# Patient Record
Sex: FEMALE | Race: WHITE | NOT HISPANIC OR LATINO | Employment: OTHER | URBAN - METROPOLITAN AREA
[De-identification: names, ages, dates, MRNs, and addresses within clinical notes are randomized per-mention and may not be internally consistent; named-entity substitution may affect disease eponyms.]

---

## 2017-04-24 ENCOUNTER — HOSPITAL ENCOUNTER (EMERGENCY)
Facility: CLINIC | Age: 57
Discharge: HOME | End: 2017-04-24
Attending: EMERGENCY MEDICINE | Admitting: EMERGENCY MEDICINE
Payer: COMMERCIAL

## 2017-04-24 ENCOUNTER — APPOINTMENT (OUTPATIENT)
Dept: GENERAL RADIOLOGY | Facility: CLINIC | Age: 57
End: 2017-04-24
Attending: EMERGENCY MEDICINE
Payer: COMMERCIAL

## 2017-04-24 VITALS
TEMPERATURE: 97.8 F | BODY MASS INDEX: 30.31 KG/M2 | HEART RATE: 60 BPM | WEIGHT: 200 LBS | RESPIRATION RATE: 18 BRPM | SYSTOLIC BLOOD PRESSURE: 148 MMHG | DIASTOLIC BLOOD PRESSURE: 92 MMHG | OXYGEN SATURATION: 98 % | HEIGHT: 68 IN

## 2017-04-24 DIAGNOSIS — R07.9 CHEST PAIN, UNSPECIFIED TYPE: ICD-10-CM

## 2017-04-24 LAB
ALBUMIN SERPL-MCNC: 4.1 G/DL (ref 3.4–5)
ALP SERPL-CCNC: 83 U/L (ref 40–150)
ALT SERPL W P-5'-P-CCNC: 36 U/L (ref 0–50)
ANION GAP SERPL CALCULATED.3IONS-SCNC: 7 MMOL/L (ref 3–14)
AST SERPL W P-5'-P-CCNC: 20 U/L (ref 0–45)
BASOPHILS # BLD AUTO: 0 10E9/L (ref 0–0.2)
BASOPHILS NFR BLD AUTO: 0.2 %
BILIRUB SERPL-MCNC: 0.3 MG/DL (ref 0.2–1.3)
BUN SERPL-MCNC: 15 MG/DL (ref 7–30)
CALCIUM SERPL-MCNC: 8.9 MG/DL (ref 8.5–10.1)
CHLORIDE SERPL-SCNC: 105 MMOL/L (ref 94–109)
CO2 SERPL-SCNC: 28 MMOL/L (ref 20–32)
CREAT SERPL-MCNC: 0.65 MG/DL (ref 0.52–1.04)
DIFFERENTIAL METHOD BLD: NORMAL
EOSINOPHIL # BLD AUTO: 0.1 10E9/L (ref 0–0.7)
EOSINOPHIL NFR BLD AUTO: 1.2 %
ERYTHROCYTE [DISTWIDTH] IN BLOOD BY AUTOMATED COUNT: 13.5 % (ref 10–15)
GFR SERPL CREATININE-BSD FRML MDRD: ABNORMAL ML/MIN/1.7M2
GLUCOSE SERPL-MCNC: 135 MG/DL (ref 70–99)
HCT VFR BLD AUTO: 41.6 % (ref 35–47)
HGB BLD-MCNC: 14.2 G/DL (ref 11.7–15.7)
IMM GRANULOCYTES # BLD: 0 10E9/L (ref 0–0.4)
IMM GRANULOCYTES NFR BLD: 0.4 %
INTERPRETATION ECG - MUSE: NORMAL
LYMPHOCYTES # BLD AUTO: 2.1 10E9/L (ref 0.8–5.3)
LYMPHOCYTES NFR BLD AUTO: 37.3 %
MCH RBC QN AUTO: 31.1 PG (ref 26.5–33)
MCHC RBC AUTO-ENTMCNC: 34.1 G/DL (ref 31.5–36.5)
MCV RBC AUTO: 91 FL (ref 78–100)
MONOCYTES # BLD AUTO: 0.3 10E9/L (ref 0–1.3)
MONOCYTES NFR BLD AUTO: 5.7 %
NEUTROPHILS # BLD AUTO: 3.1 10E9/L (ref 1.6–8.3)
NEUTROPHILS NFR BLD AUTO: 55.2 %
NRBC # BLD AUTO: 0 10*3/UL
NRBC BLD AUTO-RTO: 0 /100
PLATELET # BLD AUTO: 163 10E9/L (ref 150–450)
POTASSIUM SERPL-SCNC: 3.8 MMOL/L (ref 3.4–5.3)
PROT SERPL-MCNC: 7.6 G/DL (ref 6.8–8.8)
RBC # BLD AUTO: 4.57 10E12/L (ref 3.8–5.2)
SODIUM SERPL-SCNC: 140 MMOL/L (ref 133–144)
TROPONIN I SERPL-MCNC: NORMAL UG/L (ref 0–0.04)
TROPONIN I SERPL-MCNC: NORMAL UG/L (ref 0–0.04)
WBC # BLD AUTO: 5.6 10E9/L (ref 4–11)

## 2017-04-24 PROCEDURE — 80053 COMPREHEN METABOLIC PANEL: CPT | Performed by: EMERGENCY MEDICINE

## 2017-04-24 PROCEDURE — 93005 ELECTROCARDIOGRAM TRACING: CPT

## 2017-04-24 PROCEDURE — 71020 XR CHEST 2 VW: CPT

## 2017-04-24 PROCEDURE — 25000132 ZZH RX MED GY IP 250 OP 250 PS 637: Performed by: EMERGENCY MEDICINE

## 2017-04-24 PROCEDURE — 85025 COMPLETE CBC W/AUTO DIFF WBC: CPT | Performed by: EMERGENCY MEDICINE

## 2017-04-24 PROCEDURE — 99285 EMERGENCY DEPT VISIT HI MDM: CPT | Mod: 25

## 2017-04-24 PROCEDURE — 84484 ASSAY OF TROPONIN QUANT: CPT | Performed by: EMERGENCY MEDICINE

## 2017-04-24 RX ORDER — ASPIRIN 325 MG
325 TABLET ORAL ONCE
Status: COMPLETED | OUTPATIENT
Start: 2017-04-24 | End: 2017-04-24

## 2017-04-24 RX ADMIN — ASPIRIN 325 MG ORAL TABLET 325 MG: 325 PILL ORAL at 17:36

## 2017-04-24 ASSESSMENT — ENCOUNTER SYMPTOMS
ABDOMINAL PAIN: 0
SHORTNESS OF BREATH: 0
DIAPHORESIS: 0
NAUSEA: 0

## 2017-04-24 NOTE — ED AVS SNAPSHOT
Emergency Department    [address] [place] [address]    Phone:  [phone]    Fax:  [phone]                                       Ilene Gaviria   MRN: [id number]    Department:   Emergency Department   Date of Visit:  4/24/2017           After Visit Summary Signature Page     I have received my discharge instructions, and my questions have been answered. I have discussed any challenges I see with this plan with the nurse or doctor.    ..........................................................................................................................................  Patient/Patient Representative Signature      ..........................................................................................................................................  Patient Representative Print Name and Relationship to Patient    ..................................................               ................................................  Date                                            Time    ..........................................................................................................................................  Reviewed by Signature/Title    ...................................................              ..............................................  Date                                                            Time

## 2017-04-24 NOTE — ED PROVIDER NOTES
"  History     Chief Complaint:  Chest Pain    HPI   Ilene Gaviria is a 56 year old female with a history of untreated hypertension who presents with chest pain. 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 For the last month, she states that she has had brief episodes of dizziness and lightheadedness that worsen with position changes. She states that she has been having symptoms of intermittent chest pressure for years that sometimes will occur with activity. Has never been evaluated for this. Today, she states that she just felt different and \"aware of my heart beating\". While at work, she states that she had symptoms of indigestion with burping and nausea. In addition, she had increased feelings of lightheadedness and dizziness. After going home, she states that she laid down and became very diaphoretic and increased nausea. Because her  had a recent cardiac event and because of her family history, she was concerned and presented to the ED for evaluation. She denies current chest pressure, palpitations, chest pain, shortness of breath, leg swelling or pain, recent travel, or a history of PE/DVT or GERD. She also denies abdominal pain or diarrhea. Feeling better now. No headache, neck pain, numbness/tingling, or weakness.    Allergies:  NKDA    Medications:    The patient is currently on no regular medications.       Past Medical History:    Hypertension    Past Surgical History:     x3  Tubal ligation     Family History:  Mother: MI ( age 41)    Social History:  Relationship status:   The patient has never smoked.   The patient does not drink alcohol.        Review of Systems   Constitutional: Negative for diaphoresis.   Respiratory: Negative for shortness of breath.    Cardiovascular: Negative for chest pain and leg swelling.   Gastrointestinal: Negative for abdominal pain and nausea.   All other systems " "reviewed and are negative.      Physical Exam     Patient Vitals for the past 24 hrs:   BP Temp Temp src Pulse Heart Rate Resp SpO2 Height Weight   04/24/17 1929 (!) 148/92 - - 60 - 18 98 % - -   04/24/17 1805 (!) 150/94 - - - 57 18 96 % - -   04/24/17 1652 (!) 161/98 - - - 61 17 93 % - -   04/24/17 1554 (!) 177/101 97.8  F (36.6  C) Temporal 60 - 18 96 % 1.727 m (5' 8\") 90.7 kg (200 lb)       Physical Exam  General: Resting comfortably on the gurney  Eyes:  The pupils are equal and round    Conjunctivae and sclerae are normal  ENT:    Moist mucous membranes  Neck:  Normal range of motion  CV:  Regular rate and rhythm    Skin warm and well perfused     Radial pulses 2+ bilaterally  Resp:  Lungs are clear    Non-labored    No rales    No wheezing   GI:  Abdomen is soft, there is no rigidity    No distension    No rebound tenderness     No abdominal tenderness  MS:  Normal muscular tone    No leg swelling  Skin:  No rash or acute skin lesions noted  Neuro:   Awake, alert.      Speech is normal and fluent.    Face is symmetric.     Moves all extremities equally    Finger to nose intact  Psych:  Normal affect.  Appropriate interactions.    Emergency Department Course     ECG @ 1716  Indication: Chest pressure  Rate 59 bpm.   NC interval 204 ms.   QRS duration 100 ms.   QT/QTc 450/445 ms.   P-R-T axes 11.  Notes: Sinus bradycardia  Time read 1720      Imaging:  Radiographic findings were communicated with the patient who voiced understanding of the findings.    Chest XR, PA and LAT, per radiology:   IMPRESSION: Normal.      Laboratory:  CBC: WBC 5.6 (WNL) HGB 14.2 (WNL)  (WNL)   CMP: Cr 0.65 (WNL) Glucose 135 (H) Rest WNL  1839: Troponin I: <0.015 (WNL)    Interventions:  1736 : Aspirin, 325 mg, PO    ED Course:  The patient arrived in triage where her vitals were measured and recorded. The patient was then escorted back to the emergency department.  Nursing notes and past medical history reviewed.   I performed a " physical examination of the patient as documented above.  I explained the plan with the patient who consents to this.   An ECG was obtained.   Blood was drawn and sent to the laboratory for testing, see above results.   The patient received the above interventions.   The patient underwent the above imaging studies.   I personally reviewed the laboratory and imaging results with the Patient and answered all related questions prior to discharge.   Findings and plan explained to the Patient. Patient discharged home with instructions regarding supportive care, medications, and reasons to return. The importance of close follow-up was reviewed.     Impression & Plan      Medical Decision Making:  Ilene Gaviria is a 56 year old female who presents for evaluation of chest pain.  Vital signs wnl. Well appearing. Not having any symptoms at this time and feels quite a bit better than earlier today. Diff dx includes ACS, PE, aortic dissection, PTX, CVA/TIA, tumor, mass. EKG shows sinus bradycardia with no acute ischemic changes. Appears similar to prior EKG. Given aspirin. Most concerned about ACS given history. Does have strong family history of ACS with mother's history but patient has minimal risk factors. Has no chest pressure now. Labs obtained and troponin neg x2. Chest xray clear. No neuro deficits so suggest CVA. No shortness of breath, risk factors of PE, and unlikely given history, don't think workup needs to be done at this time for this. Unlikely aortic dissection given asymptomatic now, no severe HTN, no radiation to back and no neuro deficits. Discussed that do think stress test is indicated as next step and discussed options with her but she declines hospitalization for stress test. Strongly encouraged close follow-up and stress test. She has appt with PCP scheduled. I did order outpatient stress test for her. Also recommended daily aspirin. Reasons to return to ED were discussed with patient.        Diagnosis:    ICD-10-CM    1. Chest pain, unspecified type R07.9 Troponin I (now)     Follow-Up with Cardiologist     Exercise Stress Echocardiogram       Disposition:   Discharge to home with cardiology follow up.       I, Gayle Kinney, am serving as a scribe on 4/24/2017 at 5:05 PM to personally document services performed by Lexi Grey MD, based on my observations and the provider's statements to me.         Lexi Grey MD  04/24/17 2358

## 2017-04-24 NOTE — ED AVS SNAPSHOT
Emergency Department    [address] [facility] [address]    Phone:  [phone]    Fax:  [phone]                                       Ilene Gaviria   MRN: [id number]    Department:   Emergency Department   Date of Visit:  4/24/2017           Patient Information     Date Of Birth          1960        Your diagnoses for this visit were:     Chest pain, unspecified type        You were seen by Lexi Grey MD.      Follow-up Information     Schedule an appointment as soon as possible for a visit with Trevor Rizo MD.    Specialty:  Internal Medicine    Contact information:    [facility]  [address]  [facility] [id number] [phone]          Follow up with  Emergency Department.    Specialty:  EMERGENCY MEDICINE    Why:  If symptoms worsen    Contact information:    [address] [facility] [id number] [phone]        Schedule an appointment as soon as possible for a visit with [facility].    Contact information:    [address] [facility]  [address]  [facility] [id number] [phone]          Discharge Instructions       Start taking the aspirin daily  Get a stress test done - call heart center if you don't get a call to schedule this  Follow up with primary care provider after this     Discharge Instructions  Chest Pain    You have been seen today for chest pain or discomfort.  At this time, your doctor has found no signs that your chest pain is due to a serious or life-threatening condition, (or you have declined more testing and/or admission to the hospital). However, sometimes there is a serious problem that does not show up right away. Your evaluation today may not be complete and you may need further testing and evaluation.     You need to follow-up with your regular doctor within 3 days.    Return to the Emergency Department if:    Your chest pain changes, gets worse, starts to happen more  often, or comes with less activity.    You are short of breath.    You get very weak or tired.    You pass out or faint.    You have any new symptoms, like fever, cough, numb legs, or you cough up blood.    You have anything else that worries you.    Until you follow-up with your regular doctor please do the following:    Take one aspirin daily unless you have an allergy or are told not to by your doctor.    If a stress test appointment has been made, go to the appointment.    If you have questions, contact your regular doctor.    If your doctor today has told you to follow-up with your regular doctor, it is very important that you make an appointment with your clinic and go to the appointment.  If you do not follow-up with your primary doctor, it may result in missing an important development which could result in permanent injury or disability and/or lasting pain.  If there is any problem keeping your appointment, call your doctor or return to the Emergency Department.    If you were given a prescription for medicine here today, be sure to read all of the information (including the package insert) that comes with your prescription.  This will include important information about the medicine, its side effects, and any warnings that you need to know about.  The pharmacist who fills the prescription can provide more information and answer questions you may have about the medicine.  If you have questions or concerns that the pharmacist cannot address, please call or return to the Emergency Department.     Remember that you can always come back to the Emergency Department if you are not able to see your regular doctor in the amount of time listed above, if you get any new symptoms, or if there is anything that worries you.          Future Appointments        Provider Department Dept Phone Center    4/27/2017 1:30 PM Trevor Rizo MD [facility] [phone]       24 Hour Appointment Hotline        To make an appointment at any [facility], call [phone] ([phone]). If you don't have a family doctor or clinic, we will help you find one. [place] clinics are conveniently located to serve the needs of you and your family.          ED Discharge Orders     Exercise Stress Echocardiogram       The supervising Cardiologist may change the type of echocardiogram requested to answer a specific clinical question based on existing protocols in the Echocardiography laboratory.    Use of contrast is at the discretion of the supervising Cardiologist.            Follow-Up with Cardiologist                    Review of your medicines      Notice     You have not been prescribed any medications.            Procedures and tests performed during your visit     CBC with platelets differential    Chest XR,  PA & LAT    Comprehensive metabolic panel    EKG 12 lead    Troponin I    Troponin I (now)      Orders Needing Specimen Collection     None      Pending Results     No orders found from 4/22/2017 to 4/25/2017.            Pending Culture Results     No orders found from 4/22/2017 to 4/25/2017.            Test Results From Your Hospital Stay        4/24/2017  5:27 PM      Component Results     Component Value Ref Range & Units Status    WBC 5.6 4.0 - 11.0 10e9/L Final    RBC Count 4.57 3.8 - 5.2 10e12/L Final    Hemoglobin 14.2 11.7 - 15.7 g/dL Final    Hematocrit 41.6 35.0 - 47.0 % Final    MCV 91 78 - 100 fl Final    MCH 31.1 26.5 - 33.0 pg Final    MCHC 34.1 31.5 - 36.5 g/dL Final    RDW 13.5 10.0 - 15.0 % Final    Platelet Count 163 150 - 450 10e9/L Final    Diff Method Automated Method  Final    % Neutrophils 55.2 % Final    % Lymphocytes 37.3 % Final    % Monocytes 5.7 % Final    % Eosinophils 1.2 % Final    % Basophils 0.2 % Final    % Immature Granulocytes 0.4 % Final    Nucleated RBCs 0 0 /100 Final    Absolute Neutrophil 3.1 1.6 - 8.3 10e9/L Final    Absolute Lymphocytes 2.1 0.8 - 5.3 10e9/L Final     Absolute Monocytes 0.3 0.0 - 1.3 10e9/L Final    Absolute Eosinophils 0.1 0.0 - 0.7 10e9/L Final    Absolute Basophils 0.0 0.0 - 0.2 10e9/L Final    Abs Immature Granulocytes 0.0 0 - 0.4 10e9/L Final    Absolute Nucleated RBC 0.0  Final         4/24/2017  5:55 PM      Component Results     Component Value Ref Range & Units Status    Sodium 140 133 - 144 mmol/L Final    Potassium 3.8 3.4 - 5.3 mmol/L Final    Chloride 105 94 - 109 mmol/L Final    Carbon Dioxide 28 20 - 32 mmol/L Final    Anion Gap 7 3 - 14 mmol/L Final    Glucose 135 (H) 70 - 99 mg/dL Final    Urea Nitrogen 15 7 - 30 mg/dL Final    Creatinine 0.65 0.52 - 1.04 mg/dL Final    GFR Estimate >90  Non  GFR Calc   >60 mL/min/1.7m2 Final    GFR Estimate If Black >90   GFR Calc   >60 mL/min/1.7m2 Final    Calcium 8.9 8.5 - 10.1 mg/dL Final    Bilirubin Total 0.3 0.2 - 1.3 mg/dL Final    Albumin 4.1 3.4 - 5.0 g/dL Final    Protein Total 7.6 6.8 - 8.8 g/dL Final    Alkaline Phosphatase 83 40 - 150 U/L Final    ALT 36 0 - 50 U/L Final    AST 20 0 - 45 U/L Final         4/24/2017  5:56 PM      Component Results     Component Value Ref Range & Units Status    Troponin I ES  0.000 - 0.045 ug/L Final    <0.015  The 99th percentile for upper reference range is 0.045 ug/L.  Troponin values in   the range of 0.045 - 0.120 ug/L may be associated with risks of adverse   clinical events.           4/24/2017  6:15 PM      Narrative     XR CHEST 2 VW   4/24/2017 5:55 PM     HISTORY: Chest pain    COMPARISON: None.        Impression     IMPRESSION: Normal.    SHIVA PRIETO MD         4/24/2017  7:05 PM      Component Results     Component Value Ref Range & Units Status    Troponin I ES  0.000 - 0.045 ug/L Final    <0.015  The 99th percentile for upper reference range is 0.045 ug/L.  Troponin values in   the range of 0.045 - 0.120 ug/L may be associated with risks of adverse   clinical events.                  Clinical Quality Measure: Blood  Pressure Screening     Your blood pressure was checked while you were in the emergency department today. The last reading we obtained was  BP: (!) 150/94 . Please read the guidelines below about what these numbers mean and what you should do about them.  If your systolic blood pressure (the top number) is less than 120 and your diastolic blood pressure (the bottom number) is less than 80, then your blood pressure is normal. There is nothing more that you need to do about it.  If your systolic blood pressure (the top number) is 120-139 or your diastolic blood pressure (the bottom number) is 80-89, your blood pressure may be higher than it should be. You should have your blood pressure rechecked within a year by a primary care provider.  If your systolic blood pressure (the top number) is 140 or greater or your diastolic blood pressure (the bottom number) is 90 or greater, you may have high blood pressure. High blood pressure is treatable, but if left untreated over time it can put you at risk for heart attack, stroke, or kidney failure. You should have your blood pressure rechecked by a primary care provider within the next 4 weeks.  If your provider in the emergency department today gave you specific instructions to follow-up with your doctor or provider even sooner than that, you should follow that instruction and not wait for up to 4 weeks for your follow-up visit.        Thank you for choosing [facility]       Thank you for choosing [facility] for your care. Our goal is always to provide you with excellent care. Hearing back from our patients is one way we can continue to improve our services. Please take a few minutes to complete the written survey that you may receive in the mail after you visit with us. Thank you!        [employer] Information     [employer] lets you send messages to your doctor, view your test results, renew your prescriptions, schedule appointments and more. To sign up, go to [email or web address]  ". Click on \"Log in\" on the left side of the screen, which will take you to the Welcome page. Then click on \"Sign up Now\" on the right side of the page.     You will be asked to enter the access code listed below, as well as some personal information. Please follow the directions to create your username and password.     Your access code is: [email or web address]  Expires: 2017  7:24 PM     Your access code will  in 90 days. If you need help or a new code, please call your [facility] clinic or [phone].        Care EveryWhere ID     This is your Care EveryWhere ID. This could be used by other organizations to access your [facility] medical records  [id number]        After Visit Summary       This is your record. Keep this with you and show to your community pharmacist(s) and doctor(s) at your next visit.                  "

## 2017-04-24 NOTE — DISCHARGE INSTRUCTIONS
Start taking the aspirin daily  Get a stress test done - call heart center if you don't get a call to schedule this  Follow up with primary care provider after this     Discharge Instructions  Chest Pain    You have been seen today for chest pain or discomfort.  At this time, your doctor has found no signs that your chest pain is due to a serious or life-threatening condition, (or you have declined more testing and/or admission to the hospital). However, sometimes there is a serious problem that does not show up right away. Your evaluation today may not be complete and you may need further testing and evaluation.     You need to follow-up with your regular doctor within 3 days.    Return to the Emergency Department if:    Your chest pain changes, gets worse, starts to happen more often, or comes with less activity.    You are short of breath.    You get very weak or tired.    You pass out or faint.    You have any new symptoms, like fever, cough, numb legs, or you cough up blood.    You have anything else that worries you.    Until you follow-up with your regular doctor please do the following:    Take one aspirin daily unless you have an allergy or are told not to by your doctor.    If a stress test appointment has been made, go to the appointment.    If you have questions, contact your regular doctor.    If your doctor today has told you to follow-up with your regular doctor, it is very important that you make an appointment with your clinic and go to the appointment.  If you do not follow-up with your primary doctor, it may result in missing an important development which could result in permanent injury or disability and/or lasting pain.  If there is any problem keeping your appointment, call your doctor or return to the Emergency Department.    If you were given a prescription for medicine here today, be sure to read all of the information (including the package insert) that comes with your prescription.  This  will include important information about the medicine, its side effects, and any warnings that you need to know about.  The pharmacist who fills the prescription can provide more information and answer questions you may have about the medicine.  If you have questions or concerns that the pharmacist cannot address, please call or return to the Emergency Department.     Remember that you can always come back to the Emergency Department if you are not able to see your regular doctor in the amount of time listed above, if you get any new symptoms, or if there is anything that worries you.

## 2017-04-27 ENCOUNTER — OFFICE VISIT (OUTPATIENT)
Dept: FAMILY MEDICINE | Facility: CLINIC | Age: 57
End: 2017-04-27
Payer: COMMERCIAL

## 2017-04-27 VITALS
WEIGHT: 200 LBS | BODY MASS INDEX: 30.31 KG/M2 | DIASTOLIC BLOOD PRESSURE: 91 MMHG | OXYGEN SATURATION: 95 % | HEART RATE: 75 BPM | TEMPERATURE: 97.9 F | SYSTOLIC BLOOD PRESSURE: 125 MMHG | HEIGHT: 68 IN

## 2017-04-27 DIAGNOSIS — R03.0 ELEVATED BLOOD PRESSURE READING WITHOUT DIAGNOSIS OF HYPERTENSION: Primary | ICD-10-CM

## 2017-04-27 DIAGNOSIS — Z13.220 LIPID SCREENING: ICD-10-CM

## 2017-04-27 DIAGNOSIS — Z12.11 COLON CANCER SCREENING: ICD-10-CM

## 2017-04-27 DIAGNOSIS — E66.9 NON MORBID OBESITY, UNSPECIFIED OBESITY TYPE: ICD-10-CM

## 2017-04-27 PROCEDURE — 99203 OFFICE O/P NEW LOW 30 MIN: CPT | Performed by: INTERNAL MEDICINE

## 2017-04-27 RX ORDER — TOPIRAMATE 50 MG/1
50 TABLET, FILM COATED ORAL 2 TIMES DAILY
Qty: 60 TABLET | Refills: 1 | Status: SHIPPED | OUTPATIENT
Start: 2017-04-27 | End: 2018-01-07

## 2017-04-27 ASSESSMENT — ENCOUNTER SYMPTOMS
NUMBNESS: 0
NAUSEA: 0
LIGHT-HEADEDNESS: 0
HEADACHES: 0
ABDOMINAL PAIN: 0
DIZZINESS: 0
PALPITATIONS: 0
WEAKNESS: 0
FATIGUE: 0
SHORTNESS OF BREATH: 0
VOMITING: 0

## 2017-04-27 NOTE — PROGRESS NOTES
"  SUBJECTIVE:                                                    Ilene Gaviria is a 56 year old female who presents to clinic today for the following health issues:      ED/UC Followup:    Facility:   ED  Date of visit: 04/24/17  Reason for visit: chest pain  Current Status: patient is fine now     Patient denies having had any further episodes of chest pain since her discharge from the emergency room.  She is scheduled to have a stress test.     She states that this incident was a wake-up call for her and she is trying to eat more healthy and engage in regular exercise. She is interested in weight management.      History reviewed. No pertinent past medical history.  Pap smear, 2015, [facility], normal  Mammogram, 2015,  [facility], normal      Review of Systems   Constitutional: Negative for fatigue.   Eyes: Negative for visual disturbance.   Respiratory: Negative for shortness of breath.    Cardiovascular: Negative for chest pain, palpitations and leg swelling.   Gastrointestinal: Negative for abdominal pain, nausea and vomiting.   Neurological: Negative for dizziness, weakness, light-headedness, numbness and headaches.       BP (!) 125/91 (BP Location: Left arm, Cuff Size: Adult Large)  Pulse 75  Temp 97.9  F (36.6  C) (Tympanic)  Ht 5' 8\" (1.727 m)  Wt 200 lb (90.7 kg)  SpO2 95%  BMI 30.41 kg/m2      Physical Exam   Constitutional: She is oriented to person, place, and time. No distress.   Neck: No thyromegaly present.   Cardiovascular: Normal rate, regular rhythm and normal heart sounds.    Pulmonary/Chest: Effort normal and breath sounds normal. No respiratory distress.   Musculoskeletal: She exhibits no edema.   Neurological: She is alert and oriented to person, place, and time. Coordination normal.   Nursing note and vitals reviewed.          ICD-10-CM    1. Elevated blood pressure reading without diagnosis of hypertension R03.0 Blood Pressure Monitoring KIT   2. Non morbid obesity, " unspecified obesity type E66.9 topiramate (TOPAMAX) 50 MG tablet   3. BMI 30.0-30.9,adult Z68.30 topiramate (TOPAMAX) 50 MG tablet   4. Colon cancer screening Z12.11 Fecal colorectal cancer screen FIT   5. Lipid screening Z13.220 Lipid panel reflex to direct LDL         Patient Instructions   Proceed with your stress test.    Monitor your blood pressure twice a day (once you wake up and before bedtime).  Call doctor if:  -- your blood pressure for the top/upper number is greater than 140 or less than 90  -- your blood pressure for the bottom/lower number is greater than 90 or less than 60    Maintain the following meal plan:    Entree - 200 calories per serving  Shake - 150 calories per serving  Fruit or Vegetable - 100 calories per serving    Breakfast - Entree + Fruit or Vegetable  Snack - Shake   Lunch - Entree + Fruit or Vegetable  Snack - Shake   Dinner - Entree + Fruit or Vegetable  Snack - Shake       Call doctor if you develop any side effects from the Topamax.    Follow up in 1 month.      *30 minutes was spent with the patient, more than half of which was spent on counseling on controlling risk factors for heart disease and on weight management

## 2017-04-27 NOTE — PATIENT INSTRUCTIONS
Proceed with your stress test.    Monitor your blood pressure twice a day (once you wake up and before bedtime).  Call doctor if:  -- your blood pressure for the top/upper number is greater than 140 or less than 90  -- your blood pressure for the bottom/lower number is greater than 90 or less than 60    Maintain the following meal plan:    Entree - 200 calories per serving  Shake - 150 calories per serving  Fruit or Vegetable - 100 calories per serving    Breakfast - Entree + Fruit or Vegetable  Snack - Shake   Lunch - Entree + Fruit or Vegetable  Snack - Shake   Dinner - Entree + Fruit or Vegetable  Snack - Shake       Call doctor if you develop any side effects from the Topamax.    Follow up in 1 month.

## 2017-04-27 NOTE — NURSING NOTE
"Chief Complaint   Patient presents with     Establish Care       Initial BP (!) 125/91 (BP Location: Left arm, Cuff Size: Adult Large)  Pulse 75  Temp 97.9  F (36.6  C) (Tympanic)  Ht 5' 8\" (1.727 m)  Wt 200 lb (90.7 kg)  SpO2 95%  BMI 30.41 kg/m2 Estimated body mass index is 30.41 kg/(m^2) as calculated from the following:    Height as of this encounter: 5' 8\" (1.727 m).    Weight as of this encounter: 200 lb (90.7 kg).  Medication Reconciliation: complete   Kennedi Luis MA    "

## 2017-04-27 NOTE — MR AVS SNAPSHOT
After Visit Summary   4/27/2017    Ilene Gaviria    MRN: [id number]           Patient Information     Date Of Birth          1960        Visit Information        Provider Department      4/27/2017 1:30 PM Trevor Rizo MD [facility]        Today's Diagnoses     Colon cancer screening    -  1    Lipid screening        BMI 30.0-30.9,adult        Elevated blood pressure reading without diagnosis of hypertension          Care Instructions    Proceed with your stress test.    Monitor your blood pressure twice a day (once you wake up and before bedtime).  Call doctor if:  -- your blood pressure for the top/upper number is greater than 140 or less than 90  -- your blood pressure for the bottom/lower number is greater than 90 or less than 60    Maintain the following meal plan:    Entree - 200 calories per serving  Shake - 150 calories per serving  Fruit or Vegetable - 100 calories per serving    Breakfast - Entree + Fruit or Vegetable  Snack - Shake   Lunch - Entree + Fruit or Vegetable  Snack - Shake   Dinner - Entree + Fruit or Vegetable  Snack - Shake       Call doctor if you develop any side effects from the Topamax.    Follow up in 1 month.            Follow-ups after your visit        Your next 10 appointments already scheduled     May 02, 2017  3:00 PM CDT   Ech Stress Test with [id number]   [facility] CV Echocardiography ([facility] at [facility])    [address] [place] [id number] [phone]           1.  Please bring or wear a comfortable two-piece outfit and walking shoes. 2.  Stop eating 3 hours before the test. You may drink water or juice. 3.  Stop all caffeine 12 hours before the test. This includes coffee, tea, soda pop, chocolate and certain medicines (such as Anacin and Excederin). Also avoid decaf coffee and tea, as these contain small amounts of caffeine. 4.  No alcohol, smoking or use of  other tobacco products for 12 hours before the test. 5.  Refer to your provider instructions to see if you need to stop any medications (such as beta-blockers or nitrates) for this test. 6.  For patients with diabetes: -   If you take insulin, call your diabetes care team. Ask if you should take a   dose the morning of your test. -   If you take diabetes medicine by mouth, don't take it on the morning of your test. Bring it with you to take after the test.  (If you have questions, call your diabetes care team) 7.  When you arrive, please tell us if: -   You have diabetes. -   You have taken Viagra, Cialis or Levitra in the past 48 hours. 8.  For any questions that cannot be answered, please contact the ordering physician              Future tests that were ordered for you today     Open Future Orders        Priority Expected Expires Ordered    Lipid panel reflex to direct LDL Routine  4/27/2018 4/27/2017    Fecal colorectal cancer screen FIT Routine 5/18/2017 7/20/2017 4/27/2017            Who to contact     If you have questions or need follow up information about today's clinic visit or your schedule please contact [facility] directly at [phone].  Normal or non-critical lab and imaging results will be communicated to you by MyChart, letter or phone within 4 business days after the clinic has received the results. If you do not hear from us within 7 days, please contact the clinic through [employer] or phone. If you have a critical or abnormal lab result, we will notify you by phone as soon as possible.  Submit refill requests through [employer] or call your pharmacy and they will forward the refill request to us. Please allow 3 business days for your refill to be completed.          Additional Information About Your Visit        [employer] Information     [employer] lets you send messages to your doctor, view your test results, renew your prescriptions, schedule appointments and more. To sign up, go to  [email or web address] . Click on \"Log in\" on the left side of the screen, which will take you to the Welcome page. Then click on \"Sign up Now\" on the right side of the page.     You will be asked to enter the access code listed below, as well as some personal information. Please follow the directions to create your username and password.     Your access code is: [email or web address]  Expires: 2017  7:24 PM     Your access code will  in 90 days. If you need help or a new code, please call your [place] clinic or [phone].        Care EveryWhere ID     This is your Care EveryWhere ID. This could be used by other organizations to access your [facility] medical records  [id number]        Your Vitals Were     Pulse Temperature Height Pulse Oximetry BMI (Body Mass Index)       75 97.9  F (36.6  C) (Tympanic) 5' 8\" (1.727 m) 95% 30.41 kg/m2        Blood Pressure from Last 3 Encounters:   17 (!) 125/91   17 (!) 148/92    Weight from Last 3 Encounters:   17 200 lb (90.7 kg)   17 200 lb (90.7 kg)                 Today's Medication Changes          These changes are accurate as of: 17  2:00 PM.  If you have any questions, ask your nurse or doctor.               Start taking these medicines.        Dose/Directions    Blood Pressure Monitoring Kit   Used for:  Elevated blood pressure reading without diagnosis of hypertension   Started by:  Trevor Rizo MD        Use as directed   Quantity:  1 kit   Refills:  0       topiramate 50 MG tablet   Commonly known as:  TOPAMAX   Used for:  BMI 30.0-30.9,adult   Started by:  Trevor Rizo MD        Dose:  50 mg   Take 1 tablet (50 mg) by mouth 2 times daily Start with half a tablet twice a day for the first week   Quantity:  60 tablet   Refills:  1            Where to get your medicines      These medications were sent to [facility], [place] - [address], [address]  [address], [address], [facility] [address]     Phone:  [phone]     topiramate 50 MG tablet         Some of these will need a paper prescription and others can be bought over the counter.  Ask your nurse if you have questions.     Bring a paper prescription for each of these medications     Blood Pressure Monitoring Kit                Primary Care Provider Office Phone # Fax #    Trevor Tez Rizo -[phone]       [facility] [address]  [facility] [address]        Thank you!     Thank you for choosing [facility]  for your care. Our goal is always to provide you with excellent care. Hearing back from our patients is one way we can continue to improve our services. Please take a few minutes to complete the written survey that you may receive in the mail after your visit with us. Thank you!             Your Updated Medication List - Protect others around you: Learn how to safely use, store and throw away your medicines at www.disposemymeds.org.          This list is accurate as of: 4/27/17  2:00 PM.  Always use your most recent med list.                   Brand Name Dispense Instructions for use    Blood Pressure Monitoring Kit     1 kit    Use as directed       topiramate 50 MG tablet    TOPAMAX    60 tablet    Take 1 tablet (50 mg) by mouth 2 times daily Start with half a tablet twice a day for the first week

## 2017-05-02 ENCOUNTER — HOSPITAL ENCOUNTER (OUTPATIENT)
Dept: CARDIOLOGY | Facility: CLINIC | Age: 57
Discharge: HOME | End: 2017-05-02
Attending: EMERGENCY MEDICINE | Admitting: EMERGENCY MEDICINE
Payer: COMMERCIAL

## 2017-05-02 DIAGNOSIS — R07.9 CHEST PAIN, UNSPECIFIED TYPE: ICD-10-CM

## 2017-05-02 PROCEDURE — 93016 CV STRESS TEST SUPVJ ONLY: CPT | Performed by: INTERNAL MEDICINE

## 2017-05-02 PROCEDURE — 93350 STRESS TTE ONLY: CPT | Mod: 26 | Performed by: INTERNAL MEDICINE

## 2017-05-02 PROCEDURE — 93018 CV STRESS TEST I&R ONLY: CPT | Performed by: INTERNAL MEDICINE

## 2017-05-02 PROCEDURE — 93321 DOPPLER ECHO F-UP/LMTD STD: CPT | Mod: 26 | Performed by: INTERNAL MEDICINE

## 2017-05-02 PROCEDURE — 93325 DOPPLER ECHO COLOR FLOW MAPG: CPT | Mod: 26 | Performed by: INTERNAL MEDICINE

## 2017-05-02 PROCEDURE — 93325 DOPPLER ECHO COLOR FLOW MAPG: CPT | Mod: TC

## 2017-08-17 DIAGNOSIS — Z53.9 ERRONEOUS ENCOUNTER--DISREGARD: Primary | ICD-10-CM

## 2018-01-04 ENCOUNTER — TELEPHONE (OUTPATIENT)
Dept: FAMILY MEDICINE | Facility: CLINIC | Age: 58
End: 2018-01-04

## 2018-01-04 NOTE — TELEPHONE ENCOUNTER
1/4/2018    Call Regarding Preventive Health Screening Colonoscopy    Attempt 1    Message on voicemail    Comments:       Outreach   Roslyn Li

## 2018-01-07 ENCOUNTER — HOSPITAL ENCOUNTER (EMERGENCY)
Facility: CLINIC | Age: 58
Discharge: HOME | End: 2018-01-07
Attending: NURSE PRACTITIONER | Admitting: NURSE PRACTITIONER
Payer: COMMERCIAL

## 2018-01-07 VITALS
TEMPERATURE: 99.3 F | RESPIRATION RATE: 18 BRPM | DIASTOLIC BLOOD PRESSURE: 95 MMHG | HEIGHT: 68 IN | OXYGEN SATURATION: 97 % | SYSTOLIC BLOOD PRESSURE: 146 MMHG | WEIGHT: 200 LBS | HEART RATE: 97 BPM | BODY MASS INDEX: 30.31 KG/M2

## 2018-01-07 DIAGNOSIS — J06.9 UPPER RESPIRATORY TRACT INFECTION, UNSPECIFIED TYPE: ICD-10-CM

## 2018-01-07 PROCEDURE — 99282 EMERGENCY DEPT VISIT SF MDM: CPT

## 2018-01-07 ASSESSMENT — ENCOUNTER SYMPTOMS
NAUSEA: 0
CHILLS: 1
COUGH: 1
DIAPHORESIS: 1
SHORTNESS OF BREATH: 1
DIARRHEA: 0
FEVER: 0
WHEEZING: 0
VOMITING: 0

## 2018-01-07 NOTE — DISCHARGE INSTRUCTIONS

## 2018-01-07 NOTE — ED PROVIDER NOTES
"  History     Chief Complaint:  Cold Symptoms      HPI   Ilene Gaviria is a 57 year old female who presents to the emergency department for evaluation of cold symptoms. The patient states that in the past her colds tend to move into her lungs and gives her trouble breathing. Today the patient complains of a \"croupy cough that has been getting sharper and heavier\". She also notes that she has been having episodes of sweating and chills as well as some shortness of breath. She denies any fevers, vomiting, nausea or diarrhea. She wants to know if there is something that will make her feel better faster.    Allergies:  No known Drug Allergies      Medications:    Blood Pressure Monitoring KIT    Past Medical History:    History reviewed. No pertinent past medical history.    Past Surgical History:    GYN surgery    Family History:    History reviewed. No pertinent family history.     Social History:  Marital Status:   [2]  Social History   Substance Use Topics     Smoking status: Never Smoker     Smokeless tobacco: Never Used     Alcohol use No        Review of Systems   Constitutional: Positive for chills and diaphoresis. Negative for fever.   Respiratory: Positive for cough and shortness of breath. Negative for wheezing.    Gastrointestinal: Negative for diarrhea, nausea and vomiting.   All other systems reviewed and are negative.        Physical Exam     Patient Vitals for the past 24 hrs:   BP Temp Temp src Pulse Heart Rate Resp SpO2 Height Weight   01/07/18 1645 (!) 146/95 - - - - - - - -   01/07/18 1621 (!) 149/108 99.3  F (37.4  C) Oral 97 99 18 97 % 1.727 m (5' 8\") 90.7 kg (200 lb)           Physical Exam  General: Alert, No obvious discomfort, well kept  Eyes: PERRL, conjunctivae pink no scleral icterus or conjunctival injection  ENT:   Moist mucus membranes, posterior oropharynx clear without erythema or exudates, No lymphadenopathy, Normal voice  Resp:  Lungs clear to auscultation bilaterally, no " crackles/rubs/wheezes. Good air movement. Occasional coarse cough  CV:  Normal rate and rhythm, no murmurs/rubs/gallops  GI:  Abdomen soft and non-distended.  Normoactive BS.  No tenderness, guarding or rebound, No masses  Skin:  Warm, dry.  No rashes or petechiae  Musculoskeletal: No peripheral edema or calf tenderness, Normal gross ROM   Neuro: Alert and oriented to person/place/time, normal sensation  Psychiatric: Normal affect, cooperative, good eye contact      Emergency Department Course   Emergency Department Course:  Nursing notes and vitals reviewed.  I performed an exam of the patient as documented above.   I discussed the treatment plan with the patient. They expressed understanding of this plan and consented to discharge. They will be discharged home with instructions for care and follow up. In addition, the patient will return to the emergency department if their symptoms persist, worsen, if new symptoms arise or if there is any concern.  All questions were answered.     I personally reviewed the physical exam results with the patient and answered all related questions prior to discharge.  Impression & Plan      Medical Decision Making:  Ilene Gaviria is a 57 year old female presents for evaluation of upper respiratory symptoms. Patient is concerned for getting to work this week and wanted to be evaluated for her cold symptoms. Evaluation consisted of Physical exam. Non specific viral findings. Exam consistent with viral illness. No evidence of sepsis. No meningismus. Xray not indicated. Discharged with advice for symptomatic treatment including over the counter medication such as Tylenol and Ibuprofen.  Advised to follow up with primary care provider in 3-5 days if continued symptoms, immediately if worse. Patient will return to the ER/UR if they develop high fevers not controlled with medication, difficulty breathing, shortness of breath, or has other concerns.         Diagnosis:    ICD-10-CM     1. Upper respiratory tract infection, unspecified type J06.9      Disposition:   Discharged    Scribe Disclosure:  I, Lakhwinder Mcgregorabel, am serving as a scribe at 4:46 PM on 1/7/2018 to document services personally performed by Rj Mitchell APRN, based on my observations and the provider's statements to me.       EMERGENCY DEPARTMENT       Rj Mitchell APRN CNP  01/07/18 170

## 2018-01-07 NOTE — ED AVS SNAPSHOT
Emergency Department    [address] [place] [address]    Phone:  [phone]    Fax:  [phone]                                       Ilene Gaviria   MRN: [id number]    Department:   Emergency Department   Date of Visit:  1/7/2018           After Visit Summary Signature Page     I have received my discharge instructions, and my questions have been answered. I have discussed any challenges I see with this plan with the nurse or doctor.    ..........................................................................................................................................  Patient/Patient Representative Signature      ..........................................................................................................................................  Patient Representative Print Name and Relationship to Patient    ..................................................               ................................................  Date                                            Time    ..........................................................................................................................................  Reviewed by Signature/Title    ...................................................              ..............................................  Date                                                            Time

## 2018-01-07 NOTE — ED AVS SNAPSHOT
Emergency Department    [address] [facility] [address]    Phone:  [phone]    Fax:  [phone]                                       Ilene Gaviria   MRN: [id number]    Department:   Emergency Department   Date of Visit:  1/7/2018           Patient Information     Date Of Birth          1960        Your diagnoses for this visit were:     Upper respiratory tract infection, unspecified type        You were seen by Rj Mitchell, RENETTA CNP.      Follow-up Information     Follow up with Trevor Rizo MD In 1 week.    Specialty:  Internal Medicine    Why:  if continuned symptoms or sooner if worsening    Contact information:    [address] [address]  [phone]          Discharge Instructions         Viral Upper Respiratory Illness (Adult)  You have a viral upper respiratory illness (URI), which is another term for the common cold. This illness is contagious during the first few days. It is spread through the air by coughing and sneezing. It may also be spread by direct contact (touching the sick person and then touching your own eyes, nose, or mouth). Frequent handwashing will decrease risk of spread. Most viral illnesses go away within 7 to 10 days with rest and simple home remedies. Sometimes the illness may last for several weeks. Antibiotics will not kill a virus, and they are generally not prescribed for this condition.    Home care    If symptoms are severe, rest at home for the first 2 to 3 days. When you resume activity, don't let yourself get too tired.    Avoid being exposed to cigarette smoke (yours or others ).    You may use acetaminophen or ibuprofen to control pain and fever, unless another medicine was prescribed. (Note: If you have chronic liver or kidney disease, have ever had a stomach ulcer or gastrointestinal bleeding, or are taking blood-thinning medicines, talk with your healthcare provider before using these  medicines.) Aspirin should never be given to anyone under 18 years of age who is ill with a viral infection or fever. It may cause severe liver or brain damage.    Your appetite may be poor, so a light diet is fine. Avoid dehydration by drinking 6 to 8 glasses of fluids per day (water, soft drinks, juices, tea, or soup). Extra fluids will help loosen secretions in the nose and lungs.    Over-the-counter cold medicines will not shorten the length of time you re sick, but they may be helpful for the following symptoms: cough, sore throat, and nasal and sinus congestion. (Note: Do not use decongestants if you have high blood pressure.)  Follow-up care  Follow up with your healthcare provider, or as advised.  When to seek medical advice  Call your healthcare provider right away if any of these occur:    Cough with lots of colored sputum (mucus)    Severe headache; face, neck, or ear pain    Difficulty swallowing due to throat pain    Fever of 100.4 F (38 C)  Call 911, or get immediate medical care  Call emergency services right away if any of these occur:    Chest pain, shortness of breath, wheezing, or difficulty breathing    Coughing up blood    Inability to swallow due to throat pain  Date Last Reviewed: 9/13/2015 2000-2017 [employer]. [address] [place] [address], [place] [address]. All rights reserved. This information is not intended as a substitute for professional medical care. Always follow your healthcare professional's instructions.          24 Hour Appointment Hotline       To make an appointment at any [facility], call [phone] ([phone]). If you don't have a family doctor or clinic, we will help you find one. [facility] clinics are conveniently located to serve the needs of you and your family.             Review of your medicines      Our records show that you are taking the medicines listed below. If these are incorrect, please call your family doctor or clinic.        Dose /  Directions Last dose taken    Blood Pressure Monitoring Kit   Quantity:  1 kit        Use as directed   Refills:  0                Orders Needing Specimen Collection     None      Pending Results     No orders found from 1/5/2018 to 1/8/2018.            Pending Culture Results     No orders found from 1/5/2018 to 1/8/2018.            Pending Results Instructions     If you had any lab results that were not finalized at the time of your Discharge, you can call the ED Lab Result RN at [phone]. You will be contacted by this team for any positive Lab results or changes in treatment. The nurses are available 7 days a week from 10A to 6:30P.  You can leave a message 24 hours per day and they will return your call.        Test Results From Your Hospital Stay               Clinical Quality Measure: Blood Pressure Screening     Your blood pressure was checked while you were in the emergency department today. The last reading we obtained was  BP: (!) 149/108 (Simultaneous filing. User may not have seen previous data.) . Please read the guidelines below about what these numbers mean and what you should do about them.  If your systolic blood pressure (the top number) is less than 120 and your diastolic blood pressure (the bottom number) is less than 80, then your blood pressure is normal. There is nothing more that you need to do about it.  If your systolic blood pressure (the top number) is 120-139 or your diastolic blood pressure (the bottom number) is 80-89, your blood pressure may be higher than it should be. You should have your blood pressure rechecked within a year by a primary care provider.  If your systolic blood pressure (the top number) is 140 or greater or your diastolic blood pressure (the bottom number) is 90 or greater, you may have high blood pressure. High blood pressure is treatable, but if left untreated over time it can put you at risk for heart attack, stroke, or kidney failure. You should have your  "blood pressure rechecked by a primary care provider within the next 4 weeks.  If your provider in the emergency department today gave you specific instructions to follow-up with your doctor or provider even sooner than that, you should follow that instruction and not wait for up to 4 weeks for your follow-up visit.        Thank you for choosing [facility]       Thank you for choosing [facility] for your care. Our goal is always to provide you with excellent care. Hearing back from our patients is one way we can continue to improve our services. Please take a few minutes to complete the written survey that you may receive in the mail after you visit with us. Thank you!        [facility]ktghar[facility]akarouler Information     [employer] lets you send messages to your doctor, view your test results, renew your prescriptions, schedule appointments and more. To sign up, go to [email or web address] . Click on \"Log in\" on the left side of the screen, which will take you to the Welcome page. Then click on \"Sign up Now\" on the right side of the page.     You will be asked to enter the access code listed below, as well as some personal information. Please follow the directions to create your username and password.     Your access code is: [email or web address]  Expires: 2018  4:42 PM     Your access code will  in 90 days. If you need help or a new code, please call your [facility] clinic or [phone].        Care EveryWhere ID     This is your Care EveryWhere ID. This could be used by other organizations to access your [facility] medical records  [id number]        Equal Access to Services     SURESH HENAO : Hadii yesenia nicole hadasho Soomaali, waaxda luqadaha, qaybta kaalmada adeegyada, abiodun morales . So [facility] [phone].    ATENCIÓN: Si habla español, tiene a worley disposición servicios gratuitos de asistencia lingüística. Llame al [phone].    We comply with applicable federal civil rights laws and [place] laws. We do not " discriminate on the basis of race, color, national origin, age, disability, sex, sexual orientation, or gender identity.            After Visit Summary       This is your record. Keep this with you and show to your community pharmacist(s) and doctor(s) at your next visit.

## 2018-01-11 NOTE — TELEPHONE ENCOUNTER
1/11/2018    Call Regarding Preventive Health Screening Colonoscopy and Mammogram    Attempt 2    Message on voicemail     Comments:           Outreach   AT

## 2018-02-02 NOTE — TELEPHONE ENCOUNTER
2/2/2018    Call Regarding Preventive Health Screening Colonoscopy and Mammogram    Attempt 3    Message on voicemail    Comments:       Outreach   Roslyn Li

## 2018-04-12 ENCOUNTER — TRANSFERRED RECORDS (OUTPATIENT)
Dept: HEALTH INFORMATION MANAGEMENT | Facility: CLINIC | Age: 58
End: 2018-04-12

## 2018-04-18 ENCOUNTER — TRANSFERRED RECORDS (OUTPATIENT)
Dept: HEALTH INFORMATION MANAGEMENT | Facility: CLINIC | Age: 58
End: 2018-04-18

## 2018-06-05 RX ORDER — LIDOCAINE 40 MG/G
CREAM TOPICAL
Status: CANCELLED | OUTPATIENT
Start: 2018-06-05

## 2018-06-05 RX ORDER — ONDANSETRON 2 MG/ML
4 INJECTION INTRAMUSCULAR; INTRAVENOUS
Status: CANCELLED | OUTPATIENT
Start: 2018-06-05

## 2018-06-13 ENCOUNTER — TRANSFERRED RECORDS (OUTPATIENT)
Dept: HEALTH INFORMATION MANAGEMENT | Facility: CLINIC | Age: 58
End: 2018-06-13

## 2018-07-09 ENCOUNTER — SURGERY (OUTPATIENT)
Age: 58
End: 2018-07-09

## 2018-07-09 ENCOUNTER — HOSPITAL ENCOUNTER (OUTPATIENT)
Facility: CLINIC | Age: 58
Discharge: HOME | End: 2018-07-09
Attending: COLON & RECTAL SURGERY | Admitting: COLON & RECTAL SURGERY
Payer: COMMERCIAL

## 2018-07-09 VITALS
HEIGHT: 68 IN | SYSTOLIC BLOOD PRESSURE: 126 MMHG | BODY MASS INDEX: 29.7 KG/M2 | DIASTOLIC BLOOD PRESSURE: 95 MMHG | OXYGEN SATURATION: 91 % | RESPIRATION RATE: 24 BRPM | WEIGHT: 196 LBS

## 2018-07-09 LAB — FLEXIBLE SIGMOIDOSCOPY: NORMAL

## 2018-07-09 PROCEDURE — 25000128 H RX IP 250 OP 636: Performed by: COLON & RECTAL SURGERY

## 2018-07-09 PROCEDURE — G0500 MOD SEDAT ENDO SERVICE >5YRS: HCPCS | Performed by: COLON & RECTAL SURGERY

## 2018-07-09 PROCEDURE — 45330 DIAGNOSTIC SIGMOIDOSCOPY: CPT | Performed by: COLON & RECTAL SURGERY

## 2018-07-09 RX ORDER — FENTANYL CITRATE 50 UG/ML
INJECTION, SOLUTION INTRAMUSCULAR; INTRAVENOUS PRN
Status: DISCONTINUED | OUTPATIENT
Start: 2018-07-09 | End: 2018-07-09 | Stop reason: HOSPADM

## 2018-07-09 RX ADMIN — FENTANYL CITRATE 100 MCG: 50 INJECTION, SOLUTION INTRAMUSCULAR; INTRAVENOUS at 10:17

## 2018-07-09 RX ADMIN — MIDAZOLAM 1 MG: 1 INJECTION INTRAMUSCULAR; INTRAVENOUS at 10:17

## 2018-07-09 NOTE — H&P
"Pre-Endoscopy History and Physical     Ilene Gaviria MRN# [id number]   YOB: 1960 Age: 57 year old     Date of Procedure: 7/9/2018  Primary care provider: No Ref-Primary, Physician  Type of Endoscopy: flex sig  Reason for Procedure: pre op exam  Type of Anesthesia Anticipated: Moderate Sedation    HPI:    Ilene is a 57 year old female who will be undergoing the above procedure.      A history and physical has been performed. The patient's medications and allergies have been reviewed. The risks and benefits of the procedure including the risk of bleeding, perforation, and missed lesions as well as the sedation options and risks were discussed with the patient.  All questions were answered and informed consent was obtained.      No Known Allergies     Current Facility-Administered Medications   Medication     fentaNYL (PF) (SUBLIMAZE) injection     midazolam (VERSED) injection       Prescriptions Prior to Admission   Medication Sig Dispense Refill Last Dose     Blood Pressure Monitoring KIT Use as directed 1 kit 0        Patient Active Problem List   Diagnosis     BMI 30.0-30.9,adult        Past Medical History:   Diagnosis Date     Cancer (H)     rectal cancer        Past Surgical History:   Procedure Laterality Date     GYN SURGERY      c section X3, tubal        Social History   Substance Use Topics     Smoking status: Never Smoker     Smokeless tobacco: Never Used     Alcohol use Yes      Comment: rare       History reviewed. No pertinent family history.      PHYSICAL EXAM:   BP (!) 166/94  Resp 16  Ht 1.727 m (5' 8\")  Wt 88.9 kg (196 lb)  SpO2 97%  BMI 29.8 kg/m2 Estimated body mass index is 29.8 kg/(m^2) as calculated from the following:    Height as of this encounter: 1.727 m (5' 8\").    Weight as of this encounter: 88.9 kg (196 lb).   Mental status - alert and oriented  RESP: lungs clear  CV: RRR  AIRWAY EXAM: Mallampatti Class II (visualization of the soft palate, fauces, and " abril)    IMPRESSION   ASA Class 2 - Mild systemic disease      Signed Electronically by: Jonny Finney  July 9, 2018    Colorectal Surgery  [phone] (office)  [phone] (pager)  [email or web address]

## 2018-07-26 ENCOUNTER — OFFICE VISIT (OUTPATIENT)
Dept: FAMILY MEDICINE | Facility: CLINIC | Age: 58
End: 2018-07-26
Payer: COMMERCIAL

## 2018-07-26 VITALS
BODY MASS INDEX: 30.46 KG/M2 | TEMPERATURE: 98 F | HEART RATE: 67 BPM | HEIGHT: 68 IN | DIASTOLIC BLOOD PRESSURE: 89 MMHG | SYSTOLIC BLOOD PRESSURE: 128 MMHG | OXYGEN SATURATION: 97 % | WEIGHT: 201 LBS

## 2018-07-26 DIAGNOSIS — C20 RECTAL CANCER (H): ICD-10-CM

## 2018-07-26 DIAGNOSIS — Z01.818 PREOP GENERAL PHYSICAL EXAM: Primary | ICD-10-CM

## 2018-07-26 LAB
CREAT SERPL-MCNC: 0.6 MG/DL (ref 0.52–1.04)
GFR SERPL CREATININE-BSD FRML MDRD: >90 ML/MIN/1.7M2
HGB BLD-MCNC: 13 G/DL (ref 11.7–15.7)
POTASSIUM SERPL-SCNC: 4.2 MMOL/L (ref 3.4–5.3)

## 2018-07-26 PROCEDURE — 82565 ASSAY OF CREATININE: CPT | Performed by: NURSE PRACTITIONER

## 2018-07-26 PROCEDURE — 85018 HEMOGLOBIN: CPT | Performed by: NURSE PRACTITIONER

## 2018-07-26 PROCEDURE — 84132 ASSAY OF SERUM POTASSIUM: CPT | Performed by: NURSE PRACTITIONER

## 2018-07-26 PROCEDURE — 99214 OFFICE O/P EST MOD 30 MIN: CPT | Performed by: NURSE PRACTITIONER

## 2018-07-26 PROCEDURE — 93000 ELECTROCARDIOGRAM COMPLETE: CPT | Performed by: NURSE PRACTITIONER

## 2018-07-26 PROCEDURE — 36415 COLL VENOUS BLD VENIPUNCTURE: CPT | Performed by: NURSE PRACTITIONER

## 2018-07-26 NOTE — MR AVS SNAPSHOT
After Visit Summary   7/26/2018    Ilene Gaviria    MRN: [id number]           Patient Information     Date Of Birth          1960        Visit Information        Provider Department      7/26/2018 8:30 AM Oles[facility] Sands APR[facility]        Today's Diagnoses     Preop general physical exam    -  1    Rectal cancer (H)          Care Instructions      Before Your Surgery      Call your surgeon if there is any change in your health. This includes signs of a cold or flu (such as a sore throat, runny nose, cough, rash or fever).    Do not smoke, drink alcohol or take over the counter medicine (unless your surgeon or primary care doctor tells you to) for the 24 hours before and after surgery.    If you take prescribed drugs: Follow your doctor s orders about which medicines to take and which to stop until after surgery.    Eating and drinking prior to surgery: follow the instructions from your surgeon    Take a shower or bath the night before surgery. Use the soap your surgeon gave you to gently clean your skin. If you do not have soap from your surgeon, use your regular soap. Do not shave or scrub the surgery site.  Wear clean pajamas and have clean sheets on your bed.           Follow-ups after your visit        Your next 10 appointments already scheduled     Aug 02, 2018 10:00 AM CDT   Consult HOD with  Ostomy Wound Room 743, RN   [facility] Wound Ostomy ([facility])    [address]  [facility] [address] [phone]            Aug 07, 2018   Procedure with Jonny Finney MD   [facility] PeriOP Services (--)    [address]  [facility] [address] [phone]              Who to contact     If you have questions or need follow up information about today's clinic visit or your schedule please contact [facility] directly at [phone].  Normal or non-critical lab and imaging results will be  "communicated to you by MyChart, letter or phone within 4 business days after the clinic has received the results. If you do not hear from us within 7 days, please contact the clinic through MyChart or phone. If you have a critical or abnormal lab result, we will notify you by phone as soon as possible.  Submit refill requests through [employer] or call your pharmacy and they will forward the refill request to us. Please allow 3 business days for your refill to be completed.          Additional Information About Your Visit        Care EveryWhere ID     This is your Care EveryWhere ID. This could be used by other organizations to access your [facility] medical records  [id number]        Your Vitals Were     Pulse Temperature Height Pulse Oximetry BMI (Body Mass Index)       67 98  F (36.7  C) (Tympanic) 5' 8\" (1.727 m) 97% 30.56 kg/m2        Blood Pressure from Last 3 Encounters:   07/26/18 128/89   07/09/18 (!) 126/95   01/07/18 (!) 146/95    Weight from Last 3 Encounters:   07/26/18 201 lb (91.2 kg)   07/09/18 196 lb (88.9 kg)   01/07/18 200 lb (90.7 kg)              We Performed the Following     Creatinine     EKG 12-lead complete w/read - Clinics     Hemoglobin     Potassium        Primary Care Provider Office Phone # Fax #    Radha Dick [facility] [phone] [address]  [facility] [address]        Equal Access to Services     [facility]ASH[facility] : Hadii aad ku hadasho Soomaali, waaxda luqadaha, qaybta kaalmada adeegyada, abiodun morales . So [facility] [phone].    ATENCIÓN: Si habla español, tiene a worley disposición servicios gratuitos de asistencia lingüística. Llame al [phone].    We comply with applicable federal civil rights laws and [place] laws. We do not discriminate on the basis of race, color, national origin, age, disability, sex, sexual orientation, or gender identity.            Thank you!     Thank you for choosing [facility]  for your " care. Our goal is always to provide you with excellent care. Hearing back from our patients is one way we can continue to improve our services. Please take a few minutes to complete the written survey that you may receive in the mail after your visit with us. Thank you!             Your Updated Medication List - Protect others around you: Learn how to safely use, store and throw away your medicines at www.disposemymeds.org.      Notice  As of 7/26/2018  6:04 PM    You have not been prescribed any medications.

## 2018-07-26 NOTE — LETTER
[place] [address] [facility]  [address]  [place]  [address]  Tel: [phone]    July 26, 2018    Ilene Gaviria  [address] [place] [address]   [place] [address]        Dear Tom Solis  Your preop labs look great! Best of luck on your surgery!    If you have any further questions or problems, please contact our office.      Sincerely,    Olesya Sands NP/ Beatriz Aguero CMA  Results for orders placed or performed in visit on 07/26/18   Creatinine   Result Value Ref Range    Creatinine 0.60 0.52 - 1.04 mg/dL    GFR Estimate >90 >60 mL/min/1.7m2    GFR Estimate If Black >90 >60 mL/min/1.7m2   Hemoglobin   Result Value Ref Range    Hemoglobin 13.0 11.7 - 15.7 g/dL   Potassium   Result Value Ref Range    Potassium 4.2 3.4 - 5.3 mmol/L               Enclosure: Lab Results

## 2018-07-26 NOTE — PROGRESS NOTES
[address] [address]  [phone]  Dept: [phone]    PRE-OP EVALUATION:  Today's date: 2018    Ilene Gaviria (: 1960) presents for pre-operative evaluation assessment as requested by Dr. Jonny Finney.  She requires evaluation and anesthesia risk assessment prior to undergoing surgery/procedure for treatment of DAVINCI XI COLECTOMY .    Proposed Surgery/ Procedure:DAVINCI XI COLECTOMY   Date of Surgery/ Procedure: 2018  Time of Surgery/ Procedure: 7:30AM  Hospital/Surgical Facility:  OR  Primary Physician: No Ref-Primary, Physician  Type of Anesthesia Anticipated: General    Patient has a Health Care Directive or Living Will:  NO    1. NO - Do you have a history of heart attack, stroke, stent, bypass or surgery on an artery in the head, neck, heart or legs?  2. NO - Do you ever have any pain or discomfort in your chest?  3. NO - Do you have a history of  Heart Failure?  4. NO - Are you troubled by shortness of breath when: walking on the level, up a slight hill or at night?  5. NO - Do you currently have a cold, bronchitis or other respiratory infection?  6. NO - Do you have a cough, shortness of breath or wheezing?  7. NO - Do you sometimes get pains in the calves of your legs when you walk?  8. NO - Do you or anyone in your family have previous history of blood clots?  9. NO - Do you or does anyone in your family have a serious bleeding problem such as prolonged bleeding following surgeries or cuts?  10. NO - Have you ever had problems with anemia or been told to take iron pills?  11. YES - HAVE YOU HAD ANY ABNORMAL BLOOD LOSS SUCH AS BLACK, TARRY OR BLOODY STOOLS, OR ABNORMAL VAGINAL BLEEDING? Current rectal cancer, small amt of rectal bleeding  12. NO - Have you ever had a blood transfusion?  13. NO - Have you or any of your relatives ever had problems with anesthesia?  14. NO - Do you have sleep apnea, excessive snoring or daytime  "drowsiness?  15. NO - Do you have any prosthetic heart valves?  16. NO - Do you have prosthetic joints?  17. NO - Is there any chance that you may be pregnant?      HPI:     HPI related to upcoming procedure: Daignosed 4/2018 with rectal cancer   Is going to [facility].  Has been feeling great lately with no complaints. She is active and there are no concerns     See problem list for active medical problems.  Problems all longstanding and stable, except as noted/documented.  See ROS for pertinent symptoms related to these conditions.                                                                                                                                                              MEDICAL HISTORY:     Patient Active Problem List    Diagnosis Date Noted     BMI 30.0-30.9,adult 04/27/2017     Priority: Medium      Past Medical History:   Diagnosis Date     Cancer (H)     rectal cancer     Past Surgical History:   Procedure Laterality Date     GYN SURGERY      c section X3, tubal      SIGMOIDOSCOPY FLEXIBLE N/A 7/9/2018    Procedure: SIGMOIDOSCOPY FLEXIBLE;  FLEXIBILE SIGMOIDOSCOPY;  Surgeon: Jonny Finney MD;  Location: [facility]     Current Outpatient Prescriptions   Medication Sig Dispense Refill     Blood Pressure Monitoring KIT Use as directed 1 kit 0     OTC products: None, except as noted above    No Known Allergies   Latex Allergy: NO    Social History   Substance Use Topics     Smoking status: Never Smoker     Smokeless tobacco: Never Used     Alcohol use Yes      Comment: rare     History   Drug Use No       REVIEW OF SYSTEMS:   Constitutional, neuro, ENT, endocrine, pulmonary, cardiac, gastrointestinal, genitourinary, musculoskeletal, integument and psychiatric systems are negative, except as otherwise noted.    EXAM:   /89 (BP Location: Right arm, Cuff Size: Adult Regular)  Pulse 67  Temp 98  F (36.7  C) (Tympanic)  Ht 5' 8\" (1.727 m)  Wt 201 lb (91.2 kg)  SpO2 97%  BMI 30.56 kg/m2    " GENERAL APPEARANCE: healthy, alert and no distress     EYES: EOMI, PERRL     HENT: ear canals and TM's normal and nose and mouth without ulcers or lesions     NECK: no adenopathy, no asymmetry, masses, or scars and thyroid normal to palpation     RESP: lungs clear to auscultation - no rales, rhonchi or wheezes     CV: regular rates and rhythm, normal S1 S2, no S3 or S4 and no murmur, click or rub     MS: extremities normal- no gross deformities noted, no evidence of inflammation in joints, FROM in all extremities.     SKIN: no suspicious lesions or rashes     NEURO: Normal strength and tone, sensory exam grossly normal, mentation intact and speech normal     PSYCH: mentation appears normal. and affect normal/bright     LYMPHATICS: No cervical adenopathy    DIAGNOSTICS:     EKG: appears normal, NSR, normal axis, normal intervals, no acute ST/T changes c/w ischemia, no LVH by voltage criteria  Labs Resulted Today:   Results for orders placed or performed in visit on 07/26/18   Creatinine   Result Value Ref Range    Creatinine 0.60 0.52 - 1.04 mg/dL    GFR Estimate >90 >60 mL/min/1.7m2    GFR Estimate If Black >90 >60 mL/min/1.7m2   Hemoglobin   Result Value Ref Range    Hemoglobin 13.0 11.7 - 15.7 g/dL   Potassium   Result Value Ref Range    Potassium 4.2 3.4 - 5.3 mmol/L         Recent Labs   Lab Test  04/24/17   1659   HGB  14.2   PLT  163   NA  140   POTASSIUM  3.8   CR  0.65        IMPRESSION:   Reason for surgery/procedure: rectal cancer  Diagnosis/reason for consult: medical preop    The proposed surgical procedure is considered HIGH risk.    REVISED CARDIAC RISK INDEX  The patient has the following serious cardiovascular risks for perioperative complications such as (MI, PE, VFib and 3  AV Block):  No serious cardiac risks  INTERPRETATION: 0 risks: Class I (very low risk - 0.4% complication rate)    The patient has the following additional risks for perioperative complications:  No identified additional  risks      ICD-10-CM    1. Preop general physical exam Z01.818 EKG 12-lead complete w/read - Clinics     Creatinine     Hemoglobin     Potassium   2. Rectal cancer (H) C20        RECOMMENDATIONS:     --Consult hospital rounder / IM to assist post-op medical management    --Patient is on no chronic medications      APPROVAL GIVEN to proceed with proposed procedure, without further diagnostic evaluation       Signed Electronically by: RENETTA Burdick CNP    Copy of this evaluation report is provided to requesting physician.    [facility] Preop Guidelines    Revised Cardiac Risk Index

## 2018-08-02 ENCOUNTER — HOSPITAL ENCOUNTER (OUTPATIENT)
Dept: WOUND CARE | Facility: CLINIC | Age: 58
End: 2018-08-02
Attending: COLON & RECTAL SURGERY
Payer: COMMERCIAL

## 2018-08-02 PROCEDURE — 40000903 ZZH STATISTIC WOC PT EDUCATION, 31-45 MIN

## 2018-08-02 NOTE — PROGRESS NOTES
"[facility] Nurse Ostomy Marking and Education         Data:   History:    Pt with new hx of rectal CA.  Pt scheduled for APR with permanent colostomy. Pt is . Good support system. Pt very motivated to have surgery and move forward with life. 3 x c-sections in past    Pt referred by Dr. Castorena  Who is present for marking and education: Patient only  Type of ostomy surgery:  APR with permanent colostomy  Abdomen:   Rounded and soft.  Deep crease @ level of umbilicus when pt sits. LLQ bulge also rolls into deep crease when pt sits           Intervention:     Patient's chart evaluated.      Assessments done today:  Belt line, previous abdominal surgeries and scars, abdominal muscles and pt observed lying, sitting and standing.     Education: Reviewed upcoming surgery, stoma construction, post-op expectations, general Colostomy cares/concerns diet and hydration, bathing, odor, output,  activity, appliances and emptying.  Pt is interested in closed pouching system post-op    All patient questions answered: Yes    Patient marked with \"x\" using surgical marker and scratch method then allowed to dry 3 minutes and sealed with 3M No Sting Skin Prep. Pt marked apex of LLQ abd bulge that allows for adequate visualization and surrounding skin for pouching system.   Site is below betline of patients jeans         Assessment:      Pt is ready to proceed with OR on 8-7-18         Plan:     Plan:   ? Colostomy booklet and sample pouches given to patient for review.              Pt has [facility] contact information for any ?/concerns prior to OR        Will plan to follow patient post operatively.          "

## 2018-08-03 NOTE — H&P (VIEW-ONLY)
[address] [address]  [phone]  Dept: [phone]    PRE-OP EVALUATION:  Today's date: 2018    Ilene Gaviria (: 1960) presents for pre-operative evaluation assessment as requested by Dr. Jonny Finney.  She requires evaluation and anesthesia risk assessment prior to undergoing surgery/procedure for treatment of DAVINCI XI COLECTOMY .    Proposed Surgery/ Procedure:DAVINCI XI COLECTOMY   Date of Surgery/ Procedure: 2018  Time of Surgery/ Procedure: 7:30AM  Hospital/Surgical Facility:  OR  Primary Physician: No Ref-Primary, Physician  Type of Anesthesia Anticipated: General    Patient has a Health Care Directive or Living Will:  NO    1. NO - Do you have a history of heart attack, stroke, stent, bypass or surgery on an artery in the head, neck, heart or legs?  2. NO - Do you ever have any pain or discomfort in your chest?  3. NO - Do you have a history of  Heart Failure?  4. NO - Are you troubled by shortness of breath when: walking on the level, up a slight hill or at night?  5. NO - Do you currently have a cold, bronchitis or other respiratory infection?  6. NO - Do you have a cough, shortness of breath or wheezing?  7. NO - Do you sometimes get pains in the calves of your legs when you walk?  8. NO - Do you or anyone in your family have previous history of blood clots?  9. NO - Do you or does anyone in your family have a serious bleeding problem such as prolonged bleeding following surgeries or cuts?  10. NO - Have you ever had problems with anemia or been told to take iron pills?  11. YES - HAVE YOU HAD ANY ABNORMAL BLOOD LOSS SUCH AS BLACK, TARRY OR BLOODY STOOLS, OR ABNORMAL VAGINAL BLEEDING? Current rectal cancer, small amt of rectal bleeding  12. NO - Have you ever had a blood transfusion?  13. NO - Have you or any of your relatives ever had problems with anesthesia?  14. NO - Do you have sleep apnea, excessive snoring or daytime  "drowsiness?  15. NO - Do you have any prosthetic heart valves?  16. NO - Do you have prosthetic joints?  17. NO - Is there any chance that you may be pregnant?      HPI:     HPI related to upcoming procedure: Daignosed 4/2018 with rectal cancer   Is going to [facility].  Has been feeling great lately with no complaints. She is active and there are no concerns     See problem list for active medical problems.  Problems all longstanding and stable, except as noted/documented.  See ROS for pertinent symptoms related to these conditions.                                                                                                                                                              MEDICAL HISTORY:     Patient Active Problem List    Diagnosis Date Noted     BMI 30.0-30.9,adult 04/27/2017     Priority: Medium      Past Medical History:   Diagnosis Date     Cancer (H)     rectal cancer     Past Surgical History:   Procedure Laterality Date     GYN SURGERY      c section X3, tubal      SIGMOIDOSCOPY FLEXIBLE N/A 7/9/2018    Procedure: SIGMOIDOSCOPY FLEXIBLE;  FLEXIBILE SIGMOIDOSCOPY;  Surgeon: Jonny Finney MD;  Location: [facility]     Current Outpatient Prescriptions   Medication Sig Dispense Refill     Blood Pressure Monitoring KIT Use as directed 1 kit 0     OTC products: None, except as noted above    No Known Allergies   Latex Allergy: NO    Social History   Substance Use Topics     Smoking status: Never Smoker     Smokeless tobacco: Never Used     Alcohol use Yes      Comment: rare     History   Drug Use No       REVIEW OF SYSTEMS:   Constitutional, neuro, ENT, endocrine, pulmonary, cardiac, gastrointestinal, genitourinary, musculoskeletal, integument and psychiatric systems are negative, except as otherwise noted.    EXAM:   /89 (BP Location: Right arm, Cuff Size: Adult Regular)  Pulse 67  Temp 98  F (36.7  C) (Tympanic)  Ht 5' 8\" (1.727 m)  Wt 201 lb (91.2 kg)  SpO2 97%  BMI 30.56 kg/m2    " GENERAL APPEARANCE: healthy, alert and no distress     EYES: EOMI, PERRL     HENT: ear canals and TM's normal and nose and mouth without ulcers or lesions     NECK: no adenopathy, no asymmetry, masses, or scars and thyroid normal to palpation     RESP: lungs clear to auscultation - no rales, rhonchi or wheezes     CV: regular rates and rhythm, normal S1 S2, no S3 or S4 and no murmur, click or rub     MS: extremities normal- no gross deformities noted, no evidence of inflammation in joints, FROM in all extremities.     SKIN: no suspicious lesions or rashes     NEURO: Normal strength and tone, sensory exam grossly normal, mentation intact and speech normal     PSYCH: mentation appears normal. and affect normal/bright     LYMPHATICS: No cervical adenopathy    DIAGNOSTICS:     EKG: appears normal, NSR, normal axis, normal intervals, no acute ST/T changes c/w ischemia, no LVH by voltage criteria  Labs Resulted Today:   Results for orders placed or performed in visit on 07/26/18   Creatinine   Result Value Ref Range    Creatinine 0.60 0.52 - 1.04 mg/dL    GFR Estimate >90 >60 mL/min/1.7m2    GFR Estimate If Black >90 >60 mL/min/1.7m2   Hemoglobin   Result Value Ref Range    Hemoglobin 13.0 11.7 - 15.7 g/dL   Potassium   Result Value Ref Range    Potassium 4.2 3.4 - 5.3 mmol/L         Recent Labs   Lab Test  04/24/17   1659   HGB  14.2   PLT  163   NA  140   POTASSIUM  3.8   CR  0.65        IMPRESSION:   Reason for surgery/procedure: rectal cancer  Diagnosis/reason for consult: medical preop    The proposed surgical procedure is considered HIGH risk.    REVISED CARDIAC RISK INDEX  The patient has the following serious cardiovascular risks for perioperative complications such as (MI, PE, VFib and 3  AV Block):  No serious cardiac risks  INTERPRETATION: 0 risks: Class I (very low risk - 0.4% complication rate)    The patient has the following additional risks for perioperative complications:  No identified additional  risks      ICD-10-CM    1. Preop general physical exam Z01.818 EKG 12-lead complete w/read - Clinics     Creatinine     Hemoglobin     Potassium   2. Rectal cancer (H) C20        RECOMMENDATIONS:     --Consult hospital rounder / IM to assist post-op medical management    --Patient is on no chronic medications      APPROVAL GIVEN to proceed with proposed procedure, without further diagnostic evaluation       Signed Electronically by: RENETTA Burdick CNP    Copy of this evaluation report is provided to requesting physician.    [facility] Preop Guidelines    Revised Cardiac Risk Index

## 2018-08-07 ENCOUNTER — HOSPITAL ENCOUNTER (INPATIENT)
Facility: CLINIC | Age: 58
LOS: 4 days | Discharge: HOME | DRG: 331 | End: 2018-08-11
Attending: COLON & RECTAL SURGERY | Admitting: COLON & RECTAL SURGERY
Payer: COMMERCIAL

## 2018-08-07 ENCOUNTER — ANESTHESIA (OUTPATIENT)
Dept: SURGERY | Facility: CLINIC | Age: 58
DRG: 331 | End: 2018-08-07
Payer: COMMERCIAL

## 2018-08-07 ENCOUNTER — ANESTHESIA EVENT (OUTPATIENT)
Dept: SURGERY | Facility: CLINIC | Age: 58
DRG: 331 | End: 2018-08-07
Payer: COMMERCIAL

## 2018-08-07 DIAGNOSIS — C20 RECTAL CANCER (H): Primary | ICD-10-CM

## 2018-08-07 LAB
ABO + RH BLD: NORMAL
ABO + RH BLD: NORMAL
BLD GP AB SCN SERPL QL: NORMAL
BLOOD BANK CMNT PATIENT-IMP: NORMAL
CREAT SERPL-MCNC: 0.56 MG/DL (ref 0.52–1.04)
GFR SERPL CREATININE-BSD FRML MDRD: >90 ML/MIN/1.7M2
PLATELET # BLD AUTO: 116 10E9/L (ref 150–450)
SPECIMEN EXP DATE BLD: NORMAL

## 2018-08-07 PROCEDURE — 0D1N4Z4 BYPASS SIGMOID COLON TO CUTANEOUS, PERCUTANEOUS ENDOSCOPIC APPROACH: ICD-10-PCS | Performed by: COLON & RECTAL SURGERY

## 2018-08-07 PROCEDURE — 0DTP4ZZ RESECTION OF RECTUM, PERCUTANEOUS ENDOSCOPIC APPROACH: ICD-10-PCS | Performed by: COLON & RECTAL SURGERY

## 2018-08-07 PROCEDURE — 25000132 ZZH RX MED GY IP 250 OP 250 PS 637: Performed by: COLON & RECTAL SURGERY

## 2018-08-07 PROCEDURE — 8E0W4CZ ROBOTIC ASSISTED PROCEDURE OF TRUNK REGION, PERCUTANEOUS ENDOSCOPIC APPROACH: ICD-10-PCS | Performed by: COLON & RECTAL SURGERY

## 2018-08-07 PROCEDURE — 40000169 ZZH STATISTIC PRE-PROCEDURE ASSESSMENT I: Performed by: COLON & RECTAL SURGERY

## 2018-08-07 PROCEDURE — 86850 RBC ANTIBODY SCREEN: CPT | Performed by: COLON & RECTAL SURGERY

## 2018-08-07 PROCEDURE — 25800025 ZZH RX 258: Performed by: COLON & RECTAL SURGERY

## 2018-08-07 PROCEDURE — 88309 TISSUE EXAM BY PATHOLOGIST: CPT | Mod: 26 | Performed by: COLON & RECTAL SURGERY

## 2018-08-07 PROCEDURE — 88309 TISSUE EXAM BY PATHOLOGIST: CPT | Performed by: COLON & RECTAL SURGERY

## 2018-08-07 PROCEDURE — 37000009 ZZH ANESTHESIA TECHNICAL FEE, EACH ADDTL 15 MIN: Performed by: COLON & RECTAL SURGERY

## 2018-08-07 PROCEDURE — 25000566 ZZH SEVOFLURANE, EA 15 MIN: Performed by: COLON & RECTAL SURGERY

## 2018-08-07 PROCEDURE — 25000125 ZZHC RX 250: Performed by: COLON & RECTAL SURGERY

## 2018-08-07 PROCEDURE — 25000128 H RX IP 250 OP 636: Performed by: ANESTHESIOLOGY

## 2018-08-07 PROCEDURE — 12000007 ZZH R&B INTERMEDIATE

## 2018-08-07 PROCEDURE — 25000128 H RX IP 250 OP 636: Performed by: NURSE ANESTHETIST, CERTIFIED REGISTERED

## 2018-08-07 PROCEDURE — 25000125 ZZHC RX 250: Performed by: NURSE ANESTHETIST, CERTIFIED REGISTERED

## 2018-08-07 PROCEDURE — 27210995 ZZH RX 272: Performed by: COLON & RECTAL SURGERY

## 2018-08-07 PROCEDURE — 25000125 ZZHC RX 250: Performed by: ANESTHESIOLOGY

## 2018-08-07 PROCEDURE — 0DBN4ZZ EXCISION OF SIGMOID COLON, PERCUTANEOUS ENDOSCOPIC APPROACH: ICD-10-PCS | Performed by: COLON & RECTAL SURGERY

## 2018-08-07 PROCEDURE — 0JX90ZC TRANSFER BUTTOCK SUBCUTANEOUS TISSUE AND FASCIA WITH SKIN, SUBCUTANEOUS TISSUE AND FASCIA, OPEN APPROACH: ICD-10-PCS | Performed by: PLASTIC SURGERY

## 2018-08-07 PROCEDURE — 25000128 H RX IP 250 OP 636: Performed by: COLON & RECTAL SURGERY

## 2018-08-07 PROCEDURE — 25000132 ZZH RX MED GY IP 250 OP 250 PS 637: Performed by: PLASTIC SURGERY

## 2018-08-07 PROCEDURE — 36000087 ZZH SURGERY LEVEL 8 EA 15 ADDTL MIN: Performed by: COLON & RECTAL SURGERY

## 2018-08-07 PROCEDURE — 0DTQ4ZZ RESECTION OF ANUS, PERCUTANEOUS ENDOSCOPIC APPROACH: ICD-10-PCS | Performed by: COLON & RECTAL SURGERY

## 2018-08-07 PROCEDURE — 82565 ASSAY OF CREATININE: CPT | Performed by: COLON & RECTAL SURGERY

## 2018-08-07 PROCEDURE — 36000085 ZZH SURGERY LEVEL 8 1ST 30 MIN: Performed by: COLON & RECTAL SURGERY

## 2018-08-07 PROCEDURE — 86901 BLOOD TYPING SEROLOGIC RH(D): CPT | Performed by: COLON & RECTAL SURGERY

## 2018-08-07 PROCEDURE — 85049 AUTOMATED PLATELET COUNT: CPT | Performed by: COLON & RECTAL SURGERY

## 2018-08-07 PROCEDURE — 71000012 ZZH RECOVERY PHASE 1 LEVEL 1 FIRST HR: Performed by: COLON & RECTAL SURGERY

## 2018-08-07 PROCEDURE — 36415 COLL VENOUS BLD VENIPUNCTURE: CPT | Performed by: COLON & RECTAL SURGERY

## 2018-08-07 PROCEDURE — 25000128 H RX IP 250 OP 636: Performed by: PLASTIC SURGERY

## 2018-08-07 PROCEDURE — 37000008 ZZH ANESTHESIA TECHNICAL FEE, 1ST 30 MIN: Performed by: COLON & RECTAL SURGERY

## 2018-08-07 PROCEDURE — 27210794 ZZH OR GENERAL SUPPLY STERILE: Performed by: COLON & RECTAL SURGERY

## 2018-08-07 PROCEDURE — 86900 BLOOD TYPING SEROLOGIC ABO: CPT | Performed by: COLON & RECTAL SURGERY

## 2018-08-07 RX ORDER — FENTANYL CITRATE 50 UG/ML
25-50 INJECTION, SOLUTION INTRAMUSCULAR; INTRAVENOUS
Status: DISCONTINUED | OUTPATIENT
Start: 2018-08-07 | End: 2018-08-07 | Stop reason: HOSPADM

## 2018-08-07 RX ORDER — DIPHENHYDRAMINE HYDROCHLORIDE 50 MG/ML
25 INJECTION INTRAMUSCULAR; INTRAVENOUS EVERY 6 HOURS PRN
Status: DISCONTINUED | OUTPATIENT
Start: 2018-08-07 | End: 2018-08-11 | Stop reason: HOSPADM

## 2018-08-07 RX ORDER — NEOSTIGMINE METHYLSULFATE 1 MG/ML
VIAL (ML) INJECTION PRN
Status: DISCONTINUED | OUTPATIENT
Start: 2018-08-07 | End: 2018-08-07

## 2018-08-07 RX ORDER — MAGNESIUM HYDROXIDE 1200 MG/15ML
LIQUID ORAL PRN
Status: DISCONTINUED | OUTPATIENT
Start: 2018-08-07 | End: 2018-08-07 | Stop reason: HOSPADM

## 2018-08-07 RX ORDER — FENTANYL CITRATE 50 UG/ML
INJECTION, SOLUTION INTRAMUSCULAR; INTRAVENOUS PRN
Status: DISCONTINUED | OUTPATIENT
Start: 2018-08-07 | End: 2018-08-07

## 2018-08-07 RX ORDER — PROPOFOL 10 MG/ML
INJECTION, EMULSION INTRAVENOUS PRN
Status: DISCONTINUED | OUTPATIENT
Start: 2018-08-07 | End: 2018-08-07

## 2018-08-07 RX ORDER — SODIUM CHLORIDE, SODIUM LACTATE, POTASSIUM CHLORIDE, CALCIUM CHLORIDE 600; 310; 30; 20 MG/100ML; MG/100ML; MG/100ML; MG/100ML
INJECTION, SOLUTION INTRAVENOUS CONTINUOUS
Status: DISCONTINUED | OUTPATIENT
Start: 2018-08-07 | End: 2018-08-07 | Stop reason: HOSPADM

## 2018-08-07 RX ORDER — EPHEDRINE SULFATE 50 MG/ML
INJECTION, SOLUTION INTRAMUSCULAR; INTRAVENOUS; SUBCUTANEOUS PRN
Status: DISCONTINUED | OUTPATIENT
Start: 2018-08-07 | End: 2018-08-07

## 2018-08-07 RX ORDER — GLYCOPYRROLATE 0.2 MG/ML
INJECTION, SOLUTION INTRAMUSCULAR; INTRAVENOUS PRN
Status: DISCONTINUED | OUTPATIENT
Start: 2018-08-07 | End: 2018-08-07

## 2018-08-07 RX ORDER — ONDANSETRON 2 MG/ML
4 INJECTION INTRAMUSCULAR; INTRAVENOUS EVERY 6 HOURS PRN
Status: DISCONTINUED | OUTPATIENT
Start: 2018-08-07 | End: 2018-08-11 | Stop reason: HOSPADM

## 2018-08-07 RX ORDER — CELECOXIB 200 MG/1
200 CAPSULE ORAL ONCE
Status: COMPLETED | OUTPATIENT
Start: 2018-08-07 | End: 2018-08-07

## 2018-08-07 RX ORDER — LIDOCAINE HYDROCHLORIDE 20 MG/ML
INJECTION, SOLUTION INFILTRATION; PERINEURAL PRN
Status: DISCONTINUED | OUTPATIENT
Start: 2018-08-07 | End: 2018-08-07

## 2018-08-07 RX ORDER — ONDANSETRON 2 MG/ML
4 INJECTION INTRAMUSCULAR; INTRAVENOUS EVERY 30 MIN PRN
Status: DISCONTINUED | OUTPATIENT
Start: 2018-08-07 | End: 2018-08-07 | Stop reason: HOSPADM

## 2018-08-07 RX ORDER — ACETAMINOPHEN 325 MG/1
975 TABLET ORAL EVERY 8 HOURS
Status: COMPLETED | OUTPATIENT
Start: 2018-08-07 | End: 2018-08-10

## 2018-08-07 RX ORDER — CEFAZOLIN SODIUM 1 G/3ML
1 INJECTION, POWDER, FOR SOLUTION INTRAMUSCULAR; INTRAVENOUS SEE ADMIN INSTRUCTIONS
Status: DISCONTINUED | OUTPATIENT
Start: 2018-08-07 | End: 2018-08-07 | Stop reason: HOSPADM

## 2018-08-07 RX ORDER — ACETAMINOPHEN 325 MG/1
975 TABLET ORAL ONCE
Status: COMPLETED | OUTPATIENT
Start: 2018-08-07 | End: 2018-08-07

## 2018-08-07 RX ORDER — CIPROFLOXACIN 2 MG/ML
400 INJECTION, SOLUTION INTRAVENOUS EVERY 12 HOURS
Status: COMPLETED | OUTPATIENT
Start: 2018-08-07 | End: 2018-08-08

## 2018-08-07 RX ORDER — CIPROFLOXACIN 2 MG/ML
400 INJECTION, SOLUTION INTRAVENOUS
Status: COMPLETED | OUTPATIENT
Start: 2018-08-07 | End: 2018-08-07

## 2018-08-07 RX ORDER — CIPROFLOXACIN 2 MG/ML
400 INJECTION, SOLUTION INTRAVENOUS SEE ADMIN INSTRUCTIONS
Status: DISCONTINUED | OUTPATIENT
Start: 2018-08-07 | End: 2018-08-07 | Stop reason: HOSPADM

## 2018-08-07 RX ORDER — ONDANSETRON 4 MG/1
4 TABLET, ORALLY DISINTEGRATING ORAL EVERY 30 MIN PRN
Status: DISCONTINUED | OUTPATIENT
Start: 2018-08-07 | End: 2018-08-07 | Stop reason: HOSPADM

## 2018-08-07 RX ORDER — LIDOCAINE 40 MG/G
CREAM TOPICAL
Status: DISCONTINUED | OUTPATIENT
Start: 2018-08-07 | End: 2018-08-11 | Stop reason: HOSPADM

## 2018-08-07 RX ORDER — HYDROMORPHONE HYDROCHLORIDE 1 MG/ML
.3-.5 INJECTION, SOLUTION INTRAMUSCULAR; INTRAVENOUS; SUBCUTANEOUS EVERY 5 MIN PRN
Status: DISCONTINUED | OUTPATIENT
Start: 2018-08-07 | End: 2018-08-07 | Stop reason: HOSPADM

## 2018-08-07 RX ORDER — VECURONIUM BROMIDE 1 MG/ML
INJECTION, POWDER, LYOPHILIZED, FOR SOLUTION INTRAVENOUS PRN
Status: DISCONTINUED | OUTPATIENT
Start: 2018-08-07 | End: 2018-08-07

## 2018-08-07 RX ORDER — HEPARIN SODIUM 5000 [USP'U]/.5ML
5000 INJECTION, SOLUTION INTRAVENOUS; SUBCUTANEOUS
Status: COMPLETED | OUTPATIENT
Start: 2018-08-07 | End: 2018-08-07

## 2018-08-07 RX ORDER — GABAPENTIN 600 MG/1
600 TABLET ORAL ONCE
Status: COMPLETED | OUTPATIENT
Start: 2018-08-07 | End: 2018-08-07

## 2018-08-07 RX ORDER — NALOXONE HYDROCHLORIDE 0.4 MG/ML
.1-.4 INJECTION, SOLUTION INTRAMUSCULAR; INTRAVENOUS; SUBCUTANEOUS
Status: DISCONTINUED | OUTPATIENT
Start: 2018-08-07 | End: 2018-08-11 | Stop reason: HOSPADM

## 2018-08-07 RX ORDER — CEFAZOLIN SODIUM 2 G/100ML
2 INJECTION, SOLUTION INTRAVENOUS
Status: COMPLETED | OUTPATIENT
Start: 2018-08-07 | End: 2018-08-07

## 2018-08-07 RX ORDER — DEXAMETHASONE SODIUM PHOSPHATE 4 MG/ML
INJECTION, SOLUTION INTRA-ARTICULAR; INTRALESIONAL; INTRAMUSCULAR; INTRAVENOUS; SOFT TISSUE PRN
Status: DISCONTINUED | OUTPATIENT
Start: 2018-08-07 | End: 2018-08-07

## 2018-08-07 RX ORDER — SODIUM CHLORIDE, SODIUM LACTATE, POTASSIUM CHLORIDE, CALCIUM CHLORIDE 600; 310; 30; 20 MG/100ML; MG/100ML; MG/100ML; MG/100ML
INJECTION, SOLUTION INTRAVENOUS CONTINUOUS
Status: DISCONTINUED | OUTPATIENT
Start: 2018-08-07 | End: 2018-08-09

## 2018-08-07 RX ORDER — ONDANSETRON 2 MG/ML
INJECTION INTRAMUSCULAR; INTRAVENOUS PRN
Status: DISCONTINUED | OUTPATIENT
Start: 2018-08-07 | End: 2018-08-07

## 2018-08-07 RX ORDER — NALOXONE HYDROCHLORIDE 0.4 MG/ML
.1-.4 INJECTION, SOLUTION INTRAMUSCULAR; INTRAVENOUS; SUBCUTANEOUS
Status: DISCONTINUED | OUTPATIENT
Start: 2018-08-07 | End: 2018-08-07

## 2018-08-07 RX ORDER — KETOROLAC TROMETHAMINE 30 MG/ML
INJECTION, SOLUTION INTRAMUSCULAR; INTRAVENOUS PRN
Status: DISCONTINUED | OUTPATIENT
Start: 2018-08-07 | End: 2018-08-07

## 2018-08-07 RX ORDER — OXYCODONE HCL 10 MG/1
10 TABLET, FILM COATED, EXTENDED RELEASE ORAL ONCE
Status: COMPLETED | OUTPATIENT
Start: 2018-08-07 | End: 2018-08-07

## 2018-08-07 RX ORDER — ACETAMINOPHEN 500 MG
1000 TABLET ORAL ONCE
Status: DISCONTINUED | OUTPATIENT
Start: 2018-08-07 | End: 2018-08-07

## 2018-08-07 RX ORDER — NEOMYCIN SULFATE 500 MG/1
1000 TABLET ORAL 3 TIMES DAILY
COMMUNITY
Start: 2018-08-06 | End: 2018-08-10

## 2018-08-07 RX ORDER — MEPERIDINE HYDROCHLORIDE 25 MG/ML
12.5 INJECTION INTRAMUSCULAR; INTRAVENOUS; SUBCUTANEOUS EVERY 5 MIN PRN
Status: DISCONTINUED | OUTPATIENT
Start: 2018-08-07 | End: 2018-08-07 | Stop reason: HOSPADM

## 2018-08-07 RX ADMIN — GLYCOPYRROLATE 0.6 MG: 0.2 INJECTION, SOLUTION INTRAMUSCULAR; INTRAVENOUS at 12:33

## 2018-08-07 RX ADMIN — VECURONIUM BROMIDE 2 MG: 1 INJECTION, POWDER, LYOPHILIZED, FOR SOLUTION INTRAVENOUS at 10:50

## 2018-08-07 RX ADMIN — OXYCODONE HYDROCHLORIDE 10 MG: 10 TABLET, FILM COATED, EXTENDED RELEASE ORAL at 06:48

## 2018-08-07 RX ADMIN — METRONIDAZOLE 500 MG: 500 INJECTION, SOLUTION INTRAVENOUS at 17:26

## 2018-08-07 RX ADMIN — PROPOFOL 200 MG: 10 INJECTION, EMULSION INTRAVENOUS at 07:39

## 2018-08-07 RX ADMIN — GABAPENTIN 600 MG: 600 TABLET, FILM COATED ORAL at 06:44

## 2018-08-07 RX ADMIN — CIPROFLOXACIN 400 MG: 2 INJECTION INTRAVENOUS at 07:36

## 2018-08-07 RX ADMIN — DEXAMETHASONE SODIUM PHOSPHATE 4 MG: 4 INJECTION, SOLUTION INTRA-ARTICULAR; INTRALESIONAL; INTRAMUSCULAR; INTRAVENOUS; SOFT TISSUE at 12:28

## 2018-08-07 RX ADMIN — ROCURONIUM BROMIDE 50 MG: 10 INJECTION INTRAVENOUS at 07:39

## 2018-08-07 RX ADMIN — SODIUM CHLORIDE, POTASSIUM CHLORIDE, SODIUM LACTATE AND CALCIUM CHLORIDE: 600; 310; 30; 20 INJECTION, SOLUTION INTRAVENOUS at 12:36

## 2018-08-07 RX ADMIN — Medication 5 MG: at 10:04

## 2018-08-07 RX ADMIN — CELECOXIB 200 MG: 200 CAPSULE ORAL at 06:40

## 2018-08-07 RX ADMIN — VECURONIUM BROMIDE 2 MG: 1 INJECTION, POWDER, LYOPHILIZED, FOR SOLUTION INTRAVENOUS at 10:15

## 2018-08-07 RX ADMIN — METRONIDAZOLE 500 MG: 500 INJECTION, SOLUTION INTRAVENOUS at 08:27

## 2018-08-07 RX ADMIN — VECURONIUM BROMIDE 2 MG: 1 INJECTION, POWDER, LYOPHILIZED, FOR SOLUTION INTRAVENOUS at 07:58

## 2018-08-07 RX ADMIN — HEPARIN SODIUM 5000 UNITS: 10000 INJECTION, SOLUTION INTRAVENOUS; SUBCUTANEOUS at 07:36

## 2018-08-07 RX ADMIN — ACETAMINOPHEN 975 MG: 325 TABLET, FILM COATED ORAL at 16:28

## 2018-08-07 RX ADMIN — HYDROMORPHONE HYDROCHLORIDE 0.5 MG: 1 INJECTION, SOLUTION INTRAMUSCULAR; INTRAVENOUS; SUBCUTANEOUS at 12:23

## 2018-08-07 RX ADMIN — ACETAMINOPHEN 975 MG: 325 TABLET, FILM COATED ORAL at 06:31

## 2018-08-07 RX ADMIN — FENTANYL CITRATE 50 MCG: 50 INJECTION, SOLUTION INTRAMUSCULAR; INTRAVENOUS at 10:32

## 2018-08-07 RX ADMIN — Medication 10 MG: at 11:22

## 2018-08-07 RX ADMIN — CEFAZOLIN SODIUM 1 G: 2 INJECTION, SOLUTION INTRAVENOUS at 12:24

## 2018-08-07 RX ADMIN — ONDANSETRON 4 MG: 2 INJECTION INTRAMUSCULAR; INTRAVENOUS at 12:28

## 2018-08-07 RX ADMIN — CIPROFLOXACIN 400 MG: 2 INJECTION, SOLUTION INTRAVENOUS at 19:36

## 2018-08-07 RX ADMIN — VECURONIUM BROMIDE 3 MG: 1 INJECTION, POWDER, LYOPHILIZED, FOR SOLUTION INTRAVENOUS at 11:20

## 2018-08-07 RX ADMIN — DEXMEDETOMIDINE HYDROCHLORIDE 0.5 MCG/KG/HR: 100 INJECTION, SOLUTION INTRAVENOUS at 07:45

## 2018-08-07 RX ADMIN — FENTANYL CITRATE 100 MCG: 50 INJECTION, SOLUTION INTRAMUSCULAR; INTRAVENOUS at 07:39

## 2018-08-07 RX ADMIN — Medication 10 MG: at 10:55

## 2018-08-07 RX ADMIN — LIDOCAINE HYDROCHLORIDE 1 ML: 10 INJECTION, SOLUTION EPIDURAL; INFILTRATION; INTRACAUDAL; PERINEURAL at 06:30

## 2018-08-07 RX ADMIN — VECURONIUM BROMIDE 2 MG: 1 INJECTION, POWDER, LYOPHILIZED, FOR SOLUTION INTRAVENOUS at 09:11

## 2018-08-07 RX ADMIN — CEFAZOLIN SODIUM 2 G: 2 INJECTION, SOLUTION INTRAVENOUS at 10:24

## 2018-08-07 RX ADMIN — HYDROMORPHONE HYDROCHLORIDE: 10 INJECTION, SOLUTION INTRAMUSCULAR; INTRAVENOUS; SUBCUTANEOUS at 13:36

## 2018-08-07 RX ADMIN — MIDAZOLAM 2 MG: 1 INJECTION INTRAMUSCULAR; INTRAVENOUS at 07:36

## 2018-08-07 RX ADMIN — LIDOCAINE HYDROCHLORIDE 100 MG: 20 INJECTION, SOLUTION INFILTRATION; PERINEURAL at 07:39

## 2018-08-07 RX ADMIN — KETOROLAC TROMETHAMINE 30 MG: 30 INJECTION, SOLUTION INTRAMUSCULAR at 12:28

## 2018-08-07 RX ADMIN — SODIUM CHLORIDE, POTASSIUM CHLORIDE, SODIUM LACTATE AND CALCIUM CHLORIDE: 600; 310; 30; 20 INJECTION, SOLUTION INTRAVENOUS at 10:27

## 2018-08-07 RX ADMIN — ONDANSETRON 4 MG: 2 INJECTION INTRAMUSCULAR; INTRAVENOUS at 10:53

## 2018-08-07 RX ADMIN — Medication 5 MG: at 09:16

## 2018-08-07 RX ADMIN — GLYCOPYRROLATE 0.2 MG: 0.2 INJECTION, SOLUTION INTRAMUSCULAR; INTRAVENOUS at 10:15

## 2018-08-07 RX ADMIN — DEXAMETHASONE SODIUM PHOSPHATE 4 MG: 4 INJECTION, SOLUTION INTRA-ARTICULAR; INTRALESIONAL; INTRAMUSCULAR; INTRAVENOUS; SOFT TISSUE at 07:58

## 2018-08-07 RX ADMIN — NEOSTIGMINE METHYLSULFATE 4 MG: 1 INJECTION, SOLUTION INTRAVENOUS at 12:33

## 2018-08-07 RX ADMIN — SODIUM CHLORIDE, POTASSIUM CHLORIDE, SODIUM LACTATE AND CALCIUM CHLORIDE: 600; 310; 30; 20 INJECTION, SOLUTION INTRAVENOUS at 06:30

## 2018-08-07 RX ADMIN — Medication 5 MG: at 09:52

## 2018-08-07 RX ADMIN — VECURONIUM BROMIDE 2 MG: 1 INJECTION, POWDER, LYOPHILIZED, FOR SOLUTION INTRAVENOUS at 08:33

## 2018-08-07 RX ADMIN — FENTANYL CITRATE 50 MCG: 50 INJECTION, SOLUTION INTRAMUSCULAR; INTRAVENOUS at 08:05

## 2018-08-07 ASSESSMENT — ACTIVITIES OF DAILY LIVING (ADL)
ADLS_ACUITY_SCORE: 11
ADLS_ACUITY_SCORE: 10

## 2018-08-07 NOTE — ANESTHESIA POSTPROCEDURE EVALUATION
Patient: Ilene Gaviria    Procedure(s):  DAVINCI ABDOMINAL PERINEAL RESECTION WITH PERMANENT COLOSTOMY ( YAÑEZ ) LEFT GLUTEAL FLAP TO RECTAL DEFECT ( JERE )  - Wound Class: II-Clean Contaminated   - Wound Class: II-Clean Contaminated    Diagnosis:RECTAL CANCER  Diagnosis Additional Information: No value filed.    Anesthesia Type:  General, ETT    Note:  Anesthesia Post Evaluation    Patient location during evaluation: PACU  Patient participation: Able to fully participate in evaluation  Level of consciousness: awake  Pain management: adequate  Airway patency: patent  Cardiovascular status: acceptable  Respiratory status: acceptable  Hydration status: acceptable  PONV: none             Last vitals:  Vitals:    08/07/18 1310 08/07/18 1320 08/07/18 1330   BP: 126/89 118/85 120/71   Pulse:      Resp: 12 16 15   Temp: 35.9  C (96.6  F) 35.9  C (96.6  F) 36.2  C (97.2  F)   SpO2: 98% 98% 91%         Electronically Signed By: Devante Owen MD  August 7, 2018  1:38 PM

## 2018-08-07 NOTE — PROGRESS NOTES
Admission medication history interview status for the 8/7/2018  admission is complete. See EPIC admission navigator for prior to admission medications     Medication history source reliability:Good    Medication history interview source(s):Patient    Medication history resources (including written lists, pill bottles, clinic record):pill bottles    [facility]    Additional medication history information not noted on PTA med list :None    Time spent in this activity: 20 minutes    Prior to Admission medications    Medication Sig Last Dose Taking? Auth Provider   Ascorbic Acid (VITAMIN C PO) Take 500 mg by mouth daily 7/8/2018 at Unknown Yes Reported, Patient   Ondansetron (ZOFRAN ODT PO) Take 4 mg by mouth every 4 hours as needed for nausea  Yes Reported, Patient   OVER-THE-COUNTER Take 1 Dropperette by mouth 2 times daily Hemp oil CDB (does NOT contain THC) 7/8/2018 at Unknown Yes Reported, Patient

## 2018-08-07 NOTE — OP NOTE
Procedure Date: 08/07/2018      PREOPERATIVE DIAGNOSIS:  Distal rectal cancer.      POSTOPERATIVE DIAGNOSIS:  Distal rectal cancer.      PROCEDURES PERFORMED:   1.  Robotic-assisted abdominoperineal resection.   2.  Creation of permanent end colostomy.   3.  Left gluteal flap to repair perineal wound.      STAFF SURGEON:  Jonny Finney MD      CO-SURGEON:  Conchita Ramos MD      SECOND CO-SURGEON:  Luis Cornelius MD      ANESTHESIA:  General.      ESTIMATED BLOOD LOSS:  40 mL      SPECIMENS:  Sigmoid, rectum and anus.      FINDINGS:  There was no evidence of metastatic disease.  There was some fullness to the posterior anal canal.  We created the colostomy in the left lower quadrant and left a transabdominal drain through the left lateral robotic trocar site.      INDICATIONS:  Ilene is a pleasant 58-year-old female who presented with a fairly early stage rectal cancer.  She was staged with an MRI as a T2 N0 M0.  She has a very low rectal cancer involving the internal sphincter.  We had extensive discussions with the patient preoperatively in consultation with her Radiation Oncology as well as Medical Oncology colleagues, as well as informal discussions with the surgeons at [facility].  She received neoadjuvant chemoradiotherapy.  She presents today for an elective abdominoperineal resection after approximately 8 weeks of rest after the completion of chemoradiotherapy.  The risks, benefits and alternatives of surgery, including the risk of bleeding, infection, recurrence of cancer, breakdown of the perineal wound, development of a parastomal hernia, need for further surgery, urinary tract infection, DVT, complications of the anesthesia and any more serious complications were discussed.  In preoperative evaluation, the tumor was felt to be much too low to allow for a coloanal anastomosis and that she would likely be left with significantly poor bowel function even if an anastomosis could be created.   She wishes to proceed with surgery and understands this would include construction of a permanent end colostomy.      DESCRIPTION OF PROCEDURE:  After full bowel prep and after obtaining informed consent, the patient was brought to the operating room.  She was laid supine on the operating table.  General anesthesia was induced.  She was intubated without difficulty.  Haque catheter was placed under sterile conditions.  An orogastric tube was placed.  Her arms were tucked at her side.  She was secured to the table with tape across the chest and use of a Pigazzi pad.  She was placed in low modified lithotomy position.  All pressure points were inspected and padded appropriately. The abdomen was prepped and draped in the usual sterile fashion.  A timeout was undertaken, which identified the patient and correct procedure.      We made a stab incision in the left upper quadrant.  Veress needle was then used to establish pneumoperitoneum.  With pneumoperitoneum established, we made a small supraumbilical incision and an 8 mm robotic trocar was placed here.  We surveyed the abdomen with the robotic camera.  The cj for the colostomy was noted in the left lower quadrant.  We placed 2 additional 8 mm trocars on the patient's left side in an orientation transversely across the abdomen and a 12 mm trocar in the right lower quadrant.  We placed an 8 mm AirSeal trocar in the right upper quadrant.  We placed the patient in steep Trendelenburg position and again surveyed the abdomen.  There was no evidence of metastatic disease.  The liver was identified and found to be normal.  There were some adhesions of the sigmoid colon to the left pelvic sidewall.  We then docked the robot over the patient's left side.  In arm #1, we had a small grasping retractor.  In arm #2, we had a tip-up grasper.  In arm #3 was a camera and in arm #4 was a monopolar scissors which was exchanged out for the vessel sealing device, as well as the robotic  stapler when needed.  Through this instrumentation, we performed a medial to lateral mobilization of the sigmoid colon, descending colon and upper rectum.  The superior hemorrhoidal artery and inferior mesenteric artery were identified and placed on stretch, incising the peritoneum to the right lateral position of the vessel.  This allowed us to elevate the mesentery and to identify the left ureter, elevating the sigmoid mesocolon off the retroperitoneum out to the lateral abdominal sidewall.  This allowed us to encircle the inferior mesenteric artery and the vein with the monopolar scissors and then these vessels were taken with the robotic vessel sealing device.  This allowed us to incise some of the additional bare area of the mesentery, elevating the descending mesocolon off the retroperitoneum, up over Gerota fascia and the kidney and out laterally to the abdominal sidewall.        We turned our attention laterally and used the monopolar scissors to incise the peritoneum and Toldt fascia laterally to connect our lateral dissection to our medial dissection.  We then turned our attention to the pelvis.  We used the grasping retractor to retract the colon cephalad and incised the attachments between the mesocolon and the presacral fascia.  This was taken down distally below Waldeyer's fascia and then incising the peritoneum bilaterally along the rectum coming through the median stalks.  The fascia was then incised anteriorly as we retracted the uterus and the ovaries anteriorly.  I should note the uterus and the ovaries bilaterally looked normal.  The fascia was then incised anteriorly, gaining into the nice areolar plane between the rectum and the posterior wall of the vagina.  We then continued our dissection, both laterally as well as anteriorly, down to the level of the pelvic floor.  The levators were identified bilaterally.  We came down to the level of the anal canal and confirmed that we had dissected  posteriorly distal to the level of the coccyx, with confirmation of a perineal palpation by Dr. Cornelius.        Once this was accomplished, we turned our attention back to the sigmoid colon and identified our proximal vascular pedicle.  A point for proximal transection was chosen, allowing for an adequate radial lymphadenectomy based on the ligation of the inferior mesenteric artery.  The intervening mesentery was ligated and transected with the vessel sealing device up to our point for proximal transection, with the bowel nicely isolated here.  The bowel was transected with a single firing of a green load robotic stapler.  We then introduced a 19-[place] drain through arm #1 after undocking this arm and it was brought out through the port site.  The drain was secured to the specimen with a 3-0 Vicryl suture.        The robot was then fully undocked and I came to the bedside.  I excised a disk of skin at the proposed ostomy site and dissection was taken down with electrocautery.  The fascia was incised in a craniocaudal fashion.  The muscle fibers of the rectus were spread.  The posterior fascia was then incised in a craniocaudal fashion.  Pneumoperitoneum was evacuated and the proximal bowel, which had been grasped previously with a laparoscopic grasper, was nicely delivered through a trephine and grasped with a [place] clamp externally.  With the bowel brought through the trephine easily, pneumoperitoneum was reestablished with the AirSeal device.  We inspected the bowel, inspected the cut edge of the mesentery and found everything to be hemostatic and that the bowel was in proper orientation without twisting.  Maintaining pneumoperitoneum and utilizing the robotic camera as a laparoscope, we closed the right lower quadrant trocar site as well as the AirSeal trocar site with a Pineda-Trina device and 0 Vicryl suture.  With the pneumoperitoneum evacuated, skin and subcutaneous fat was irrigated and all sites  then closed with 4-0 Monocryl in a subcuticular fashion.  The drain was secured with 2-0 nylon.        We then turned our attention to the ostomy.  The trephine was palpated and found to be slightly enlarged.  I was concerned about a parastomal hernia.  The fascia was partially reapproximated cephalad to the stoma with interrupted sutures of 0 Vicryl on a UR-6.  At the completion of this, the fashion was felt to be snug, but not tight.  Palpation of the stoma also after maturing the stoma was found that it was patent, but not tight.  Some of the pericolonic fat was excised by excising the epiploica and then the colostomy was matured in a Peggy fashion with interrupted 3-0 Vicryl sutures after excising the transverse staple line.  A stoma appliance was applied.  A drain sponge was applied to the drain and the drain was connected to bulb suction.        The drapes were taken down.  She was then placed in supine position.  She was then placed in the prone jackknife position on a new operating table.  The procto roll was placed across the pelvis and the chest and legs were padded with pillows, as well as chest rolls.  All pressure points were inspected and padded appropriately.  The buttocks were spread with tape.  The perineum was prepped and draped in the usual sterile fashion.  We repeated our timeout.  The coccyx was identified by palpation.  Skin markings were made around the hair bearing area just outside the external sphincter circumferentially around the anus.  I did palpate the fullness in the anus again and confirmed location.  Electrocautery was then used to make a curvilinear incision bilaterally around the anus to fully excise the anus.  Dissection was taken down with electrocautery bilaterally as well as posteriorly, coming through the anococcygeal ligament and entering into the pelvis.  We were able to palpate the levators bilaterally and incise these bilaterally, carrying our dissection anteriorly.   This allowed us to jonathon the specimen outside the patient.  The drain was identified and the suture was cut, leaving the drain within the pelvis.  We then took some of the remaining fibers and attachments of the rectum and anus attached to the vagina.  We then carried with our anterior dissection very carefully to avoid injury to the vagina, as well as injury to the bowel.  With the specimen fully excised, it was sent off for routine examination and marked as sigmoid colon, rectum and anus.  The mesorectal excision appeared to be complete.      The pelvis was then irrigated out and hemostasis was attended to.  The drain was left within the pelvis.  The levators were reapproximated with interrupted #1 Vicryl sutures and the wound was irrigated out.  At this point, the drapes were taken down and the procto roll was removed and the patient was kept in a more neutral position with pillows per Dr. Ramos's instruction.  She then came in to the procedure and reprepped and draped the patient for the left gluteal flap.  At the end of the procedure, I came back into the room as the patient was turned in the supine position and being extubated.  I did change out the ostomy appliance, as well as the drain dressing.  The patient was then awakened, extubated and transferred to the PACU in stable condition.  Lap, sponge and needle counts were correct at the end of the case x 2.         CHANTEL YAÑEZ MD             D: 2018   T: 2018   MT: SHAUNA      Name:     JAMES CARABALLO   MRN:      [id number]        Account:        [id number]   :      1960           Procedure Date: 2018      Document: [id number]       cc: Patrizia Talley MD

## 2018-08-07 NOTE — ANESTHESIA PREPROCEDURE EVALUATION
Anesthesia Evaluation     .             ROS/MED HX    ENT/Pulmonary:      (-) sleep apnea   Neurologic:  - neg neurologic ROS     Cardiovascular:  - neg cardiovascular ROS       METS/Exercise Tolerance:     Hematologic:  - neg hematologic  ROS       Musculoskeletal:  - neg musculoskeletal ROS       GI/Hepatic:        (-) GERD   Renal/Genitourinary:  - ROS Renal section negative       Endo:         Psychiatric:  - neg psychiatric ROS       Infectious Disease:         Malignancy:   (+) Malignancy History of Other  Other CA Rectal CA Active status post         Other:    - neg other ROS                 Physical Exam  Normal systems: cardiovascular, pulmonary and dental    Airway   Mallampati: II  TM distance: >3 FB  Neck ROM: full    Dental     Cardiovascular       Pulmonary                     Anesthesia Plan      History & Physical Review  History and physical reviewed and following examination; no interval change.    ASA Status:  3 .    NPO Status:  > 8 hours    Plan for General and ETT with Intravenous induction. Maintenance will be Balanced.    PONV prophylaxis:  Ondansetron (or other 5HT-3) and Dexamethasone or Solumedrol  precedex gtt      Postoperative Care  Postoperative pain management:  IV analgesics.      Consents  Anesthetic plan, risks, benefits and alternatives discussed with:  Patient..                          .

## 2018-08-07 NOTE — IP AVS SNAPSHOT
[facility]    [address] [place] [address] [place] [address]    Phone:  [phone]                                       After Visit Summary   8/7/2018    Ilene Gaviria    MRN: [id number]           After Visit Summary Signature Page     I have received my discharge instructions, and my questions have been answered. I have discussed any challenges I see with this plan with the nurse or doctor.    ..........................................................................................................................................  Patient/Patient Representative Signature      ..........................................................................................................................................  Patient Representative Print Name and Relationship to Patient    ..................................................               ................................................  Date                                            Time    ..........................................................................................................................................  Reviewed by Signature/Title    ...................................................              ..............................................  Date                                                            Time

## 2018-08-07 NOTE — ANESTHESIA CARE TRANSFER NOTE
Patient: Ilene Gaviria    Procedure(s):  DAVINCI ABDOMINAL PERINEAL RESECTION WITH PERMANENT COLOSTOMY ( YAÑEZ ) LEFT GLUTEAL FLAP TO RECTAL DEFECT ( JERE )  - Wound Class: II-Clean Contaminated   - Wound Class: II-Clean Contaminated    Diagnosis: RECTAL CANCER  Diagnosis Additional Information: No value filed.    Anesthesia Type:   General, ETT     Note:  Airway :Face Mask  Patient transferred to:PACU  Comments: A&O x 3.  Denies pain, N&V.  Report to RN.      Vitals: (Last set prior to Anesthesia Care Transfer)    CRNA VITALS  8/7/2018 1243 - 8/7/2018 1320      8/7/2018             NIBP: 132/88    Pulse: 75    NIBP Mean: 109    SpO2: 98 %    Resp Rate (observed): 9    Resp Rate (set): 13                Electronically Signed By: Tricia Epstein  August 7, 2018  1:20 PM

## 2018-08-07 NOTE — BRIEF OP NOTE
[facility] Brief Operative Note    Pre-operative diagnosis: RECTAL CANCER   Post-operative diagnosis Rectal cancer   Procedure: Procedure(s):  DAVINCI ABDOMINAL PERINEAL RESECTION WITH PERMANENT COLOSTOMY ( PARI ) LEFT GLUTEAL FLAP TO RECTAL DEFECT ( JERE )  - Wound Class: II-Clean Contaminated   - Wound Class: II-Clean Contaminated   Surgeon(s): Surgeon(s) and Role:  Panel 1:     * Jonny Finney MD - Primary     * Luis Cornelius MD - Assisting    Panel 2:     * Conchita Ramos MD - Primary   Estimated blood loss: 40 mL    Specimens:   ID Type Source Tests Collected by Time Destination   A : SIGMOID, RECTUM, ANUS Tissue Large Intestine, Rectum SURGICAL PATHOLOGY EXAM Jonny Finney MD 8/7/2018 11:04 AM       Findings: Excellent response to chemo radiation.  Perineal wound closed by Dr. Ramos.  JONAH wound in pelvis and in buttock.  End colostomy in LLQ.         IVF: 300  UOP: 200  Condition on discharge from OR: Satisfactory    Jonny Finney MD   [facility]   [phone].        ADDENDUM:    PATIENT DATA  Indicate Y or N:  Home O2 No  Hemodialysis  No  Transplant patient  No  Cirrhosis  No  Steroids in last 30 days  No  Immunomodulators in last 30 days  No  Anticoagulation at time of surgery  No   List medication n    Prior abdominal surgery  No  Pelvic irradiation  Yes    Albumin within 30 days if known 4.1   Hgb within 30 days if known 13   Hemoglobin   Date Value Ref Range Status   07/26/2018 13.0 11.7 - 15.7 g/dL Final   ]  Cr within 30 days if known 0.6   Creatinine   Date Value Ref Range Status   07/26/2018 0.60 0.52 - 1.04 mg/dL Final   ]  Body mass index is 30.71 kg/(m^2).      OR DATA  Emergent  No   <24 hours  No   <1 week  No  Bowel Prep Yes  Antibiotics  Yes  DVT prophylaxis    Heparin  Yes   SCD  y   None  No  Drain  Yes  ASA (1,2,3,4) 2  OR time (min) 330  Stents  No  Transfuse >/= 2U  No  Anastomosis   Stapled  No   Handsewn  No  Leak Test     Positive  No   Negative  No   Not done  Yes    FOR CANCER ALSO COMPLETE:  Preoperative treatment (Y or N)   Chemo  Yes   Radiation Yes   Diversion  No  If rectal cancer   Distance from anal verge (cm) 1   Location    Anterior n      Posterior y      Lateral y      Circumferential n    Preoperative Stage   CT  Yes   MRI Yes   US  No   Clinical  yes   Unknown  No   T stage (1,2,3,4) 2   N stage (0,1,2) 0   M stage (0,1) 0  CEA 0.9  Metastatic disease at time of operation  No       Route (Have a good day)

## 2018-08-07 NOTE — BRIEF OP NOTE
[facility] Brief Operative Note    Pre-operative diagnosis: RECTAL CANCER   Post-operative diagnosis rectal wound     Procedure: Procedure(s):  DAVINCI ABDOMINAL PERINEAL RESECTION WITH PERMANENT COLOSTOMY ( PARI ) LEFT GLUTEAL FLAP TO RECTAL DEFECT ( JERE )  - Wound Class: II-Clean Contaminated   - Wound Class: II-Clean Contaminated   Surgeon(s): Surgeon(s) and Role:  Panel 1:     * Jonny Finney MD - Primary     * Luis Cornelius MD - Assisting    Panel 2:     * Conchita Ramos MD - Primary   Estimated blood loss: 40 mL    Specimens:   ID Type Source Tests Collected by Time Destination   A : SIGMOID, RECTUM, ANUS Tissue Large Intestine, Rectum SURGICAL PATHOLOGY EXAM Jonny Finney MD 8/7/2018 11:04 AM       Findings: See op note.

## 2018-08-07 NOTE — PLAN OF CARE
Problem: Patient Care Overview  Goal: Individualization & Mutuality  1435 Pt received per cart from PACU. Drains , miguel patent. Denies pain. Few ice chips given. Vy Wagner RN

## 2018-08-08 LAB
ANION GAP SERPL CALCULATED.3IONS-SCNC: 7 MMOL/L (ref 3–14)
BUN SERPL-MCNC: 15 MG/DL (ref 7–30)
CALCIUM SERPL-MCNC: 8.2 MG/DL (ref 8.5–10.1)
CHLORIDE SERPL-SCNC: 108 MMOL/L (ref 94–109)
CO2 SERPL-SCNC: 26 MMOL/L (ref 20–32)
COPATH REPORT: NORMAL
CREAT SERPL-MCNC: 0.6 MG/DL (ref 0.52–1.04)
ERYTHROCYTE [DISTWIDTH] IN BLOOD BY AUTOMATED COUNT: 13.5 % (ref 10–15)
GFR SERPL CREATININE-BSD FRML MDRD: >90 ML/MIN/1.7M2
GLUCOSE SERPL-MCNC: 103 MG/DL (ref 70–99)
HCT VFR BLD AUTO: 32.9 % (ref 35–47)
HGB BLD-MCNC: 10.8 G/DL (ref 11.7–15.7)
MAGNESIUM SERPL-MCNC: 1.8 MG/DL (ref 1.6–2.3)
MCH RBC QN AUTO: 31.9 PG (ref 26.5–33)
MCHC RBC AUTO-ENTMCNC: 32.8 G/DL (ref 31.5–36.5)
MCV RBC AUTO: 97 FL (ref 78–100)
PHOSPHATE SERPL-MCNC: 3.2 MG/DL (ref 2.5–4.5)
PLATELET # BLD AUTO: 120 10E9/L (ref 150–450)
POTASSIUM SERPL-SCNC: 4.1 MMOL/L (ref 3.4–5.3)
RBC # BLD AUTO: 3.39 10E12/L (ref 3.8–5.2)
SODIUM SERPL-SCNC: 141 MMOL/L (ref 133–144)
WBC # BLD AUTO: 8.2 10E9/L (ref 4–11)

## 2018-08-08 PROCEDURE — 80048 BASIC METABOLIC PNL TOTAL CA: CPT | Performed by: COLON & RECTAL SURGERY

## 2018-08-08 PROCEDURE — 83735 ASSAY OF MAGNESIUM: CPT | Performed by: COLON & RECTAL SURGERY

## 2018-08-08 PROCEDURE — 25000128 H RX IP 250 OP 636: Performed by: COLON & RECTAL SURGERY

## 2018-08-08 PROCEDURE — 25000128 H RX IP 250 OP 636: Performed by: PHYSICIAN ASSISTANT

## 2018-08-08 PROCEDURE — 36415 COLL VENOUS BLD VENIPUNCTURE: CPT | Performed by: COLON & RECTAL SURGERY

## 2018-08-08 PROCEDURE — 25000125 ZZHC RX 250: Performed by: COLON & RECTAL SURGERY

## 2018-08-08 PROCEDURE — 25000132 ZZH RX MED GY IP 250 OP 250 PS 637: Performed by: COLON & RECTAL SURGERY

## 2018-08-08 PROCEDURE — 12000007 ZZH R&B INTERMEDIATE

## 2018-08-08 PROCEDURE — 84100 ASSAY OF PHOSPHORUS: CPT | Performed by: COLON & RECTAL SURGERY

## 2018-08-08 PROCEDURE — 85027 COMPLETE CBC AUTOMATED: CPT | Performed by: COLON & RECTAL SURGERY

## 2018-08-08 RX ORDER — KETOROLAC TROMETHAMINE 30 MG/ML
30 INJECTION, SOLUTION INTRAMUSCULAR; INTRAVENOUS EVERY 6 HOURS PRN
Status: DISCONTINUED | OUTPATIENT
Start: 2018-08-08 | End: 2018-08-11 | Stop reason: HOSPADM

## 2018-08-08 RX ADMIN — SODIUM CHLORIDE, POTASSIUM CHLORIDE, SODIUM LACTATE AND CALCIUM CHLORIDE: 600; 310; 30; 20 INJECTION, SOLUTION INTRAVENOUS at 00:53

## 2018-08-08 RX ADMIN — METRONIDAZOLE 500 MG: 500 INJECTION, SOLUTION INTRAVENOUS at 09:18

## 2018-08-08 RX ADMIN — ENOXAPARIN SODIUM 40 MG: 40 INJECTION SUBCUTANEOUS at 07:30

## 2018-08-08 RX ADMIN — ACETAMINOPHEN 975 MG: 325 TABLET, FILM COATED ORAL at 00:41

## 2018-08-08 RX ADMIN — ACETAMINOPHEN 975 MG: 325 TABLET, FILM COATED ORAL at 16:00

## 2018-08-08 RX ADMIN — METRONIDAZOLE 500 MG: 500 INJECTION, SOLUTION INTRAVENOUS at 00:41

## 2018-08-08 RX ADMIN — SODIUM CHLORIDE, POTASSIUM CHLORIDE, SODIUM LACTATE AND CALCIUM CHLORIDE: 600; 310; 30; 20 INJECTION, SOLUTION INTRAVENOUS at 17:50

## 2018-08-08 RX ADMIN — CIPROFLOXACIN 400 MG: 2 INJECTION, SOLUTION INTRAVENOUS at 07:30

## 2018-08-08 RX ADMIN — KETOROLAC TROMETHAMINE 30 MG: 30 INJECTION, SOLUTION INTRAMUSCULAR at 12:17

## 2018-08-08 RX ADMIN — ACETAMINOPHEN 975 MG: 325 TABLET, FILM COATED ORAL at 23:35

## 2018-08-08 RX ADMIN — ACETAMINOPHEN 975 MG: 325 TABLET, FILM COATED ORAL at 08:12

## 2018-08-08 ASSESSMENT — ACTIVITIES OF DAILY LIVING (ADL)
ADLS_ACUITY_SCORE: 11

## 2018-08-08 NOTE — PROGRESS NOTES
COLON [facility] RECTAL [facility]GERY  PROGRESS NOTE    August 8, 2018  Post-op Day # 1    SUBJECTIVE:  The patient is doing well. Feeling sore. Using PCA for pain control.     OBJECTIVE:  Temp:  [96.6  F (35.9  C)-98.6  F (37  C)] 98.6  F (37  C)  Heart Rate:  [57-75] 67  Resp:  [12-31] 16  BP: (103-130)/(60-97) 115/69  SpO2:  [91 %-98 %] 96 %    Intake/Output Summary (Last 24 hours) at 08/08/18 0806  Last data filed at 08/08/18 0600   Gross per 24 hour   Intake             3681 ml   Output             2445 ml   Net             1236 ml       GENERAL:  Awake, alert, no acute distress,   HEAD - Nomocephalic atraumatic  SCLERA anicteric  EXTREMITIES warm and well perfused  ABDOMEN:  Soft, appropriately tender, non-distended, colostomy is pink and viable.   INCISION:  C/d/i, JONAH serosanguinous drainage and around the tube    LABS:  Lab Results   Component Value Date    WBC 8.2 08/08/2018     Lab Results   Component Value Date    HGB 10.8 08/08/2018     Lab Results   Component Value Date    HCT 32.9 08/08/2018     Lab Results   Component Value Date     08/08/2018     Last Basic Metabolic Panel:  Lab Results   Component Value Date     04/24/2017      Lab Results   Component Value Date    POTASSIUM 4.2 07/26/2018     Lab Results   Component Value Date    CHLORIDE 105 04/24/2017     Lab Results   Component Value Date    ARTHUR 8.9 04/24/2017     Lab Results   Component Value Date    CO2 28 04/24/2017     Lab Results   Component Value Date    BUN 15 04/24/2017     Lab Results   Component Value Date    CR 0.56 08/07/2018     Lab Results   Component Value Date     04/24/2017       ASSESSMENT/PLAN:POD#1 robotic APR and end colostomy  1. Full liquids  2. Haque to be removed tomorrow  3. Continue JONAH drains.   4. Lovenox for prophylaxis and teaching for home  5. Continue PCA and PO pain meds  6. Decrease IVF      Traci Andrade PA-C  [facility]  [phone]    I performed a history and physical  examination of the patient and discussed their management with the physician assistant. I reviewed the physician assistants note and agree with the documented findings and plan of care.       Jonny Finney MD  Colorectal Surgery  [phone] (office)  [phone] (pager)  [email or web address]

## 2018-08-08 NOTE — PROGRESS NOTES
"Plastic Surgery    Patient doing OK. Reports abdomen more uncomfortable than buttock area.    PE: Temp: 98.9  F (37.2  C) Temp src: Oral BP: 102/54   Heart Rate: 65 Resp: 16 SpO2: 94 % O2 Device: Nasal cannula Oxygen Delivery: 1 LPM    Flap in place. Mild ecchymosis distally but good cap refill.    A/P:  Limit upright sitting to 10\"/60\". Ambulate as tolerated. Lying as tolerated. Management per CRS.    Conchita Ramos MD  Plastic Surgery  [facility]  [phone] (office)        "

## 2018-08-08 NOTE — PLAN OF CARE
Problem: Patient Care Overview  Goal: Plan of Care/Patient Progress Review  Patient alert and oriented x4. VSS on 1L of O2 via NC. Pain controlled with PCA, scheduled Tylenol and PRN Toradol. JPs patent, new dressing applied. Rectal incision CDI. BS+, passing gas. Stoma pink and protruding, no output. Haque patent . Up w/1. Full liquid diet, tolerating well. CMS intact.

## 2018-08-08 NOTE — PLAN OF CARE
Problem: Patient Care Overview  Goal: Plan of Care/Patient Progress Review  Outcome: Improving  Alert and oriented x4. Vital signs stable on 2L nasal cannula. Tolerated jello and broth. Dangled and stood at bedside. Assist of 1. Bowel sounds hypoactive, denies flatus. Haque patent with adequate output. 2 JONAH drains patent with serosang output. Lap sites open to air with no drainage. Incision dressings clean, dry and intact. Ostomy intact with no output. Pain managed with PCA.

## 2018-08-08 NOTE — PLAN OF CARE
Problem: Patient Care Overview  Goal: Plan of Care/Patient Progress Review  Outcome: No Change  A/O x4. AVSS. 1L of O2 nasal cannula. CMS intact. Pain controlled with PCA, 0.2 dilaudid and schedule tylenol. L buttock dressing CDI, JONAH patent stripped per order, sanguinous output. LLQ dressing changed 1x, leaking at site, JONAH w/no output. Rectal incision intact, small amount of blood on panties. BS audible and active, no flatus. Ostomy w/no output. Stoma pink and protruding. Haque patent w/adequate output. Up w/1. Full liquid diet, tolerating well.

## 2018-08-08 NOTE — PHARMACY
Medication coverage check for Lovenox. Patient's copay is reasonable at $12 for a one-month supply.  Leola Perdue CphT  [facility] Discharge Pharmacy Liaison  Liaison Cell: [phone]

## 2018-08-09 LAB — GLUCOSE BLDC GLUCOMTR-MCNC: 92 MG/DL (ref 70–99)

## 2018-08-09 PROCEDURE — 25000128 H RX IP 250 OP 636: Performed by: COLON & RECTAL SURGERY

## 2018-08-09 PROCEDURE — 00000146 ZZHCL STATISTIC GLUCOSE BY METER IP

## 2018-08-09 PROCEDURE — 25000128 H RX IP 250 OP 636: Performed by: PHYSICIAN ASSISTANT

## 2018-08-09 PROCEDURE — 12000007 ZZH R&B INTERMEDIATE

## 2018-08-09 PROCEDURE — G0463 HOSPITAL OUTPT CLINIC VISIT: HCPCS

## 2018-08-09 PROCEDURE — 25000132 ZZH RX MED GY IP 250 OP 250 PS 637: Performed by: PHYSICIAN ASSISTANT

## 2018-08-09 PROCEDURE — 25000132 ZZH RX MED GY IP 250 OP 250 PS 637: Performed by: COLON & RECTAL SURGERY

## 2018-08-09 PROCEDURE — 40000901 ZZH STATISTIC WOC PT EDUCATION, 0-15 MIN

## 2018-08-09 RX ORDER — HYDROMORPHONE HYDROCHLORIDE 1 MG/ML
.3-.5 INJECTION, SOLUTION INTRAMUSCULAR; INTRAVENOUS; SUBCUTANEOUS
Status: DISCONTINUED | OUTPATIENT
Start: 2018-08-09 | End: 2018-08-11 | Stop reason: HOSPADM

## 2018-08-09 RX ORDER — OXYCODONE HYDROCHLORIDE 5 MG/1
5 TABLET ORAL EVERY 4 HOURS PRN
Status: DISCONTINUED | OUTPATIENT
Start: 2018-08-09 | End: 2018-08-11 | Stop reason: HOSPADM

## 2018-08-09 RX ADMIN — KETOROLAC TROMETHAMINE 30 MG: 30 INJECTION, SOLUTION INTRAMUSCULAR at 21:39

## 2018-08-09 RX ADMIN — ENOXAPARIN SODIUM 40 MG: 40 INJECTION SUBCUTANEOUS at 06:33

## 2018-08-09 RX ADMIN — KETOROLAC TROMETHAMINE 30 MG: 30 INJECTION, SOLUTION INTRAMUSCULAR at 15:35

## 2018-08-09 RX ADMIN — ACETAMINOPHEN 975 MG: 325 TABLET, FILM COATED ORAL at 16:40

## 2018-08-09 RX ADMIN — SODIUM CHLORIDE, POTASSIUM CHLORIDE, SODIUM LACTATE AND CALCIUM CHLORIDE: 600; 310; 30; 20 INJECTION, SOLUTION INTRAVENOUS at 10:03

## 2018-08-09 RX ADMIN — OXYCODONE HYDROCHLORIDE 5 MG: 5 TABLET ORAL at 13:46

## 2018-08-09 RX ADMIN — ACETAMINOPHEN 975 MG: 325 TABLET, FILM COATED ORAL at 08:43

## 2018-08-09 ASSESSMENT — ACTIVITIES OF DAILY LIVING (ADL)
ADLS_ACUITY_SCORE: 11

## 2018-08-09 NOTE — PLAN OF CARE
Problem: Patient Care Overview  Goal: Plan of Care/Patient Progress Review  Outcome: Improving  Patient alert and oriented x4. VSS on RA. JPs patent, stripped per order, new dressing applies. Rectal incision dress changed. BS+, passing gas, small amount soft/brown stool. Stoma pink and protruding. PT bladder scanned and straight cath @ 1530 645 ml output. CMS intact. Advanced to regular diet. Ambulated in [address] twice, tolerated well.

## 2018-08-09 NOTE — PLAN OF CARE
Problem: Patient Care Overview  Goal: Plan of Care/Patient Progress Review  Outcome: Improving  A/O x4. AVSS. 1L of O2 nasal cannula. CMS intact. Pain controlled with PCA and scheduled tylenol. Laps sites x4, CDI. JONAH x2, moderate serosanguinous output, stripped per order. LLQ dressing scant serous drainage. L lateral dressing and bottom dressing CDI. Stoma pink and protruding. Ostomy no output this shift. BS audible and active, passing flatus, no BM this shift. Haque to be removed this morning, due to void. Up w/assist x1. Tolerating full liquid diet. Will continue to monitor.

## 2018-08-09 NOTE — PROGRESS NOTES
COLON & RECTAL SURGERY  PROGRESS NOTE    August 8, 2018  Post-op Day # 2    SUBJECTIVE:  The patient is doing very well, no events overnight. Ambulated once, tolerating diet without nausea, using PCA minimally. Haque removed this AM, + flatus, no significant stoma output.     OBJECTIVE:  Temp:  [98.4  F (36.9  C)-98.9  F (37.2  C)] 98.4  F (36.9  C)  Pulse:  [57-64] 57  Heart Rate:  [59-70] 59  Resp:  [16] 16  BP: (102-135)/(54-78) 135/67  SpO2:  [93 %-95 %] 95 %    Intake/Output Summary (Last 24 hours) at 08/08/18 0806  Last data filed at 08/08/18 0600   Gross per 24 hour   Intake             3681 ml   Output             2445 ml   Net             1236 ml       GENERAL:  Awake, alert, no acute distress,   HEAD - Nomocephalic atraumatic  SCLERA anicteric  EXTREMITIES warm and well perfused  ABDOMEN/Perineum:  Soft, appropriately and minimally tender, non-distended, colostomy is pink and viable, + gas in bag.   INCISION:  C/d/i, JONAH serosanguinous drainage and around the tube    LABS:  Lab Results   Component Value Date    WBC 8.2 08/08/2018     Lab Results   Component Value Date    HGB 10.8 08/08/2018     Lab Results   Component Value Date    HCT 32.9 08/08/2018     Lab Results   Component Value Date     08/08/2018     Last Basic Metabolic Panel:  Lab Results   Component Value Date     04/24/2017      Lab Results   Component Value Date    POTASSIUM 4.2 07/26/2018     Lab Results   Component Value Date    CHLORIDE 105 04/24/2017     Lab Results   Component Value Date    ARTHUR 8.9 04/24/2017     Lab Results   Component Value Date    CO2 28 04/24/2017     Lab Results   Component Value Date    BUN 15 04/24/2017     Lab Results   Component Value Date    CR 0.56 08/07/2018     Lab Results   Component Value Date     04/24/2017       ASSESSMENT/PLAN:POD#2 robotic APR and end colostomy  1. Will discuss diet advancement with Dr. Finney  2. Haque out, due to void in 6 hours  3. Continue flap JONAH per PRS, will  discuss timing for removal of abd JONAH with Dr. Finney   4. Lovenox for prophylaxis and teaching for home  5. Will discuss DC PCA today and use of multimodal oral analgesics  6. Will discuss saline lock IVF  7. WOCN to see today for teaching  8. Pt encouraged to increase ambulation  9. F/U pending path    Lonny Obregon MD  Pager: [phone]  [facility]  [phone]      CRS Staff  Seen and examined independently.  Reviewed path with her.  Awaiting to urinate.  Will remove abdominal drain tomorrow.    Jonny Finney MD  Colorectal Surgery  [phone] (office)  [phone] (pager)  [email or web address]

## 2018-08-10 LAB — PLATELET # BLD AUTO: 124 10E9/L (ref 150–450)

## 2018-08-10 PROCEDURE — 25000132 ZZH RX MED GY IP 250 OP 250 PS 637: Performed by: PHYSICIAN ASSISTANT

## 2018-08-10 PROCEDURE — 85049 AUTOMATED PLATELET COUNT: CPT | Performed by: COLON & RECTAL SURGERY

## 2018-08-10 PROCEDURE — 40000902 ZZH STATISTIC WOC PT EDUCATION, 16-30 MIN

## 2018-08-10 PROCEDURE — 12000007 ZZH R&B INTERMEDIATE

## 2018-08-10 PROCEDURE — 36415 COLL VENOUS BLD VENIPUNCTURE: CPT | Performed by: COLON & RECTAL SURGERY

## 2018-08-10 PROCEDURE — 25000132 ZZH RX MED GY IP 250 OP 250 PS 637: Performed by: COLON & RECTAL SURGERY

## 2018-08-10 PROCEDURE — G0463 HOSPITAL OUTPT CLINIC VISIT: HCPCS

## 2018-08-10 PROCEDURE — 25000128 H RX IP 250 OP 636: Performed by: COLON & RECTAL SURGERY

## 2018-08-10 RX ORDER — OXYCODONE HYDROCHLORIDE 5 MG/1
5 TABLET ORAL EVERY 4 HOURS PRN
Qty: 30 TABLET | Refills: 0 | Status: SHIPPED | OUTPATIENT
Start: 2018-08-10 | End: 2018-12-14

## 2018-08-10 RX ORDER — MINERAL OIL/HYDROPHIL PETROLAT
OINTMENT (GRAM) TOPICAL DAILY
Status: DISCONTINUED | OUTPATIENT
Start: 2018-08-10 | End: 2018-08-11 | Stop reason: HOSPADM

## 2018-08-10 RX ADMIN — ACETAMINOPHEN 975 MG: 325 TABLET, FILM COATED ORAL at 08:02

## 2018-08-10 RX ADMIN — OXYCODONE HYDROCHLORIDE 5 MG: 5 TABLET ORAL at 12:49

## 2018-08-10 RX ADMIN — KETOROLAC TROMETHAMINE 30 MG: 30 INJECTION, SOLUTION INTRAMUSCULAR at 15:48

## 2018-08-10 RX ADMIN — ENOXAPARIN SODIUM 40 MG: 40 INJECTION SUBCUTANEOUS at 06:08

## 2018-08-10 RX ADMIN — OXYCODONE HYDROCHLORIDE 5 MG: 5 TABLET ORAL at 06:08

## 2018-08-10 RX ADMIN — ACETAMINOPHEN 975 MG: 325 TABLET, FILM COATED ORAL at 00:48

## 2018-08-10 RX ADMIN — KETOROLAC TROMETHAMINE 30 MG: 30 INJECTION, SOLUTION INTRAMUSCULAR at 08:08

## 2018-08-10 RX ADMIN — OXYCODONE HYDROCHLORIDE 5 MG: 5 TABLET ORAL at 20:16

## 2018-08-10 ASSESSMENT — ACTIVITIES OF DAILY LIVING (ADL)
ADLS_ACUITY_SCORE: 11

## 2018-08-10 NOTE — CONSULTS
Care Transition Initial Assessment - RN        Met with: Patient and Family.  DATA   Active Problems:    Rectal cancer (H)       Cognitive Status: awake, alert and oriented.        Contact information and PCP information verified: Yes  Lives With: spouse  Insurance concerns: No Insurance issues identified  ASSESSMENT  Patient currently receives the following services:  none       Identified issues/concerns regarding health management: new ostomy cares  Patient lives with spouse and family  PLAN  Financial costs for the patient include per insurance .  Patient given options and choices for discharge yes, would like [facility] . Referral sent to [place] via standard process  Patient/family is agreeable to the plan?  Yes:   Patient anticipates discharging to home with [facility] .        Patient anticipates needs for home equipment: Yes, new ostomy supplies  Plan/Disposition: Home     Care  (CTS) will continue to follow as needed.

## 2018-08-10 NOTE — PROGRESS NOTES
"[facility] Nurse Inpatient Ostomy Assessment      Assessment of ostomy and needs for:  New Permanent colostomy      Data:   History:        Type of ostomy:  Permanent colostomy  Stoma assessment:   ? Stoma 1 3/4\" rounded, red moist, protrudes slightly, lumen empties @ 6 o'clock above skin level  ? A bridge in place?No  Mucocutaneous Junction (MCJ):  Intact with sutures  Peristomal skin:  Intact no erythema.  Divot @ 3 o'clock and crease from 4-7 o'clock  Abdominal assessment: distended but softer today  ? N/G still in place?:  No  Output:  Small amt brown mushy output. Passing flatus        I/O last 3 completed shifts:  In: -   Out: 1500 [Urine:1275; Drains:225]    Current pouching system:  [employer]den NI flat cut to fit with drainable pouch with lock n roll closure. Small amt leakage @ 3 o'clock  Pain:  5/10 buttocks pain   ? Is pt still on a PCA? No see EMR    Diet:       Active Diet Order      Regular Diet Adult     Labs:   Recent Labs   Lab Test  08/08/18   0732   04/24/17   1659   ALBUMIN   --    --   4.1   HGB  10.8*   < >  14.2   WBC  8.2   --   5.6    < > = values in this interval not displayed.             Intervention:     Patient's chart evaluated.      Assessments done today:  Stoma, pouching system and readiness to learn  Education: Met with  and patient for teaching                    Pt changed pouch with verbal cues and minimal hands on assist                     Pt is emptying drainable pouch independently. Pt applied closed appliance today                     Review bathing, odor, diet and activity.                      Reviewed all AVS ostomy instructions and discharge supplies    All patient / family questions answered:  Yes      Prepared for discharge by: AVS completed and reviewed with pt/.  Discharge supplies reviewed with patient    Pt registered for ostomy supply samples? Yes         Assessment:       Stoma: Viable and functioning    Prosthetic refitting:  " Gracy NI flat cut to fit with closed pouch.  .     Learning needs/ comprehension: HHC for reinforcement of appliance change and monitoring buttocks incision. HHC to monitor need for convexity         Plan:     Plan:   ? Current pouching system: Gracy New Image 2-pc 57mm flat with drainable/closed pouches.    Education:  ? Education completed:  Stoma construction, post-op expectations. Pt changed pouch with verbal cues and minimal hands on assist. Emptying drainable pouch independently  ? Educational needs: HHC for reinforcement of appliance change and monitoring buttocks incision. HHC to monitor need for convexity   ? Continue to prepare for discharge: AVS completed and reviewed. Pt has supplies @ bedside for discharge  o Supply company: TBD  o Do WOC Nurses recommend home care? Yes    WOC will return daily M-F

## 2018-08-10 NOTE — PROGRESS NOTES
"[facility] Nurse Inpatient Ostomy Assessment      Assessment of ostomy and needs for:  New Permanent colostomy      Data:   History:        Type of ostomy:  Permanent colostomy  Stoma assessment:   ? Stoma 1 3/4\" rounded, red moist, protrudes slightly, lumen empties @ 6 o'clock above skin level  ? A bridge in place?No  Mucocutaneous Junction (MCJ):  Intact with sutures  Peristomal skin:  Intact no erythema.  Divot @ 3 o'clock and crease from 4-7 o'clock  Abdominal assessment: distended and firm  ? N/G still in place?:  No  Output:  Small amt brown fecal mushy output that appears to be residual stool.  No flatus noted   I/O last 3 completed shifts:  In: 1238 [P.O.:540; I.V.:698]  Out: 1190 [Urine:925; Drains:265]    Current pouching system:  Gracy Premier flat cut to fit post-op pouch intact without evidence of leakage   Pain:  5/10 buttocks pain   ? Is pt still on a PCA? No see EMR    Diet:       Active Diet Order      Regular Diet Adult     Labs:   Recent Labs   Lab Test  08/08/18   0732   04/24/17   1659   ALBUMIN   --    --   4.1   HGB  10.8*   < >  14.2   WBC  8.2   --   5.6    < > = values in this interval not displayed.           Intervention:     Patient's chart evaluated.      Assessments done today:  Stoma, pouching system and readiness to learn    Education: Review surgery, stoma construction, post-op expectations, C Role                           Demo appliance change for pt and                            Review emptying drainable pouch with lock n roll                            Review bathing, odor and diet    All patient / family questions answered:  Yes      Prepared for discharge by: No discharge prep initiated today    Pt registered for ostomy supply samples? Yes         Assessment:       Stoma: Viable and await return of GI function    Prosthetic refitting:  Gracy NI flat cut to fit with drainable lock n roll pouch.  Monitor need for convexity. Pt wants closed " pouches @ discharge    Learning needs/ comprehension:  Continued review of appliance changed and colostomy management. Pt and  engaged         Plan:     Plan:   ? Current pouching system: Gracy New Image 2-pc 57mm flat with drainable/closed pouches. Monitor need for convexity secondary to divot/crease    Education:  ? Education completed:  Stoma construction, post-op expectations and demo appliance change  ? Educational needs: Continued review of colostomy management and appliance change  ? Continue to prepare for discharge: Not discharge instructions initiated today  o Supply company: TBD  o Do WOC Nurses recommend home care? TBD    WOC will return daily M-F

## 2018-08-10 NOTE — PROGRESS NOTES
Plastic Surgery    Patient doing very well. Good pain control on oxycodone and toradol.    PE: Temp: 97.9  F (36.6  C) Temp src: Oral BP: (!) 131/91   Heart Rate: 59 Resp: 16 SpO2: 97 % O2 Device: None (Room air)      Gluteal flap healing well. JONAH drain in place and functioning.    A/P:  OK to discharge from my standpoint. F/u with me next week. Drain will stay in at discharge. May shower.    Conchita Ramos MD  Plastic Surgery  [facility]  [phone] (office)

## 2018-08-10 NOTE — PROGRESS NOTES
"New Colostomy:        1.Change appliance 2x/wk and prn   2. Empty or change pouch when 1/3 full of stool/gas  3. Will not harm appliance to get wet during bathing.        Blow dry (on cool setting) cloth tape and backing after bath or shower  4. Iniitally Eat 6 small meals/day. No dietary restrictions related to colostomy  5. Drink plenty of fluids  6. Always carry and emergency appliance system  7. For best results use a odor eliminator (Febreeze) in your bathroom prior to emptying/changing the appliance  8. No heavy lifting, no sit-ups but walk.  9. OK to take steps.       Supplies:  [employer] New image 2pc: 20 pouch changes/month  1. Barrier: flextend, cut to fit with tape                                       #16210  5/bx = -4bx/month  2. Pouch: Opaque lock n roll                                                    #96553 10/bx = 2bx/month OR  3. Pouch: Closed opaque no  filter, 9\"                                       #21004 30/bx =  2bx/month   4. Pouch: Closed opaque filter 7\"                                        #27295 60/bx = 1bx/month  4. Optional if leakage:  Adapt ring                                             #7805   10/bx = 2bx/month         Procedure for appliance change:  1. Draw and cut opening in barrier (If cut to fit)  following pattern,   2. Remove plastic backing  3. Optional if leakage:  Open ring. Stretch to approx size of opening in pouch.   4.  Optional: Place full ring around opening on sticky side of pouch. Press into place  5. Fold back edge of tape to create a \"tab\" This assists with paper removal in Step #11  6 Set barrier aside  7. Gently remove pouching system from around stoma. Discard.  8. Clean skin around stoma with disposable wipe or moist gauze. Discard.  9. Pat the skin dry  10. Center stoma in pouch opening. Press barrier to skin  11. Pull on \"tabs\" to remove paper that covers the tape. Press tape to skin  12. Close lock n roll closure on drainable pouch.  If using closed " appliance can change up to 2x/day      Call for any ?/concerns:  Carline TIWARI (ostomy nurses) @ [facility]  (C) [phone]  (W) [phone]

## 2018-08-10 NOTE — PROGRESS NOTES
COLON & RECTAL SURGERY  PROGRESS NOTE    August 10, 2018  Post-op Day # 3    SUBJECTIVE:  The patient has been having output from her colostomy and removing it herself. She tolerated diet advancement. Pain is controlled but having more incisional pain then past 2 days.     OBJECTIVE:  Temp:  [97.9  F (36.6  C)-98.7  F (37.1  C)] 97.9  F (36.6  C)  Heart Rate:  [58-68] 59  Resp:  [16] 16  BP: (115-141)/(68-91) 131/91  SpO2:  [93 %-97 %] 97 %    Intake/Output Summary (Last 24 hours) at 08/10/18 0848  Last data filed at 08/10/18 0613   Gross per 24 hour   Intake              300 ml   Output             1200 ml   Net             -900 ml       GENERAL:  Awake, alert, no acute distress,   HEAD - Nomocephalic atraumatic  SCLERA anicteric  EXTREMITIES warm and well perfused  ABDOMEN:  Soft, appropriately tender, non-distended, colostomy is pink and viable. Gas and mushy brown stool in bag.   INCISION:  C/d/i, JONAH drain with serous fluid. Abdominal drain removed.     LABS:  Lab Results   Component Value Date    WBC 8.2 08/08/2018     Lab Results   Component Value Date    HGB 10.8 08/08/2018     Lab Results   Component Value Date    HCT 32.9 08/08/2018     Lab Results   Component Value Date     08/08/2018     Last Basic Metabolic Panel:  Lab Results   Component Value Date     08/08/2018      Lab Results   Component Value Date    POTASSIUM 4.1 08/08/2018     Lab Results   Component Value Date    CHLORIDE 108 08/08/2018     Lab Results   Component Value Date    ARTHUR 8.2 08/08/2018     Lab Results   Component Value Date    CO2 26 08/08/2018     Lab Results   Component Value Date    BUN 15 08/08/2018     Lab Results   Component Value Date    CR 0.60 08/08/2018     Lab Results   Component Value Date     08/08/2018       ASSESSMENT/PLAN:POD#3 robotic APR with end colostomy and left gluteal flap. The patient is voiding and having gas and stool from her colostomy  1. Regular diet  2. PO and IV PRN pain meds  3. WOC  for education  4. Lovenox for prophylaxis and teaching for going home  5. OOB, ambulate.   6. Activity restrictions and gluteal JONAH drain per Dr. Ramos  7. Dispo: Anticipate going home in the next 1-2 days with lovenox and [facility]    Traci Andrade PA-C  [facility]  [phone]    CRS Staff  I performed a history and physical examination of the patient and discussed their management with the physician assistant. I reviewed the physician assistants note and agree with the documented findings and plan of care.         Doing great.  Plan to discharge home tomorrow.  Jonny Finney MD  Colorectal Surgery  [phone] (office)  [phone] (pager)  [email or web address]

## 2018-08-10 NOTE — PLAN OF CARE
Problem: Patient Care Overview  Goal: Plan of Care/Patient Progress Review  Outcome: Improving  A/O x4. AVSS on RA. CMS intact. Pain controlled with scheduled tylenol, Toradol, and oxycodone. Lap sites x4 CDI. Rectal dressing CDI. JONAH x2 patent w/serosanguious output. BS audible and active, passing flatus. Ostomy output x1, pt involved in ostomy cares. Stoma pink and protruding. Up w/SBA to BR, voiding adequately, PVR 51. Regular diet, poor appetite. Will continue to monitor.

## 2018-08-11 VITALS
HEIGHT: 68 IN | RESPIRATION RATE: 16 BRPM | HEART RATE: 58 BPM | SYSTOLIC BLOOD PRESSURE: 131 MMHG | TEMPERATURE: 97.9 F | DIASTOLIC BLOOD PRESSURE: 86 MMHG | BODY MASS INDEX: 30.62 KG/M2 | OXYGEN SATURATION: 91 % | WEIGHT: 202 LBS

## 2018-08-11 PROCEDURE — 25000132 ZZH RX MED GY IP 250 OP 250 PS 637: Performed by: PHYSICIAN ASSISTANT

## 2018-08-11 PROCEDURE — 25000128 H RX IP 250 OP 636: Performed by: COLON & RECTAL SURGERY

## 2018-08-11 RX ADMIN — OXYCODONE HYDROCHLORIDE 5 MG: 5 TABLET ORAL at 05:46

## 2018-08-11 RX ADMIN — ENOXAPARIN SODIUM 40 MG: 40 INJECTION SUBCUTANEOUS at 07:57

## 2018-08-11 RX ADMIN — KETOROLAC TROMETHAMINE 30 MG: 30 INJECTION, SOLUTION INTRAMUSCULAR at 07:59

## 2018-08-11 ASSESSMENT — ACTIVITIES OF DAILY LIVING (ADL)
ADLS_ACUITY_SCORE: 11

## 2018-08-11 NOTE — PLAN OF CARE
Problem: Patient Care Overview  Goal: Plan of Care/Patient Progress Review  Outcome: Improving  A/Ox4. AVSS. Lap sites DAISY. Rectal inc DAISY, no drainage. JONAH stripped, 30 mL serosanguinous output. +BS, +flatus. Pt caring for colostomy independently. Voiding adequately. Independent. Plan to discharge tomorrow. Pain managed with prn toradol & oxycodone 1x. Tolerating regular diet.

## 2018-08-11 NOTE — PROGRESS NOTES
Plastic Surgery    Pt doing well, anxious to go home.      Flap viable, suture line intact.    Drain output not excessive.    OK for discharge from plastic surgery standpoint, f/u w/ Dr. Ramos next week on Thursday for drain removal.

## 2018-08-11 NOTE — PLAN OF CARE
Problem: Patient Care Overview  Goal: Plan of Care/Patient Progress Review  Outcome: Adequate for Discharge Date Met: 08/11/18  Pt. A & O x 4. VSS.  Pain managed with PO Oxy. Pt. Independent in room.  Ambulated in [place] with stand by assist.  Pt. Independent with colostomy care.  BS active and passing flatus.  Lap sites DAISY; Rectal site DAISY.  Pt. And  was educated on discharge orders as well as lovenox shot administration.  Pt. Discharged to home with .  Pt. Sent home with Oxy and lovenox; JONAH drain low back/buttocks still in place.  Pt. Managing stripping and emptying.

## 2018-08-11 NOTE — PLAN OF CARE
Problem: Patient Care Overview  Goal: Plan of Care/Patient Progress Review  Outcome: Improving  VSS on RA. Incisions WNL, JONAH with serosanguinous output. pt resting comfortably this shift, minimal pain, well managed with oxycodone.

## 2018-08-11 NOTE — PROGRESS NOTES
COLON & RECTAL SURGERY PROGRESS NOTE: Colon & Rectal Surgery Staff MD    SUBJECTIVE: Feels great, wants to go home. No N/V. Good pain control, +ambulation. Comfortable with ostomy cares. Plastics has seen today and was happy with wound.    VITALS:  B/P: 131/79, T: 98.5, P: 58, R: 16  Patient Vitals for the past 24 hrs:   BP Temp Temp src Pulse Resp SpO2   08/11/18 0630 - - - - 16 -   08/11/18 0546 - - - - 16 -   08/11/18 0419 - - - - 18 -   08/11/18 0212 - - - - 16 -   08/11/18 0034 131/79 98.5  F (36.9  C) Oral - 16 95 %   08/10/18 2101 - - - - 16 -   08/10/18 2017 123/73 98.2  F (36.8  C) - 58 16 94 %   08/10/18 2016 - - - - 16 -   08/10/18 1552 130/82 98.2  F (36.8  C) Oral - 16 94 %   08/10/18 1334 - - - - 16 -   08/10/18 1123 130/88 98.3  F (36.8  C) - - 16 98 %       Intake/Output Summary (Last 24 hours) at 08/11/18 0812  Last data filed at 08/11/18 0543   Gross per 24 hour   Intake              435 ml   Output             1045 ml   Net             -610 ml     JONAH 15/115 ml    EXAM:  General Appearance:  NAD, looks good.  Abdomen: soft, NT, wounds CDI, ostomy pink     RECENT LABS:  Recent Labs   Lab Test  08/10/18   0844  08/08/18   0732   07/26/18   0919  04/24/17   1659   WBC   --   8.2   --    --   5.6   RBC   --   3.39*   --    --   4.57   HGB   --   10.8*   --   13.0  14.2   HCT   --   32.9*   --    --   41.6   MCV   --   97   --    --   91   MCH   --   31.9   --    --   31.1   MCHC   --   32.8   --    --   34.1   PLT  124*  120*   < >   --   163    < > = values in this interval not displayed.     Recent Labs   Lab Test  08/08/18   0732  07/26/18   0919  04/24/17   1659   POTASSIUM  4.1  4.2  3.8   CHLORIDE  108   --   105   BUN  15   --   15     No lab results found.    ASSESSMENT AND PLAN:  POD# 4 s/p robotic APR with end colostomy and gluteal flap by Dr Ramos of Plastic surgery.  Discharge to home today  Lovenox for 30 days postoperatively at home.  Activity and JONAH restrictions per Dr Richard POLLOCK  MD ARMAND EricksonRS ....................  8/11/2018   8:12 AM  Pager: [phone]    Colon and Rectal Surgery Staff Physician at [facility] covering patients on call for [facility] ([facility]) physicians. The [facility] physicians providing regular coverage at this facility will assume the patient's care upon completion of my call coverage period.

## 2018-08-11 NOTE — DISCHARGE INSTRUCTIONS
Discharge Instructions following Abdominal Surgery  [facility]      Bowel Function  After removal of a portion of the intestines, it is normal for bowel movements to be erratic.  They are often looser and more frequent.  This condition generally resolves within a few weeks or it can be controlled with diet or medication.  Diet  In general, you may return to a well-balanced diet upon discharge.  Your doctor will let you know when to add extra fiber to your diet.  Be sure to drink plenty of fluids.  Incision  You should expect some discomfort in the area of your incision, particularly as you increase your activity.  If you notice an area of increasing redness or new drainage, please call your doctor.  You may shower as desired but refrain from tub baths or swimming until the incisions are fully healed.  Activity  Gradually increase your activity each day.  There are generally no restrictions on walking, climbing stairs, or riding in a car.  You can usually drive a car 7-10 days after your leave the hospital.  Do not drive while taking narcotic pain medications.  Ask your doctor regarding resumption of physical sexual activity; in most cases, you can resume sex after a few weeks.  Your physician will advise you about when to return to work.  It is preferable to have somebody stay with you at home until you are fully healed and able to resume a normal level of activity   Medications  You will be discharged with a prescription pain medication and any medications you were taking prior to surgery.  Do not drive while taking narcotic pain medications.  If you need additional medications or a refill, you must call your doctor during normal business hours.  It is the policy of [facility] to not refill pain medication after hours or on weekends because your chart is not available.  Follow-up  Typically, you will be asked to schedule a follow-up office visit 1  "to 4 weeks after surgery.  If you have any questions related to follow-up, medications, or your condition, please call the office.      Call your surgeon if you have these signs or symptoms:  ([phone])    Problem with the incision, including increasing pain, swelling, redness, or drainage    Increasing abdominal pain    Uncontrolled nausea or vomiting    Fever or chills    Constipation  (no bowel movement for 3 days)    Diarrhea (more than 3 watery stools within 24 hours)    Bleeding from the rectum, wound, or stoma    Any questions or concerns  You are being discharged with home care services through [facility]. [facility] will contact you to schedule initial visit. If you have any questions prior to this call, please contact the 24 hour patient line at [phone].    New Colostomy:   - HHC to reinforce appliance change and colostomy management  -HHC to monitor need for a convex barrier  - HHC to monitor buttocks incisions       1.Change appliance 2x/wk and prn   2. Empty or change pouch when 1/3 full of stool/gas  3. Will not harm appliance to get wet during bathing.        Blow dry (on cool setting) cloth tape and backing after bath or shower  4. Iniitally Eat 6 small meals/day. No dietary restrictions related to colostomy  5. Drink plenty of fluids  6. Always carry and emergency appliance system  7. For best results use a odor eliminator (Febreeze) in your bathroom prior to emptying/changing the appliance  8. No heavy lifting, no sit-ups but walk.  9. OK to take steps.       Supplies:  Victoria Plumb New image 2pc: 20 pouch changes/month  1. Barrier: flextend, cut to fit with tape                                       #94008  5/bx = -4bx/month  2. Pouch: Opaque lock n roll                                                    #08187 10/bx = 2bx/month OR  3. Pouch: Closed opaque no  filter, 9\"                                       #60444 30/bx =  2bx/month   4. Pouch: Closed opaque filter 7\"      " "                                  #03454 60/bx = 1bx/month  4. Optional if leakage:  Adapt ring                                             #7805   10/bx = 2bx/month          Procedure for appliance change:  1. Draw and cut opening in barrier (If cut to fit)  following pattern,   2. Remove plastic backing  3. Optional if leakage:  Open ring. Stretch to approx size of opening in pouch.   4.  Optional: Place full ring around opening on sticky side of pouch. Press into place  5. Fold back edge of tape to create a \"tab\" This assists with paper removal in Step #11  6 Set barrier aside  7. Gently remove pouching system from around stoma. Discard.  8. Clean skin around stoma with disposable wipe or moist gauze. Discard.  9. Pat the skin dry  10. Center stoma in pouch opening. Press barrier to skin  11. Pull on \"tabs\" to remove paper that covers the tape. Press tape to skin  12. Close lock n roll closure on drainable pouch.  If using closed appliance can change up to 2x/day      Call for any ?/concerns:  Carline TIWARI (ostomy nurses) @ [facility]  (C) [phone]  (W) [phone]                                   "

## 2018-08-13 ENCOUNTER — TELEPHONE (OUTPATIENT)
Dept: FAMILY MEDICINE | Facility: CLINIC | Age: 58
End: 2018-08-13

## 2018-08-13 NOTE — DISCHARGE SUMMARY
[facility] Discharge Summary      Ilene Gaviria MRN# [id number]   Age: 58 year old YOB: 1960     Date of Admission:  8/7/2018  Date of Discharge::  8/11/2018 11:11 AM  Admitting Physician:  Jonny Finney MD  Discharge Physician:  Jonny Finney MD     PCP:  Olesya Sands    Disposition: Patient discharged from [facility] to home in stable condition.        Primary Diagnosis:   Distal rectal cancer            Discharge Medications:   Discharge Medication List as of 8/11/2018  9:37 AM      START taking these medications    Details   enoxaparin (LOVENOX) 40 MG/0.4ML injection Inject 0.4 mLs (40 mg) Subcutaneous every 24 hours, Disp-26 Syringe, R-0, E-Prescribe      oxyCODONE IR (ROXICODONE) 5 MG tablet Take 1 tablet (5 mg) by mouth every 4 hours as needed for moderate to severe pain, Disp-30 tablet, R-0, Local Print         CONTINUE these medications which have NOT CHANGED    Details   Ascorbic Acid (VITAMIN C PO) Take 500 mg by mouth daily, Historical      Ondansetron (ZOFRAN ODT PO) Take 4 mg by mouth every 4 hours as needed for nausea, Historical      OVER-THE-COUNTER Take 1 Dropperette by mouth 2 times daily Hemp oil CDB (does NOT contain THC), Historical         STOP taking these medications       MetroNIDAZOLE (FLAGYL PO) Comments:   Reason for Stopping:         neomycin (MYCIFRADIN) 500 MG tablet Comments:   Reason for Stopping:                      Follow Up, Special Instructions:   Discharge diet: Low residue   Discharge activity: No heavy lifting, pushing, pulling for 6 week(s)   Discharge follow-up: Follow up with Dr. Finney in 4 weeks   Wound care: Daily dressing changes  Keep wound clean and dry  May get incision wet in shower but do not soak or scrub              Procedures:   Procedure(s): Robotic assisted abdominoperineal resection, creation of permanent end colostomy, left gluteal flap to repair perineal wound       No other procedures performed  during this admission            Consultations:   , SONIDO, plastic surgery          Brief Hospital Summary:   Patient is a 58 year old woman who underwent robotic assisted abdominoperineal resection with creation of permanent end colostomy on 8/7/18 by Dr. Finney with left gluteal flap by Dr. Ramos.   There were no immediate complications during this procedure.    Please refer to the full operative summary for details.  The patient's hospital course was unremarkable.  Pain was controlled on oral pain regimen.  She was tolerating a low fiber diet.  Bowel function had returned prior to discharge.  She recovered as anticipated and experienced no post-operative complications. She was discharged with a gluteal drain in place with plans for follow up with Dr. Ramos for outpatient removal of the drain.        Attestation:  I have reviewed today's vital signs, notes, medications, labs and imaging.    Sameera Cisneros PA-C  [facility]          ADDENDUM:  Length of stay: 4 days  Indicate Y or N for the following:  UTI  No  C diff  No  PNA  No  SSI No  DVT No  PE  No  CVA No  MI No  Enterocutaneous fistula  No  Peripheral nerve injury  No  Abscess (not adjacent to anastomosis)  No  Leak No    Treated with:   Antibiotics N/A   Drain  N/A   Reoperation  N/A  Death within 30 days No  Reintubation  No  Reoperation  No   Procedure N/A    FOR CANCER CASES:  T stage: 0 (post-treatment, no evidence of residual tumor)  N stage: 0   Total number of nodes: 13   Total positive: 0  M stage: 0  R:   TME grade, if known (1,2,3):   MSI (pos, neg):

## 2018-08-13 NOTE — TELEPHONE ENCOUNTER
Reason for Call:  Home Health Care    MARTA  with [facility] [employer] called regarding (reason for call): [phone]    Orders are needed for this patient. SKILLED NURSING      Skilled Nursin VISITS A WEEK FOR 1 WEEK AND 1 VISIT A WEEK FOR THREE WEEKS AND 3 PRN VISITS FOR COLOSTOMY    Pt Provider: FRANCINE    Phone Number Homecare Nurse can be reached at: [phone]    Can we leave a detailed message on this number? YES      Best Time: ANYTIME    Call taken on 2018 at 11:55 AM by Charlette Rodas

## 2018-08-13 NOTE — TELEPHONE ENCOUNTER
Rectal Cancer, Rectal Cancer (H) 08/11/2018 6 mo ED/IP 08/11/2018 6 mo ED/IP 0/1  [phone] (home) [phone] (work)

## 2018-08-14 NOTE — TELEPHONE ENCOUNTER
"ED / Discharge Outreach Protocol    Patient Contact    Attempt # 1    Was call answered?  Yes.  \"May I please speak with <patient name>\"  Is patient available?   Yes    ED/Discharge Protocol    \"Hi, my name is Charmaine Rouse, a registered nurse, and I am calling on behalf of [facility] office at [place].  I am calling to follow up and see how things are going for you after your recent visit.\"    How are you doing now that you are home?\" Discomfort - sleeping on back. Taking Oxycodone but otherwise doing really well. Not having much pain.     Is patient experiencing symptoms that may require a hospital visit?  No    Discharge Instructions    \"Let's review your discharge instructions.  What is/are the follow-up recommendations?  Pt. Response: Thursday for drain removal and \"we'll go from there\"     \"Were you instructed to make a follow-up appointment?\"  Pt. Response: No.       \"When you see the provider, I would recommend that you bring your discharge instructions with you.    Medications    \"How many new medications are you on since your hospitalization/ED visit?\"    0-1  \"How many of your current medicines changed (dose, timing, name, etc.) while you were in the hospital/ED visit?\"   0-1  \"Do you have questions about your medications?\"   No  \"Were you newly diagnosed with heart failure, COPD, diabetes or did you have a heart attack?\"   No  For patients on insulin: \"Did you start on insulin in the hospital or did you have your insulin dose changed?\"   No    Medication reconciliation completed? Yes    Was MTM referral placed (*Make sure to put transitions as reason for referral)?   No    Call Summary    \"Do you have any questions or concerns about your condition or care plan at the moment?\"    No  Triage nurse advice given: N/A    Patient was in ER 6 times in the past year (assess appropriateness of ER visits.)      \"If you have questions or things don't continue to improve, we encourage you contact us through " "the main clinic number,  ([phone])   Even if the clinic is not open, triage nurses are available 24/7 to help you.     We would like you to know that our clinic has extended hours (provide information).  We also have urgent care (provide details on closest location and hours/contact info)\"      \"Thank you for your time and take care!\"    Charmaine PALMER RN      About your hospital stay            You were admitted on:  August 7, 2018 You last received care in the:  [place] [address] [facility]      You were discharged on:  August 11, 2018                 Reason for your hospital stay         Surgery.               Reason for your hospital stay         The patient was in the hospital to have surgery for rectal cancer.              Follow-up Appointments           Follow-up and recommended labs and tests          Follow up in the office in 3-4 weeks with Dr. Finney or at your already scheduled post op visit. Call [phone] to schedule your appointment. Call the office at [phone] if you develop fever, uncontrolled pain, bleeding, nausea, vomiting, or constipation.      No heavy lifting or straining over 15-20 lbs for 6 weeks. No Driving while taking narcotic pain medications                           Discharge Medications:          Discharge Medication List as of 8/11/2018  9:37 AM             START taking these medications     Details   enoxaparin (LOVENOX) 40 MG/0.4ML injection Inject 0.4 mLs (40 mg) Subcutaneous every 24 hours, Disp-26 Syringe, R-0, E-Prescribe       oxyCODONE IR (ROXICODONE) 5 MG tablet Take 1 tablet (5 mg) by mouth every 4 hours as needed for moderate to severe pain, Disp-30 tablet, R-0, Local Print                 CONTINUE these medications which have NOT CHANGED     Details   Ascorbic Acid (VITAMIN C PO) Take 500 mg by mouth daily, Historical       Ondansetron (ZOFRAN ODT PO) Take 4 mg by mouth every 4 hours as needed for nausea, Historical       OVER-THE-COUNTER Take 1 " Dropperette by mouth 2 times daily Hemp oil CDB (does NOT contain THC), Historical                STOP taking these medications         MetroNIDAZOLE (FLAGYL PO) Comments:   Reason for Stopping:            neomycin (MYCIFRADIN) 500 MG tablet Comments:   Reason for Stopping:                           Follow Up, Special Instructions:    Discharge diet: Low residue   Discharge activity: No heavy lifting, pushing, pulling for 6 week(s)   Discharge follow-up: Follow up with Dr. Finney in 4 weeks   Wound care: Daily dressing changes  Keep wound clean and dry  May get incision wet in shower but do not soak or scrub                      Procedures:    Procedure(s): Robotic assisted abdominoperineal resection, creation of permanent end colostomy, left gluteal flap to repair perineal wound       No other procedures performed during this admission              Consultations:   , SONIDO, plastic surgery               Brief Hospital Summary:    Patient is a 58 year old woman who underwent robotic assisted abdominoperineal resection with creation of permanent end colostomy on 8/7/18 by Dr. Finney with left gluteal flap by Dr. Ramos.   There were no immediate complications during this procedure.    Please refer to the full operative summary for details.  The patient's hospital course was unremarkable.  Pain was controlled on oral pain regimen.  She was tolerating a low fiber diet.  Bowel function had returned prior to discharge.  She recovered as anticipated and experienced no post-operative complications. She was discharged with a gluteal drain in place with plans for follow up with Dr. Ramos for outpatient removal of the drain.

## 2018-08-16 NOTE — TELEPHONE ENCOUNTER
Called [facility]na to provide verbal orders for skilled nursing. No answer- left detailed VM.    Amadeo DEL CID RN

## 2018-08-17 NOTE — OP NOTE
PLASTIC SURGERY OPERATIVE NOTE  Patient Name:  Ilene Gaviria     Date of Service:  8/7/2018     PREOPERATIVE DIAGNOSES:   1.  RECTAL CANCER     POSTOPERATIVE DIAGNOSES:   1.  RECTAL CANCER       SURGEON:  Conchita Ramos MD   OR STAFF:  Circulator: Maria Elena Ortiz RN  Relief Circulator: Fabiana Domingo RN  Relief Scrub: Nellie Gaviria  Scrub Person: Sara Mondragon     ANESTHESIA:  General     ESTIMATED BLOOD LOSS:  40 mL    SPECIMEN(S):    ID Type Source Tests Collected by Time Destination   A : SIGMOID, RECTUM, ANUS Tissue Large Intestine, Rectum SURGICAL PATHOLOGY EXAM Jonny Finney MD 8/7/2018 11:04 AM         PROCEDURES:   1.  Left gluteal flap to perineum      DESCRIPTION OF PROCEDURE:  Patient was marked for incisions and taken to the operating room with Dr. Finney.  He completed his portion of the surgery.  The patient was then repositioned for a gluteal flap to the perineal area.  The buttock area was prepped and draped in a sterile manner.  An incision was made along the gluteal crease extending below the gluteus and then laterally.  A skin, subcutaneous tissue, and fascia flap was then elevated.  It was rotated medially to fill the rectal defect.  A portion of this flap was de-epithelialized medially.  This was tacked into the rectal defect using interrupted 2-0 PDS suture.  A 15 Uzbek JONAH drain was placed and secured with 3-0 nylon suture.  Excess skin was then removed from the superior aspect of the gluteal crease to allow for primary closure.  The incision was reapproximated interrupted 2-0 PDS in the superficial fascia followed by interrupted 3-0 Monocryl in the subcutaneous tissue.  Below the buttock and laterally, the incision was closed with running 4-0 Monocryl suture.  The gluteal crease was reapproximated interrupted 4-0 nylon suture.  Xeroform and a light dressing were applied.    IMPLANTS:  * No implants in log *    Conchita Ramos MD  Plastic Surgery  [facility]  [phone] (office)

## 2018-08-17 NOTE — OP NOTE
Procedure Date: 08/07/2018      PREOPERATIVE DIAGNOSIS:  Rectal cancer.      POSTOPERATIVE DIAGNOSIS:  Rectal cancer.      PROCEDURE:  Robotic-assisted abdominal perineal resection with creation of permanent end colostomy as well as left gluteal flap perineal closure.      SURGEON:  Jonny Finney MD      COSURGEON:  Luis Cornelius MD and Conchita Ramos MD       ANESTHESIA:  GETA.      ESTIMATED BLOOD LOSS:  40 mL.      Please see Dr. Finney's notes for full indications and details of procedure.        PROCEDURE DETAILS:  The patient was brought to the operating room, placed on the table in the modified lithotomy position.  After adequate induction of general anesthesia, the patient was prepped and draped in standard surgical fashion.  Timeout was then performed to confirm the patient and procedure as well as necessary equipment and personnel, all was found to be in order.  I began by establishing pneumoperitoneum using a Veress needle in left upper quadrant.  Once pneumoperitoneum was achieved, a supraumbilical incision was made and an 8 mm robotic trocar was placed.  Entering the robotic camera into the abdomen, there appeared to be no damage to the abdominal viscera from either the Veress or the port entry.  The versus was then removed.  We then placed the remainder of our ports, all the robotic ports were placed in a transverse orientation in a straight line.  A 12 mm was placed in the right side of the abdomen, two 8 mm in the left and placed an additional 8 mm AirSeal in the right upper quadrant.  The patient was then placed into steep Trendelenburg.        We then docked the robot and Dr. Finney then went to the patient consult.  A medial lateral dissection was then performed to the inferior mesenteric artery and this was taken using the LigaSure energy device.  The ureter was identified and made sure to be kept well out of the way prior to taking the vessel.  Total mesorectal excision dissection was then  performed of the rectum all the way down to the pelvic floor, started the posterior avascular plane carrying that around laterally and eventually anteriorly until we were all the way at the bottom.  Once we felt we had enough of that dissection done that would be able to get the remainder done through a perineal approach, we then turned our attention towards the proximal to make sure that we would have enough length to bring up an end colostomy.  We had mobilized the proximal sigmoid distal descending colon enough that we would be able to get the bowel through the abdominal wall.  We then cleaned off all the mesentery all the way to where it could be dissected and then transected the bowel using a green load robotic stapler single load.        We then introduced a 19-Bangladeshi Tapan drain after undocking one of the arms and we brought that drain through the port site and secured that to the specimen using a 3-0 Vicryl suture.  We did put a grasping forceps on what would eventually become the colostomy.  We then prepared to bring up that colostomy.  We removed a disk of skin from the site marked for the ostomy and then incised the fascia with a cruciate incision.  The rectus muscle was spread bluntly and then the posterior peritoneum was entered sharply using electrocautery.  We were then able to hand ourselves that specimen from the inside that we put the locked grasper on and the Blayne from the outside and were able to pull the entire specimen through the abdominal wall.  Once we had that pulled through the abdominal wall, we then were able to put the camera back into the abdomen.  We checked the orientation and this appeared to be adequate.  There was no twisting of the conduit.  We then closed the right lower quadrant 12 mm port site as well as AirSeal port site, all using an 0 Vicryl stitch and the Pineda-Trina needle.  All the skin incisions were then closed with 4-0 Monocryl in subcuticular fashion as well  as Dermabond.        We then matured the colostomy in the usual fashion placing brooking stitches at the 12, 3, 6 and 9 o'clock positions which everted the colostomy nicely and then completed the mucocutaneous junction using interrupted 3-0 Vicryl.  All the drapes were then taken down.  Patient was then flipped back onto a brand new OR table in the prone jackknife position.  The patient was then reprepped and redraped in standard surgical fashion.  We then circumferentially came around the anus coming down through the subcutaneous fat using electrocautery all the way down to the levator muscles.  We then came through the pelvic floor posteriorly just from the coccyx.  Once we were back into our abdominal dissection which was quite easy as that dissection was fairly extensive, we then circumferentially came around the left and right lateral portions of the levators.  We were then able to jonathon the specimen out of the abdomen and we removed the remainder of the specimen off of the vagina and the anterior structures.  At the end of the procedure, hemostasis appeared to be excellent.  We disconnected the drain from the specimen and placed that back into the abdomen.  At this point, Dr. Ramos from Plastic Surgery came in to perform a flap closure of the perineal wound.         QUEENIE PRIETO MD             D: 2018   T: 2018   MT: TACHO      Name:     JAMES CARABALLO   MRN:      [id number]        Account:        [id number]   :      1960           Procedure Date: 2018      Document: [id number]       cc: Queenie Prieto MD

## 2018-09-06 ENCOUNTER — HOSPITAL ENCOUNTER (OUTPATIENT)
Dept: WOUND CARE | Facility: CLINIC | Age: 58
Discharge: HOME | End: 2018-09-06
Attending: NURSE PRACTITIONER | Admitting: NURSE PRACTITIONER
Payer: COMMERCIAL

## 2018-09-06 PROCEDURE — G0463 HOSPITAL OUTPT CLINIC VISIT: HCPCS

## 2018-09-06 PROCEDURE — 40000901 ZZH STATISTIC WOC PT EDUCATION, 0-15 MIN

## 2018-09-06 NOTE — PROGRESS NOTES
"[facility] Nurse Outpatient Ostomy Assessment      Assessment of ostomy and needs for:  New Permanent colostomy      Data:   History:    History of Low rectal CA followed by APR with  permanent colostomy by Dr Finney.  Pt had appt with oncologist today: no positive nodes and no chemo/radiation follow-up. Pt reports #20 weight loss. Still reporting lack of stamina but slowly improving.  Gracy NI flat barrier with ring -> continual leakage @ home. Pt trialed convex barrier with success. Currently using [place] NI 57mm convex cut to fit and closed pouch, changing barrier every 4-5 days.   Reports mushy to forming fecal output. Diet slowly improving.   supportive and with patient today. Pt denies issues with ordering supplies    Type of ostomy:  Permanent colostomy  Stoma assessment:   ? Stoma 1 1/2\" rounded, red moist, protrudes slightly, lumen empties @ 6 o'clock above skin level  Mucocutaneous Junction (MCJ):  Intact with softening sutures. 4 x sutures removed today  Peristomal skin:  Intact no erythema.  Divot @ 3 o'clock and crease from 4-7 o'clock causing stoma to sit in small bowl  Abdominal assessment: soft jelly-like abd wall.  Output:  Formed brown fecal output today           Current pouching system:  Gracy NI cut to fit convex 57mm with closed pouch.  No leakage under  Convex barrier              Intervention:     Patient's chart evaluated.      Assessments done today:  Stoma, pouching system  Education: Met with  and patient                      Discussed use of ring if needed in future                     Trial gracy pre-cut 1 3/8\" convex barrier with small amt of trimming              All patient / family questions answered:  Yes  Prosthetic refitting: Trial of 1 3/8\" convex barrier. No ring             Assessment:       Stoma: Viable and functioning    Prosthetic refitting:  Switch to  Gracy NI convex   1 3/8\" barrier with tape and closed pouch to " "fill in uneven abd contours            Plan:     Plan:   ? Current pouching system: Gracy New Image 2-pc 57mm convex 1 3/8\" (#[id number])  pre-cut  with closed pouches.  o Supply company: [employer]                     F/u prn for any questions/concerns          "

## 2018-12-06 ENCOUNTER — TRANSFERRED RECORDS (OUTPATIENT)
Dept: HEALTH INFORMATION MANAGEMENT | Facility: CLINIC | Age: 58
End: 2018-12-06

## 2018-12-14 ENCOUNTER — OFFICE VISIT (OUTPATIENT)
Dept: FAMILY MEDICINE | Facility: CLINIC | Age: 58
End: 2018-12-14
Payer: COMMERCIAL

## 2018-12-14 VITALS
WEIGHT: 205 LBS | HEART RATE: 67 BPM | BODY MASS INDEX: 31.07 KG/M2 | OXYGEN SATURATION: 95 % | HEIGHT: 68 IN | DIASTOLIC BLOOD PRESSURE: 88 MMHG | SYSTOLIC BLOOD PRESSURE: 127 MMHG | TEMPERATURE: 97.2 F

## 2018-12-14 DIAGNOSIS — Z93.3 COLOSTOMY IN PLACE (H): ICD-10-CM

## 2018-12-14 DIAGNOSIS — R32 URINARY INCONTINENCE, UNSPECIFIED TYPE: ICD-10-CM

## 2018-12-14 DIAGNOSIS — Z01.818 PREOP GENERAL PHYSICAL EXAM: Primary | ICD-10-CM

## 2018-12-14 PROCEDURE — 99214 OFFICE O/P EST MOD 30 MIN: CPT | Performed by: NURSE PRACTITIONER

## 2018-12-14 ASSESSMENT — MIFFLIN-ST. JEOR: SCORE: 1563.37

## 2018-12-14 NOTE — PROGRESS NOTES
[address] [address]  [phone]  Dept: [phone]    PRE-OP EVALUATION:  Today's date: 2018    Ilene Gaviria (: 1960) presents for pre-operative evaluation assessment as requested by Dontae Armstrong.  He requires evaluation and anesthesia risk assessment prior to undergoing surgery/procedure for treatment of bladder prolapse .    Proposed Surgery/ Procedure: Advantage Fit Sling  Date of Surgery/ Procedure: 2018  Time of Surgery/ Procedure: 9:30 AM  Hospital/Surgical Facility: [facility]  Fax number for surgical facility: [phone]  Primary Physician: Olesya Sands  Type of Anesthesia Anticipated: to be determined    Patient has a Health Care Directive or Living Will:  NO    1. NO - Do you have a history of heart attack, stroke, stent, bypass or surgery on an artery in the head, neck, heart or legs?  2. NO - Do you ever have any pain or discomfort in your chest?  3. NO - Do you have a history of  Heart Failure?  4. NO - Are you troubled by shortness of breath when: walking on the level, up a slight hill or at night?  5. NO - Do you currently have a cold, bronchitis or other respiratory infection?  6. NO - Do you have a cough, shortness of breath or wheezing?  7. NO - Do you sometimes get pains in the calves of your legs when you walk?  8. NO - Do you or anyone in your family have previous history of blood clots?  9. NO - Do you or does anyone in your family have a serious bleeding problem such as prolonged bleeding following surgeries or cuts?  10. NO - Have you ever had problems with anemia or been told to take iron pills?  11. NO - Have you had any abnormal blood loss such as black, tarry or bloody stools, or abnormal vaginal bleeding?  12. NO - Have you ever had a blood transfusion?  13. NO - Have you or any of your relatives ever had problems with anesthesia?  14. NO - Do you have sleep apnea, excessive  snoring or daytime drowsiness?  15. NO - Do you have any prosthetic heart valves?  16. NO - Do you have prosthetic joints?  17. NO - Is there any chance that you may be pregnant?      HPI:     HPI related to upcoming procedure: Going for bladder sling. Recently had colectomy d/t rectal cancer and has since developed more incontinence.  Has an ostomy and has no concerns about this.   Has been feeling well         See problem list for active medical problems.  Problems all longstanding and stable, except as noted/documented.  See ROS for pertinent symptoms related to these conditions.                                                                                                                                                          .    MEDICAL HISTORY:     Patient Active Problem List    Diagnosis Date Noted     Rectal cancer (H) 08/07/2018     Priority: Medium     BMI 30.0-30.9,adult 04/27/2017     Priority: Medium      Past Medical History:   Diagnosis Date     Cancer (H)     rectal cancer     Past Surgical History:   Procedure Laterality Date     DAVINCI COLECTOMY N/A 8/7/2018    Procedure: DAVINCI XI COLECTOMY;  DAVINCI ABDOMINAL PERINEAL RESECTION WITH PERMANENT COLOSTOMY ( PARI ) LEFT GLUTEAL FLAP TO RECTAL DEFECT ( JERE ) ;  Surgeon: Jonny Finney MD;  Location:  OR     GRAFT FLAP GLUTEUS TO PERINEUM N/A 8/7/2018    Procedure: GRAFT FLAP GLUTEUS TO PERINEUM;;  Surgeon: Conchita Ramos MD;  Location:  OR     GYN SURGERY      c section X3, tubal      SIGMOIDOSCOPY FLEXIBLE N/A 7/9/2018    Procedure: SIGMOIDOSCOPY FLEXIBLE;  FLEXIBILE SIGMOIDOSCOPY;  Surgeon: Jonny Finney MD;  Location:  GI     Current Outpatient Medications   Medication Sig Dispense Refill     Ascorbic Acid (VITAMIN C PO) Take 500 mg by mouth daily       enoxaparin (LOVENOX) 40 MG/0.4ML injection Inject 0.4 mLs (40 mg) Subcutaneous every 24 hours 26 Syringe 0     Ondansetron (ZOFRAN ODT PO) Take 4 mg by mouth every 4  "hours as needed for nausea       OVER-THE-COUNTER Take 1 Dropperette by mouth 2 times daily Hemp oil CDB (does NOT contain THC)       oxyCODONE IR (ROXICODONE) 5 MG tablet Take 1 tablet (5 mg) by mouth every 4 hours as needed for moderate to severe pain 30 tablet 0     OTC products: None, except as noted above    No Known Allergies   Latex Allergy: NO    Social History     Tobacco Use     Smoking status: Never Smoker     Smokeless tobacco: Never Used   Substance Use Topics     Alcohol use: Yes     Comment: rare     History   Drug Use No       REVIEW OF SYSTEMS:   Constitutional, neuro, ENT, endocrine, pulmonary, cardiac, gastrointestinal, genitourinary, musculoskeletal, integument and psychiatric systems are negative, except as otherwise noted.    EXAM:   /88 (BP Location: Left arm, Patient Position: Sitting, Cuff Size: Adult Regular)   Pulse 67   Temp 97.2  F (36.2  C) (Oral)   Ht 1.727 m (5' 8\")   Wt 93 kg (205 lb)   SpO2 95%   BMI 31.17 kg/m      GENERAL APPEARANCE: healthy, alert and no distress     EYES: EOMI, PERRL     HENT:  mouth without ulcers or lesions     NECK: no adenopathy, no asymmetry, masses, or scars and thyroid normal to palpation     RESP: lungs clear to auscultation - no rales, rhonchi or wheezes     CV: regular rates and rhythm, normal S1 S2, no S3 or S4 and no murmur, click or rub     MS: extremities normal- no gross deformities noted, no evidence of inflammation in joints, FROM in all extremities.     SKIN: no suspicious lesions or rashes     NEURO: Normal strength and tone, sensory exam grossly normal, mentation intact and speech normal     PSYCH: mentation appears normal. and affect normal/bright     LYMPHATICS: No cervical adenopathy    DIAGNOSTICS:     EKG: Not indicated due to non-vascular surgery and last ekg on 7/26/18 (within 30 days for CAD history or last year for cardiac risk factors)  Labs Resulted Today:   See separate sheet for labs      Recent Labs   Lab Test " 08/10/18  0844 08/08/18  0732 08/07/18  1640 07/26/18  0919 04/24/17  1659     IMPRESSION:   Reason for surgery/procedure: bladder sling  Diagnosis/reason for consult: medical preop    The proposed surgical procedure is considered INTERMEDIATE risk.    REVISED CARDIAC RISK INDEX  The patient has the following serious cardiovascular risks for perioperative complications such as (MI, PE, VFib and 3  AV Block):  No serious cardiac risks  INTERPRETATION: 0 risks: Class I (very low risk - 0.4% complication rate)    The patient has the following additional risks for perioperative complications:  No identified additional risks      ICD-10-CM    1. Preop general physical exam Z01.818    2. Urinary incontinence, unspecified type R32    3. Colostomy in place (H) Z93.3        RECOMMENDATIONS:         --Patient is to take all scheduled medications on the day of surgery EXCEPT for modifications listed below.  --Patient is on no chronic medications      APPROVAL GIVEN to proceed with proposed procedure, without further diagnostic evaluation       Signed Electronically by: RENETTA Burdick CNP    Copy of this evaluation report is provided to requesting physician.    [facility] Preop Guidelines    Revised Cardiac Risk Index

## 2019-03-12 ENCOUNTER — HOSPITAL ENCOUNTER (OUTPATIENT)
Dept: CT IMAGING | Facility: CLINIC | Age: 59
Discharge: HOME | End: 2019-03-12
Attending: INTERNAL MEDICINE | Admitting: INTERNAL MEDICINE
Payer: COMMERCIAL

## 2019-03-12 DIAGNOSIS — C20 RECTAL CANCER (H): ICD-10-CM

## 2019-03-12 PROCEDURE — 25000125 ZZHC RX 250: Performed by: INTERNAL MEDICINE

## 2019-03-12 PROCEDURE — 71260 CT THORAX DX C+: CPT

## 2019-03-12 PROCEDURE — 25000128 H RX IP 250 OP 636: Performed by: INTERNAL MEDICINE

## 2019-03-12 PROCEDURE — 74177 CT ABD & PELVIS W/CONTRAST: CPT

## 2019-03-12 RX ORDER — IOPAMIDOL 755 MG/ML
103 INJECTION, SOLUTION INTRAVASCULAR ONCE
Status: COMPLETED | OUTPATIENT
Start: 2019-03-12 | End: 2019-03-12

## 2019-03-12 RX ADMIN — SODIUM CHLORIDE 71 ML: 9 INJECTION, SOLUTION INTRAVENOUS at 07:52

## 2019-03-12 RX ADMIN — IOPAMIDOL 103 ML: 755 INJECTION, SOLUTION INTRAVENOUS at 07:52

## 2019-04-01 ENCOUNTER — TRANSFERRED RECORDS (OUTPATIENT)
Dept: HEALTH INFORMATION MANAGEMENT | Facility: CLINIC | Age: 59
End: 2019-04-01

## 2019-04-01 ENCOUNTER — MEDICAL CORRESPONDENCE (OUTPATIENT)
Dept: HEALTH INFORMATION MANAGEMENT | Facility: CLINIC | Age: 59
End: 2019-04-01

## 2019-07-15 ENCOUNTER — HOSPITAL ENCOUNTER (OUTPATIENT)
Facility: CLINIC | Age: 59
Discharge: HOME | End: 2019-07-15
Attending: COLON & RECTAL SURGERY | Admitting: COLON & RECTAL SURGERY
Payer: COMMERCIAL

## 2019-07-15 VITALS
BODY MASS INDEX: 30.31 KG/M2 | HEIGHT: 68 IN | HEART RATE: 56 BPM | SYSTOLIC BLOOD PRESSURE: 132 MMHG | RESPIRATION RATE: 12 BRPM | WEIGHT: 200 LBS | DIASTOLIC BLOOD PRESSURE: 95 MMHG | OXYGEN SATURATION: 91 %

## 2019-07-15 LAB — COLONOSCOPY: NORMAL

## 2019-07-15 PROCEDURE — 25000125 ZZHC RX 250: Performed by: COLON & RECTAL SURGERY

## 2019-07-15 PROCEDURE — 88305 TISSUE EXAM BY PATHOLOGIST: CPT | Performed by: COLON & RECTAL SURGERY

## 2019-07-15 PROCEDURE — 88305 TISSUE EXAM BY PATHOLOGIST: CPT | Mod: 26 | Performed by: COLON & RECTAL SURGERY

## 2019-07-15 PROCEDURE — G0500 MOD SEDAT ENDO SERVICE >5YRS: HCPCS | Performed by: COLON & RECTAL SURGERY

## 2019-07-15 PROCEDURE — 44394 COLONOSCOPY W/SNARE: CPT | Mod: PT | Performed by: COLON & RECTAL SURGERY

## 2019-07-15 PROCEDURE — 25000128 H RX IP 250 OP 636: Performed by: COLON & RECTAL SURGERY

## 2019-07-15 PROCEDURE — 45385 COLONOSCOPY W/LESION REMOVAL: CPT | Mod: PT | Performed by: COLON & RECTAL SURGERY

## 2019-07-15 RX ORDER — LIDOCAINE 40 MG/G
CREAM TOPICAL
Status: DISCONTINUED | OUTPATIENT
Start: 2019-07-15 | End: 2019-07-15 | Stop reason: HOSPADM

## 2019-07-15 RX ORDER — PROCHLORPERAZINE MALEATE 10 MG
10 TABLET ORAL EVERY 6 HOURS PRN
Status: DISCONTINUED | OUTPATIENT
Start: 2019-07-15 | End: 2019-07-15 | Stop reason: HOSPADM

## 2019-07-15 RX ORDER — DIPHENHYDRAMINE HCL 25 MG
25 CAPSULE ORAL EVERY 4 HOURS PRN
Status: DISCONTINUED | OUTPATIENT
Start: 2019-07-15 | End: 2019-07-15 | Stop reason: HOSPADM

## 2019-07-15 RX ORDER — DIPHENHYDRAMINE HYDROCHLORIDE 50 MG/ML
25 INJECTION INTRAMUSCULAR; INTRAVENOUS EVERY 4 HOURS PRN
Status: DISCONTINUED | OUTPATIENT
Start: 2019-07-15 | End: 2019-07-15 | Stop reason: HOSPADM

## 2019-07-15 RX ORDER — FLUMAZENIL 0.1 MG/ML
0.2 INJECTION, SOLUTION INTRAVENOUS
Status: DISCONTINUED | OUTPATIENT
Start: 2019-07-15 | End: 2019-07-15 | Stop reason: HOSPADM

## 2019-07-15 RX ORDER — NALOXONE HYDROCHLORIDE 0.4 MG/ML
.1-.4 INJECTION, SOLUTION INTRAMUSCULAR; INTRAVENOUS; SUBCUTANEOUS
Status: DISCONTINUED | OUTPATIENT
Start: 2019-07-15 | End: 2019-07-15 | Stop reason: HOSPADM

## 2019-07-15 RX ORDER — FENTANYL CITRATE 50 UG/ML
INJECTION, SOLUTION INTRAMUSCULAR; INTRAVENOUS PRN
Status: DISCONTINUED | OUTPATIENT
Start: 2019-07-15 | End: 2019-07-15 | Stop reason: HOSPADM

## 2019-07-15 RX ORDER — ONDANSETRON 4 MG/1
4 TABLET, ORALLY DISINTEGRATING ORAL EVERY 6 HOURS PRN
Status: DISCONTINUED | OUTPATIENT
Start: 2019-07-15 | End: 2019-07-15 | Stop reason: HOSPADM

## 2019-07-15 RX ORDER — ONDANSETRON 2 MG/ML
4 INJECTION INTRAMUSCULAR; INTRAVENOUS
Status: DISCONTINUED | OUTPATIENT
Start: 2019-07-15 | End: 2019-07-15 | Stop reason: HOSPADM

## 2019-07-15 RX ORDER — ONDANSETRON 2 MG/ML
4 INJECTION INTRAMUSCULAR; INTRAVENOUS EVERY 6 HOURS PRN
Status: DISCONTINUED | OUTPATIENT
Start: 2019-07-15 | End: 2019-07-15 | Stop reason: HOSPADM

## 2019-07-15 ASSESSMENT — MIFFLIN-ST. JEOR: SCORE: 1540.69

## 2019-07-15 NOTE — H&P
Pre-Endoscopy History and Physical     Ilene Gaviria MRN# [id number]   YOB: 1960 Age: 58 year old     Date of Procedure: 7/15/2019  Primary care provider: No Ref-Primary, Physician  Type of Endoscopy: colonoscopy  Reason for Procedure: surveillance, rectal cancer  Type of Anesthesia Anticipated: Moderate Sedation    HPI:    Ilene is a 58 year old female who will be undergoing the above procedure.      A history and physical has been performed. The patient's medications and allergies have been reviewed. The risks and benefits of the procedure including the risk of bleeding, perforation, and missed lesions as well as the sedation options and risks were discussed with the patient.  All questions were answered and informed consent was obtained.      Allergies   Allergen Reactions     No Known Allergies         No current facility-administered medications for this encounter.        No medications prior to admission.       Patient Active Problem List   Diagnosis     BMI 30.0-30.9,adult     Rectal cancer (H)     Colostomy in place (H)        Past Medical History:   Diagnosis Date     Cancer (H)     rectal cancer        Past Surgical History:   Procedure Laterality Date     DAVINCI COLECTOMY N/A 8/7/2018    Procedure: DAVINCI XI COLECTOMY;  DAVINCI ABDOMINAL PERINEAL RESECTION WITH PERMANENT COLOSTOMY ( PARI ) LEFT GLUTEAL FLAP TO RECTAL DEFECT ( JERE ) ;  Surgeon: Jonny Finney MD;  Location:  OR     GENITOURINARY SURGERY      bladder sling 12/18     GRAFT FLAP GLUTEUS TO PERINEUM N/A 8/7/2018    Procedure: GRAFT FLAP GLUTEUS TO PERINEUM;;  Surgeon: Conchita Ramos MD;  Location:  OR     GYN SURGERY      c section X3, tubal      SIGMOIDOSCOPY FLEXIBLE N/A 7/9/2018    Procedure: SIGMOIDOSCOPY FLEXIBLE;  FLEXIBILE SIGMOIDOSCOPY;  Surgeon: Jonny Finney MD;  Location:  GI       Social History     Tobacco Use     Smoking status: Never Smoker     Smokeless tobacco: Never Used  "  Substance Use Topics     Alcohol use: Yes     Comment: rare       History reviewed. No pertinent family history.      PHYSICAL EXAM:   BP (!) 165/97   Pulse 65   Ht 1.727 m (5' 8\")   Wt 90.7 kg (200 lb)   SpO2 97%   BMI 30.41 kg/m   Estimated body mass index is 30.41 kg/m  as calculated from the following:    Height as of this encounter: 1.727 m (5' 8\").    Weight as of this encounter: 90.7 kg (200 lb).   Mental status - alert and oriented  RESP: lungs clear  CV: RRR  AIRWAY EXAM: Mallampatti Class II (visualization of the soft palate, fauces, and uvula)    IMPRESSION   ASA Class 2 - Mild systemic disease      Signed Electronically by: Jonny Finney  July 15, 2019    Colorectal Surgery  [phone] (office)  [phone] (pager)  [email or web address]          "

## 2019-07-16 LAB — COPATH REPORT: NORMAL

## 2019-12-11 ENCOUNTER — HOSPITAL ENCOUNTER (OUTPATIENT)
Dept: MAMMOGRAPHY | Facility: CLINIC | Age: 59
Discharge: HOME | End: 2019-12-11
Attending: INTERNAL MEDICINE | Admitting: INTERNAL MEDICINE
Payer: COMMERCIAL

## 2019-12-11 DIAGNOSIS — Z12.31 VISIT FOR SCREENING MAMMOGRAM: ICD-10-CM

## 2019-12-11 PROCEDURE — 77067 SCR MAMMO BI INCL CAD: CPT

## 2019-12-12 ENCOUNTER — MEDICAL CORRESPONDENCE (OUTPATIENT)
Dept: HEALTH INFORMATION MANAGEMENT | Facility: CLINIC | Age: 59
End: 2019-12-12

## 2019-12-17 ENCOUNTER — OFFICE VISIT (OUTPATIENT)
Dept: FAMILY MEDICINE | Facility: CLINIC | Age: 59
End: 2019-12-17
Payer: COMMERCIAL

## 2019-12-17 VITALS
OXYGEN SATURATION: 100 % | HEART RATE: 65 BPM | TEMPERATURE: 97.9 F | DIASTOLIC BLOOD PRESSURE: 87 MMHG | SYSTOLIC BLOOD PRESSURE: 127 MMHG

## 2019-12-17 DIAGNOSIS — G89.29 CHRONIC RIGHT SHOULDER PAIN: ICD-10-CM

## 2019-12-17 DIAGNOSIS — M25.511 CHRONIC RIGHT SHOULDER PAIN: ICD-10-CM

## 2019-12-17 DIAGNOSIS — Z13.220 LIPID SCREENING: ICD-10-CM

## 2019-12-17 DIAGNOSIS — Z01.818 PREOP GENERAL PHYSICAL EXAM: Primary | ICD-10-CM

## 2019-12-17 DIAGNOSIS — Z93.3 COLOSTOMY IN PLACE (H): ICD-10-CM

## 2019-12-17 LAB
ANION GAP SERPL CALCULATED.3IONS-SCNC: 6 MMOL/L (ref 3–14)
BUN SERPL-MCNC: 18 MG/DL (ref 7–30)
CALCIUM SERPL-MCNC: 9.1 MG/DL (ref 8.5–10.1)
CHLORIDE SERPL-SCNC: 107 MMOL/L (ref 94–109)
CHOLEST SERPL-MCNC: 206 MG/DL
CO2 SERPL-SCNC: 28 MMOL/L (ref 20–32)
CREAT SERPL-MCNC: 0.6 MG/DL (ref 0.52–1.04)
GFR SERPL CREATININE-BSD FRML MDRD: >90 ML/MIN/{1.73_M2}
GLUCOSE SERPL-MCNC: 100 MG/DL (ref 70–99)
HDLC SERPL-MCNC: 39 MG/DL
LDLC SERPL CALC-MCNC: 97 MG/DL
NONHDLC SERPL-MCNC: 167 MG/DL
POTASSIUM SERPL-SCNC: 4.3 MMOL/L (ref 3.4–5.3)
SODIUM SERPL-SCNC: 141 MMOL/L (ref 133–144)
TRIGL SERPL-MCNC: 348 MG/DL

## 2019-12-17 PROCEDURE — 99214 OFFICE O/P EST MOD 30 MIN: CPT | Performed by: INTERNAL MEDICINE

## 2019-12-17 PROCEDURE — 80061 LIPID PANEL: CPT | Performed by: INTERNAL MEDICINE

## 2019-12-17 PROCEDURE — 80048 BASIC METABOLIC PNL TOTAL CA: CPT | Performed by: INTERNAL MEDICINE

## 2019-12-17 ASSESSMENT — MIFFLIN-ST. JEOR: SCORE: 1617.34

## 2019-12-17 NOTE — PROGRESS NOTES
[address] [address]  [phone]  Dept: [phone]    PRE-OP EVALUATION:  Today's date: 2019    Ilene Gaviira (: 1960) presents for pre-operative evaluation assessment as requested by Dr. Luis English.  She requires evaluation and anesthesia risk assessment prior to undergoing surgery/procedure for treatment of right shoulder pain.    Proposed Surgery/ Procedure: laparoscopic right shoulder   Date of Surgery/ Procedure: 2019  Time of Surgery/ Procedure: 9:30 am   Hospital/Surgical Facility: [facility] [place]  Fax number for surgical facility: [phone]  Primary Physician: Trevor Rizo  Type of Anesthesia Anticipated: General    Patient has a Health Care Directive or Living Will:  NO    1. NO - Do you have a history of heart attack, stroke, stent, bypass or surgery on an artery in the head, neck, heart or legs?  2. NO - Do you ever have any pain or discomfort in your chest?  3. NO - Do you have a history of  Heart Failure?  4. NO - Are you troubled by shortness of breath when: walking on the level, up a slight hill or at night?  5. NO - Do you currently have a cold, bronchitis or other respiratory infection?  6. NO - Do you have a cough, shortness of breath or wheezing?  7. NO - Do you sometimes get pains in the calves of your legs when you walk?  8. NO - Do you or anyone in your family have previous history of blood clots?  9. NO - Do you or does anyone in your family have a serious bleeding problem such as prolonged bleeding following surgeries or cuts?  10. NO - Have you ever had problems with anemia or been told to take iron pills?  11. NO - Have you had any abnormal blood loss such as black, tarry or bloody stools, or abnormal vaginal bleeding?  12. NO - Have you ever had a blood transfusion?  13. NO - Have you or any of your relatives ever had problems with anesthesia?  14. NO - Do you have sleep apnea,  excessive snoring or daytime drowsiness?  15. NO - Do you have any prosthetic heart valves?  16. NO - Do you have prosthetic joints?  17. NO - Is there any chance that you may be pregnant?      HPI:     HPI related to upcoming procedure:     Sometime in August of this year, the patient reportedly had an accidental fall and hurt her right shoulder.  Since then, her shoulder pain has not improved.  Pain is aggravated by certain movements such as reaching backwards and raising her arm above shoulder level.  Denies radiation of pain down the right arm.  No numbness/tingling or weakness of the right upper extremity.  Patient has consulted with orthopedics and has had steroid injections into the right shoulder without satisfactory relief.  Patient was then advised arthroscopic surgery.      MEDICAL HISTORY:     Patient Active Problem List    Diagnosis Date Noted     Colostomy in place (H) 12/14/2018     Priority: Medium     Rectal cancer (H) 08/07/2018     Priority: Medium     BMI 30.0-30.9,adult 04/27/2017     Priority: Medium      Past Medical History:   Diagnosis Date     Cancer (H)     rectal cancer     Past Surgical History:   Procedure Laterality Date     DAVINCI COLECTOMY N/A 8/7/2018    Procedure: DAVINCI XI COLECTOMY;  DAVINCI ABDOMINAL PERINEAL RESECTION WITH PERMANENT COLOSTOMY ( PARI ) LEFT GLUTEAL FLAP TO RECTAL DEFECT ( JERE ) ;  Surgeon: Jonny Finney MD;  Location:  OR     GENITOURINARY SURGERY      bladder sling 12/18     GRAFT FLAP GLUTEUS TO PERINEUM N/A 8/7/2018    Procedure: GRAFT FLAP GLUTEUS TO PERINEUM;;  Surgeon: Conchita Ramos MD;  Location:  OR     GYN SURGERY      c section X3, tubal      SIGMOIDOSCOPY FLEXIBLE N/A 7/9/2018    Procedure: SIGMOIDOSCOPY FLEXIBLE;  FLEXIBILE SIGMOIDOSCOPY;  Surgeon: Jonny Finney MD;  Location:  GI     No current outpatient medications on file.     OTC products: no recent use of OTC ASA, NSAIDS or Steroids    Allergies   Allergen Reactions  "    No Known Allergies       Latex Allergy: NO    Social History     Tobacco Use     Smoking status: Never Smoker     Smokeless tobacco: Never Used   Substance Use Topics     Alcohol use: Yes     Comment: rare     History   Drug Use No       REVIEW OF SYSTEMS:   C: NEGATIVE for fever, chills, change in weight  E/M: NEGATIVE for ear, mouth and throat problems  R: NEGATIVE for significant cough or SOB  CV: NEGATIVE for chest pain, palpitations or peripheral edema  GI: NEGATIVE for nausea, abdominal pain, heartburn, or change in bowel habits  : NEGATIVE for frequency, dysuria, or hematuria  N: NEGATIVE for weakness, dizziness or paresthesias  H: NEGATIVE for bleeding problems      EXAM:   /87 (BP Location: Right arm, Cuff Size: Adult Large)   Pulse 65   Temp 97.9  F (36.6  C) (Oral)   Ht (P) 1.727 m (5' 8\")   Wt (P) 98.9 kg (218 lb)   SpO2 100%   Breastfeeding No   BMI (P) 33.15 kg/m        GENERAL APPEARANCE: healthy, alert and no distress    NECK: no adenopathy, no asymmetry, masses, or scars and thyroid normal to palpation    RESP: lungs clear to auscultation - no rales, rhonchi or wheezes    CV: regular rates and rhythm, normal S1 S2, no S3 or S4 and no murmur, click or rub    ABDOMEN:  soft, nontender, no HSM or masses and bowel sounds normal    NEURO: Normal strength and tone, sensory exam grossly normal, mentation intact and speech normal    PSYCH: mentation appears normal. and affect normal/bright    LYMPHATICS: No cervical or supraclavicular nodes      DIAGNOSTICS:     Labs Drawn and in Process:   Unresulted Labs Ordered in the Past 30 Days of this Admission     Date and Time Order Name Status Description    12/17/2019 0953 LIPID REFLEX TO DIRECT LDL PANEL In process     12/17/2019 0940 LIPID REFLEX TO DIRECT LDL PANEL In process     12/17/2019 0939 BASIC METABOLIC PANEL In process           Recent Labs   Lab Test 08/10/18  0844 08/08/18  0732 08/07/18  1640 07/26/18  0919 04/24/17  1659   HGB  " --  10.8*  --  13.0 14.2   * 120* 116*  --  163   NA  --  141  --   --  140   POTASSIUM  --  4.1  --  4.2 3.8   CR  --  0.60 0.56 0.60 0.65        IMPRESSION:   Diagnosis/reason for consult: right shoulder pain    The proposed surgical procedure is considered LOW risk.    REVISED CARDIAC RISK INDEX  The patient has the following serious cardiovascular risks for perioperative complications such as (MI, PE, VFib and 3  AV Block):  No serious cardiac risks  INTERPRETATION: 0 risks: Class I (very low risk - 0.4% complication rate)    The patient has the following additional risks for perioperative complications:  No identified additional risks      ICD-10-CM    1. Preop general physical exam Z01.818 Basic metabolic panel   2. Chronic right shoulder pain M25.511     G89.29    3. Colostomy in place (H) Z93.3        RECOMMENDATIONS:       APPROVAL GIVEN to proceed with proposed procedure, without further diagnostic evaluation       Signed Electronically by: Trevor Rizo MD    Copy of this evaluation report is provided to requesting physician.    [facility] Preop Guidelines    Revised Cardiac Risk Index

## 2019-12-17 NOTE — PROGRESS NOTES
"   SUBJECTIVE:   CC: Ilene Gaviria is an 59 year old woman who presents for preventive health visit.     HPI  {Add if <65 person on Medicare  - Required Questions (Optional):709641}  {Outside tests to abstract? :425389}    {additional problems to add (Optional):667590}    Today's PHQ-2 Score:   PHQ-2 ( 1999 Pfizer) 12/14/2018   Q1: Little interest or pleasure in doing things 0   Q2: Feeling down, depressed or hopeless 0   PHQ-2 Score 0       Abuse: Current or Past(Physical, Sexual or Emotional)- { :453148}  Do you feel safe in your environment? { :466250}    Have you ever done Advance Care Planning? (For example, a Health Directive, POLST, or a discussion with a medical provider or your loved ones about your wishes): { :198236}    Social History     Tobacco Use     Smoking status: Never Smoker     Smokeless tobacco: Never Used   Substance Use Topics     Alcohol use: Yes     Comment: rare     {Rooming Staff- Complete this question if Prescreen response is not shown below for today's visit. If you drink alcohol do you typically have >3 drinks per day or >7 drinks per week? (Optional):410880}    No flowsheet data found.{add AUDIT responses (Optional) (A score of 7 for adult men is an indication of hazardous drinking; a score of 8 or more is an indication of an alcohol use disorder.  A score of 7 or more for adult women is an indication of hazardous drinking or an alchohol use disorder):900223}    Reviewed orders with patient.  Reviewed health maintenance and updated orders accordingly - { :332735::\"Yes\"}  {Chronicprobdata (optional):928762}    {Mammo Decision Support (Optional):604505}    Pertinent mammograms are reviewed under the imaging tab.  History of abnormal Pap smear: { :172876}     Reviewed and updated as needed this visit by clinical staff         Reviewed and updated as needed this visit by Provider        {HISTORY OPTIONS (Optional):983991}    Review of Systems  {FEMALE ROS (Optional):320262}   " "  OBJECTIVE:   There were no vitals taken for this visit.  Physical Exam  {Exam Choices (Optional):298852}    {Diagnostic Test Results (Optional):517946::\"Diagnostic Test Results:\",\"Labs reviewed in Epic\"}    ASSESSMENT/PLAN:   {Diag Picklist:657124}    COUNSELING:  {FEMALE COUNSELING MESSAGES:195891::\"Reviewed preventive health counseling, as reflected in patient instructions\"}    Estimated body mass index is 30.41 kg/m  as calculated from the following:    Height as of 7/15/19: 1.727 m (5' 8\").    Weight as of 7/15/19: 90.7 kg (200 lb).    {Weight Management Plan (ACO) Complete if BMI is abnormal-  Ages 18-64  BMI >24.9.  Age 65+ with BMI <23 or >30 (Optional):267651}     reports that she has never smoked. She has never used smokeless tobacco.  {Tobacco Cessation -- Complete if patient is a smoker (Optional):282415}    Counseling Resources:  ATP IV Guidelines  Pooled Cohorts Equation Calculator  Breast Cancer Risk Calculator  FRAX Risk Assessment  ICSI Preventive Guidelines  Dietary Guidelines for Americans, 2010  hybris's MyPlate  ASA Prophylaxis  Lung CA Screening    Trevor Rizo MD  [facility]  "

## 2019-12-17 NOTE — PATIENT INSTRUCTIONS
Follow up in 6 months for your full physical exam (please come in fasting).        Before Your Surgery      Call your surgeon if there is any change in your health. This includes signs of a cold or flu (such as a sore throat, runny nose, cough, rash or fever).    Do not smoke, drink alcohol or take over the counter medicine (unless your surgeon or primary care doctor tells you to) for the 24 hours before and after surgery.    If you take prescribed drugs: Follow your doctor s orders about which medicines to take and which to stop until after surgery.    Eating and drinking prior to surgery: follow the instructions from your surgeon    Take a shower or bath the night before surgery. Use the soap your surgeon gave you to gently clean your skin. If you do not have soap from your surgeon, use your regular soap. Do not shave or scrub the surgery site.  Wear clean pajamas and have clean sheets on your bed.

## 2019-12-17 NOTE — LETTER
[place] [address] [facility]  [address]  [place]  [address]  Tel: [phone]    January 9, 2020    Ilene SMITH Khadra  [address] [place] [address]        Dear MsRanda Khadra,    Good day to you Ilene.    Your cholesterol levels are mildly elevated.  This would improve with stricter low-fat diet and regular exercise.    Dr. Sallie Oviedo      Enclosure: Lab Results  Results for orders placed or performed in visit on 12/17/19   Basic metabolic panel     Status: Abnormal   Result Value Ref Range    Sodium 141 133 - 144 mmol/L    Potassium 4.3 3.4 - 5.3 mmol/L    Chloride 107 94 - 109 mmol/L    Carbon Dioxide 28 20 - 32 mmol/L    Anion Gap 6 3 - 14 mmol/L    Glucose 100 (H) 70 - 99 mg/dL    Urea Nitrogen 18 7 - 30 mg/dL    Creatinine 0.60 0.52 - 1.04 mg/dL    GFR Estimate >90 >60 mL/min/[1.73_m2]    GFR Estimate If Black >90 >60 mL/min/[1.73_m2]    Calcium 9.1 8.5 - 10.1 mg/dL   Lipid panel reflex to direct LDL     Status: Abnormal   Result Value Ref Range    Cholesterol 206 (H) <200 mg/dL    Triglycerides 348 (H) <150 mg/dL    HDL Cholesterol 39 (L) >49 mg/dL    LDL Cholesterol Calculated 97 <100 mg/dL    Non HDL Cholesterol 167 (H) <130 mg/dL

## 2020-04-07 ENCOUNTER — HOSPITAL ENCOUNTER (OUTPATIENT)
Dept: CT IMAGING | Facility: CLINIC | Age: 60
Discharge: HOME | End: 2020-04-07
Attending: INTERNAL MEDICINE | Admitting: INTERNAL MEDICINE
Payer: COMMERCIAL

## 2020-04-07 DIAGNOSIS — C20 MALIGNANT NEOPLASM OF RECTUM (H): ICD-10-CM

## 2020-04-07 PROCEDURE — 25000128 H RX IP 250 OP 636: Performed by: INTERNAL MEDICINE

## 2020-04-07 PROCEDURE — 71260 CT THORAX DX C+: CPT

## 2020-04-07 PROCEDURE — 25000125 ZZHC RX 250: Performed by: INTERNAL MEDICINE

## 2020-04-07 RX ORDER — IOPAMIDOL 755 MG/ML
107 INJECTION, SOLUTION INTRAVASCULAR ONCE
Status: COMPLETED | OUTPATIENT
Start: 2020-04-07 | End: 2020-04-07

## 2020-04-07 RX ADMIN — SODIUM CHLORIDE 71 ML: 9 INJECTION, SOLUTION INTRAVENOUS at 08:19

## 2020-04-07 RX ADMIN — IOPAMIDOL 107 ML: 755 INJECTION, SOLUTION INTRAVENOUS at 08:19

## 2020-04-09 ENCOUNTER — TRANSFERRED RECORDS (OUTPATIENT)
Dept: HEALTH INFORMATION MANAGEMENT | Facility: CLINIC | Age: 60
End: 2020-04-09

## 2020-06-08 DIAGNOSIS — Z11.59 ENCOUNTER FOR SCREENING FOR OTHER VIRAL DISEASES: Primary | ICD-10-CM

## 2020-06-19 DIAGNOSIS — Z20.822 SUSPECTED COVID-19 VIRUS INFECTION: Primary | ICD-10-CM

## 2020-06-19 LAB
SARS-COV-2 RNA SPEC QL NAA+PROBE: NOT DETECTED
SPECIMEN SOURCE: NORMAL

## 2020-06-19 PROCEDURE — 99000 SPECIMEN HANDLING OFFICE-LAB: CPT | Performed by: COLON & RECTAL SURGERY

## 2020-06-19 PROCEDURE — U0003 INFECTIOUS AGENT DETECTION BY NUCLEIC ACID (DNA OR RNA); SEVERE ACUTE RESPIRATORY SYNDROME CORONAVIRUS 2 (SARS-COV-2) (CORONAVIRUS DISEASE [COVID-19]), AMPLIFIED PROBE TECHNIQUE, MAKING USE OF HIGH THROUGHPUT TECHNOLOGIES AS DESCRIBED BY CMS-2020-01-R: HCPCS | Mod: 90 | Performed by: COLON & RECTAL SURGERY

## 2020-06-19 PROCEDURE — 99207 ZZC NO BILLABLE SERVICE THIS VISIT: CPT

## 2020-06-19 NOTE — LETTER
June 20, 2020        Ilene Gaviria  [address] [place] [id number]    This letter provides a written record that you were tested for COVID-19 on 6/19/20.       Your result was negative. This means that we didn t find the virus that causes COVID-19 in your sample. A test may show negative when you do actually have the virus. This can happen when the virus is in the early stages of infection, before you feel illness symptoms.    If you have symptoms   Stay home and away from others (self-isolate) until you meet ALL of the guidelines below:    You ve had no fever--and no medicine that reduces fever--for 3 full days (72 hours). And      Your other symptoms have gotten better. For example, your cough or breathing has improved. And     At least 10 days have passed since your symptoms started.    During this time:    Stay home. Don t go to work, school or anywhere else.     Stay in your own room, including for meals. Use your own bathroom if you can.    Stay away from others in your home. No hugging, kissing or shaking hands. No visitors.    Clean  high touch  surfaces often (doorknobs, counters, handles, etc.). Use a household cleaning spray or wipes. You can find a full list on the EPA website at www.epa.gov/pesticide-registration/list-n-disinfectants-use-against-sars-cov-2.    Cover your mouth and nose with a mask, tissue or washcloth to avoid spreading germs.    Wash your hands and face often with soap and water.    Going back to work  Check with your employer for any guidelines to follow for going back to work.    Employers: This document serves as formal notice that your employee tested negative for COVID-19, as of the testing date shown above.

## 2020-06-25 ENCOUNTER — HOSPITAL ENCOUNTER (OUTPATIENT)
Facility: CLINIC | Age: 60
Discharge: HOME | End: 2020-06-25
Attending: COLON & RECTAL SURGERY | Admitting: COLON & RECTAL SURGERY
Payer: COMMERCIAL

## 2020-06-25 VITALS
BODY MASS INDEX: 31.07 KG/M2 | WEIGHT: 205 LBS | OXYGEN SATURATION: 96 % | HEIGHT: 68 IN | DIASTOLIC BLOOD PRESSURE: 101 MMHG | TEMPERATURE: 98 F | RESPIRATION RATE: 21 BRPM | HEART RATE: 74 BPM | SYSTOLIC BLOOD PRESSURE: 154 MMHG

## 2020-06-25 LAB — COLONOSCOPY: NORMAL

## 2020-06-25 PROCEDURE — 44389 COLONOSCOPY WITH BIOPSY: CPT | Performed by: COLON & RECTAL SURGERY

## 2020-06-25 PROCEDURE — 88305 TISSUE EXAM BY PATHOLOGIST: CPT | Performed by: COLON & RECTAL SURGERY

## 2020-06-25 PROCEDURE — 88305 TISSUE EXAM BY PATHOLOGIST: CPT | Mod: 26 | Performed by: COLON & RECTAL SURGERY

## 2020-06-25 RX ORDER — FLUMAZENIL 0.1 MG/ML
0.2 INJECTION, SOLUTION INTRAVENOUS
Status: DISCONTINUED | OUTPATIENT
Start: 2020-06-25 | End: 2020-06-25 | Stop reason: HOSPADM

## 2020-06-25 RX ORDER — DIPHENHYDRAMINE HCL 25 MG
25 CAPSULE ORAL EVERY 4 HOURS PRN
Status: DISCONTINUED | OUTPATIENT
Start: 2020-06-25 | End: 2020-06-25 | Stop reason: HOSPADM

## 2020-06-25 RX ORDER — DIPHENHYDRAMINE HYDROCHLORIDE 50 MG/ML
25 INJECTION INTRAMUSCULAR; INTRAVENOUS EVERY 4 HOURS PRN
Status: DISCONTINUED | OUTPATIENT
Start: 2020-06-25 | End: 2020-06-25 | Stop reason: HOSPADM

## 2020-06-25 RX ORDER — NALOXONE HYDROCHLORIDE 0.4 MG/ML
.1-.4 INJECTION, SOLUTION INTRAMUSCULAR; INTRAVENOUS; SUBCUTANEOUS
Status: DISCONTINUED | OUTPATIENT
Start: 2020-06-25 | End: 2020-06-25 | Stop reason: HOSPADM

## 2020-06-25 RX ORDER — ONDANSETRON 2 MG/ML
4 INJECTION INTRAMUSCULAR; INTRAVENOUS EVERY 6 HOURS PRN
Status: DISCONTINUED | OUTPATIENT
Start: 2020-06-25 | End: 2020-06-25 | Stop reason: HOSPADM

## 2020-06-25 RX ORDER — ONDANSETRON 4 MG/1
4 TABLET, ORALLY DISINTEGRATING ORAL EVERY 6 HOURS PRN
Status: DISCONTINUED | OUTPATIENT
Start: 2020-06-25 | End: 2020-06-25 | Stop reason: HOSPADM

## 2020-06-25 RX ORDER — PROCHLORPERAZINE MALEATE 10 MG
10 TABLET ORAL EVERY 6 HOURS PRN
Status: DISCONTINUED | OUTPATIENT
Start: 2020-06-25 | End: 2020-06-25 | Stop reason: HOSPADM

## 2020-06-25 ASSESSMENT — MIFFLIN-ST. JEOR: SCORE: 1558.37

## 2020-06-26 LAB — COPATH REPORT: NORMAL

## 2020-08-24 ENCOUNTER — TELEPHONE (OUTPATIENT)
Dept: FAMILY MEDICINE | Facility: CLINIC | Age: 60
End: 2020-08-24

## 2020-08-24 NOTE — TELEPHONE ENCOUNTER
Panel Management Review      Composite cancer screening  Chart review shows that this patient is due/due soon for the following Pap Smear  Summary:    Patient is due/failing the following:   PAP    Action needed:   Contact us with information or schedule an exam.    Type of outreach:    Sent letter.    Questions for provider review:    None                                                                                                                                    Henrietta Oneal MA on 8/24/2020 at 11:37 AM

## 2020-11-13 ENCOUNTER — TRANSFERRED RECORDS (OUTPATIENT)
Dept: HEALTH INFORMATION MANAGEMENT | Facility: CLINIC | Age: 60
End: 2020-11-13

## 2020-12-14 ENCOUNTER — HOSPITAL ENCOUNTER (OUTPATIENT)
Dept: MAMMOGRAPHY | Facility: CLINIC | Age: 60
Discharge: HOME | End: 2020-12-14
Attending: INTERNAL MEDICINE | Admitting: INTERNAL MEDICINE
Payer: COMMERCIAL

## 2020-12-14 DIAGNOSIS — Z12.31 VISIT FOR SCREENING MAMMOGRAM: ICD-10-CM

## 2020-12-14 PROCEDURE — 77067 SCR MAMMO BI INCL CAD: CPT

## 2020-12-25 ENCOUNTER — NURSE TRIAGE (OUTPATIENT)
Dept: NURSING | Facility: CLINIC | Age: 60
End: 2020-12-25

## 2020-12-25 NOTE — TELEPHONE ENCOUNTER
COVID19 positive 12/23 and done through [facility].   Breathing fine, not sob. Pulse oxygen 95-88% 93% now  Coughing at times.   NO wheezing.   Heaviness sometimes in the chest  Some crackling after a long breath, .   Temp is 100 and has a headache. Fever started 3 days ago. Takes tylenol for headache and this helps. Chin to chest is fine.   Advised a virtul urgent care visit for today. Patient declined stating that she will monitor her symptoms for now and call back if they worsen.    Nickie Dmaon RN on 12/25/2020 at 2:38 PM    Reason for Disposition    Fever present > 3 days (72 hours)    Additional Information    Negative: SEVERE difficulty breathing (e.g., struggling for each breath, speaks in single words)    Negative: Difficult to awaken or acting confused (e.g., disoriented, slurred speech)    Negative: Bluish (or gray) lips or face now    Negative: Shock suspected (e.g., cold/pale/clammy skin, too weak to stand, low BP, rapid pulse)    Negative: Sounds like a life-threatening emergency to the triager    Negative: [1] COVID-19 exposure AND [2] no symptoms    Negative: [1] Lives with someone known to have influenza (flu test positive) AND [2] flu-like symptoms (e.g., cough, runny nose, sore throat, SOB; with or without fever)    Negative: [1] Adult with possible COVID-19 symptoms AND [2] triager concerned about severity of symptoms or other causes    Negative: Immunization reaction suspected (e.g., fever, headache, muscle aches occurring during days 1-3 days after immunization)    Negative: COVID-19 and breastfeeding, questions about    Negative: SEVERE or constant chest pain or pressure (Exception: mild central chest pain, present only when coughing)    Negative: MODERATE difficulty breathing (e.g., speaks in phrases, SOB even at rest, pulse 100-120)    Negative: [1] Headache AND [2] stiff neck (can't touch chin to chest)    Negative: MILD difficulty breathing (e.g., minimal/no SOB at rest, SOB with  walking, pulse <100)    Negative: Chest pain or pressure    Negative: Patient sounds very sick or weak to the triager    Negative: Fever > 103 F (39.4 C)    Negative: [1] Fever > 101 F (38.3 C) AND [2] age > 60    Negative: [1] Fever > 100.0 F (37.8 C) AND [2] bedridden (e.g., nursing home patient, CVA, chronic illness, recovering from surgery)    Negative: [1] HIGH RISK patient (e.g., age > 64 years, diabetes, heart or lung disease, weak immune system) AND [2] new or worsening symptoms    Negative: [1] HIGH RISK patient AND [2] influenza is widespread in the community AND [3] ONE OR MORE respiratory symptoms: cough, sore throat, runny or stuffy nose    Negative: [1] HIGH RISK patient AND [2] influenza exposure within the last 7 days AND [3] ONE OR MORE respiratory symptoms: cough, sore throat, runny or stuffy nose    Protocols used: CORONAVIRUS (COVID-19) DIAGNOSED OR [id number] 12.1

## 2021-05-10 ENCOUNTER — HOSPITAL ENCOUNTER (OUTPATIENT)
Dept: CT IMAGING | Facility: CLINIC | Age: 61
Discharge: HOME | End: 2021-05-10
Attending: INTERNAL MEDICINE | Admitting: INTERNAL MEDICINE
Payer: COMMERCIAL

## 2021-05-10 DIAGNOSIS — C20 PRIMARY MALIGNANT NEOPLASM OF RECTUM (H): ICD-10-CM

## 2021-05-10 LAB
CREAT BLD-MCNC: 0.7 MG/DL (ref 0.52–1.04)
GFR SERPL CREATININE-BSD FRML MDRD: 86 ML/MIN/{1.73_M2}

## 2021-05-10 PROCEDURE — 71260 CT THORAX DX C+: CPT

## 2021-05-10 PROCEDURE — 250N000009 HC RX 250: Performed by: INTERNAL MEDICINE

## 2021-05-10 PROCEDURE — 82565 ASSAY OF CREATININE: CPT

## 2021-05-10 PROCEDURE — 250N000011 HC RX IP 250 OP 636: Performed by: INTERNAL MEDICINE

## 2021-05-10 RX ORDER — IOPAMIDOL 755 MG/ML
101 INJECTION, SOLUTION INTRAVASCULAR ONCE
Status: COMPLETED | OUTPATIENT
Start: 2021-05-10 | End: 2021-05-10

## 2021-05-10 RX ADMIN — SODIUM CHLORIDE 69 ML: 9 INJECTION, SOLUTION INTRAVENOUS at 09:35

## 2021-05-10 RX ADMIN — IOPAMIDOL 101 ML: 755 INJECTION, SOLUTION INTRAVENOUS at 09:35

## 2021-05-13 ENCOUNTER — TRANSFERRED RECORDS (OUTPATIENT)
Dept: HEALTH INFORMATION MANAGEMENT | Facility: CLINIC | Age: 61
End: 2021-05-13

## 2021-11-12 ENCOUNTER — TRANSFERRED RECORDS (OUTPATIENT)
Dept: HEALTH INFORMATION MANAGEMENT | Facility: CLINIC | Age: 61
End: 2021-11-12
Payer: COMMERCIAL

## 2021-12-21 ENCOUNTER — HOSPITAL ENCOUNTER (OUTPATIENT)
Dept: MAMMOGRAPHY | Facility: CLINIC | Age: 61
Discharge: HOME | End: 2021-12-21
Attending: INTERNAL MEDICINE | Admitting: INTERNAL MEDICINE
Payer: COMMERCIAL

## 2021-12-21 DIAGNOSIS — Z12.31 VISIT FOR SCREENING MAMMOGRAM: ICD-10-CM

## 2021-12-21 PROCEDURE — 77063 BREAST TOMOSYNTHESIS BI: CPT

## 2022-03-09 ENCOUNTER — HOSPITAL ENCOUNTER (EMERGENCY)
Facility: CLINIC | Age: 62
Discharge: HOME | End: 2022-03-09
Attending: EMERGENCY MEDICINE | Admitting: EMERGENCY MEDICINE
Payer: COMMERCIAL

## 2022-03-09 ENCOUNTER — APPOINTMENT (OUTPATIENT)
Dept: GENERAL RADIOLOGY | Facility: CLINIC | Age: 62
End: 2022-03-09
Attending: EMERGENCY MEDICINE
Payer: COMMERCIAL

## 2022-03-09 ENCOUNTER — APPOINTMENT (OUTPATIENT)
Dept: CT IMAGING | Facility: CLINIC | Age: 62
End: 2022-03-09
Attending: EMERGENCY MEDICINE
Payer: COMMERCIAL

## 2022-03-09 VITALS
RESPIRATION RATE: 24 BRPM | HEIGHT: 68 IN | DIASTOLIC BLOOD PRESSURE: 100 MMHG | WEIGHT: 205 LBS | SYSTOLIC BLOOD PRESSURE: 160 MMHG | TEMPERATURE: 97.7 F | OXYGEN SATURATION: 94 % | BODY MASS INDEX: 31.07 KG/M2 | HEART RATE: 68 BPM

## 2022-03-09 DIAGNOSIS — R07.81 PLEURITIC CHEST PAIN: ICD-10-CM

## 2022-03-09 DIAGNOSIS — I10 HYPERTENSION, UNSPECIFIED TYPE: ICD-10-CM

## 2022-03-09 LAB
ALBUMIN SERPL-MCNC: 3.9 G/DL (ref 3.4–5)
ALP SERPL-CCNC: 76 U/L (ref 40–150)
ALT SERPL W P-5'-P-CCNC: 73 U/L (ref 0–50)
ANION GAP SERPL CALCULATED.3IONS-SCNC: 5 MMOL/L (ref 3–14)
AST SERPL W P-5'-P-CCNC: 42 U/L (ref 0–45)
BASOPHILS # BLD AUTO: 0 10E3/UL (ref 0–0.2)
BASOPHILS NFR BLD AUTO: 1 %
BILIRUB SERPL-MCNC: 0.4 MG/DL (ref 0.2–1.3)
BUN SERPL-MCNC: 15 MG/DL (ref 7–30)
CALCIUM SERPL-MCNC: 9.4 MG/DL (ref 8.5–10.1)
CHLORIDE BLD-SCNC: 106 MMOL/L (ref 94–109)
CO2 SERPL-SCNC: 28 MMOL/L (ref 20–32)
CREAT SERPL-MCNC: 0.64 MG/DL (ref 0.52–1.04)
D DIMER PPP FEU-MCNC: 0.52 UG/ML FEU (ref 0–0.5)
EOSINOPHIL # BLD AUTO: 0.1 10E3/UL (ref 0–0.7)
EOSINOPHIL NFR BLD AUTO: 2 %
ERYTHROCYTE [DISTWIDTH] IN BLOOD BY AUTOMATED COUNT: 12.7 % (ref 10–15)
GFR SERPL CREATININE-BSD FRML MDRD: >90 ML/MIN/1.73M2
GLUCOSE BLD-MCNC: 150 MG/DL (ref 70–99)
HCT VFR BLD AUTO: 44.6 % (ref 35–47)
HGB BLD-MCNC: 14.9 G/DL (ref 11.7–15.7)
IMM GRANULOCYTES # BLD: 0 10E3/UL
IMM GRANULOCYTES NFR BLD: 1 %
LYMPHOCYTES # BLD AUTO: 1.2 10E3/UL (ref 0.8–5.3)
LYMPHOCYTES NFR BLD AUTO: 25 %
MCH RBC QN AUTO: 31.2 PG (ref 26.5–33)
MCHC RBC AUTO-ENTMCNC: 33.4 G/DL (ref 31.5–36.5)
MCV RBC AUTO: 93 FL (ref 78–100)
MONOCYTES # BLD AUTO: 0.3 10E3/UL (ref 0–1.3)
MONOCYTES NFR BLD AUTO: 6 %
NEUTROPHILS # BLD AUTO: 3.2 10E3/UL (ref 1.6–8.3)
NEUTROPHILS NFR BLD AUTO: 65 %
NRBC # BLD AUTO: 0 10E3/UL
NRBC BLD AUTO-RTO: 0 /100
PLATELET # BLD AUTO: 184 10E3/UL (ref 150–450)
POTASSIUM BLD-SCNC: 4 MMOL/L (ref 3.4–5.3)
PROT SERPL-MCNC: 7.3 G/DL (ref 6.8–8.8)
RBC # BLD AUTO: 4.78 10E6/UL (ref 3.8–5.2)
SODIUM SERPL-SCNC: 139 MMOL/L (ref 133–144)
TROPONIN I SERPL HS-MCNC: 11 NG/L
WBC # BLD AUTO: 4.8 10E3/UL (ref 4–11)

## 2022-03-09 PROCEDURE — 71275 CT ANGIOGRAPHY CHEST: CPT

## 2022-03-09 PROCEDURE — 84484 ASSAY OF TROPONIN QUANT: CPT | Performed by: EMERGENCY MEDICINE

## 2022-03-09 PROCEDURE — 85379 FIBRIN DEGRADATION QUANT: CPT | Performed by: EMERGENCY MEDICINE

## 2022-03-09 PROCEDURE — 93005 ELECTROCARDIOGRAM TRACING: CPT

## 2022-03-09 PROCEDURE — 71046 X-RAY EXAM CHEST 2 VIEWS: CPT

## 2022-03-09 PROCEDURE — 99285 EMERGENCY DEPT VISIT HI MDM: CPT | Mod: 25

## 2022-03-09 PROCEDURE — 80053 COMPREHEN METABOLIC PANEL: CPT | Performed by: EMERGENCY MEDICINE

## 2022-03-09 PROCEDURE — 250N000009 HC RX 250: Performed by: EMERGENCY MEDICINE

## 2022-03-09 PROCEDURE — 36415 COLL VENOUS BLD VENIPUNCTURE: CPT | Performed by: EMERGENCY MEDICINE

## 2022-03-09 PROCEDURE — 85025 COMPLETE CBC W/AUTO DIFF WBC: CPT | Performed by: EMERGENCY MEDICINE

## 2022-03-09 PROCEDURE — 250N000011 HC RX IP 250 OP 636: Performed by: EMERGENCY MEDICINE

## 2022-03-09 PROCEDURE — 82040 ASSAY OF SERUM ALBUMIN: CPT | Performed by: EMERGENCY MEDICINE

## 2022-03-09 RX ORDER — HYDROCODONE BITARTRATE AND ACETAMINOPHEN 5; 325 MG/1; MG/1
1-2 TABLET ORAL EVERY 4 HOURS PRN
Qty: 8 TABLET | Refills: 0 | Status: SHIPPED | OUTPATIENT
Start: 2022-03-09 | End: 2022-06-01

## 2022-03-09 RX ORDER — IOPAMIDOL 755 MG/ML
74 INJECTION, SOLUTION INTRAVASCULAR ONCE
Status: COMPLETED | OUTPATIENT
Start: 2022-03-09 | End: 2022-03-09

## 2022-03-09 RX ORDER — CYCLOBENZAPRINE HCL 10 MG
10 TABLET ORAL 3 TIMES DAILY PRN
Qty: 20 TABLET | Refills: 0 | Status: SHIPPED | OUTPATIENT
Start: 2022-03-09 | End: 2022-03-16

## 2022-03-09 RX ADMIN — SODIUM CHLORIDE 97 ML: 900 INJECTION INTRAVENOUS at 10:55

## 2022-03-09 RX ADMIN — IOPAMIDOL 74 ML: 755 INJECTION, SOLUTION INTRAVENOUS at 10:54

## 2022-03-09 ASSESSMENT — ENCOUNTER SYMPTOMS
SHORTNESS OF BREATH: 1
COUGH: 0
FEVER: 0

## 2022-03-09 NOTE — ED PROVIDER NOTES
"  History   Chief Complaint:  Chest Pain       The history is provided by the patient.      Ilene Gaviria is a 61 year old female who presents with anterior right-sided chest pain accompanied by shortness of breath and pain while taking a deep breath which started about 6 days ago. She correlates this pain to a sneezing episode that happened a day earlier. She notes she apparently has had a rib out before. She is not on blood pressure medications and notes her blood pressure is normally not high. No immediate pain after the sneezing episode. No fall or injury to the area. No recent travel. She does not smoke. Denies fever, cough, leg swelling and leg pain.  She also apparently saw her chiropractor who did an adjustment, however that has not helped her.      Review of Systems   Constitutional: Negative for fever.   Respiratory: Positive for shortness of breath. Negative for cough.    Cardiovascular: Positive for chest pain. Negative for leg swelling.   All other systems reviewed and are negative.      Allergies:  The patient has no known allergies.     Medications:  The patient is not currently taking any prescribed medications.    Past Medical History:     Rectal cancer  Colostomy in place      Past Surgical History:    Davinci colectomy  Bladder sling  Graft flap gluteus to perineum  Caesarean section x3  Sigmoidoscopy flexible   Tonsillectomy  [place] teeth extraction    Family History:    Father - cancer  Mother - heart disease    Social History:  The patient presents to the ED alone.    Physical Exam     Patient Vitals for the past 24 hrs:   BP Temp Temp src Pulse Resp SpO2 Height Weight   03/09/22 1152 (!) 160/100 -- -- 68 -- -- -- --   03/09/22 1040 (!) 167/103 -- -- 67 -- 94 % -- --   03/09/22 1000 (!) 114/99 -- -- 81 24 94 % -- --   03/09/22 0819 (!) 194/103 97.7  F (36.5  C) Oral 84 22 100 % 1.727 m (5' 8\") 93 kg (205 lb)       Physical Exam  Nursing note and vitals reviewed.  Constitutional:  Oriented " to person, place, and time. Cooperative.   HENT:   Nose:    Nose normal.   Mouth/Throat:   Face mask in place.  Eyes:    Conjunctivae normal and EOM are normal.      Pupils are equal, round, and reactive to light.   Neck:    Trachea normal.   Cardiovascular:  Normal rate, regular rhythm, normal heart sounds and normal pulses. No murmur heard.  Pulmonary/Chest:  Effort normal and breath sounds normal.   Abdominal:   Soft. Normal appearance and bowel sounds are normal.      There is no tenderness.      There is no rebound and no CVA tenderness.   Musculoskeletal:  Reproducible tenderness to palpation to the right upper chest wall region.  Extremities atraumatic x 4.   Lymphadenopathy:  No cervical adenopathy.   Neurological:   Alert and oriented to person, place, and time. Normal strength.      No cranial nerve deficit or sensory deficit. GCS eye subscore is 4. GCS verbal subscore is 5. GCS motor subscore is 6.   Skin:    Skin is intact. No rash noted.   Psychiatric:   Normal mood and affect.    Emergency Department Course   ECG  ECG obtained at 0959, ECG read at 1002  Normal sinus rhythm. Minimal voltage criteria for LVH, may be normal variant. Nonspecific T wave abnormality. Abnormal ECG.   No significant change as compared to prior, dated 04/24/17.  Rate 72 bpm. CA interval 170 ms. QRS duration 92 ms. QT/QTc 392/429 ms. P-R-T axes 57 5 42.     Imaging:  CT Chest Pulmonary Embolism w Contrast   Preliminary Result   IMPRESSION: No pulmonary embolism demonstrated.      XR Chest 2 Views   Final Result   IMPRESSION:  There are no acute infiltrates. The cardiac silhouette is   not enlarged. Pulmonary vasculature is unremarkable.       EN RAY MD            SYSTEM ID:  [id number]        Report per radiology    Laboratory:  Labs Ordered and Resulted from Time of ED Arrival to Time of ED Departure   D DIMER QUANTITATIVE - Abnormal       Result Value    D-Dimer Quantitative 0.52 (*)    COMPREHENSIVE METABOLIC PANEL -  Abnormal    Sodium 139      Potassium 4.0      Chloride 106      Carbon Dioxide (CO2) 28      Anion Gap 5      Urea Nitrogen 15      Creatinine 0.64      Calcium 9.4      Glucose 150 (*)     Alkaline Phosphatase 76      AST 42      ALT 73 (*)     Protein Total 7.3      Albumin 3.9      Bilirubin Total 0.4      GFR Estimate >90     TROPONIN I - Normal    Troponin I High Sensitivity 11     CBC WITH PLATELETS AND DIFFERENTIAL    WBC Count 4.8      RBC Count 4.78      Hemoglobin 14.9      Hematocrit 44.6      MCV 93      MCH 31.2      MCHC 33.4      RDW 12.7      Platelet Count 184      % Neutrophils 65      % Lymphocytes 25      % Monocytes 6      % Eosinophils 2      % Basophils 1      % Immature Granulocytes 1      NRBCs per 100 WBC 0      Absolute Neutrophils 3.2      Absolute Lymphocytes 1.2      Absolute Monocytes 0.3      Absolute Eosinophils 0.1      Absolute Basophils 0.0      Absolute Immature Granulocytes 0.0      Absolute NRBCs 0.0            Emergency Department Course:    Reviewed:  I reviewed nursing notes, vitals, past medical history and Care Everywhere    Assessments:  0839 I obtained history and examined the patient as noted above.   1156 I rechecked the patient and explained findings.     Disposition:  The patient was discharged to home.     Impression & Plan     CMS Diagnoses: None      Medical Decision Making:  This is a 61-year-old female came in for further evaluation of pleuritic right-sided chest pain.  She has had this now for about 6 days. She has a normal exam here, other than her blood pressure which is elevated and her pain is somewhat reproducible.  She had a chest x-ray obtained prior to my seeing her, which was unremarkable.  I felt it was reasonable to rule out other more serious causes such as cardiac ischemia and pulmonary embolism, as well as a pneumothorax.  Therefore proceeded with the above work-up including an EKG, blood work, and then a CT scan of her chest when her D-dimer  came back elevated.  Her CT scan is unremarkable as well, which is reassuring.  I suspect her pain is musculoskeletal in origin, and I reassured her that this does not appear to be from a dislocated rib.  I agreed to provide her a prescription for Flexeril as well as a small prescription for Norco.  I recommended close outpatient follow-up though regardless, especially given her elevated blood pressure here.  She knows to return here with any concerns or worsening symptoms.    Diagnosis:    ICD-10-CM    1. Pleuritic chest pain  R07.81    2. Hypertension, unspecified type  I10        Discharge Medications:  New Prescriptions    CYCLOBENZAPRINE (FLEXERIL) 10 MG TABLET    Take 1 tablet (10 mg) by mouth 3 times daily as needed for muscle spasms    HYDROCODONE-ACETAMINOPHEN (NORCO) 5-325 MG TABLET    Take 1-2 tablets by mouth every 4 hours as needed for pain       Scribe Disclosure:  I, Chuyita Carreon, am serving as a scribe at 8:33 AM on 3/9/2022 to document services personally performed by Jackson Hernandez MD based on my observations and the provider's statements to me.            Jackson Hernandez MD  03/09/22 [id number]

## 2022-03-09 NOTE — ED TRIAGE NOTES
Pt reports last week having a sneezing episode and then developing severe pain anterior right chest,m went to chiropractor to diagnosed with a rib out, had an adjustment and pain has continued to get worse, went to [facility] this morning and referred to ED, no imaging completed

## 2022-05-06 NOTE — TELEPHONE ENCOUNTER
Action 5.6.22  sv   Action Taken Records request sent to Endocrinology [facility]     Action 5.31.22 sv    Action Taken Called and spoke with Endocrinology [facility], a 2nd request was sent, per staff records should be sent over and be received by tomorrow morning     Action 6.1.22 sv    Action Taken No records have been received from Endocrinology [facility], 3rd request sent,               RECORDS RECEIVED FROM: internal    DATE RECEIVED: 6.1.22    NOTES (FOR ALL VISITS) STATUS DETAILS   OFFICE NOTES from referring provider internal  Self referred   OFFICE NOTES from other specialist     ED NOTES     OPERATIVE REPORT  (thyroid, pituitary, adrenal, parathyroid)     MEDICATION LIST internal     IMAGING      DEXASCAN     MRI (BRAIN)     XR (Chest) internal  3.9.22    CT (HEAD/NECK/CHEST/ABDOMEN) internal  3.9.22, 12.21.21, 5.10.21   NUCLEAR      ULTRASOUND (HEAD/NECK)     LABS     DIABETES: HBGA1C, CREATININE, FASTING LIPIDS, MICROALBUMIN URINE, POTASSIUM, TSH, T4    THYROID: TSH, T4, CBC, THYRODLONULIN, TOTAL T3, FREE T4, CALCITONIN, CEA internal

## 2022-05-23 ENCOUNTER — ANCILLARY PROCEDURE (OUTPATIENT)
Dept: CT IMAGING | Facility: CLINIC | Age: 62
End: 2022-05-23
Attending: INTERNAL MEDICINE
Payer: COMMERCIAL

## 2022-05-23 DIAGNOSIS — C20 MALIGNANT NEOPLASM OF RECTUM (H): ICD-10-CM

## 2022-05-23 LAB
CREAT BLD-MCNC: 0.6 MG/DL (ref 0.5–1)
GFR SERPL CREATININE-BSD FRML MDRD: >60 ML/MIN/1.73M2

## 2022-05-23 PROCEDURE — 82565 ASSAY OF CREATININE: CPT

## 2022-05-23 PROCEDURE — 250N000011 HC RX IP 250 OP 636: Performed by: INTERNAL MEDICINE

## 2022-05-23 PROCEDURE — 74177 CT ABD & PELVIS W/CONTRAST: CPT

## 2022-05-23 RX ORDER — IOPAMIDOL 755 MG/ML
100 INJECTION, SOLUTION INTRAVASCULAR ONCE
Status: COMPLETED | OUTPATIENT
Start: 2022-05-23 | End: 2022-05-23

## 2022-05-23 RX ADMIN — IOPAMIDOL 100 ML: 755 INJECTION, SOLUTION INTRAVENOUS at 08:57

## 2022-05-31 ENCOUNTER — TRANSFERRED RECORDS (OUTPATIENT)
Dept: HEALTH INFORMATION MANAGEMENT | Facility: CLINIC | Age: 62
End: 2022-05-31
Payer: COMMERCIAL

## 2022-06-01 ENCOUNTER — PRE VISIT (OUTPATIENT)
Dept: ENDOCRINOLOGY | Facility: CLINIC | Age: 62
End: 2022-06-01

## 2022-06-01 ENCOUNTER — OFFICE VISIT (OUTPATIENT)
Dept: ENDOCRINOLOGY | Facility: CLINIC | Age: 62
End: 2022-06-01
Payer: COMMERCIAL

## 2022-06-01 VITALS
HEIGHT: 68 IN | BODY MASS INDEX: 31.17 KG/M2 | WEIGHT: 205.7 LBS | DIASTOLIC BLOOD PRESSURE: 105 MMHG | HEART RATE: 85 BPM | SYSTOLIC BLOOD PRESSURE: 155 MMHG

## 2022-06-01 DIAGNOSIS — E11.40 TYPE 2 DIABETES MELLITUS WITH DIABETIC NEUROPATHY, WITHOUT LONG-TERM CURRENT USE OF INSULIN (H): Primary | ICD-10-CM

## 2022-06-01 LAB — HBA1C MFR BLD: 6.1 % (ref 4.3–?)

## 2022-06-01 PROCEDURE — 83036 HEMOGLOBIN GLYCOSYLATED A1C: CPT | Performed by: STUDENT IN AN ORGANIZED HEALTH CARE EDUCATION/TRAINING PROGRAM

## 2022-06-01 PROCEDURE — 99205 OFFICE O/P NEW HI 60 MIN: CPT | Performed by: STUDENT IN AN ORGANIZED HEALTH CARE EDUCATION/TRAINING PROGRAM

## 2022-06-01 RX ORDER — SEMAGLUTIDE 1.34 MG/ML
INJECTION, SOLUTION SUBCUTANEOUS
Qty: 3 MG | Refills: 3 | Status: SHIPPED | OUTPATIENT
Start: 2022-06-01 | End: 2023-03-13

## 2022-06-01 RX ORDER — SIMVASTATIN 20 MG
1 TABLET ORAL
COMMUNITY
End: 2023-03-13

## 2022-06-01 RX ORDER — LISINOPRIL 20 MG/1
20 TABLET ORAL DAILY
Qty: 30 TABLET | Refills: 3 | Status: SHIPPED | OUTPATIENT
Start: 2022-06-01 | End: 2022-11-09

## 2022-06-01 ASSESSMENT — PAIN SCALES - GENERAL: PAINLEVEL: NO PAIN (0)

## 2022-06-01 NOTE — NURSING NOTE
"Chief Complaint   Patient presents with     Diabetes     Vital signs:      BP: (!) 155/105 Pulse: 85           Height: 172.7 cm (5' 8\") Weight: 93.3 kg (205 lb 11.2 oz)  Estimated body mass index is 31.28 kg/m  as calculated from the following:    Height as of this encounter: 1.727 m (5' 8\").    Weight as of this encounter: 93.3 kg (205 lb 11.2 oz).        "

## 2022-06-01 NOTE — LETTER
6/1/2022       RE: Ilene Gaviria  [address] [facility] [address]     Dear Colleague,    Thank you for referring your patient, Ilene Gaviria, to the [facility] ENDOCRINOLOG[facility] [place] [facility]. Please see a copy of my visit note below.    Endocrinology Clinic Visit 6/1/2022    NAME:  Ilene Gaviria  PCP:  Trevor Rizo  MRN:  [id number]  Reason for Consult: DM2  Requesting Provider:  Referred Self    Chief Complaint     Chief Complaint   Patient presents with     Diabetes       History of Present Illness     Ilene Gaviria is a 61 year old female who is seen in clinic for DM2    Her past medical history is significant for rectal cancer status post surgery and chemotherapy currently in remission.  Never been on steroids before.  The patient was diagnosed with type 2 diabetes . 6 month ago new diagnosis of DM2. A1C was checked no records, she said it was in the range of 8.  She was started on metformin, currently at 2 g daily.  She is tolerating it very well.  Her A1c today was down to 6.1.  Today she was concerned about her weight affecting her overall health and diabetes .  Her weight has always been always between 180-210 since having her kids. It is been stable but not able to loose weight despite dieting. She saw nutritionist in the past, does not think it was very helpful.    Previous A1C in records reviewed. Today A1C is 6.1 significantly improved compared to previous.    Weight and records as below  Wt Readings from Last 4 Encounters:   06/01/22 93.3 kg (205 lb 11.2 oz)   03/09/22 93 kg (205 lb)   06/25/20 93 kg (205 lb)   12/17/19 (P) 98.9 kg (218 lb)         Diabetes Care/Complications:   Eyes: no eye  exam  Kidneys: no urine microalb, normal Cr  Nerves: tingling and numbness bottom at first but now at the ankle.   Smoking: no  Blood Pressure: elevated today and multiplr previously elevated  readings.  Lipids: on simvastatin 20 mg daily  Macrovascular: no   Hypoglycemia: no     Previous DM related Hospitalization: no    Current treatment strategy:   Metformin 2 g daily     Blood Glucose Monitoring:   No SMBG    Diet: 2 meals and a snack  Breakfast: cereal or eggs.   Lunch: big meal: a lot of salads and veggies. Rarely protein. Sometime asian food.  Snack at night: fruits.  Drinks: no sugary drinks. Occasional alcohol.  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 She lives with her .    Problem List     Patient Active Problem List   Diagnosis     BMI 30.0-30.9,adult     Rectal cancer (H)     Colostomy in place (H)        Medications     Current Outpatient Medications   Medication     metFORMIN (GLUCOPHAGE) 500 MG tablet     simvastatin (ZOCOR) 20 MG tablet     No current facility-administered medications for this visit.        Allergies     Allergies   Allergen Reactions     No Known Allergies        Medical / Surgical History     Past Medical History:   Diagnosis Date     Cancer (H)     rectal cancer     Past Surgical History:   Procedure Laterality Date     DAVINCI COLECTOMY N/A 8/7/2018    Procedure: DAVINCI XI COLECTOMY;  DAVINCI ABDOMINAL PERINEAL RESECTION WITH PERMANENT COLOSTOMY ( PARI ) LEFT GLUTEAL FLAP TO RECTAL DEFECT ( JERE ) ;  Surgeon: Jonny Finney MD;  Location:  OR     GENITOURINARY SURGERY      bladder sling 12/18     GRAFT FLAP GLUTEUS TO PERINEUM N/A 8/7/2018    Procedure: GRAFT FLAP GLUTEUS TO PERINEUM;;  Surgeon: Conchita Ramos MD;  Location:  OR     GYN SURGERY      c section X3, tubal      SIGMOIDOSCOPY FLEXIBLE N/A 7/9/2018    Procedure: SIGMOIDOSCOPY FLEXIBLE;  FLEXIBILE SIGMOIDOSCOPY;  Surgeon: Jonny Finney MD;  Location:  GI       Social History     Social History     Socioeconomic History     Marital status:      Spouse name: Not on file     Number  "of children: Not on file     Years of education: Not on file     Highest education level: Not on file   Occupational History     Not on file   Tobacco Use     Smoking status: Never Smoker     Smokeless tobacco: Never Used   Substance and Sexual Activity     Alcohol use: Yes     Comment: rare     Drug use: No     Sexual activity: Not Currently     Partners: Male   Other Topics Concern     Parent/sibling w/ CABG, MI or angioplasty before 65F 55M? Not Asked   Social History Narrative     Not on file     Social Determinants of Health     Financial Resource Strain: Not on file   Food Insecurity: Not on file   Transportation Needs: Not on file   Physical Activity: Not on file   Stress: Not on file   Social Connections: Not on file   Intimate Partner Violence: Not on file   Housing Stability: Not on file       Family History   Noncontributory    ROS     12 ROS completed, pertinent positive and negative in HPI    Physical Exam   BP (!) 155/105 (BP Location: Left arm, Patient Position: Sitting, Cuff Size: Adult Regular)   Pulse 85   Ht 1.727 m (5' 8\")   Wt 93.3 kg (205 lb 11.2 oz)   BMI 31.28 kg/m       General: Comfortable, no obvious distress, normal body habitus  Eyes: Sclera anicteric, moist conjunctiva  HENT: Atraumatic, oropharynx clear, moist mucous membranes with no mucosal ulcerations  Neck: Trachea midline, supple.   CV: normal rate.   Resp:  good effort, no evidence of loud wheezing  Abdomen:  obese, non distended.   Skin: No rashes, lesions, or subcutaneous nodules on exposed skin.   Psych: Alert and oriented x 3. Appropriate affect, good insight  Extremities: No peripheral edema  Musculoskeletal: Appropriate muscle bulk and strength  Neuro: Moves all four extremities. No focal deficits on limited exam. Gait normal.     Diabetic Foot Screen:  Any complaints of increased pain or numbness ? No  Is there a foot ulcer now or a history of foot ulcer? No  Does the foot have an abnormal shape? No  Are the nails " thick, too long or ingrown? No  Are there any redness or open areas? No         Sensation Testing done at all points on the diagram with monofilament     Right Foot: Sensation Normal at all points  Left Foot: Sensation Normal at all points     Risk Category: 0- No loss of protective sensation  Performed by Christal Monteiro MD                Labs/Imaging     Pertinent Labs were reviewed and updated in EPIC and discussed briefly.  Radiology Results were  reviewed and updated in EPIC and discussed briefly.    Summary of recent findings:   No results found for: A1C    No results found for: TSH, T4    Creatinine   Date Value Ref Range Status   03/09/2022 0.64 0.52 - 1.04 mg/dL Final   12/17/2019 0.60 0.52 - 1.04 mg/dL Final     Creatinine POCT   Date Value Ref Range Status   05/23/2022 0.6 0.5 - 1.0 mg/dL Final       Recent Labs   Lab Test 12/17/19  0940   CHOL 206*   HDL 39*   LDL 97   TRIG 348*       No results found for: [id number], [id number], [id number]    I personally reviewed the patient's outside records from [facility] EMR and faxed records. Summary of pertinent findings in [facility].    Impression / Plan     1. Diabetes Mellitus: Type 2  2. Obesity class II    Her A1c significantly improved on metformin, down to 6.1 today.  Her concern today were related to her weight and diet.  She is interested in weight loss medication.  We reviewed the available FDA approved weight loss medications.  Given her underlying diabetes, she is interested in trying a GLP-1 agonist.  She has no prior history of pancreatitis, no family history or personal history of thyroid cancer.  I reviewed with her GLP-1 agonist side effect.         Plan:   Start Ozempic at 0.25 mg weekly for 4 weeks then go up to 0.5 mg weekly for 4 weeks, if tolerated go up to 1 mg weekly  Continue metformin 2 g daily     2. Diabetes Complications: She is due for an eye exam, ophthalmology referral placed.  Neuropathy could be related to previous chemotherapy.     3.  Blood Pressure Management: Blood pressure is uncontrolled. Currently is  not on pharmacotherapy for this.  Hypertension is a new diagnosis for her, I reviewed lisinopril side effects with her.  Plan to start lisinopril 20 mg daily     4.Lipid Management: Per the new ACC/RALPH/NHLBI guidelines, statins are recommended for individuals with diabetes aged 40-75 with LDL  without ASCVD, and for any individual with ASCVD. Currently the patient is on a statin.      5. Smoking Status: Patient Pt is smoke free..     Review of the result(s) of each unique test - A1c and diabetes related labs.          Test and/or medications prescribed today:  Orders Placed This Encounter   Procedures     Hemoglobin A1c POCT         Follow up: 2-3 month      Christal Monteiro MD  Endocrinology, Diabetes and Metabolism  [facility]    Review of the result(s) of each unique test - A1c and diabetes related labs  65 minutes spent on the date of the encounter doing chart review, history and exam, documentation and further activities per the note

## 2022-06-01 NOTE — PROGRESS NOTES
Endocrinology Clinic Visit 6/1/2022    NAME:  Ilene Gaviria  PCP:  Trevor Rizo  MRN:  [id number]  Reason for Consult: DM2  Requesting Provider:  Referred Self    Chief Complaint     Chief Complaint   Patient presents with     Diabetes       History of Present Illness     Ilene Gaviria is a 61 year old female who is seen in clinic for DM2    Her past medical history is significant for rectal cancer status post surgery and chemotherapy currently in remission.  Never been on steroids before.  The patient was diagnosed with type 2 diabetes . 6 month ago new diagnosis of DM2. A1C was checked no records, she said it was in the range of 8.  She was started on metformin, currently at 2 g daily.  She is tolerating it very well.  Her A1c today was down to 6.1.  Today she was concerned about her weight affecting her overall health and diabetes .  Her weight has always been always between 180-210 since having her kids. It is been stable but not able to loose weight despite dieting. She saw nutritionist in the past, does not think it was very helpful.    Previous A1C in records reviewed. Today A1C is 6.1 significantly improved compared to previous.    Weight and records as below  Wt Readings from Last 4 Encounters:   06/01/22 93.3 kg (205 lb 11.2 oz)   03/09/22 93 kg (205 lb)   06/25/20 93 kg (205 lb)   12/17/19 (P) 98.9 kg (218 lb)         Diabetes Care/Complications:   Eyes: no eye  exam  Kidneys: no urine microalb, normal Cr  Nerves: tingling and numbness bottom at first but now at the ankle.   Smoking: no  Blood Pressure: elevated today and multiplr previously elevated readings.  Lipids: on simvastatin 20 mg daily  Macrovascular: no   Hypoglycemia: no     Previous DM related Hospitalization: no    Current treatment strategy:   Metformin 2 g daily     Blood Glucose Monitoring:   No SMBG    Diet: 2 meals and a snack  Breakfast: cereal or eggs.   Lunch: big meal: a lot of salads and veggies. Rarely  protein. Sometime asian food.  Snack at night: fruits.  Drinks: no sugary drinks. Occasional alcohol.  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 She lives with her .    Problem List     Patient Active Problem List   Diagnosis     BMI 30.0-30.9,adult     Rectal cancer (H)     Colostomy in place (H)        Medications     Current Outpatient Medications   Medication     metFORMIN (GLUCOPHAGE) 500 MG tablet     simvastatin (ZOCOR) 20 MG tablet     No current facility-administered medications for this visit.        Allergies     Allergies   Allergen Reactions     No Known Allergies        Medical / Surgical History     Past Medical History:   Diagnosis Date     Cancer (H)     rectal cancer     Past Surgical History:   Procedure Laterality Date     DAVINCI COLECTOMY N/A 8/7/2018    Procedure: DAVINCI XI COLECTOMY;  DAVINCI ABDOMINAL PERINEAL RESECTION WITH PERMANENT COLOSTOMY ( PARI ) LEFT GLUTEAL FLAP TO RECTAL DEFECT ( JERE ) ;  Surgeon: Jonny Finney MD;  Location:  OR     GENITOURINARY SURGERY      bladder sling 12/18     GRAFT FLAP GLUTEUS TO PERINEUM N/A 8/7/2018    Procedure: GRAFT FLAP GLUTEUS TO PERINEUM;;  Surgeon: Conchita Ramos MD;  Location:  OR     GYN SURGERY      c section X3, tubal      SIGMOIDOSCOPY FLEXIBLE N/A 7/9/2018    Procedure: SIGMOIDOSCOPY FLEXIBLE;  FLEXIBILE SIGMOIDOSCOPY;  Surgeon: Jonny Finney MD;  Location:  GI       Social History     Social History     Socioeconomic History     Marital status:      Spouse name: Not on file     Number of children: Not on file     Years of education: Not on file     Highest education level: Not on file   Occupational History     Not on file   Tobacco Use     Smoking status: Never Smoker     Smokeless tobacco: Never Used   Substance and Sexual Activity     Alcohol use: Yes     Comment: rare     Drug use: No     Sexual activity: Not  "Currently     Partners: Male   Other Topics Concern     Parent/sibling w/ CABG, MI or angioplasty before 65F 55M? Not Asked   Social History Narrative     Not on file     Social Determinants of Health     Financial Resource Strain: Not on file   Food Insecurity: Not on file   Transportation Needs: Not on file   Physical Activity: Not on file   Stress: Not on file   Social Connections: Not on file   Intimate Partner Violence: Not on file   Housing Stability: Not on file       Family History   Noncontributory    ROS     12 ROS completed, pertinent positive and negative in HPI    Physical Exam   BP (!) 155/105 (BP Location: Left arm, Patient Position: Sitting, Cuff Size: Adult Regular)   Pulse 85   Ht 1.727 m (5' 8\")   Wt 93.3 kg (205 lb 11.2 oz)   BMI 31.28 kg/m       General: Comfortable, no obvious distress, normal body habitus  Eyes: Sclera anicteric, moist conjunctiva  HENT: Atraumatic, oropharynx clear, moist mucous membranes with no mucosal ulcerations  Neck: Trachea midline, supple.   CV: normal rate.   Resp:  good effort, no evidence of loud wheezing  Abdomen:  obese, non distended.   Skin: No rashes, lesions, or subcutaneous nodules on exposed skin.   Psych: Alert and oriented x 3. Appropriate affect, good insight  Extremities: No peripheral edema  Musculoskeletal: Appropriate muscle bulk and strength  Neuro: Moves all four extremities. No focal deficits on limited exam. Gait normal.     Diabetic Foot Screen:  Any complaints of increased pain or numbness ? No  Is there a foot ulcer now or a history of foot ulcer? No  Does the foot have an abnormal shape? No  Are the nails thick, too long or ingrown? No  Are there any redness or open areas? No         Sensation Testing done at all points on the diagram with monofilament     Right Foot: Sensation Normal at all points  Left Foot: Sensation Normal at all points     Risk Category: 0- No loss of protective sensation  Performed by Christal Monteiro, " MD                Labs/Imaging     Pertinent Labs were reviewed and updated in EPIC and discussed briefly.  Radiology Results were  reviewed and updated in EPIC and discussed briefly.    Summary of recent findings:   No results found for: A1C    No results found for: TSH, T4    Creatinine   Date Value Ref Range Status   03/09/2022 0.64 0.52 - 1.04 mg/dL Final   12/17/2019 0.60 0.52 - 1.04 mg/dL Final     Creatinine POCT   Date Value Ref Range Status   05/23/2022 0.6 0.5 - 1.0 mg/dL Final       Recent Labs   Lab Test 12/17/19  0940   CHOL 206*   HDL 39*   LDL 97   TRIG 348*       No results found for: [id number], [id number], [id number]    I personally reviewed the patient's outside records from [facility] EMR and faxed records. Summary of pertinent findings in [facility].    Impression / Plan     1. Diabetes Mellitus: Type 2  2. Obesity class II    Her A1c significantly improved on metformin, down to 6.1 today.  Her concern today were related to her weight and diet.  She is interested in weight loss medication.  We reviewed the available FDA approved weight loss medications.  Given her underlying diabetes, she is interested in trying a GLP-1 agonist.  She has no prior history of pancreatitis, no family history or personal history of thyroid cancer.  I reviewed with her GLP-1 agonist side effect.         Plan:   Start Ozempic at 0.25 mg weekly for 4 weeks then go up to 0.5 mg weekly for 4 weeks, if tolerated go up to 1 mg weekly  Continue metformin 2 g daily     2. Diabetes Complications: She is due for an eye exam, ophthalmology referral placed.  Neuropathy could be related to previous chemotherapy.     3. Blood Pressure Management: Blood pressure is uncontrolled. Currently is  not on pharmacotherapy for this.  Hypertension is a new diagnosis for her, I reviewed lisinopril side effects with her.  Plan to start lisinopril 20 mg daily     4.Lipid Management: Per the new ACC/RALPH/NHLBI guidelines, statins are recommended for  individuals with diabetes aged 40-75 with LDL  without ASCVD, and for any individual with ASCVD. Currently the patient is on a statin.      5. Smoking Status: Patient Pt is smoke free..     Review of the result(s) of each unique test - A1c and diabetes related labs.          Test and/or medications prescribed today:  Orders Placed This Encounter   Procedures     Hemoglobin A1c POCT         Follow up: 2-3 month      Christal Monteiro MD  Endocrinology, Diabetes and Metabolism  [facility]    Review of the result(s) of each unique test - A1c and diabetes related labs  65 minutes spent on the date of the encounter doing chart review, history and exam, documentation and further activities per the note

## 2022-06-01 NOTE — PATIENT INSTRUCTIONS
It was nice meeting you.    Start lisinopril 20 mg daily for High blood pressure.    I sent script for ozempic start as below:  Week 1 through week 4 : 0.25 mg once weekly.  Week 5 through week 8: 0.5 mg once weekly.  Week 9 t: 1 mg once weekly.

## 2022-06-02 PROCEDURE — 99283 EMERGENCY DEPT VISIT LOW MDM: CPT

## 2022-06-03 ENCOUNTER — HOSPITAL ENCOUNTER (EMERGENCY)
Facility: CLINIC | Age: 62
Discharge: HOME | End: 2022-06-03
Attending: EMERGENCY MEDICINE | Admitting: EMERGENCY MEDICINE
Payer: COMMERCIAL

## 2022-06-03 VITALS
HEIGHT: 68 IN | HEART RATE: 70 BPM | DIASTOLIC BLOOD PRESSURE: 101 MMHG | WEIGHT: 205 LBS | OXYGEN SATURATION: 97 % | RESPIRATION RATE: 14 BRPM | TEMPERATURE: 97.8 F | SYSTOLIC BLOOD PRESSURE: 174 MMHG | BODY MASS INDEX: 31.07 KG/M2

## 2022-06-03 DIAGNOSIS — N30.00 ACUTE CYSTITIS WITHOUT HEMATURIA: ICD-10-CM

## 2022-06-03 LAB
ALBUMIN UR-MCNC: 100 MG/DL
APPEARANCE UR: ABNORMAL
BILIRUB UR QL STRIP: NEGATIVE
COLOR UR AUTO: ABNORMAL
GLUCOSE UR STRIP-MCNC: NEGATIVE MG/DL
HGB UR QL STRIP: ABNORMAL
KETONES UR STRIP-MCNC: NEGATIVE MG/DL
LEUKOCYTE ESTERASE UR QL STRIP: ABNORMAL
MUCOUS THREADS #/AREA URNS LPF: PRESENT /LPF
NITRATE UR QL: NEGATIVE
PH UR STRIP: 5.5 [PH] (ref 5–7)
RBC URINE: 6 /HPF
SP GR UR STRIP: 1.01 (ref 1–1.03)
SQUAMOUS EPITHELIAL: 1 /HPF
UROBILINOGEN UR STRIP-MCNC: NORMAL MG/DL
WBC CLUMPS #/AREA URNS HPF: PRESENT /HPF
WBC URINE: >182 /HPF

## 2022-06-03 PROCEDURE — 250N000013 HC RX MED GY IP 250 OP 250 PS 637: Performed by: EMERGENCY MEDICINE

## 2022-06-03 PROCEDURE — 81001 URINALYSIS AUTO W/SCOPE: CPT | Performed by: EMERGENCY MEDICINE

## 2022-06-03 PROCEDURE — 87086 URINE CULTURE/COLONY COUNT: CPT | Performed by: EMERGENCY MEDICINE

## 2022-06-03 RX ORDER — CEPHALEXIN 500 MG/1
500 CAPSULE ORAL ONCE
Status: COMPLETED | OUTPATIENT
Start: 2022-06-03 | End: 2022-06-03

## 2022-06-03 RX ORDER — CEPHALEXIN 500 MG/1
500 CAPSULE ORAL 2 TIMES DAILY
Qty: 14 CAPSULE | Refills: 0 | Status: SHIPPED | OUTPATIENT
Start: 2022-06-03 | End: 2022-06-10

## 2022-06-03 RX ADMIN — CEPHALEXIN 500 MG: 500 CAPSULE ORAL at 02:06

## 2022-06-03 NOTE — ED NOTES
Bed: ED20  Expected date:   Expected time:   Means of arrival:   Comments:  Triage -poss kidney stone

## 2022-06-03 NOTE — ED PROVIDER NOTES
History   Chief Complaint:  Flank Pain and Dysuria       HPI   Ilene Gaviria is a 61 year old female with history of cancer and radiation who presents with dysuria.  Patient is having dysuria, frequency and she feels that she has another urinary tract infection.  She has been treated with 3 different courses of antibiotics in the month of May.  She does not know the names of the medications but the first 2 antibiotics were for 5 days and the same one which may have been nitrofurantoin. The 3rd antibiotic was for 3 days.  She reports that she does get better with the antibiotics but then symptoms return a few days later.  The third time this happened, a culture was done which was negative.  She reports that she usually gets urinary tract infections 4-6 times per year and cultures are not usually done.  Denies fever, vomiting and abdominal pain.  She did have a CT scan of her abdomen on May 23 which was after her third course of antibiotics.  The symptoms that she is having now have been present for about a week and they were feeling improved but then worsened last night.    CT chestabdpelvis:  1.  No metastatic disease in the chest, abdomen and pelvis.  2.  Inflammatory changes surrounding the urinary bladder, likely secondary to cystitis, given the history of current urinary tract infection. Correlate with urinalysis.  3.  Hepatic steatosis.  4.  Stable left lower quadrant end colostomy with a large parastomal hernia containing nonobstructed loops of small bowel.  5.  Stable 1 cm left adrenal nodule, likely benign given the stability.    Review of Systems  + Urinary frequency, dysuria  Denies fever, vomiting, abdominal pain  10 point review of systems was obtained and negative other than mentioned above.    Allergies:  No Known Allergies    Medications:  lisinopril (ZESTRIL) 20 MG tablet  metFORMIN (GLUCOPHAGE) 500 MG tablet  semaglutide (OZEMPIC, 0.25 OR 0.5 MG/DOSE,) 2 MG/1.5ML SOPN pen  simvastatin (ZOCOR)  "20 MG tablet      Past Medical History:     Past Medical History:   Diagnosis Date     Cancer (H)      Past Surgical History:    Past Surgical History:   Procedure Laterality Date     DAVINCI COLECTOMY N/A 8/7/2018    Procedure: DAVINCI XI COLECTOMY;  DAVINCI ABDOMINAL PERINEAL RESECTION WITH PERMANENT COLOSTOMY ( PARI ) LEFT GLUTEAL FLAP TO RECTAL DEFECT ( JERE ) ;  Surgeon: Jonny Finney MD;  Location:  OR     GENITOURINARY SURGERY      bladder sling 12/18     GRAFT FLAP GLUTEUS TO PERINEUM N/A 8/7/2018    Procedure: GRAFT FLAP GLUTEUS TO PERINEUM;;  Surgeon: Conchita Ramos MD;  Location:  OR     GYN SURGERY      c section X3, tubal      SIGMOIDOSCOPY FLEXIBLE N/A 7/9/2018    Procedure: SIGMOIDOSCOPY FLEXIBLE;  FLEXIBILE SIGMOIDOSCOPY;  Surgeon: Jonny Finney MD;  Location:  GI      Social History:  In ED alone    Physical Exam     Patient Vitals for the past 24 hrs:   BP Temp Temp src Pulse Resp SpO2 Height Weight   06/03/22 0001 (!) 174/101 (!) 96.7  F (35.9  C) Temporal 70 14 97 % 1.727 m (5' 8\") 93 kg (205 lb)       Physical Exam  General: Sitting up in bed  Eyes:  The pupils are equal and round    Conjunctivae and sclerae are normal  ENT:    Wearing a mask  Neck:  Normal range of motion  CV:  Regular rate     Skin warm and well perfused   Resp:  Non labored breathing on room air    No tachypnea    No cough heard  GI:  Abdomen is soft, there is no rigidity    No distension    No rebound tenderness     No abdominal tenderness    No CVA tenderness  MS:  Normal muscular tone  Skin:  No rash or acute skin lesions noted  Neuro:   Awake, alert.      Speech is normal and fluent.    Face is symmetric.     Moves all extremities equally  Psych: Normal affect.  Appropriate interactions.    Emergency Department Course     Laboratory:  Labs Ordered and Resulted from Time of ED Arrival to Time of ED Departure   ROUTINE UA WITH MICROSCOPIC REFLEX TO CULTURE - Abnormal       Result Value    Color " Urine Straw      Appearance Urine Cloudy (*)     Glucose Urine Negative      Bilirubin Urine Negative      Ketones Urine Negative      Specific Gravity Urine 1.015      Blood Urine Small (*)     pH Urine 5.5      Protein Albumin Urine 100  (*)     Urobilinogen Urine Normal      Nitrite Urine Negative      Leukocyte Esterase Urine Large (*)     WBC Clumps Urine Present (*)     Mucus Urine Present (*)     RBC Urine 6 (*)     WBC Urine >182 (*)     Squamous Epithelials Urine 1     URINE CULTURE        Emergency Department Course:    Reviewed:  I reviewed nursing notes, vitals and past medical history    Assessments:   I obtained history and examined the patient as noted above.     Disposition:  The patient was discharged to home.     Impression & Plan     Medical Decision Making:  Ilene Gaviria is a 61-year-old female who presented to the emergency department with urinary symptoms.  She is think she has another urinary tract infection.  Her urine analysis is consistent with urinary tract infection though I do wonder if she may have radiation cystitis or other process that is causing the frequent UTIs.  She does not routinely get a urine culture done when she has the symptoms though the last time the culture was negative.  Her provider mentioned the possibility of a kidney stone but we reviewed the CT that she had done on May 23 that showed no renal masses, hydronephrosis or calculi.  There was thickening of her bladder.  This was after her third course of antibiotics.  Given that she just recently had a CT, she does not want additional CT done today.  Given her urinalysis result today, will start her on antibiotics while the culture is in process. I do not know exactly which antibiotics she was given over last month but keflex did not sound familiar to patient so will do this one. No evidence of pyelonephritis and no abdominal tenderness to suggest other process such as diverticulitis, etc.  I did discuss the  possibility of a process such as radiation cystitis given that the last urine culture was negative while having symptoms. She denies vaginal discharge. I recommended follow-up with urology and discussed that in future urine cultures may be beneficial especially if todays urine culture is negative.     Diagnosis:    ICD-10-CM    1. Acute cystitis without hematuria  N30.00        Discharge Medications:  Discharge Medication List as of 6/3/2022  2:03 AM      START taking these medications    Details   cephALEXin (KEFLEX) 500 MG capsule Take 1 capsule (500 mg) by mouth 2 times daily for 7 days, Disp-14 capsule, R-0, E-Prescribe           MD Matilda King, Lexi Trent MD  06/03/22 0819

## 2022-06-04 LAB — BACTERIA UR CULT: NORMAL

## 2022-06-08 ENCOUNTER — TELEPHONE (OUTPATIENT)
Dept: OBGYN | Facility: CLINIC | Age: 62
End: 2022-06-08
Payer: COMMERCIAL

## 2022-06-08 ENCOUNTER — VIRTUAL VISIT (OUTPATIENT)
Dept: UROLOGY | Facility: CLINIC | Age: 62
End: 2022-06-08
Attending: OBSTETRICS & GYNECOLOGY
Payer: COMMERCIAL

## 2022-06-08 DIAGNOSIS — N39.41 URGE INCONTINENCE: Primary | ICD-10-CM

## 2022-06-08 PROCEDURE — 99203 OFFICE O/P NEW LOW 30 MIN: CPT | Mod: 95 | Performed by: OBSTETRICS & GYNECOLOGY

## 2022-06-08 RX ORDER — TOLTERODINE 2 MG/1
2 CAPSULE, EXTENDED RELEASE ORAL DAILY
Qty: 30 CAPSULE | Refills: 3 | Status: SHIPPED | OUTPATIENT
Start: 2022-06-08 | End: 2023-05-11

## 2022-06-08 NOTE — TELEPHONE ENCOUNTER
Patient called with increase pain and urgency with urination. Patient reports recent ED visit and recurrent UTI's.    Patient scheduled today with Dr. Romero at 3:30pm for a telephone visit

## 2022-06-08 NOTE — LETTER
6/8/2022       RE: Ilene Gaviria  [address] [facility] [address]     Dear Colleague,    Thank you for referring your patient, Ilene Gaviria, to the [facility] [place] at [facility]. Please see a copy of my visit note below.    Video-Visit Details    Type of service:  Telephone Visit    Video Start Time (time phone started): 3:30    June 8, 2022    Referring Provider: No referring provider defined for this encounter.    Primary Care Provider: Trevor Rizo    CC: urinary urgency    HPI:  Ilene Gaviria is a 61 year old female who presents for evaluation of her pelvic floor symptoms.  She has an appointment with me in 2 weeks but wanted to discuss her symptoms. She has urinary urgency and frequency. Her UAs and urine cultures have been negative.    She has a h/o rectal cancer s/p stoma and radiation.    Past Medical History:   Diagnosis Date     Cancer (H)     rectal cancer       Past Surgical History:   Procedure Laterality Date     DAVINCI COLECTOMY N/A 8/7/2018    Procedure: DAVINCI XI COLECTOMY;  DAVINCI ABDOMINAL PERINEAL RESECTION WITH PERMANENT COLOSTOMY ( PARI ) LEFT GLUTEAL FLAP TO RECTAL DEFECT ( JERE ) ;  Surgeon: Jonny Finney MD;  Location:  OR     GENITOURINARY SURGERY      bladder sling 12/18     GRAFT FLAP GLUTEUS TO PERINEUM N/A 8/7/2018    Procedure: GRAFT FLAP GLUTEUS TO PERINEUM;;  Surgeon: Conchita Ramos MD;  Location:  OR     GYN SURGERY      c section X3, tubal      SIGMOIDOSCOPY FLEXIBLE N/A 7/9/2018    Procedure: SIGMOIDOSCOPY FLEXIBLE;  FLEXIBILE SIGMOIDOSCOPY;  Surgeon: Jonny Finney MD;  Location:  GI       Social History     Socioeconomic History     Marital status:      Spouse name: Not on file     Number of children: Not on file     Years of education: Not on file     Highest education level: Not on file   Occupational History     Not on file    Tobacco Use     Smoking status: Never Smoker     Smokeless tobacco: Never Used   Substance and Sexual Activity     Alcohol use: Yes     Comment: rare     Drug use: No     Sexual activity: Not Currently     Partners: Male   Other Topics Concern     Parent/sibling w/ CABG, MI or angioplasty before 65F 55M? Not Asked   Social History Narrative     Not on file     Social Determinants of Health     Financial Resource Strain: Not on file   Food Insecurity: Not on file   Transportation Needs: Not on file   Physical Activity: Not on file   Stress: Not on file   Social Connections: Not on file   Intimate Partner Violence: Not on file   Housing Stability: Not on file       No family history on file.    ROS    Allergies   Allergen Reactions     No Known Allergies        Current Outpatient Medications   Medication     tolterodine ER (DETROL LA) 2 MG 24 hr capsule     cephALEXin (KEFLEX) 500 MG capsule     lisinopril (ZESTRIL) 20 MG tablet     metFORMIN (GLUCOPHAGE) 500 MG tablet     semaglutide (OZEMPIC, 0.25 OR 0.5 MG/DOSE,) 2 MG/1.5ML SOPN pen     simvastatin (ZOCOR) 20 MG tablet     No current facility-administered medications for this visit.       There were no vitals taken for this visit. No LMP recorded. Patient is postmenopausal. There is no height or weight on file to calculate BMI.  Ms. Gaviria is alert, comfortable in no acute distress, non-labored breathing.     A/P: Ilene Gaviria is a 61 year old F with urinary urgency    Refer for PFPT. Start detrol 2mg. F/U in 2-3 months    I spent a total of 30 minutes with  Ms. Gaviria  on the date of the encounter in chart review, face to face patient visit, review of tests, documentation and/or discussion with other providers about the issues documented above.    Med Romero MD  Professor, OB/GYN  Urogynecologist  CC  Patient Care Team:  Trevor Rizo MD as PCP - General (Internal Medicine)  Trevor Rizo MD as Assigned  PCP  Brittany Harris OD (Optometry)  Christal Monteiro MD as Referring Physician (Endocrinology, Diabetes, and Metabolism)                  Video End Time (time phone stopped): 4:00    Originating Location (pt. Location): Home    Distant Location (provider location):  [facility]     Mode of Communication:  phone    Physician has received verbal consent for a Video Visit from the patient? Yes      Med Romero MD

## 2022-06-08 NOTE — PROGRESS NOTES
Video-Visit Details    Type of service:  Telephone Visit    Video Start Time (time phone started): 3:30    June 8, 2022    Referring Provider: No referring provider defined for this encounter.    Primary Care Provider: Trevor Rizo    CC: urinary urgency    HPI:  Ilene Gaviria is a 61 year old female who presents for evaluation of her pelvic floor symptoms.  She has an appointment with me in 2 weeks but wanted to discuss her symptoms. She has urinary urgency and frequency. Her UAs and urine cultures have been negative.    She has a h/o rectal cancer s/p stoma and radiation.    Past Medical History:   Diagnosis Date     Cancer (H)     rectal cancer       Past Surgical History:   Procedure Laterality Date     DAVINCI COLECTOMY N/A 8/7/2018    Procedure: DAVINCI XI COLECTOMY;  DAVINCI ABDOMINAL PERINEAL RESECTION WITH PERMANENT COLOSTOMY ( PARI ) LEFT GLUTEAL FLAP TO RECTAL DEFECT ( JERE ) ;  Surgeon: Jonny Finney MD;  Location:  OR     GENITOURINARY SURGERY      bladder sling 12/18     GRAFT FLAP GLUTEUS TO PERINEUM N/A 8/7/2018    Procedure: GRAFT FLAP GLUTEUS TO PERINEUM;;  Surgeon: Conchita Ramos MD;  Location:  OR     GYN SURGERY      c section X3, tubal      SIGMOIDOSCOPY FLEXIBLE N/A 7/9/2018    Procedure: SIGMOIDOSCOPY FLEXIBLE;  FLEXIBILE SIGMOIDOSCOPY;  Surgeon: Jonny Finney MD;  Location:  GI       Social History     Socioeconomic History     Marital status:      Spouse name: Not on file     Number of children: Not on file     Years of education: Not on file     Highest education level: Not on file   Occupational History     Not on file   Tobacco Use     Smoking status: Never Smoker     Smokeless tobacco: Never Used   Substance and Sexual Activity     Alcohol use: Yes     Comment: rare     Drug use: No     Sexual activity: Not Currently     Partners: Male   Other Topics Concern     Parent/sibling w/ CABG, MI or angioplasty before 65F 55M? Not Asked    Social History Narrative     Not on file     Social Determinants of Health     Financial Resource Strain: Not on file   Food Insecurity: Not on file   Transportation Needs: Not on file   Physical Activity: Not on file   Stress: Not on file   Social Connections: Not on file   Intimate Partner Violence: Not on file   Housing Stability: Not on file       No family history on file.    ROS    Allergies   Allergen Reactions     No Known Allergies        Current Outpatient Medications   Medication     tolterodine ER (DETROL LA) 2 MG 24 hr capsule     cephALEXin (KEFLEX) 500 MG capsule     lisinopril (ZESTRIL) 20 MG tablet     metFORMIN (GLUCOPHAGE) 500 MG tablet     semaglutide (OZEMPIC, 0.25 OR 0.5 MG/DOSE,) 2 MG/1.5ML SOPN pen     simvastatin (ZOCOR) 20 MG tablet     No current facility-administered medications for this visit.       There were no vitals taken for this visit. No LMP recorded. Patient is postmenopausal. There is no height or weight on file to calculate BMI.  Ms. Gaviria is alert, comfortable in no acute distress, non-labored breathing.     A/P: Ilene Gaviria is a 61 year old F with urinary urgency    Refer for PFPT. Start detrol 2mg. F/U in 2-3 months    I spent a total of 30 minutes with  Ms. Gaviria  on the date of the encounter in chart review, face to face patient visit, review of tests, documentation and/or discussion with other providers about the issues documented above.    Med Romero MD  Professor, OB/GYN  Urogynecologist  CC  Patient Care Team:  Trevor Rizo MD as PCP - General (Internal Medicine)  Trevor Rizo MD as Assigned PCP  Brittany Harris OD (Optometry)  Christal Monteiro MD as Referring Physician (Endocrinology, Diabetes, and Metabolism)                  Video End Time (time phone stopped): 4:00    Originating Location (pt. Location): Home    Distant Location (provider location):  [facility]      Mode of Communication:  phone    Physician has received verbal consent for a Video Visit from the patient? Yes      Med Romero MD

## 2022-06-14 ENCOUNTER — TELEPHONE (OUTPATIENT)
Dept: UROLOGY | Facility: CLINIC | Age: 62
End: 2022-06-14
Payer: COMMERCIAL

## 2022-06-14 NOTE — TELEPHONE ENCOUNTER
[facility] Call [facility]    Phone Message    May a detailed message be left on voicemail: yes     Reason for Call: Medication Question or concern regarding medication   Prescription Clarification  Name of Medication: tolterodine ER (DETROL LA) 2 MG 24 hr capsule   Prescribing Provider: Dr. Romero   What on the order needs clarification? Pt called and stated that the above medication was working for awhile and as of yesterday just doesn't work anymore, pt has a big event this weekend and would like to know what she can do so she is not using the bathroom so often, please call pt to further discuss, thanks!          Action Taken: Message routed to:  Other: Uro    Travel Screening: Not Applicable

## 2022-06-14 NOTE — TELEPHONE ENCOUNTER
Called and spoke with patient. Patient states this dose worked for 3 days and then now is not working well for her. Patient reports urinating 10 times during the day and 20 times overnight. Patient denies UTI symptoms, flank pain, fever, or any other concerns. Patient is wondering if there is a higher dose or a different medication she can try.     Will route MD for review.

## 2022-06-16 ENCOUNTER — NURSE TRIAGE (OUTPATIENT)
Dept: NURSING | Facility: CLINIC | Age: 62
End: 2022-06-16
Payer: COMMERCIAL

## 2022-06-16 NOTE — TELEPHONE ENCOUNTER
[facility] Call [facility]    Phone Message    May a detailed message be left on voicemail: yes     Reason for Call: Per patient, she is frustrated the nobody has called her back for 2 days now.  She is still having the same issues and feels its getting worse.  Please reach out to patient.    Action Taken: Message routed to:  [facility]    Travel Screening: Not Applicable

## 2022-06-16 NOTE — TELEPHONE ENCOUNTER
Patient is calling and states that she has a sudden onset to urinate all the time.  Patient had a virtual visit and was started on Detrol LA.  Patient feels it is not working.  Medication was increased to 4 mg and is still not working.   Her Urologist is on vacation.  Patient is not able to get in to urology as it is booked for two weeks.  Patient was told to go to ER and patient went to ER and they advised her to go to urology.  Patient is calling with questions today.    COVID 19 Nurse Triage Plan/Patient Instructions    Please be aware that novel coronavirus (COVID-19) may be circulating in the community. If you develop symptoms such as fever, cough, or SOB or if you have concerns about the presence of another infection including coronavirus (COVID-19), please contact your health care provider or visit [email or web address].     Disposition/Instructions    Home care recommended. Follow home care protocol based instructions.    Thank you for taking steps to prevent the spread of this virus.  o Limit your contact with others.  o Wear a simple mask to cover your cough.  o Wash your hands well and often.    Resources    [facility]: About COVID-19: [email or web address]    CDC: What to Do If You're Sick: www.cdc.gov/coronavirus/2019-ncov/about/steps-when-sick.html    CDC: Ending Home Isolation: www.cdc.gov/coronavirus/2019-ncov/hcp/disposition-in-home-patients.html     CDC: Caring for Someone: www.cdc.gov/coronavirus/2019-ncov/if-you-are-sick/care-for-someone.html     [facility]: Interim Guidance for Hospital Discharge to Home: www.health.[facility].[place]/diseases/coronavirus/hcp/hospdischarge.pdf    [facility] clinical trials (COVID-19 research studies): [email or web address]/um-clinical-trials     Below are the COVID-19 hotlines at the [place] Department of Health ([facility]). Interpreters are available.   o For health questions: Call 454-598-0211 or 1-108.501.5319 (7 a.m. to 7 p.m.)  o For  questions about schools and childcare: Call 610-411-7124 or 1-710.777.1161 (7 a.m. to 7 p.m.)

## 2022-06-16 NOTE — TELEPHONE ENCOUNTER
Following message received from Dr. Romero    She can increase her detrol to 4mg every day     Has she gone to physical therapy yet?     Writer called and notified patient of medication dose increase. Patient expressed frustration with the situation. Patient has an appointment with Dr. Romero and PT scheduled.

## 2022-06-20 ENCOUNTER — HOSPITAL ENCOUNTER (EMERGENCY)
Facility: CLINIC | Age: 62
Discharge: HOME | End: 2022-06-20
Attending: EMERGENCY MEDICINE | Admitting: EMERGENCY MEDICINE
Payer: COMMERCIAL

## 2022-06-20 VITALS
OXYGEN SATURATION: 97 % | WEIGHT: 205 LBS | DIASTOLIC BLOOD PRESSURE: 87 MMHG | HEIGHT: 68 IN | SYSTOLIC BLOOD PRESSURE: 127 MMHG | HEART RATE: 75 BPM | BODY MASS INDEX: 31.07 KG/M2 | TEMPERATURE: 98.7 F | RESPIRATION RATE: 16 BRPM

## 2022-06-20 DIAGNOSIS — N30.01 ACUTE CYSTITIS WITH HEMATURIA: ICD-10-CM

## 2022-06-20 LAB
ALBUMIN UR-MCNC: 300 MG/DL
APPEARANCE UR: ABNORMAL
BACTERIA #/AREA URNS HPF: ABNORMAL /HPF
BILIRUB UR QL STRIP: NEGATIVE
COLOR UR AUTO: ABNORMAL
GLUCOSE UR STRIP-MCNC: NEGATIVE MG/DL
HGB UR QL STRIP: ABNORMAL
KETONES UR STRIP-MCNC: NEGATIVE MG/DL
LEUKOCYTE ESTERASE UR QL STRIP: ABNORMAL
MUCOUS THREADS #/AREA URNS LPF: PRESENT /LPF
NITRATE UR QL: NEGATIVE
PH UR STRIP: 6 [PH] (ref 5–7)
RBC URINE: 34 /HPF
SP GR UR STRIP: 1.02 (ref 1–1.03)
SQUAMOUS EPITHELIAL: 1 /HPF
UROBILINOGEN UR STRIP-MCNC: NORMAL MG/DL
WBC CLUMPS #/AREA URNS HPF: PRESENT /HPF
WBC URINE: >182 /HPF

## 2022-06-20 PROCEDURE — 87086 URINE CULTURE/COLONY COUNT: CPT | Performed by: EMERGENCY MEDICINE

## 2022-06-20 PROCEDURE — 81001 URINALYSIS AUTO W/SCOPE: CPT | Performed by: EMERGENCY MEDICINE

## 2022-06-20 PROCEDURE — 99284 EMERGENCY DEPT VISIT MOD MDM: CPT

## 2022-06-20 RX ORDER — CELECOXIB 200 MG/1
200 CAPSULE ORAL DAILY PRN
Qty: 15 CAPSULE | Refills: 0 | Status: SHIPPED | OUTPATIENT
Start: 2022-06-20 | End: 2022-09-21

## 2022-06-20 RX ORDER — PHENAZOPYRIDINE HYDROCHLORIDE 200 MG/1
200 TABLET, FILM COATED ORAL 3 TIMES DAILY
Qty: 9 TABLET | Refills: 0 | Status: SHIPPED | OUTPATIENT
Start: 2022-06-20 | End: 2022-06-23

## 2022-06-20 ASSESSMENT — ENCOUNTER SYMPTOMS
FEVER: 0
DYSURIA: 1
CHILLS: 0
BACK PAIN: 1

## 2022-06-20 NOTE — ED PROVIDER NOTES
"  History     Chief Complaint:  Bladder Problems      HPI   Ilene Gaviria is a 61 year old female who presents with bladder problems. The patient had a bladder sling placed in 2018. She also has had a full colostomy. She has had urgency and a burning sensation while urinating for the past 1.5 months. She has been on three full courses of antibiotics so far. She is not sleeping very well lately. She gets about 1.5 hours of sleep before going to the bathroom almost every 15 minutes. She has cloudy urine as well. She denies fever or chills. She has low back pain as well. She has an appointment with Urology on Friday, and has a appointment with Dr. Song OB-GYN, who placed the bladder sling.    Review of Systems   Constitutional: Negative for chills and fever.   Genitourinary: Positive for dysuria and urgency.        Abnormal urine color   Musculoskeletal: Positive for back pain.   All other systems reviewed and are negative.    Allergies:  No Known Allergies    Medications:    lisinopril   metformin   ozempic  simvastatin  tolterodine ER     Past Medical History:    Rectal cancer  Colostomy in place  Primary osteoarthritis  Complex tear of medial meniscus of right knee as current injury  Obesity    Past Surgical History:    Bladder sling  davinci colectomy  Graft flap gluteus to perineum    Tubal ligation  Sigmoidoscopy flexible    Social History:  Presents with SO    Physical Exam     Patient Vitals for the past 24 hrs:   BP Temp Temp src Pulse Resp SpO2 Height Weight   22 0546 (!) 132/95 -- -- 75 -- 97 % -- --   22 0412 (!) 110/92 (!) 96.3  F (35.7  C) Temporal 97 16 97 % 1.727 m (5' 8\") 93 kg (205 lb)       Physical Exam  General: alert, lying comfortably on gurney  HENT: mucous membranes moist  CV: regular rate, regular rhythm  Resp: normal effort, clear throughout, no crackles or wheezing  GI: abdomen soft, mild suprapubic tenderness  MSK: no bony tenderness  Skin: appropriately warm and " dry  Extremities: no edema, calves non-tender  Neuro: alert, clear speech, oriented  Psych: normal mood and affect      Emergency Department Course     Laboratory:  Labs Ordered and Resulted from Time of ED Arrival to Time of ED Departure   ROUTINE UA WITH MICROSCOPIC REFLEX TO CULTURE - Abnormal       Result Value    Color Urine Orange (*)     Appearance Urine Cloudy (*)     Glucose Urine Negative      Bilirubin Urine Negative      Ketones Urine Negative      Specific Gravity Urine 1.023      Blood Urine Moderate (*)     pH Urine 6.0      Protein Albumin Urine 300  (*)     Urobilinogen Urine Normal      Nitrite Urine Negative      Leukocyte Esterase Urine Large (*)     Bacteria Urine Moderate (*)     WBC Clumps Urine Present (*)     Mucus Urine Present (*)     RBC Urine 34 (*)     WBC Urine >182 (*)     Squamous Epithelials Urine 1     URINE CULTURE     Emergency Department Course:    Reviewed:  I reviewed nursing notes, vitals, past medical history, Care Everywhere and [facility]    Assessments:  0635 I obtained history and examined the patient as noted above.   0737 I rechecked the patient and explained findings.       Consults:   0658 I spoke with Dr. Reese of the OB-GYN service to discuss the patient's findings, presentation, and plan of care.    Disposition:  The patient was discharged to home.     Impression & Plan      Medical Decision Making:  Ilene Gaviria is a 61 year old female with history of rectal cancer, colostomy, who presents with urgency and dysuria.  Symptoms have been unremitting, and have affected her quality of life as she is unable to sleep throughout the night.  She has follow-up scheduled with Dr. Song, but felt that the symptoms were too severe to await follow-up.  Vitals are normal.  She has a benign abdominal exam.  She is already completed multiple courses of antibiotics for cystitis, but urine cultures have consistently returned negative.  At this point, I do not recommend additional  antibiotics.  I doubt kidney stone, pyelonephritis, or neurologic cause.  I suspect a interstitial cystitis, versus malfunction of the bladder sling.  We discussed medications to help alleviate symptoms, including Pyridium and celecoxib.  We will feed discussed the patient with Dr. Reese to try to accelerate follow-up.  Patient will call the clinic this morning, and I have notified the care manager to help arrange rapid follow up.        Diagnosis:    ICD-10-CM    1. Acute cystitis with hematuria  N30.01        Discharge Medications:  New Prescriptions    CELECOXIB (CELEBREX) 200 MG CAPSULE    Take 1 capsule (200 mg) by mouth daily as needed for moderate pain    PHENAZOPYRIDINE (PYRIDIUM) 200 MG TABLET    Take 1 tablet (200 mg) by mouth 3 times daily for 3 days     Scribe Disclosure:  Macario CARDENAS, am serving as a scribe at 6:33 AM on 6/20/2022 to document services personally performed by Zarina Neville MD based on my observations and the provider's statements to me.      Zarina Neville MD  06/25/22 2037

## 2022-06-20 NOTE — ED NOTES
I was asked to follow up with this patient to see if she can be seen sooner than this Wednesday by her OB/GYN Dr. Song.  I contacted this patient and she was able to get an appt tomorrow and is satisfied with that.  I will update the ER Provider.

## 2022-06-20 NOTE — DISCHARGE INSTRUCTIONS
Your urine test today show signs of infection, but no bacteria.  Based on your symptoms and risk factors, I suspect there is another cause of bladder inflammation, such as interstitial cystitis.  There are medications available to treat this problem, but this is a diagnosis that needs to be made by specialist, such as a gynecologist or urologist.  I have discussed her case with Dr. Cleveland, who is the on-call obstetrician for [facility].  He recommends follow-up today, and recommends calling the clinic for an appointment as soon as it open, at 8 AM.  I have also notified her care manager, who will assist with obtaining follow-up.

## 2022-06-20 NOTE — ED TRIAGE NOTES
Patient states has a bladder sling.  Thinks it may have collapsed.  Was on antibiotics.   Pressure, burning, stabbing.  Urine frequency.  Has a full stoma.       Triage Assessment     Row Name 06/20/22 0415       Triage Assessment (Adult)    Airway WDL WDL       Respiratory WDL    Respiratory WDL WDL       Skin Circulation/Temperature WDL    Skin Circulation/Temperature WDL WDL       Cardiac WDL    Cardiac WDL WDL       Peripheral/Neurovascular WDL    Peripheral Neurovascular WDL WDL       Cognitive/Neuro/Behavioral WDL    Cognitive/Neuro/Behavioral WDL WDL

## 2022-06-21 ENCOUNTER — PATIENT OUTREACH (OUTPATIENT)
Dept: CARE COORDINATION | Facility: CLINIC | Age: 62
End: 2022-06-21
Payer: COMMERCIAL

## 2022-06-21 DIAGNOSIS — Z71.89 OTHER SPECIFIED COUNSELING: ICD-10-CM

## 2022-06-21 LAB — BACTERIA UR CULT: NORMAL

## 2022-06-21 NOTE — PROGRESS NOTES
Clinic Care Coordination Contact    Background: Care Coordination referral placed from [facility] discharge report for reason of patient meeting criteria for a TCM outreach call by [facility] team.    Assessment: Upon chart review, [facility] Team member will cancel/close the referral for TCM outreach due to reason below:    Patient has a follow up appointment with an appropriate provider today for hospital discharge  see CM note of 6/20/22)    Plan: Care Coordination referral for TCM outreach canceled.    Rama Mccarthy MA  [facility], [facility]

## 2022-06-26 ENCOUNTER — APPOINTMENT (OUTPATIENT)
Dept: CT IMAGING | Facility: CLINIC | Age: 62
End: 2022-06-26
Attending: EMERGENCY MEDICINE
Payer: COMMERCIAL

## 2022-06-26 ENCOUNTER — HOSPITAL ENCOUNTER (EMERGENCY)
Facility: CLINIC | Age: 62
Discharge: HOME | End: 2022-06-26
Attending: EMERGENCY MEDICINE | Admitting: EMERGENCY MEDICINE
Payer: COMMERCIAL

## 2022-06-26 VITALS
OXYGEN SATURATION: 96 % | HEIGHT: 68 IN | RESPIRATION RATE: 18 BRPM | WEIGHT: 200 LBS | HEART RATE: 80 BPM | DIASTOLIC BLOOD PRESSURE: 82 MMHG | SYSTOLIC BLOOD PRESSURE: 128 MMHG | TEMPERATURE: 96.7 F | BODY MASS INDEX: 30.31 KG/M2

## 2022-06-26 DIAGNOSIS — K43.5 PARASTOMAL HERNIA WITHOUT OBSTRUCTION OR GANGRENE: ICD-10-CM

## 2022-06-26 DIAGNOSIS — N39.0 URINARY TRACT INFECTION IN FEMALE: ICD-10-CM

## 2022-06-26 LAB
ALBUMIN SERPL-MCNC: 3.5 G/DL (ref 3.4–5)
ALP SERPL-CCNC: 87 U/L (ref 40–150)
ALT SERPL W P-5'-P-CCNC: 63 U/L (ref 0–50)
ANION GAP SERPL CALCULATED.3IONS-SCNC: 4 MMOL/L (ref 3–14)
AST SERPL W P-5'-P-CCNC: 36 U/L (ref 0–45)
BASOPHILS # BLD AUTO: 0 10E3/UL (ref 0–0.2)
BASOPHILS NFR BLD AUTO: 0 %
BILIRUB SERPL-MCNC: 0.3 MG/DL (ref 0.2–1.3)
BUN SERPL-MCNC: 15 MG/DL (ref 7–30)
CALCIUM SERPL-MCNC: 9.3 MG/DL (ref 8.5–10.1)
CHLORIDE BLD-SCNC: 103 MMOL/L (ref 94–109)
CO2 SERPL-SCNC: 28 MMOL/L (ref 20–32)
CREAT SERPL-MCNC: 0.79 MG/DL (ref 0.52–1.04)
EOSINOPHIL # BLD AUTO: 0.1 10E3/UL (ref 0–0.7)
EOSINOPHIL NFR BLD AUTO: 1 %
ERYTHROCYTE [DISTWIDTH] IN BLOOD BY AUTOMATED COUNT: 12.6 % (ref 10–15)
GFR SERPL CREATININE-BSD FRML MDRD: 85 ML/MIN/1.73M2
GLUCOSE BLD-MCNC: 155 MG/DL (ref 70–99)
HCT VFR BLD AUTO: 43.9 % (ref 35–47)
HGB BLD-MCNC: 14.4 G/DL (ref 11.7–15.7)
IMM GRANULOCYTES # BLD: 0 10E3/UL
IMM GRANULOCYTES NFR BLD: 1 %
LACTATE SERPL-SCNC: 1.7 MMOL/L (ref 0.7–2)
LYMPHOCYTES # BLD AUTO: 1.3 10E3/UL (ref 0.8–5.3)
LYMPHOCYTES NFR BLD AUTO: 21 %
MCH RBC QN AUTO: 30.8 PG (ref 26.5–33)
MCHC RBC AUTO-ENTMCNC: 32.8 G/DL (ref 31.5–36.5)
MCV RBC AUTO: 94 FL (ref 78–100)
MONOCYTES # BLD AUTO: 0.5 10E3/UL (ref 0–1.3)
MONOCYTES NFR BLD AUTO: 8 %
NEUTROPHILS # BLD AUTO: 4.4 10E3/UL (ref 1.6–8.3)
NEUTROPHILS NFR BLD AUTO: 69 %
NRBC # BLD AUTO: 0 10E3/UL
NRBC BLD AUTO-RTO: 0 /100
PLATELET # BLD AUTO: 195 10E3/UL (ref 150–450)
POTASSIUM BLD-SCNC: 4.2 MMOL/L (ref 3.4–5.3)
PROT SERPL-MCNC: 6.9 G/DL (ref 6.8–8.8)
RBC # BLD AUTO: 4.67 10E6/UL (ref 3.8–5.2)
SODIUM SERPL-SCNC: 135 MMOL/L (ref 133–144)
WBC # BLD AUTO: 6.3 10E3/UL (ref 4–11)

## 2022-06-26 PROCEDURE — 74176 CT ABD & PELVIS W/O CONTRAST: CPT

## 2022-06-26 PROCEDURE — 83605 ASSAY OF LACTIC ACID: CPT | Performed by: EMERGENCY MEDICINE

## 2022-06-26 PROCEDURE — 96360 HYDRATION IV INFUSION INIT: CPT | Mod: 59

## 2022-06-26 PROCEDURE — 85025 COMPLETE CBC W/AUTO DIFF WBC: CPT | Performed by: EMERGENCY MEDICINE

## 2022-06-26 PROCEDURE — 258N000003 HC RX IP 258 OP 636: Performed by: EMERGENCY MEDICINE

## 2022-06-26 PROCEDURE — 80053 COMPREHEN METABOLIC PANEL: CPT | Performed by: EMERGENCY MEDICINE

## 2022-06-26 PROCEDURE — 36415 COLL VENOUS BLD VENIPUNCTURE: CPT | Performed by: EMERGENCY MEDICINE

## 2022-06-26 PROCEDURE — 99285 EMERGENCY DEPT VISIT HI MDM: CPT | Mod: 25

## 2022-06-26 RX ORDER — IOPAMIDOL 755 MG/ML
85 INJECTION, SOLUTION INTRAVASCULAR ONCE
Status: DISCONTINUED | OUTPATIENT
Start: 2022-06-26 | End: 2022-06-26 | Stop reason: CLARIF

## 2022-06-26 RX ADMIN — SODIUM CHLORIDE 1000 ML: 9 INJECTION, SOLUTION INTRAVENOUS at 16:20

## 2022-06-26 ASSESSMENT — ENCOUNTER SYMPTOMS
FEVER: 0
COUGH: 0
CONSTIPATION: 1
SORE THROAT: 0
DYSURIA: 1
SHORTNESS OF BREATH: 0
FREQUENCY: 1

## 2022-06-26 NOTE — DISCHARGE INSTRUCTIONS
Start your Cipro to treat the UTI.  Follow-up with your urologist by phone or virtually this week for recheck.

## 2022-06-26 NOTE — ED TRIAGE NOTES
"Has colostomy (has had since 2018) but has not had output for 3-4 days. On narcotics for UTI. Is also on antibiotics for UTI per urologist so that is being addressed. Noticed \"bulging\" on side and near back by ostomy. Tried massaging and heating pads. Has not taken any stool softeners. Did notice flatus in ostomy bag on way here.     Triage Assessment     Row Name 06/26/22 1528       Triage Assessment (Adult)    Airway WDL WDL       Respiratory WDL    Respiratory WDL WDL       Skin Circulation/Temperature WDL    Skin Circulation/Temperature WDL WDL       Cardiac WDL    Cardiac WDL WDL       Peripheral/Neurovascular WDL    Peripheral Neurovascular WDL WDL       Cognitive/Neuro/Behavioral WDL    Cognitive/Neuro/Behavioral WDL WDL              "

## 2022-06-26 NOTE — ED PROVIDER NOTES
History   Chief Complaint:  Constipation (Has not had any output of colostomy for 3-4 days)       The history is provided by the patient.      Ilene Gaviria is a 61 year old female with history of of bladder sling, rectal cancer, and colostomy who presents with constipation. She notes that for the last 60 hours she has not had any output in her colostomy bag and this started after she began taking Hydrocodone on Thursday. She has however been able to burp and pass gas. She does not currently have any pain with this. In addition, she notes that she currently has a UTI that is thought to be radiation cystitis for which she was on Bactrim and was switched to Ciprofloxacin today after seeing her urologist on Friday. For around the last month, she has been having dysuria, urgency, and urinary frequency because of this. She denies having any recent fevers, sore throat, chest pain, or shortness of breath.    Review of Systems   Constitutional: Negative for fever.   HENT: Negative for sore throat.    Respiratory: Negative for cough and shortness of breath.    Cardiovascular: Negative for chest pain.   Gastrointestinal: Positive for constipation.   Genitourinary: Positive for dysuria, frequency and urgency.   All other systems reviewed and are negative.      Allergies:  The patient has no known allergies.     Medications:  Celebrex  Zestril  Metformin  Semaglutide  Zocor  Tolterodine    Past Medical History:     Rectal cancer  Colostomy in place   Osteoarthritis of right knee  Complex tear of medial meniscus   Obesity  Premature menopause    Past Surgical History:    DaVinci colectomy  Bladder sling surgery  Graft flap gluteus to perineum   section x3  Tubal ligation  Sigmoidoscopy flexible   Tonsillectomy  [place] teeth extraction    Family History:    Metastatics liver cancer  Heart disease    Social History:  Patient came from home.  Patient is accompanied in the ED with her .    Physical Exam  "    Patient Vitals for the past 24 hrs:   BP Temp Temp src Pulse Resp SpO2 Height Weight   06/26/22 1910 128/82 -- -- 80 18 96 % -- --   06/26/22 1526 135/88 (!) 96.7  F (35.9  C) Temporal 85 22 93 % 1.727 m (5' 8\") 90.7 kg (200 lb)       Physical Exam  Physical Exam   Constitutional:  Patient is oriented to person, place, and time. They appear well-developed and well-nourished.    HENT:   Mouth/Throat:   Oropharynx is clear and moist.   Eyes:    Conjunctivae normal and EOM are normal. Pupils are equal, round, and reactive to light.   Neck:    Normal range of motion.   Cardiovascular: Normal rate, regular rhythm and normal heart sounds.  Exam reveals no gallop and no friction rub.  No murmur heard.  Pulmonary/Chest:  Effort normal and breath sounds normal. Patient has no wheezes. Patient has no rales.   Abdominal:   Ostomy bag in place with no output. Abdomen is soft with no focal tenderness. Slightly hypoactive bowel sounds. Patient exhibits no mass. There is no rebound and no guarding.   Musculoskeletal:  Normal range of motion. Patient exhibits no edema.   Neurological:   Patient is alert and oriented to person, place, and time. Patient has normal strength. No cranial nerve deficit or sensory deficit. GCS 15  Skin:   Skin is warm and dry. No rash noted. No erythema.   Psychiatric:   Patient has a normal mood and affect. Patient's behavior is normal. Judgment and thought content normal.     Emergency Department Course     Imaging:  CT Abdomen Pelvis w/o Contrast   Final Result   IMPRESSION:    1.  Left lower quadrant colostomy with small bowel containing parastomal hernia. No obstruction, colitis, diverticulitis, or appendicitis.   2.  Fatty liver.   3.  Bladder wall thickening with trace left hydroureter, correlate for cystitis and ascending urinary tract infection.   4.  Status post hysterectomy.           Report per radiology    Laboratory:  Labs Ordered and Resulted from Time of ED Arrival to Time of ED " Departure   COMPREHENSIVE METABOLIC PANEL - Abnormal       Result Value    Sodium 135      Potassium 4.2      Chloride 103      Carbon Dioxide (CO2) 28      Anion Gap 4      Urea Nitrogen 15      Creatinine 0.79      Calcium 9.3      Glucose 155 (*)     Alkaline Phosphatase 87      AST 36      ALT 63 (*)     Protein Total 6.9      Albumin 3.5      Bilirubin Total 0.3      GFR Estimate 85     LACTIC ACID WHOLE BLOOD - Normal    Lactic Acid 1.7     CBC WITH PLATELETS AND DIFFERENTIAL    WBC Count 6.3      RBC Count 4.67      Hemoglobin 14.4      Hematocrit 43.9      MCV 94      MCH 30.8      MCHC 32.8      RDW 12.6      Platelet Count 195      % Neutrophils 69      % Lymphocytes 21      % Monocytes 8      % Eosinophils 1      % Basophils 0      % Immature Granulocytes 1      NRBCs per 100 WBC 0      Absolute Neutrophils 4.4      Absolute Lymphocytes 1.3      Absolute Monocytes 0.5      Absolute Eosinophils 0.1      Absolute Basophils 0.0      Absolute Immature Granulocytes 0.0      Absolute NRBCs 0.0     ROUTINE UA WITH MICROSCOPIC REFLEX TO CULTURE   URINE CULTURE        Emergency Department Course:       Reviewed:  I reviewed nursing notes, vitals, past medical history and Care Everywhere    Assessments:  1606 I obtained history and examined the patient as noted above.   1820 I rechecked the patient and explained findings.     Interventions:  1620 NS 1000 mL IV    Disposition:  The patient was discharged to home.     Impression & Plan     CMS Diagnoses: None    Medical Decision Making:  Ilene Gaviria is a 61 year old female presenting for abdominal pain and concerns for no output out of her ostomy bag over the last couple of days.  She is on antibiotics for a UTI and was seen by urology on Friday and urine cultures came back today and so she was switched over to Cipro.  The patient was a bit hesitant to change anything regarding her UTI treatment.  Declined any type of catheter or urine specimen collection.  She  would like to follow-up with her urology regarding these symptoms.  I did do blood work as well as imaging.  She also opted for no contrast because she did not want any contrast in her renal system.  CT was therefore performed which shows a parastomal hernia which she states she has had before.  But no obstruction.  I suspect at this time that she likely has constipation due to taking hydrocodone.  She is taking this for the UTI symptoms.  I did recommend that she transition to Tylenol and ibuprofen.  If she does continue to use hydrocodone she should use a stool softener or laxative.  At this time with her work-up she is safe for discharge.  Again she will follow-up with your urologist either virtually or in the office this week to further manage her UTI.    Diagnosis:    ICD-10-CM    1. Parastomal hernia without obstruction or gangrene  K43.5    2. Urinary tract infection in female  N39.0        Discharge Medications:  Discharge Medication List as of 6/26/2022  7:01 PM          Scribe Disclosure:  I, Erwin Horvath, am serving as a scribe at 4:22 PM on 6/26/2022 to document services personally performed by Candy Morales MD based on my observations and the provider's statements to me.        Candy Morales MD  06/26/22 1959

## 2022-07-01 ENCOUNTER — HOSPITAL ENCOUNTER (EMERGENCY)
Facility: CLINIC | Age: 62
Discharge: HOME | End: 2022-07-01
Attending: EMERGENCY MEDICINE | Admitting: EMERGENCY MEDICINE
Payer: COMMERCIAL

## 2022-07-01 VITALS
BODY MASS INDEX: 30.31 KG/M2 | HEIGHT: 68 IN | HEART RATE: 65 BPM | DIASTOLIC BLOOD PRESSURE: 92 MMHG | SYSTOLIC BLOOD PRESSURE: 144 MMHG | RESPIRATION RATE: 14 BRPM | OXYGEN SATURATION: 96 % | WEIGHT: 200 LBS | TEMPERATURE: 97.7 F

## 2022-07-01 DIAGNOSIS — N30.40 CYSTITIS, RADIATION: ICD-10-CM

## 2022-07-01 DIAGNOSIS — K59.00 CONSTIPATION, UNSPECIFIED CONSTIPATION TYPE: ICD-10-CM

## 2022-07-01 LAB
ALBUMIN SERPL-MCNC: 3.4 G/DL (ref 3.4–5)
ALP SERPL-CCNC: 94 U/L (ref 40–150)
ALT SERPL W P-5'-P-CCNC: 49 U/L (ref 0–50)
ANION GAP SERPL CALCULATED.3IONS-SCNC: 8 MMOL/L (ref 3–14)
AST SERPL W P-5'-P-CCNC: 19 U/L (ref 0–45)
BASOPHILS # BLD AUTO: 0 10E3/UL (ref 0–0.2)
BASOPHILS NFR BLD AUTO: 0 %
BILIRUB SERPL-MCNC: 0.8 MG/DL (ref 0.2–1.3)
BUN SERPL-MCNC: 31 MG/DL (ref 7–30)
CALCIUM SERPL-MCNC: 9.2 MG/DL (ref 8.5–10.1)
CHLORIDE BLD-SCNC: 106 MMOL/L (ref 94–109)
CO2 SERPL-SCNC: 25 MMOL/L (ref 20–32)
CREAT SERPL-MCNC: 1.41 MG/DL (ref 0.52–1.04)
EOSINOPHIL # BLD AUTO: 0 10E3/UL (ref 0–0.7)
EOSINOPHIL NFR BLD AUTO: 0 %
ERYTHROCYTE [DISTWIDTH] IN BLOOD BY AUTOMATED COUNT: 12.6 % (ref 10–15)
GFR SERPL CREATININE-BSD FRML MDRD: 42 ML/MIN/1.73M2
GLUCOSE BLD-MCNC: 204 MG/DL (ref 70–99)
HCT VFR BLD AUTO: 38.8 % (ref 35–47)
HGB BLD-MCNC: 12.9 G/DL (ref 11.7–15.7)
HOLD SPECIMEN: NORMAL
IMM GRANULOCYTES # BLD: 0.1 10E3/UL
IMM GRANULOCYTES NFR BLD: 1 %
LYMPHOCYTES # BLD AUTO: 0.9 10E3/UL (ref 0.8–5.3)
LYMPHOCYTES NFR BLD AUTO: 10 %
MCH RBC QN AUTO: 30.9 PG (ref 26.5–33)
MCHC RBC AUTO-ENTMCNC: 33.2 G/DL (ref 31.5–36.5)
MCV RBC AUTO: 93 FL (ref 78–100)
MONOCYTES # BLD AUTO: 0.4 10E3/UL (ref 0–1.3)
MONOCYTES NFR BLD AUTO: 4 %
NEUTROPHILS # BLD AUTO: 7.6 10E3/UL (ref 1.6–8.3)
NEUTROPHILS NFR BLD AUTO: 85 %
NRBC # BLD AUTO: 0 10E3/UL
NRBC BLD AUTO-RTO: 0 /100
PLATELET # BLD AUTO: 202 10E3/UL (ref 150–450)
POTASSIUM BLD-SCNC: 5 MMOL/L (ref 3.4–5.3)
PROT SERPL-MCNC: 6.8 G/DL (ref 6.8–8.8)
RBC # BLD AUTO: 4.17 10E6/UL (ref 3.8–5.2)
SODIUM SERPL-SCNC: 139 MMOL/L (ref 133–144)
WBC # BLD AUTO: 9 10E3/UL (ref 4–11)

## 2022-07-01 PROCEDURE — 96374 THER/PROPH/DIAG INJ IV PUSH: CPT

## 2022-07-01 PROCEDURE — 99284 EMERGENCY DEPT VISIT MOD MDM: CPT | Mod: 25

## 2022-07-01 PROCEDURE — 258N000003 HC RX IP 258 OP 636: Performed by: EMERGENCY MEDICINE

## 2022-07-01 PROCEDURE — 250N000013 HC RX MED GY IP 250 OP 250 PS 637: Performed by: EMERGENCY MEDICINE

## 2022-07-01 PROCEDURE — 250N000011 HC RX IP 250 OP 636: Performed by: EMERGENCY MEDICINE

## 2022-07-01 PROCEDURE — 36415 COLL VENOUS BLD VENIPUNCTURE: CPT | Performed by: EMERGENCY MEDICINE

## 2022-07-01 PROCEDURE — 85004 AUTOMATED DIFF WBC COUNT: CPT | Performed by: EMERGENCY MEDICINE

## 2022-07-01 PROCEDURE — 96375 TX/PRO/DX INJ NEW DRUG ADDON: CPT

## 2022-07-01 PROCEDURE — 87086 URINE CULTURE/COLONY COUNT: CPT | Performed by: EMERGENCY MEDICINE

## 2022-07-01 PROCEDURE — 80053 COMPREHEN METABOLIC PANEL: CPT | Performed by: EMERGENCY MEDICINE

## 2022-07-01 PROCEDURE — 96361 HYDRATE IV INFUSION ADD-ON: CPT

## 2022-07-01 RX ORDER — SODIUM CHLORIDE, SODIUM LACTATE, POTASSIUM CHLORIDE, CALCIUM CHLORIDE 600; 310; 30; 20 MG/100ML; MG/100ML; MG/100ML; MG/100ML
125 INJECTION, SOLUTION INTRAVENOUS CONTINUOUS
Status: DISCONTINUED | OUTPATIENT
Start: 2022-07-01 | End: 2022-07-01 | Stop reason: HOSPADM

## 2022-07-01 RX ORDER — POLYETHYLENE GLYCOL 3350 17 G/17G
1 POWDER, FOR SOLUTION ORAL 2 TIMES DAILY
Qty: 1020 G | Refills: 0 | Status: SHIPPED | OUTPATIENT
Start: 2022-07-01 | End: 2022-07-31

## 2022-07-01 RX ORDER — PHENAZOPYRIDINE HYDROCHLORIDE 100 MG/1
200 TABLET, FILM COATED ORAL ONCE
Status: COMPLETED | OUTPATIENT
Start: 2022-07-01 | End: 2022-07-01

## 2022-07-01 RX ORDER — HYDROMORPHONE HYDROCHLORIDE 1 MG/ML
0.5 INJECTION, SOLUTION INTRAMUSCULAR; INTRAVENOUS; SUBCUTANEOUS
Status: DISCONTINUED | OUTPATIENT
Start: 2022-07-01 | End: 2022-07-01 | Stop reason: HOSPADM

## 2022-07-01 RX ORDER — HYOSCYAMINE SULFATE 0.38 MG/1
0.38 TABLET, EXTENDED RELEASE ORAL EVERY 12 HOURS PRN
Qty: 30 TABLET | Refills: 0 | Status: SHIPPED | OUTPATIENT
Start: 2022-07-01 | End: 2023-05-11

## 2022-07-01 RX ORDER — KETOROLAC TROMETHAMINE 15 MG/ML
15 INJECTION, SOLUTION INTRAMUSCULAR; INTRAVENOUS ONCE
Status: COMPLETED | OUTPATIENT
Start: 2022-07-01 | End: 2022-07-01

## 2022-07-01 RX ORDER — SENNOSIDES 8.6 MG
1 TABLET ORAL DAILY PRN
Qty: 30 TABLET | Refills: 0 | Status: SHIPPED | OUTPATIENT
Start: 2022-07-01 | End: 2023-03-13

## 2022-07-01 RX ORDER — CIPROFLOXACIN 500 MG/1
500 TABLET, FILM COATED ORAL 2 TIMES DAILY
COMMUNITY
Start: 2022-06-26 | End: 2022-07-03

## 2022-07-01 RX ADMIN — PHENAZOPYRIDINE HYDROCHLORIDE 200 MG: 100 TABLET ORAL at 05:27

## 2022-07-01 RX ADMIN — HYDROMORPHONE HYDROCHLORIDE 0.5 MG: 1 INJECTION, SOLUTION INTRAMUSCULAR; INTRAVENOUS; SUBCUTANEOUS at 06:31

## 2022-07-01 RX ADMIN — SODIUM CHLORIDE, POTASSIUM CHLORIDE, SODIUM LACTATE AND CALCIUM CHLORIDE 1000 ML: 600; 310; 30; 20 INJECTION, SOLUTION INTRAVENOUS at 04:54

## 2022-07-01 RX ADMIN — KETOROLAC TROMETHAMINE 15 MG: 15 INJECTION, SOLUTION INTRAMUSCULAR; INTRAVENOUS at 04:07

## 2022-07-01 RX ADMIN — GLYCERIN 1 SUPPOSITORY: 2 SUPPOSITORY RECTAL at 05:25

## 2022-07-01 ASSESSMENT — ENCOUNTER SYMPTOMS
FEVER: 0
ABDOMINAL PAIN: 1
NAUSEA: 1
CONSTIPATION: 1

## 2022-07-01 NOTE — ED TRIAGE NOTES
Patient presents via EMS with complaints of abdominal pain and known UTI. Patient with hx of colorectal cancer, in remission. Patient states she was diagnosed with a UTI about a month ago, and was told to follow up with a urologist. Patient was able to see the urologist after a delay, and has been on 2 antibiotics for this. Has been dealing with UTI symptoms for the past month, states she is having a hard time controlling her urine.   Patient seen in ED earlier this week for abdominal pain and constipation, found to have parastomal hernia. Has ostomy bag from previous colorectal surgery. Patient states she hasn't taken any narcotic pain medication for the past 1.5 weeks because she knew it was constipating her.   Patient taking tylenol at home for her pain. States tonight around 0100 she woke up with severe abdominal pain and was not able to wait until her 0900 apt with urology today. Feeling R sided abdominal pain (which she attributes to her ostomy issues) and suprapubic pain (which she attributes to her UTI).

## 2022-07-01 NOTE — ED PROVIDER NOTES
History   Chief Complaint:  Abdominal Pain       HPI   Ilene Gaviria is a 61 year old female with history of colerectal cancer with stoma and radiation who presents with abdominal pain and known UTI. Patient has a history of colorectal cancer and radiation, in remission. Patient states she was diagnosed with a UTI about a month ago, and was told to follow up with a urologist. Patient was able to see the urologist after a delay, and has been on 2 antibiotics for this. She is not currently on antibiotics (ciprofloxacin). Urologist was also concerned for a radiation cystitis. She has been dealing with UTI symptoms for the past month. She has not been able to hold her urine and is using a pad.     Patient was seen in ED earlier this week for abdominal pain and constipation, and was found to have parastomal hernia. Patient states she hasn't taken any narcotic pain medication for the past 1.5 weeks because she knew it was constipating her. Patient is taking tylenol at home for her pain. Tonight around 0100 she woke up with severe right-sided abdominal pain and was not able to wait until her 0900 appointment with urology today. She attributes the abdominal pain as a side effect of ostomy issues. She had a bowel movement today which was rock hard. She has had a mild nausea. She deneis a fever.    CT ABDOMEN PELVIS W/O CONTRAST on 6/26/22  IMPRESSION:   1.  Left lower quadrant colostomy with small bowel containing parastomal hernia. No obstruction, colitis, diverticulitis, or appendicitis.  2.  Fatty liver.  3.  Bladder wall thickening with trace left hydroureter, correlate for cystitis and ascending urinary tract infection.  4.  Status post hysterectomy.    Review of Systems   Constitutional: Negative for fever.   Gastrointestinal: Positive for abdominal pain, constipation and nausea.   All other systems reviewed and are negative.    Allergies:  No Known Allergies    Medications:  celecoxib (CELEBREX) 200 MG capsule  "not taking current  lisinopril (ZESTRIL) 20 MG tablet  metFORMIN (GLUCOPHAGE) 500 MG tablet  semaglutide (OZEMPIC, 0.25 OR 0.5 MG/DOSE,) 2 MG/1.5ML SOPN pen  simvastatin (ZOCOR) 20 MG tablet  tolterodine ER (DETROL LA) 2 MG 24 hr capsule not taking currently    Past Medical History:     Rectal cancer  Colostomy in place   Osteoarthritis of right knee  Complex tear of medial meniscus   Obesity  Premature menopause    Past Surgical History:    DaVinci colectomy  Bladder sling surgery  Graft flap gluteus to perineum   section x3  Tubal ligation  Sigmoidoscopy flexible   Tonsillectomy  [place] teeth extraction     Family History:    Metastatics liver cancer  Heart disease'    Social History:  Presents alone.    Physical Exam     Patient Vitals for the past 24 hrs:   BP Temp Temp src Pulse Resp SpO2 Height Weight   22 0347 (!) 166/105 97.7  F (36.5  C) Oral 73 18 99 % 1.727 m (5' 8\") 90.7 kg (200 lb)       Physical Exam    General: Alert, interactive in mild to moderate distress  Head:  Scalp is atraumatic  Eyes:  The pupils are equal, round, and reactive to light    EOM's intact    No scleral icterus  ENT:      Nose:  The external nose is normal  Ears:  External ears are normal  Mouth/Throat: The oropharynx is normal    Mucus membranes are moist   Neck:  Normal range of motion.      There is no rigidity.    Trachea is in the midline     CV:  Regular rate and rhythm    No murmur  Resp:  Breath sounds are clear bilaterally    Non-labored, no retractions or accessory muscle use  GI:  Diffuse abdominal tenderness.  Ostomy in the left lower quadrant with a healthy-appearing stoma, parastomal hernia with no signs of incarceration positive bowel.   MS:  Normal strength in all 4 extremities  Skin:  Warm and dry, No rash or lesions noted.  Neuro: Strength 5/5 x4.    GCS: 15  Psych:  Awake. Alert.  Normal affect.      Appropriate interactions.    Emergency Department Course   Laboratory:  Labs Ordered and Resulted " from Time of ED Arrival to Time of ED Departure   COMPREHENSIVE METABOLIC PANEL - Abnormal       Result Value    Sodium 139      Potassium 5.0      Chloride 106      Carbon Dioxide (CO2) 25      Anion Gap 8      Urea Nitrogen 31 (*)     Creatinine 1.41 (*)     Calcium 9.2      Glucose 204 (*)     Alkaline Phosphatase 94      AST 19      ALT 49      Protein Total 6.8      Albumin 3.4      Bilirubin Total 0.8      GFR Estimate 42 (*)    CBC WITH PLATELETS AND DIFFERENTIAL    WBC Count 9.0      RBC Count 4.17      Hemoglobin 12.9      Hematocrit 38.8      MCV 93      MCH 30.9      MCHC 33.2      RDW 12.6      Platelet Count 202      % Neutrophils 85      % Lymphocytes 10      % Monocytes 4      % Eosinophils 0      % Basophils 0      % Immature Granulocytes 1      NRBCs per 100 WBC 0      Absolute Neutrophils 7.6      Absolute Lymphocytes 0.9      Absolute Monocytes 0.4      Absolute Eosinophils 0.0      Absolute Basophils 0.0      Absolute Immature Granulocytes 0.1      Absolute NRBCs 0.0     ROUTINE UA WITH MICROSCOPIC REFLEX TO CULTURE   URINE CULTURE      Emergency Department Course:     Reviewed:  I reviewed nursing notes, vitals, past medical history and Care Everywhere    Assessments:  0437 I obtained history and examined the patient as noted above.   0550 I rechecked the patient and explained findings.     Interventions:  0407 Toradol 15 mg IV  0454 Bolus 1L IV    Disposition:  The patient was discharged to home.     Impression & Plan     Medical Decision Making:  Following presentation history and physical examination were performed, previous records reviewed.  Patient is currently on antibiotics and a bladder spasm medication given her ongoing discomfort.  Laboratory work-up is reassuring she was only able to provide a small amount of urine therefore culture is pending urinalysis cannot be obtained at this time.  She is feeling much improved after the medications above and is requesting discharge so she can  follow-up with her urologist at 9 AM.  Given the recent CT scan and no signs of bowel obstruction or intra-abdominal catastrophe on examination I do not believe she needs repeat imaging today.  I will discharge her with the medications below for constipation and for spasms.  She will return if new symptoms develop.    Diagnosis:    ICD-10-CM    1. Cystitis, radiation  N30.40    2. Constipation, unspecified constipation type  K59.00        Discharge Medications:  New Prescriptions    GLYCERIN (LAXATIVE) 1.2 G SUPPOSITORY    Place 1 suppository rectally daily as needed (constipation)    HYOSCYAMINE ER (LEVBID) 375 MCG 12 HR TABLET    Take 1 tablet (0.375 mg) by mouth every 12 hours as needed for cramping    POLYETHYLENE GLYCOL (MIRALAX) 17 GM/DOSE POWDER    Take 17 g (1 capful) by mouth 2 times daily for 30 days    SENNOSIDES (SENOKOT) 8.6 MG TABLET    Take 1 tablet by mouth daily as needed for constipation       Scribe Disclosure:  I, Haresh Churchill, am serving as a scribe on 7/1/2022 at 4:37 AM to personally document services performed by Gian Perry MD based on my observations and the provider's statements to me.          Gian Perry MD  07/01/22 0613

## 2022-07-01 NOTE — ED NOTES
Bed: ED05  Expected date:   Expected time:   Means of arrival:   Comments:  [address] 69F abdominal pain/back pain history of cancer eta 6151

## 2022-07-02 LAB — BACTERIA UR CULT: NO GROWTH

## 2022-07-05 ENCOUNTER — HOSPITAL ENCOUNTER (OUTPATIENT)
Dept: CT IMAGING | Facility: CLINIC | Age: 62
Discharge: HOME | End: 2022-07-05
Attending: UROLOGY | Admitting: UROLOGY
Payer: COMMERCIAL

## 2022-07-05 DIAGNOSIS — N17.9 ACUTE KIDNEY INJURY (H): ICD-10-CM

## 2022-07-05 PROCEDURE — 74176 CT ABD & PELVIS W/O CONTRAST: CPT

## 2022-07-13 ENCOUNTER — PRE VISIT (OUTPATIENT)
Dept: UROLOGY | Facility: CLINIC | Age: 62
End: 2022-07-13

## 2022-07-13 NOTE — TELEPHONE ENCOUNTER
MEDICAL RECORDS REQUEST   [facility]  [facility]  [address] [place] [address]  PHONE: [phone]  Fax: [phone]        FUTURE VISIT INFORMATION                                                   Ilene SARAH Gaviria, : 1960 scheduled for future visit at [facility]    APPOINTMENT INFORMATION:    Date: 2022    Provider:  Kacy Anderson MD    Reason for Visit/Diagnosis: rUTIs, cystitis, currently has miguel catheter    RECORDS REQUESTED FOR VISIT                                                     NOTES  STATUS/DETAILS   DISCHARGE REPORT from the ER  yes, 2022 -- Gian Perry MD -- [facility] ED   2022 - Candy Morales MD - [facility] ED   2022 - Zarina Neville MD - [facility] ED   2022 -- Lexi Grey MD -- [facility] ED   MEDICATION LIST  yes   LABS     URINALYSIS (UA)  yes     PRE-VISIT CHECKLIST      Record collection complete Yes   Appointment appropriately scheduled           (right time/right provider) Yes   Joint diagnostic appointment coordinated correctly          (ensure right order & amount of time) Yes   MyChart activation If no, please explain pending   Questionnaire complete If no, please explain pending

## 2022-07-13 NOTE — TELEPHONE ENCOUNTER
Reason for Visit: Consult    Diagnosis: rUTIs, cystitis, currently has miguel catheter    Orders/Procedures/Records: in system    Contact Patient: n/a    Rooming Requirements: Normal, Dip?      Maya Weinberg  07/13/22  5:47 PM

## 2022-07-20 ENCOUNTER — OFFICE VISIT (OUTPATIENT)
Dept: UROLOGY | Facility: CLINIC | Age: 62
End: 2022-07-20
Payer: COMMERCIAL

## 2022-07-20 VITALS
HEIGHT: 68 IN | SYSTOLIC BLOOD PRESSURE: 147 MMHG | WEIGHT: 200 LBS | HEART RATE: 77 BPM | DIASTOLIC BLOOD PRESSURE: 93 MMHG | BODY MASS INDEX: 30.31 KG/M2

## 2022-07-20 DIAGNOSIS — R33.9 URINARY RETENTION: ICD-10-CM

## 2022-07-20 DIAGNOSIS — N32.9 LESION OF BLADDER: ICD-10-CM

## 2022-07-20 DIAGNOSIS — R39.15 URINARY URGENCY: Primary | ICD-10-CM

## 2022-07-20 PROCEDURE — 99205 OFFICE O/P NEW HI 60 MIN: CPT | Performed by: UROLOGY

## 2022-07-20 RX ORDER — TAMSULOSIN HYDROCHLORIDE 0.4 MG/1
0.4 CAPSULE ORAL DAILY
Qty: 30 CAPSULE | Refills: 11 | Status: SHIPPED | OUTPATIENT
Start: 2022-07-20 | End: 2023-05-11

## 2022-07-20 RX ORDER — CEFAZOLIN SODIUM 2 G/50ML
2 SOLUTION INTRAVENOUS SEE ADMIN INSTRUCTIONS
Status: CANCELLED | OUTPATIENT
Start: 2022-07-20

## 2022-07-20 RX ORDER — CEFAZOLIN SODIUM 2 G/50ML
2 SOLUTION INTRAVENOUS
Status: CANCELLED | OUTPATIENT
Start: 2022-07-20

## 2022-07-20 RX ORDER — ESTRADIOL 0.1 MG/G
2 CREAM VAGINAL
Qty: 42.5 G | Refills: 3 | Status: SHIPPED | OUTPATIENT
Start: 2022-07-20

## 2022-07-20 ASSESSMENT — PAIN SCALES - GENERAL: PAINLEVEL: NO PAIN (0)

## 2022-07-20 NOTE — PROGRESS NOTES
HPI:  Ilene Gaviria is a 61 year old female with frequency, urgency and now recent episode of retention.  History of colorectal cancer s/p resection and colostomy placement.  History of sling shortly after and then had trouble with UTI like symptoms after.  Describes recent symptoms as pain when bladder fills up.  UDS done once at outside facility but during a time she believes she had UTI.  Cysto recently done by Dr. Mckeon which showed erythematous lesion. Plan was for bladder biopsy but was out of network so presented here.    Accompanied by her partner    Reviewed previous notes from Dr. Romero on 6/8/22 and Dr. Mckeon from 7/8/22     Exam:  There were no vitals taken for this visit.  General: age-appropriate appearing female in NAD sitting in an exam chair  HEENT: Head AT/NC, EOMI, CN Grossly intact.  Resp: no respiratory distress  Back: bony spine is non-tender, flanks are non-tender.  Abdomen: Abdomen is soft and nontender. No organomegaly. Stoma in place.  : [place] draining yellow urine to leg bag  LE: Edema is none   Skin: clear of rashes or ecchymoses. No sacral decubitus ulcer.    Review of Imaging:  The following imaging exams were reviewed by me and discussed with patient:  7/5/22 CT abd/pelvis  IMPRESSION:   No acute process demonstrated.      Review of Labs:  The following labs were reviewed by me and discussed with the patient:  Lab Results   Component Value Date    WBC 9.0 07/01/2022    WBC 8.2 08/08/2018     Lab Results   Component Value Date    RBC 4.17 07/01/2022    RBC 3.39 08/08/2018     Lab Results   Component Value Date    HGB 12.9 07/01/2022    HGB 10.8 08/08/2018     Lab Results   Component Value Date    HCT 38.8 07/01/2022    HCT 32.9 08/08/2018     Lab Results   Component Value Date    MCV 93 07/01/2022    MCV 97 08/08/2018     Lab Results   Component Value Date    MCH 30.9 07/01/2022    MCH 31.9 08/08/2018     Lab Results   Component Value Date    MCHC 33.2 07/01/2022    MCHC  32.8 08/08/2018     Lab Results   Component Value Date    RDW 12.6 07/01/2022    RDW 13.5 08/08/2018     Lab Results   Component Value Date     07/01/2022     08/10/2018        Last Comprehensive Metabolic Panel:  Sodium   Date Value Ref Range Status   07/01/2022 139 133 - 144 mmol/L Final   12/17/2019 141 133 - 144 mmol/L Final     Potassium   Date Value Ref Range Status   07/01/2022 5.0 3.4 - 5.3 mmol/L Final   12/17/2019 4.3 3.4 - 5.3 mmol/L Final     Chloride   Date Value Ref Range Status   07/01/2022 106 94 - 109 mmol/L Final   12/17/2019 107 94 - 109 mmol/L Final     Carbon Dioxide   Date Value Ref Range Status   12/17/2019 28 20 - 32 mmol/L Final     Carbon Dioxide (CO2)   Date Value Ref Range Status   07/01/2022 25 20 - 32 mmol/L Final     Anion Gap   Date Value Ref Range Status   07/01/2022 8 3 - 14 mmol/L Final   12/17/2019 6 3 - 14 mmol/L Final     Glucose   Date Value Ref Range Status   07/01/2022 204 (H) 70 - 99 mg/dL Final   12/17/2019 100 (H) 70 - 99 mg/dL Final     Urea Nitrogen   Date Value Ref Range Status   07/01/2022 31 (H) 7 - 30 mg/dL Final   12/17/2019 18 7 - 30 mg/dL Final     Creatinine   Date Value Ref Range Status   07/01/2022 1.41 (H) 0.52 - 1.04 mg/dL Final   12/17/2019 0.60 0.52 - 1.04 mg/dL Final     GFR Estimate   Date Value Ref Range Status   07/01/2022 42 (L) >60 mL/min/1.73m2 Final     Comment:     Effective December 21, 2021 eGFRcr in adults is calculated using the 2021 CKD-EPI creatinine equation which includes age and gender (Víctor et al., NEJM, DOI: 10.1056/VZFIlz0193797)   05/10/2021 86 >60 mL/min/[1.73_m2] Final     GFR, ESTIMATED POCT   Date Value Ref Range Status   05/23/2022 >60 >60 mL/min/1.73m2 Final     Calcium   Date Value Ref Range Status   07/01/2022 9.2 8.5 - 10.1 mg/dL Final   12/17/2019 9.1 8.5 - 10.1 mg/dL Final     Bilirubin Total   Date Value Ref Range Status   07/01/2022 0.8 0.2 - 1.3 mg/dL Final   04/24/2017 0.3 0.2 - 1.3 mg/dL Final      Alkaline Phosphatase   Date Value Ref Range Status   07/01/2022 94 40 - 150 U/L Final   04/24/2017 83 40 - 150 U/L Final     ALT   Date Value Ref Range Status   07/01/2022 49 0 - 50 U/L Final   04/24/2017 36 0 - 50 U/L Final     AST   Date Value Ref Range Status   07/01/2022 19 0 - 45 U/L Final   04/24/2017 20 0 - 45 U/L Final                Color Urine (no units)   Date Value   06/20/2022 Orange (A)     Appearance Urine (no units)   Date Value   06/20/2022 Cloudy (A)     Glucose Urine (mg/dL)   Date Value   06/20/2022 Negative     Bilirubin Urine (no units)   Date Value   06/20/2022 Negative     Ketones Urine (mg/dL)   Date Value   06/20/2022 Negative     Specific Gravity Urine (no units)   Date Value   06/20/2022 1.023     pH Urine (no units)   Date Value   06/20/2022 6.0     Protein Albumin Urine (mg/dL)   Date Value   06/20/2022 300  (A)     Nitrite Urine (no units)   Date Value   06/20/2022 Negative     Leukocyte Esterase Urine (no units)   Date Value   06/20/2022 Large (A)          Assessment & Plan   61F with chronic frequency, urgency and now recent episode of retention with indwelling miguel in place. History of colon cancer s/p colostomy and radiation.  She also has hx of sling surgery and has been getting recurrent UTIs. We discussed potential causes of symptoms including infection vs. Obstruction from sling as causes for her urinary retention.  This could also be decompensation of her bladder after pelvic surgery for colon cancer.  We further discussed options for management including removal of her catheter today which bothers and and teaching of CIC which she is amenable to.  We also will try flomax to help with this.  Further evaluation with cystoscopy and biopsy to evaluate the bladder lesion seen by Dr. Mckeon was also recommended.  Finally will proceed with UDS to help elicit whether she has detrusor function.  - In terms of current management of retention - will remove miguel and teach CIC  today  - Continue vaginal estrogen cream three times weekly  - Can start flomax nightly  - Plan for cystoscopy with biopsy/fulguration in OR when next available.   - UDS after biopsy to better evaluate voiding function      Kacy Anderson MD  [facility] [place]    Patient was seen, evaluated and plan was formulated in conjunction with me and I agree with the above.  Kacy Anderson MD      ==========================      Additional Coding Information:    Time spent:  69 minutes spent on the date of the encounter doing chart review, history and exam, documentation and further activities per the note

## 2022-07-20 NOTE — NURSING NOTE
Ilene Gaviria comes into clinic today at the request of Dr. Kacy Anderson with the diagnosis of urinary retention for clean intermittent self catheterization teaching.    Orders verified    Following clinic protocol I instructed Ilene Gaviria in CIC. Ilene was able to demonstrate good hand hygiene and genital hygiene. She was able to self catheterize without difficulty using a 14 fr straight tip catheter for urinary retention. Pt has indefinite urinary retention.    Pt instructed to catheterize 6 times daily using a 14 Fr straight tipped catheter for urinary retention.    This service provided today was under the supervising provider of the day Dr. Kacy Anderson, who was available if needed.    Lisa Perdomo, CMA

## 2022-07-20 NOTE — TELEPHONE ENCOUNTER
FUTURE VISIT INFORMATION      SURGERY INFORMATION:    Date: 7/26/22    Location: [facility]    Surgeon:  Kacy Anderson MD    Anesthesia Type:  MAC    Procedure: CYSTOSCOPY, WITH BLADDER BIOPSY AND FULGURATION (Look in the bladder and sample red  tissue then burn the area involved)    Consult: ov 7/20    RECORDS REQUESTED FROM:       Primary Care Provider: [facility]    Most recent EKG+ Tracing: 3/9/22    Most recent Cardiac Stress Test: 5/2/17

## 2022-07-20 NOTE — NURSING NOTE
"Chief Complaint   Patient presents with     Consult     Catheter, month ago was unable to urinate, possibly due to colon cancer, has mesh       Blood pressure (!) 147/93, pulse 77, height 1.727 m (5' 8\"), weight 90.7 kg (200 lb), not currently breastfeeding. Body mass index is 30.41 kg/m .    Patient Active Problem List   Diagnosis     BMI 30.0-30.9,adult     Rectal cancer (H)     Colostomy in place (H)       Allergies   Allergen Reactions     No Known Allergies        Current Outpatient Medications   Medication Sig Dispense Refill     lisinopril (ZESTRIL) 20 MG tablet Take 1 tablet (20 mg) by mouth daily 30 tablet 3     metFORMIN (GLUCOPHAGE) 500 MG tablet Take 500 mg by mouth 2 times daily (with meals)       simvastatin (ZOCOR) 20 MG tablet Take 1 tablet by mouth daily (with dinner)       celecoxib (CELEBREX) 200 MG capsule Take 1 capsule (200 mg) by mouth daily as needed for moderate pain 15 capsule 0     glycerin (LAXATIVE) 1.2 g suppository Place 1 suppository rectally daily as needed (constipation) 5 suppository 0     hyoscyamine ER (LEVBID) 375 mcg 12 hr tablet Take 1 tablet (0.375 mg) by mouth every 12 hours as needed for cramping 30 tablet 0     polyethylene glycol (MIRALAX) 17 GM/Dose powder Take 17 g (1 capful) by mouth 2 times daily for 30 days 1020 g 0     semaglutide (OZEMPIC, 0.25 OR 0.5 MG/DOSE,) 2 MG/1.5ML SOPN pen Week 1 through week 4 : 0.25 mg once weekly. Week 5 through week 8: 0.5 mg once weekly. Week 9 : 1 mg once weekly. 3 mg 3     sennosides (SENOKOT) 8.6 MG tablet Take 1 tablet by mouth daily as needed for constipation 30 tablet 0     tolterodine ER (DETROL LA) 2 MG 24 hr capsule Take 1 capsule (2 mg) by mouth daily 30 capsule 3       Social History     Tobacco Use     Smoking status: Never Smoker     Smokeless tobacco: Never Used   Substance Use Topics     Alcohol use: Yes     Comment: rare     Drug use: No       Maya Weinberg  7/20/2022  10:05 AM  "

## 2022-07-20 NOTE — PATIENT INSTRUCTIONS
- In terms of current management of retention - will remove miguel and teach CIC today  - Continue vaginal estrogen cream three times weekly  - Can start flomax nightly  - Plan for cystoscopy with biopsy/fulguration in OR when next available.   - UDS after biopsy to better evaluate voiding function

## 2022-07-20 NOTE — LETTER
7/20/2022       RE: Ilene Gaviria  [address] [facility] [address]     Dear Colleague,    Thank you for referring your patient, Ilene Gaviria, to the [facility] UROLOGY CLINIC [place] at [facility]. Please see a copy of my visit note below.    HPI:  Ilene Gaviria is a 61 year old female with frequency, urgency and now recent episode of retention.  History of colorectal cancer s/p resection and colostomy placement.  History of sling shortly after and then had trouble with UTI like symptoms after.  Describes recent symptoms as pain when bladder fills up.  UDS done once at outside facility but during a time she believes she had UTI.  Cysto recently done by Dr. Mckeon which showed erythematous lesion. Plan was for bladder biopsy but was out of network so presented here.    Accompanied by her partner    Reviewed previous notes from Dr. Romero on 6/8/22 and Dr. Mckeon from 7/8/22     Exam:  There were no vitals taken for this visit.  General: age-appropriate appearing female in NAD sitting in an exam chair  HEENT: Head AT/NC, EOMI, CN Grossly intact.  Resp: no respiratory distress  Back: bony spine is non-tender, flanks are non-tender.  Abdomen: Abdomen is soft and nontender. No organomegaly. Stoma in place.  : Haque draining yellow urine to leg bag  LE: Edema is none   Skin: clear of rashes or ecchymoses. No sacral decubitus ulcer.    Review of Imaging:  The following imaging exams were reviewed by me and discussed with patient:  7/5/22 CT abd/pelvis  IMPRESSION:   No acute process demonstrated.      Review of Labs:  The following labs were reviewed by me and discussed with the patient:  Lab Results   Component Value Date    WBC 9.0 07/01/2022    WBC 8.2 08/08/2018     Lab Results   Component Value Date    RBC 4.17 07/01/2022    RBC 3.39 08/08/2018     Lab Results   Component Value Date    HGB 12.9 07/01/2022    HGB 10.8 08/08/2018      Lab Results   Component Value Date    HCT 38.8 07/01/2022    HCT 32.9 08/08/2018     Lab Results   Component Value Date    MCV 93 07/01/2022    MCV 97 08/08/2018     Lab Results   Component Value Date    MCH 30.9 07/01/2022    MCH 31.9 08/08/2018     Lab Results   Component Value Date    MCHC 33.2 07/01/2022    MCHC 32.8 08/08/2018     Lab Results   Component Value Date    RDW 12.6 07/01/2022    RDW 13.5 08/08/2018     Lab Results   Component Value Date     07/01/2022     08/10/2018        Last Comprehensive Metabolic Panel:  Sodium   Date Value Ref Range Status   07/01/2022 139 133 - 144 mmol/L Final   12/17/2019 141 133 - 144 mmol/L Final     Potassium   Date Value Ref Range Status   07/01/2022 5.0 3.4 - 5.3 mmol/L Final   12/17/2019 4.3 3.4 - 5.3 mmol/L Final     Chloride   Date Value Ref Range Status   07/01/2022 106 94 - 109 mmol/L Final   12/17/2019 107 94 - 109 mmol/L Final     Carbon Dioxide   Date Value Ref Range Status   12/17/2019 28 20 - 32 mmol/L Final     Carbon Dioxide (CO2)   Date Value Ref Range Status   07/01/2022 25 20 - 32 mmol/L Final     Anion Gap   Date Value Ref Range Status   07/01/2022 8 3 - 14 mmol/L Final   12/17/2019 6 3 - 14 mmol/L Final     Glucose   Date Value Ref Range Status   07/01/2022 204 (H) 70 - 99 mg/dL Final   12/17/2019 100 (H) 70 - 99 mg/dL Final     Urea Nitrogen   Date Value Ref Range Status   07/01/2022 31 (H) 7 - 30 mg/dL Final   12/17/2019 18 7 - 30 mg/dL Final     Creatinine   Date Value Ref Range Status   07/01/2022 1.41 (H) 0.52 - 1.04 mg/dL Final   12/17/2019 0.60 0.52 - 1.04 mg/dL Final     GFR Estimate   Date Value Ref Range Status   07/01/2022 42 (L) >60 mL/min/1.73m2 Final     Comment:     Effective December 21, 2021 eGFRcr in adults is calculated using the 2021 CKD-EPI creatinine equation which includes age and gender (Víctor et al., NEJM, DOI: 10.1056/NNBWaa3676813)   05/10/2021 86 >60 mL/min/[1.73_m2] Final     GFR, ESTIMATED POCT   Date  Value Ref Range Status   05/23/2022 >60 >60 mL/min/1.73m2 Final     Calcium   Date Value Ref Range Status   07/01/2022 9.2 8.5 - 10.1 mg/dL Final   12/17/2019 9.1 8.5 - 10.1 mg/dL Final     Bilirubin Total   Date Value Ref Range Status   07/01/2022 0.8 0.2 - 1.3 mg/dL Final   04/24/2017 0.3 0.2 - 1.3 mg/dL Final     Alkaline Phosphatase   Date Value Ref Range Status   07/01/2022 94 40 - 150 U/L Final   04/24/2017 83 40 - 150 U/L Final     ALT   Date Value Ref Range Status   07/01/2022 49 0 - 50 U/L Final   04/24/2017 36 0 - 50 U/L Final     AST   Date Value Ref Range Status   07/01/2022 19 0 - 45 U/L Final   04/24/2017 20 0 - 45 U/L Final                Color Urine (no units)   Date Value   06/20/2022 Orange (A)     Appearance Urine (no units)   Date Value   06/20/2022 Cloudy (A)     Glucose Urine (mg/dL)   Date Value   06/20/2022 Negative     Bilirubin Urine (no units)   Date Value   06/20/2022 Negative     Ketones Urine (mg/dL)   Date Value   06/20/2022 Negative     Specific Gravity Urine (no units)   Date Value   06/20/2022 1.023     pH Urine (no units)   Date Value   06/20/2022 6.0     Protein Albumin Urine (mg/dL)   Date Value   06/20/2022 300  (A)     Nitrite Urine (no units)   Date Value   06/20/2022 Negative     Leukocyte Esterase Urine (no units)   Date Value   06/20/2022 Large (A)          Assessment & Plan   61F with chronic frequency, urgency and now recent episode of retention with indwelling miguel in place. History of colon cancer s/p colostomy and radiation.  She also has hx of sling surgery and has been getting recurrent UTIs. We discussed potential causes of symptoms including infection vs. Obstruction from sling as causes for her urinary retention.  This could also be decompensation of her bladder after pelvic surgery for colon cancer.  We further discussed options for management including removal of her catheter today which bothers and and teaching of CIC which she is amenable to.  We also will  try flomax to help with this.  Further evaluation with cystoscopy and biopsy to evaluate the bladder lesion seen by Dr. Mckeon was also recommended.  Finally will proceed with UDS to help elicit whether she has detrusor function.  - In terms of current management of retention - will remove miguel and teach CIC today  - Continue vaginal estrogen cream three times weekly  - Can start flomax nightly  - Plan for cystoscopy with biopsy/fulguration in OR when next available.   - UDS after biopsy to better evaluate voiding function      Kacy Anderson MD  [facility] UROLOGY CLINIC [place]    Patient was seen, evaluated and plan was formulated in conjunction with me and I agree with the above.  Kacy Anderson MD      ==========================      Additional Coding Information:    Time spent:  69 minutes spent on the date of the encounter doing chart review, history and exam, documentation and further activities per the note

## 2022-07-22 ENCOUNTER — ANESTHESIA EVENT (OUTPATIENT)
Dept: SURGERY | Facility: AMBULATORY SURGERY CENTER | Age: 62
End: 2022-07-22
Payer: COMMERCIAL

## 2022-07-22 ENCOUNTER — PRE VISIT (OUTPATIENT)
Dept: SURGERY | Facility: CLINIC | Age: 62
End: 2022-07-22

## 2022-07-22 ENCOUNTER — TELEPHONE (OUTPATIENT)
Dept: UROLOGY | Facility: CLINIC | Age: 62
End: 2022-07-22

## 2022-07-22 NOTE — TELEPHONE ENCOUNTER
[facility] Call Center    Phone Message    May a detailed message be left on voicemail: no     Reason for Call: Patient is currently practicing self catheterization and is new to using a catheter. She stated she is in need of more catheters and hoping she can possibly  more today. Please reach out to the patient to further discuss. Thank you    Action Taken: Message routed to:  [facility] ([facility]): Urology    Travel Screening: Not Applicable

## 2022-07-22 NOTE — ADDENDUM NOTE
Addendum  created 07/22/22 1431 by Monica Jarquin PA-C    Order list changed, Order sets accessed

## 2022-07-22 NOTE — H&P (VIEW-ONLY)
Pre-Operative H & P     CC:  Preoperative exam to assess for increased cardiopulmonary risk while undergoing surgery and anesthesia.    Date of Encounter: 7/22/2022  Primary Care Physician:  Trevor Rizo     Reason for visit:   Encounter Diagnosis   Name Primary?     Pre-op evaluation Yes       HPI  Ilene Gaviria is a 61 year old female who presents for pre-operative H & P in preparation for  Procedure Information     Case: [id number] Date/Time: 07/26/22 1340    Procedure: CYSTOSCOPY, WITH BLADDER BIOPSY AND FULGURATION (Look in the bladder and sample red  tissue then burn the area involved) (N/A Urethra)    Anesthesia type: Monitor Anesthesia Care    Diagnosis: Lesion of bladder [N32.9]    Pre-op diagnosis: Lesion of bladder [N32.9]    Location: [place] [address] / [facility]    Providers: Kacy Anderson MD          Patient is being evaluated for comorbid conditions of hypertension, diabetes, rectal cancer    Ms. Gaviria has a history of urinary retention. She has a history of colorectal cancer s/p resection and colostomy placement followed by a sling shortly after. She then had trouble with UTI symptoms. She was seen by Dr. Anderson for further evaluation and is now scheduled for the above procedure.     History is obtained from the patient and chart review    Hx of abnormal bleeding or anti-platelet use: denies    Menstrual history: No LMP recorded. Patient is postmenopausal.     Past Medical History  Past Medical History:   Diagnosis Date     Cancer (H)     rectal cancer       Past Surgical History  Past Surgical History:   Procedure Laterality Date     DAVINCI COLECTOMY N/A 8/7/2018    Procedure: DAVINCI XI COLECTOMY;  DAVINCI ABDOMINAL PERINEAL RESECTION WITH PERMANENT COLOSTOMY ( PARI ) LEFT GLUTEAL FLAP TO RECTAL DEFECT ( JERE ) ;  Surgeon: Jonny Finney MD;  Location:  OR     GENITOURINARY SURGERY      bladder sling 12/18     GRAFT  FLAP GLUTEUS TO PERINEUM N/A 8/7/2018    Procedure: GRAFT FLAP GLUTEUS TO PERINEUM;;  Surgeon: Conchita Ramos MD;  Location:  OR     GYN SURGERY      c section X3, tubal      SIGMOIDOSCOPY FLEXIBLE N/A 7/9/2018    Procedure: SIGMOIDOSCOPY FLEXIBLE;  FLEXIBILE SIGMOIDOSCOPY;  Surgeon: Jonny Finney MD;  Location:  GI       Prior to Admission Medications  Current Outpatient Medications   Medication Sig Dispense Refill     lisinopril (ZESTRIL) 20 MG tablet Take 1 tablet (20 mg) by mouth daily (Patient taking differently: Take 20 mg by mouth every evening) 30 tablet 3     metFORMIN (GLUCOPHAGE) 500 MG tablet Take 500 mg by mouth 2 times daily (with meals)       simvastatin (ZOCOR) 20 MG tablet Take 1 tablet by mouth daily (with dinner)       tamsulosin (FLOMAX) 0.4 MG capsule Take 1 capsule (0.4 mg) by mouth daily for 360 days (Patient taking differently: Take 0.4 mg by mouth every evening) 30 capsule 11     celecoxib (CELEBREX) 200 MG capsule Take 1 capsule (200 mg) by mouth daily as needed for moderate pain (Patient not taking: Reported on 7/22/2022) 15 capsule 0     estradiol (ESTRACE) 0.1 MG/GM vaginal cream Place 2 g vaginally three times a week (Patient not taking: Reported on 7/22/2022) 42.5 g 3     glycerin (LAXATIVE) 1.2 g suppository Place 1 suppository rectally daily as needed (constipation) (Patient not taking: Reported on 7/22/2022) 5 suppository 0     hyoscyamine ER (LEVBID) 375 mcg 12 hr tablet Take 1 tablet (0.375 mg) by mouth every 12 hours as needed for cramping (Patient not taking: Reported on 7/22/2022) 30 tablet 0     polyethylene glycol (MIRALAX) 17 GM/Dose powder Take 17 g (1 capful) by mouth 2 times daily for 30 days (Patient not taking: Reported on 7/22/2022) 1020 g 0     semaglutide (OZEMPIC, 0.25 OR 0.5 MG/DOSE,) 2 MG/1.5ML SOPN pen Week 1 through week 4 : 0.25 mg once weekly. Week 5 through week 8: 0.5 mg once weekly. Week 9 : 1 mg once weekly. (Patient not taking: Reported on  7/22/2022) 3 mg 3     sennosides (SENOKOT) 8.6 MG tablet Take 1 tablet by mouth daily as needed for constipation (Patient not taking: Reported on 7/22/2022) 30 tablet 0     tolterodine ER (DETROL LA) 2 MG 24 hr capsule Take 1 capsule (2 mg) by mouth daily (Patient not taking: Reported on 7/22/2022) 30 capsule 3       Allergies  Allergies   Allergen Reactions     No Known Allergies        Social History  Social History     Socioeconomic History     Marital status:      Spouse name: Not on file     Number of children: Not on file     Years of education: Not on file     Highest education level: Not on file   Occupational History     Not on file   Tobacco Use     Smoking status: Never Smoker     Smokeless tobacco: Never Used   Substance and Sexual Activity     Alcohol use: Yes     Comment: rare     Drug use: No     Sexual activity: Not Currently     Partners: Male   Other Topics Concern     Parent/sibling w/ CABG, MI or angioplasty before 65F 55M? Not Asked   Social History Narrative     Not on file     Social Determinants of Health     Financial Resource Strain: Not on file   Food Insecurity: Not on file   Transportation Needs: Not on file   Physical Activity: Not on file   Stress: Not on file   Social Connections: Not on file   Intimate Partner Violence: Not on file   Housing Stability: Not on file       Family History  Family History   Problem Relation Age of Onset     Anesthesia Reaction No family hx of      Deep Vein Thrombosis (DVT) No family hx of        Review of Systems  The complete review of systems is negative other than noted in the HPI or here.   Anesthesia Evaluation   Pt has had prior anesthetic.     No history of anesthetic complications       ROS/MED HX  ENT/Pulmonary:  - neg pulmonary ROS  (-) tobacco use   Neurologic:  - neg neurologic ROS     Cardiovascular:     (+) -----Previous cardiac testing   Echo: Date: Results:    Stress Test: Date: 2017 Results:  Interpretation Summary  The baseline  ECG displays diffuse abnormal ST segments at rest. The EKG  portion of this stress test was negative for inducible ischemia (see echo  results below) as there was no chest pain or significant ST changes with  exercise.  The visual ejection fraction is estimated at >70% with normal resting wall  motion and no stress-induced wall motion abnormalities.  The stress EKG is non-diagnostic due to the pre-existing EKG abnormalities but  there was no new ST segment depression oe CP.  Arrhythmia induced during stress: occasional PVC's and PAC's.     This was a normal stress echocardiogram with no evidence of stress-induced  ischemia.    ECG Reviewed: Date: 3/2022 Results:  SR, minimal voltage LVH  Cath: Date: Results:   (-) taking anticoagulants/antiplatelets   METS/Exercise Tolerance: >4 METS Comment: Can walk multiple blocks, ascend stairs, works at restaurant on feet. Denies exertional symptoms    Hematologic:  - neg hematologic  ROS  (-) history of blood clots and history of blood transfusion   Musculoskeletal:  - neg musculoskeletal ROS     GI/Hepatic: Comment: Colostomy in place   (-) GERD   Renal/Genitourinary: Comment: Bladder lesion      Endo:     (+) type II DM, Last HgA1c: 6.1, date: 6/2022, Not using insulin,  (-) chronic steroid usage   Psychiatric/Substance Use:  - neg psychiatric ROS     Infectious Disease:  - neg infectious disease ROS     Malignancy:   (+) Malignancy, History of GI.GI CA  Remission status post Surgery, Chemo and Radiation.        Other:            Virtual visit -  No vitals were obtained    Physical Exam  Constitutional: Awake, alert, cooperative, no apparent distress, and appears stated age.  Eyes: Pupils equal  HENT: Normocephalic, moist mucous membranes  Respiratory: lungs CTAB  CV: RRR, no murmur appreciated  Neurologic: Awake, alert, oriented to name, place and time.   Neuropsychiatric: Calm, cooperative. Normal affect.      Prior Labs/Diagnostic Studies   All labs and imaging personally  "reviewed     EKG/ stress test - if available please see in ROS above   No results found.  No flowsheet data found.      The patient's records and results personally reviewed by this provider.     Outside records reviewed from: Care Everywhere      Assessment      Ilene Gaviria is a 61 year old female seen as a PAC referral for risk assessment and optimization for anesthesia.    Plan/Recommendations  Pt will be optimized for the proposed procedure.  See below for details on the assessment, risk, and preoperative recommendations    NEUROLOGY  - No history of TIA, CVA or seizure  -Post Op delirium risk factors:  No risk identified    ENT  - No current airway concerns.  Will need to be reassessed day of surgery.  Mallampati: I  TM: > 3    CARDIAC  - No history of CAD and Afib   -hypertension using lisinopril  -denies cardiac history or symptoms   - METS (Metabolic Equivalents)  Patient performs 4 or more METS exercise without symptoms            Total Score: 0      RCRI-Very low risk: Class 1 0.4% complication rate            Total Score: 0        PULMONARY  DOROTHEA Low Risk            Total Score: 2    DOROTHEA: Hypertension    DOROTHEA: Over 50 ys old      - Denies asthma or inhaler use  - Tobacco History      History   Smoking Status     Never Smoker   Smokeless Tobacco     Never Used       GI  -h/o colorectal cancer s/p resection with colostomy, chemoradiation.   PONV Medium Risk  Total Score: 2           1 AN PONV: Pt is Female    1 AN PONV: Patient is not a current smoker        /RENAL  - Baseline Creatinine WNL, was checked 7/1 and elevated at 1.4.   Will repeat today  -bladder lesion with the above procedure planned     ENDOCRINE    - BMI: Estimated body mass index is 30.53 kg/m  as calculated from the following:    Height as of this encounter: 1.727 m (5' 8\").    Weight as of this encounter: 91.1 kg (200 lb 12.8 oz).  Obesity (BMI >30)  A1c 6.1 6/2022  Diabetes Mellitus, Type 2, non-insulin dependent.  Hold morning " oral hypoglycemic medications. Recommend close monitoring of the patient's blood glucose levels throughout the perioperative period.    HEME  VTE Low Risk 0.26%            Total Score: 1    VTE: Greater than 59 yrs old      - No history of abnormal bleeding or antiplatelet use.    The patient is optimized for their procedure. AVS with information on surgery time/arrival time, meds and NPO status given by nursing staff. No further diagnostic testing indicated.    Please refer to the physical examination documented by the anesthesiologist in the anesthesia record on the day of surgery.    Prep time: 13 minutes  Visit time: 9 minutes  Documentation time: 8 minutes  ------------------------------------------  Total time: 30 minutes      Monica Jarquin PA-C  Preoperative Assessment Center  [facility]  [facility]  Phone: [phone]  Fax: [phone]

## 2022-07-22 NOTE — TELEPHONE ENCOUNTER
Maya Weinberg  You 21 minutes ago (10:02 AM)     EW    There is in order in, she needs to contact [facility]. Lisa said that the order is confirmed with [facility] and she was also sent home with a lot of supplies. The patient is able to reuse the catheters as long as she washes them with warm soapy water, but only the Gracy one (it is a brand of catheter).      Tomas called and left detailed VM to pt with message above as well as clinic number and [facility] number.

## 2022-07-26 ENCOUNTER — HOSPITAL ENCOUNTER (OUTPATIENT)
Facility: AMBULATORY SURGERY CENTER | Age: 62
Discharge: HOME | End: 2022-07-26
Attending: UROLOGY
Payer: COMMERCIAL

## 2022-07-26 ENCOUNTER — ANESTHESIA (OUTPATIENT)
Dept: SURGERY | Facility: AMBULATORY SURGERY CENTER | Age: 62
End: 2022-07-26
Payer: COMMERCIAL

## 2022-07-26 VITALS
RESPIRATION RATE: 16 BRPM | WEIGHT: 200.8 LBS | HEART RATE: 64 BPM | TEMPERATURE: 98 F | OXYGEN SATURATION: 98 % | BODY MASS INDEX: 30.53 KG/M2 | DIASTOLIC BLOOD PRESSURE: 64 MMHG | SYSTOLIC BLOOD PRESSURE: 110 MMHG

## 2022-07-26 DIAGNOSIS — N32.9 LESION OF BLADDER: ICD-10-CM

## 2022-07-26 LAB — GLUCOSE BLDC GLUCOMTR-MCNC: 101 MG/DL (ref 70–99)

## 2022-07-26 PROCEDURE — 52204 CYSTOSCOPY W/BIOPSY(S): CPT

## 2022-07-26 PROCEDURE — 52204 CYSTOSCOPY W/BIOPSY(S): CPT | Mod: GC | Performed by: UROLOGY

## 2022-07-26 PROCEDURE — 88305 TISSUE EXAM BY PATHOLOGIST: CPT | Mod: TC | Performed by: UROLOGY

## 2022-07-26 PROCEDURE — 88305 TISSUE EXAM BY PATHOLOGIST: CPT | Mod: 26 | Performed by: PATHOLOGY

## 2022-07-26 PROCEDURE — 82962 GLUCOSE BLOOD TEST: CPT | Performed by: PATHOLOGY

## 2022-07-26 RX ORDER — MEPERIDINE HYDROCHLORIDE 25 MG/ML
12.5 INJECTION INTRAMUSCULAR; INTRAVENOUS; SUBCUTANEOUS
Status: DISCONTINUED | OUTPATIENT
Start: 2022-07-26 | End: 2022-07-27 | Stop reason: HOSPADM

## 2022-07-26 RX ORDER — CEFAZOLIN SODIUM 2 G/50ML
2 SOLUTION INTRAVENOUS SEE ADMIN INSTRUCTIONS
Status: DISCONTINUED | OUTPATIENT
Start: 2022-07-26 | End: 2022-07-27 | Stop reason: HOSPADM

## 2022-07-26 RX ORDER — ONDANSETRON 2 MG/ML
INJECTION INTRAMUSCULAR; INTRAVENOUS PRN
Status: DISCONTINUED | OUTPATIENT
Start: 2022-07-26 | End: 2022-07-26

## 2022-07-26 RX ORDER — DEXAMETHASONE SODIUM PHOSPHATE 4 MG/ML
INJECTION, SOLUTION INTRA-ARTICULAR; INTRALESIONAL; INTRAMUSCULAR; INTRAVENOUS; SOFT TISSUE PRN
Status: DISCONTINUED | OUTPATIENT
Start: 2022-07-26 | End: 2022-07-26

## 2022-07-26 RX ORDER — PROPOFOL 10 MG/ML
INJECTION, EMULSION INTRAVENOUS PRN
Status: DISCONTINUED | OUTPATIENT
Start: 2022-07-26 | End: 2022-07-26

## 2022-07-26 RX ORDER — OXYCODONE HYDROCHLORIDE 5 MG/1
5 TABLET ORAL EVERY 4 HOURS PRN
Status: DISCONTINUED | OUTPATIENT
Start: 2022-07-26 | End: 2022-07-27 | Stop reason: HOSPADM

## 2022-07-26 RX ORDER — LIDOCAINE HYDROCHLORIDE 20 MG/ML
INJECTION, SOLUTION INFILTRATION; PERINEURAL PRN
Status: DISCONTINUED | OUTPATIENT
Start: 2022-07-26 | End: 2022-07-26

## 2022-07-26 RX ORDER — FENTANYL CITRATE 50 UG/ML
25 INJECTION, SOLUTION INTRAMUSCULAR; INTRAVENOUS EVERY 5 MIN PRN
Status: DISCONTINUED | OUTPATIENT
Start: 2022-07-26 | End: 2022-07-27 | Stop reason: HOSPADM

## 2022-07-26 RX ORDER — FENTANYL CITRATE 50 UG/ML
INJECTION, SOLUTION INTRAMUSCULAR; INTRAVENOUS PRN
Status: DISCONTINUED | OUTPATIENT
Start: 2022-07-26 | End: 2022-07-26

## 2022-07-26 RX ORDER — FENTANYL CITRATE 50 UG/ML
25 INJECTION, SOLUTION INTRAMUSCULAR; INTRAVENOUS
Status: DISCONTINUED | OUTPATIENT
Start: 2022-07-26 | End: 2022-07-27 | Stop reason: HOSPADM

## 2022-07-26 RX ORDER — ONDANSETRON 4 MG/1
4 TABLET, ORALLY DISINTEGRATING ORAL EVERY 30 MIN PRN
Status: DISCONTINUED | OUTPATIENT
Start: 2022-07-26 | End: 2022-07-27 | Stop reason: HOSPADM

## 2022-07-26 RX ORDER — LIDOCAINE HYDROCHLORIDE 20 MG/ML
JELLY TOPICAL PRN
Status: DISCONTINUED | OUTPATIENT
Start: 2022-07-26 | End: 2022-07-26 | Stop reason: HOSPADM

## 2022-07-26 RX ORDER — CEFAZOLIN SODIUM 2 G/50ML
2 SOLUTION INTRAVENOUS
Status: COMPLETED | OUTPATIENT
Start: 2022-07-26 | End: 2022-07-26

## 2022-07-26 RX ORDER — SODIUM CHLORIDE, SODIUM LACTATE, POTASSIUM CHLORIDE, CALCIUM CHLORIDE 600; 310; 30; 20 MG/100ML; MG/100ML; MG/100ML; MG/100ML
INJECTION, SOLUTION INTRAVENOUS CONTINUOUS
Status: DISCONTINUED | OUTPATIENT
Start: 2022-07-26 | End: 2022-07-27 | Stop reason: HOSPADM

## 2022-07-26 RX ORDER — ONDANSETRON 2 MG/ML
4 INJECTION INTRAMUSCULAR; INTRAVENOUS EVERY 30 MIN PRN
Status: DISCONTINUED | OUTPATIENT
Start: 2022-07-26 | End: 2022-07-27 | Stop reason: HOSPADM

## 2022-07-26 RX ORDER — LIDOCAINE 40 MG/G
CREAM TOPICAL
Status: DISCONTINUED | OUTPATIENT
Start: 2022-07-26 | End: 2022-07-27 | Stop reason: HOSPADM

## 2022-07-26 RX ORDER — PROPOFOL 10 MG/ML
INJECTION, EMULSION INTRAVENOUS CONTINUOUS PRN
Status: DISCONTINUED | OUTPATIENT
Start: 2022-07-26 | End: 2022-07-26

## 2022-07-26 RX ADMIN — DEXAMETHASONE SODIUM PHOSPHATE 4 MG: 4 INJECTION, SOLUTION INTRA-ARTICULAR; INTRALESIONAL; INTRAMUSCULAR; INTRAVENOUS; SOFT TISSUE at 13:25

## 2022-07-26 RX ADMIN — PROPOFOL 20 MG: 10 INJECTION, EMULSION INTRAVENOUS at 13:30

## 2022-07-26 RX ADMIN — FENTANYL CITRATE 50 MCG: 50 INJECTION, SOLUTION INTRAMUSCULAR; INTRAVENOUS at 13:30

## 2022-07-26 RX ADMIN — CEFAZOLIN SODIUM 2 G: 2 SOLUTION INTRAVENOUS at 13:08

## 2022-07-26 RX ADMIN — LIDOCAINE HYDROCHLORIDE 60 MG: 20 INJECTION, SOLUTION INFILTRATION; PERINEURAL at 13:10

## 2022-07-26 RX ADMIN — PROPOFOL 150 MCG/KG/MIN: 10 INJECTION, EMULSION INTRAVENOUS at 13:10

## 2022-07-26 RX ADMIN — PROPOFOL 50 MG: 10 INJECTION, EMULSION INTRAVENOUS at 13:11

## 2022-07-26 RX ADMIN — PROPOFOL 30 MG: 10 INJECTION, EMULSION INTRAVENOUS at 13:28

## 2022-07-26 RX ADMIN — ONDANSETRON 4 MG: 2 INJECTION INTRAMUSCULAR; INTRAVENOUS at 13:25

## 2022-07-26 RX ADMIN — SODIUM CHLORIDE, SODIUM LACTATE, POTASSIUM CHLORIDE, CALCIUM CHLORIDE: 600; 310; 30; 20 INJECTION, SOLUTION INTRAVENOUS at 13:08

## 2022-07-26 RX ADMIN — FENTANYL CITRATE 50 MCG: 50 INJECTION, SOLUTION INTRAMUSCULAR; INTRAVENOUS at 13:43

## 2022-07-26 NOTE — ANESTHESIA PREPROCEDURE EVALUATION
Anesthesia Pre-Procedure Evaluation    Patient: Ilene Gaviria   MRN: [id number] : 1960        Procedure : Procedure(s):  CYSTOSCOPY, WITH BLADDER BIOPSY AND FULGURATION (Look in the bladder and sample red  tissue then burn the area involved)          Past Medical History:   Diagnosis Date     Cancer (H)     rectal cancer      Past Surgical History:   Procedure Laterality Date     DAVINCI COLECTOMY N/A 2018    Procedure: DAVINCI XI COLECTOMY;  DAVINCI ABDOMINAL PERINEAL RESECTION WITH PERMANENT COLOSTOMY ( PARI ) LEFT GLUTEAL FLAP TO RECTAL DEFECT ( JERE ) ;  Surgeon: Jonny Finney MD;  Location:  OR     GENITOURINARY SURGERY      bladder sling      GRAFT FLAP GLUTEUS TO PERINEUM N/A 2018    Procedure: GRAFT FLAP GLUTEUS TO PERINEUM;;  Surgeon: Conchita Ramos MD;  Location:  OR     GYN SURGERY      c section X3, tubal      SIGMOIDOSCOPY FLEXIBLE N/A 2018    Procedure: SIGMOIDOSCOPY FLEXIBLE;  FLEXIBILE SIGMOIDOSCOPY;  Surgeon: Jonny Finney MD;  Location:  GI      Allergies   Allergen Reactions     No Known Allergies       Social History     Tobacco Use     Smoking status: Never Smoker     Smokeless tobacco: Never Used   Substance Use Topics     Alcohol use: Yes     Comment: rare      Wt Readings from Last 1 Encounters:   22 91.1 kg (200 lb 12.8 oz)        Anesthesia Evaluation            ROS/MED HX  ENT/Pulmonary:  - neg pulmonary ROS     Neurologic:  - neg neurologic ROS     Cardiovascular:  - neg cardiovascular ROS     METS/Exercise Tolerance:     Hematologic:  - neg hematologic  ROS     Musculoskeletal:  - neg musculoskeletal ROS     GI/Hepatic:  - neg GI/hepatic ROS     Renal/Genitourinary:  - neg Renal ROS     Endo:  - neg endo ROS     Psychiatric/Substance Use:  - neg psychiatric ROS     Infectious Disease:  - neg infectious disease ROS     Malignancy:  - neg malignancy ROS     Other:  - neg other ROS          Physical Exam    Airway  airway exam  normal      Mallampati: II   TM distance: > 3 FB   Neck ROM: full   Mouth opening: > 3 cm    Respiratory Devices and Support         Dental  no notable dental history         Cardiovascular          Rhythm and rate: regular and normal     Pulmonary   pulmonary exam normal        breath sounds clear to auscultation           OUTSIDE LABS:  CBC:   Lab Results   Component Value Date    WBC 9.0 07/01/2022    WBC 6.3 06/26/2022    HGB 12.9 07/01/2022    HGB 14.4 06/26/2022    HCT 38.8 07/01/2022    HCT 43.9 06/26/2022     07/01/2022     06/26/2022     BMP:   Lab Results   Component Value Date     07/01/2022     06/26/2022    POTASSIUM 5.0 07/01/2022    POTASSIUM 4.2 06/26/2022    CHLORIDE 106 07/01/2022    CHLORIDE 103 06/26/2022    CO2 25 07/01/2022    CO2 28 06/26/2022    BUN 31 (H) 07/01/2022    BUN 15 06/26/2022    CR 1.41 (H) 07/01/2022    CR 0.79 06/26/2022     (H) 07/26/2022     (H) 07/01/2022     COAGS: No results found for: PTT, INR, FIBR  POC:   Lab Results   Component Value Date    BGM 92 08/09/2018     HEPATIC:   Lab Results   Component Value Date    ALBUMIN 3.4 07/01/2022    PROTTOTAL 6.8 07/01/2022    ALT 49 07/01/2022    AST 19 07/01/2022    ALKPHOS 94 07/01/2022    BILITOTAL 0.8 07/01/2022     OTHER:   Lab Results   Component Value Date    LACT 1.7 06/26/2022    ARTHUR 9.2 07/01/2022    PHOS 3.2 08/08/2018    MAG 1.8 08/08/2018       Anesthesia Plan    ASA Status:  1   NPO Status:  NPO Appropriate    Anesthesia Type: MAC.     - Reason for MAC: immobility needed, straight local not clinically adequate   Induction: Intravenous.   Maintenance: TIVA.        Consents    Anesthesia Plan(s) and associated risks, benefits, and realistic alternatives discussed. Questions answered and patient/representative(s) expressed understanding.    - Discussed:     - Discussed with:  Patient      - Extended Intubation/Ventilatory Support Discussed: No.      - Patient is DNR/DNI Status: No     Use of blood products discussed: No .     Postoperative Care    Pain management: IV analgesics, Oral pain medications, Multi-modal analgesia.   PONV prophylaxis: Ondansetron (or other 5HT-3), Background Propofol Infusion     Comments:                Rj Soliz MD

## 2022-07-26 NOTE — INTERVAL H&P NOTE
"I have reviewed the surgical (or preoperative) H&P that is linked to this encounter, and examined the patient. There are no significant changes    Clinical Conditions Present on Arrival:  Clinically Significant Risk Factors Present on Admission                   # Obesity: Estimated body mass index is 30.53 kg/m  as calculated from the following:    Height as of 7/22/22: 1.727 m (5' 8\").    Weight as of this encounter: 91.1 kg (200 lb 12.8 oz).       "

## 2022-07-26 NOTE — ANESTHESIA CARE TRANSFER NOTE
Patient: Ilene Gaviria    Procedure: Procedure(s):  CYSTOSCOPY, WITH BLADDER BIOPSY AND FULGURATION (Look in the bladder and sample red  tissue then burn the area involved)       Diagnosis: Lesion of bladder [N32.9]  Diagnosis Additional Information: No value filed.    Anesthesia Type:   No value filed.     Note:    Oropharynx: oropharynx clear of all foreign objects and spontaneously breathing  Level of Consciousness: awake  Oxygen Supplementation: room air    Independent Airway: airway patency satisfactory and stable  Dentition: dentition unchanged  Vital Signs Stable: post-procedure vital signs reviewed and stable  Report to RN Given: handoff report given  Patient transferred to: Phase II    Handoff Report: Identifed the Patient, Identified the Reponsible Provider, Reviewed the pertinent medical history, Discussed the surgical course, Reviewed Intra-OP anesthesia mangement and issues during anesthesia, Set expectations for post-procedure period and Allowed opportunity for questions and acknowledgement of understanding      Vitals:  Vitals Value Taken Time   /73 07/26/22 1358   Temp 36.7  C (98.1  F) 07/26/22 1358   Pulse 81 07/26/22 1358   Resp 16 07/26/22 1358   SpO2 92 % 07/26/22 1358       Electronically Signed By: RENETTA Gee CRNA  July 26, 2022  1:59 PM

## 2022-07-26 NOTE — DISCHARGE INSTRUCTIONS
[facility]  Home Care Following Anesthesia  For 24 hours after surgery:  Get plenty of rest.  A responsible adult must stay with you for at least 24 hours after you leave the surgery center.  Do not drive or use heavy equipment.  If you have weakness or tingling, don't drive or use heavy equipment until this feeling goes away.   Do not drink alcohol.   Avoid strenuous or risky activities.  Ask for help when climbing stairs.  You may feel lightheaded.  IF so, sit for a few minutes before standing.  Have someone help you get up.   If you have nausea (feel sick to your stomach): Drink only clear liquids such as apple juice, ginger ale, broth or 7-Up.  Rest may also help.  Be sure to drink enough fluids.  Move to a regular diet as you feel able.   You may have a slight fever.  Call the doctor if your fever is over 100 F (37.7 C) (taken under the tongue) or lasts longer than 24 hours.  You may have a dry mouth, a sore throat, muscle aches or trouble sleeping. These should go away after 24 hours.  Do not make important or legal decisions.   It is recommended to avoid smoking.               Tips for taking pain medications  To get the best pain relief possible, remember these points:  Take pain medications as directed, before pain becomes severe.  Pain medication can upset your stomach: taking it with food may help.  Constipation is a common side effect of pain medication. Drink plenty of  fluids.  Eat foods high in fiber. Take a stool softener if recommended by your doctor or pharmacist.  Do not drink alcohol, drive or operate machinery while taking pain medications.  Ask about other ways to control pain, such as with heat, ice or relaxation.    Tylenol/Acetaminophen Consumption  To help encourage the safe use of acetaminophen, the makers of TYLENOL  have lowered the maximum daily dose for single-ingredient Extra Strength TYLENOL  (acetaminophen) products sold in the [place] from 8 pills  per day (4,000 mg) to 6 pills per day (3,000 mg). The dosing interval has also changed from 2 pills every 4-6 hours to 2 pills every 6 hours.  If you feel your pain relief is insufficient, you may take Tylenol/Acetaminophen in addition to your narcotic pain medication.   Be careful not to exceed 3,000 mg of Tylenol/Acetaminophen in a 24 hour period from all sources.  If you are taking extra strength Tylenol/acetaminophen (500 mg), the maximum dose is 6 tablets in 24 hours.  If you are taking regular strength acetaminophen (325 mg), the maximum dose is 9 tablets in 24 hours.    Call a doctor for any of the following:  Signs of infection (fever, growing tenderness at the surgery site, a large amount of drainage or bleeding, severe pain, foul-smelling drainage, redness, swelling).  It has been over 8 to 10 hours since surgery and you are still not able to urinate (pass water).  Headache for over 24 hours.  Numbness, tingling or weakness the day after surgery (if you had spinal anesthesia).  Signs of Covid-19 infection (temperature over 100 degrees, shortness of breath, cough, loss of taste/smell, generalized body aches, persistent headache, chills, sore throat, nausea/vomiting/diarrhea)  Your doctor is:       Dr. Kacy Anderson, Prostate and Urology: [phone]               Or dial [phone] and ask for the resident on call for:  Prostate Urology  For emergency care, call the:  [facility]:  [phone] (TTY for hearing impaired: [phone])

## 2022-07-26 NOTE — ANESTHESIA POSTPROCEDURE EVALUATION
Patient: Ilene Gaviria    Procedure: Procedure(s):  CYSTOSCOPY, WITH BLADDER BIOPSY AND FULGURATION (Look in the bladder and sample red  tissue then burn the area involved)       Anesthesia Type:  No value filed.    Note:  Disposition: Outpatient   Postop Pain Control: Uneventful            Sign Out: Well controlled pain   PONV: No   Neuro/Psych: Uneventful            Sign Out: Acceptable/Baseline neuro status   Airway/Respiratory: Uneventful            Sign Out: Acceptable/Baseline resp. status   CV/Hemodynamics: Uneventful            Sign Out: Acceptable CV status   Other NRE: NONE   DID A NON-ROUTINE EVENT OCCUR? No           Last vitals:  Vitals Value Taken Time   /64 07/26/22 1415   Temp 36.7  C (98  F) 07/26/22 1415   Pulse 64 07/26/22 1415   Resp 16 07/26/22 1415   SpO2 98 % 07/26/22 1415       Electronically Signed By: Rj Soliz MD  July 26, 2022  2:28 PM

## 2022-07-26 NOTE — BRIEF OP NOTE
[facility]    Brief Operative Note    Pre-operative diagnosis: Lesion of bladder [N32.9]  Post-operative diagnosis Same as pre-operative diagnosis    Procedure: Procedure(s):  CYSTOSCOPY, WITH BLADDER BIOPSY AND FULGURATION (Look in the bladder and sample red  tissue then burn the area involved)  Surgeon: Surgeon(s) and Role:     * Kacy Anderson MD - Primary     * Wilbert More MD - Resident - Assisting  Anesthesia: Monitor Anesthesia Care   Estimated Blood Loss: Minimal    Drains: None  Specimens:   ID Type Source Tests Collected by Time Destination   1 : Right Lateral Bladder Wall Biopsy Tissue Urinary Bladder, Lateral Wall, Right SURGICAL PATHOLOGY EXAM Kacy Anderson MD 7/26/2022  1:37 PM    2 : Posterior Bladder Wall Tissue Urinary Bladder, Posterior Wall SURGICAL PATHOLOGY EXAM Kacy Anderson MD 7/26/2022  1:48 PM      Findings:   Diffusely erythematous bladder mucosa and sedimentous urine. Biopsied abnormal mucosa and erythematous region, sent separately. Good hemostasis at case end.  Complications: None.  Implants: * No implants in log *      Plan:  DNA Urine Study in 2-3 Days  Treat with least number of antibiotics to cover all bacteria for 10 day course (complicated UTI)  After course of treatment, consider once daily prophylaxis with oral abx (or methenamine and Vitamin C if no good prophylactic regimen)

## 2022-07-26 NOTE — OP NOTE
OPERATIVE REPORT - 7/26/2022    PREOPERATIVE DIAGNOSIS: Abnormal cystoscopy, recurrent UTI's    POSTOPERATIVE DIAGNOSIS: Same    PROCEDURES PERFORMED:   1. Cystourethroscopy  2. Targeted bladder biopsies  3. Fulguration of biopsied areas    STAFF SURGEON: Kacy Anderson MD    RESIDENT: Wilbert More MD    ANESTHESIA: Monitor Anesthesia Care    ESTIMATED BLOOD LOSS: < 10 mL.     DRAINS: None     SIGNIFICANT FINDINGS:  - Diverticulum on right dome of bladder  - Erythematous, inflamed mucosa with bullous inflammatory changes near diverticulum    OPERATIVE INDICATIONS:   Ilene Gaviria is 61 year old female with history of colostomy and radiation for colorectal cancer, urologic history of urethral sling, now with recurrent UTI's and urinary retention. After a discussion of all risks, benefits and alternatives, she consented to proceed with the above listed procedures.    DESCRIPTION OF PROCEDURE:   After verification of informed consent was obtained, the patient was brought to the operating room, and moved to the operating table. After adequate anesthesia was induced, the patient was repositioned in dorsal lithotomy position and prepped and draped in the usual sterile fashion. A formal timeout was performed to confirm the correct patient, procedure and operative site.     A 19-Cymro rigid cystoscope was inserted into a well lubricated urethra. We began by performing a white light cystourethroscopy. The urethra was unremarkable. The ureteral orifices were in their orthotopic positions bilaterally. There were no intravesical stones. There was a diverticulum on the right anterior wall/dome with surrounding bullous erythematous changes and the bladder was mildly trabeculated. Media was turbid.    We used a cold-cup flexible biopsy forceps to biopsy the posterior inflamed wall and the bullous inflammatory mucosa near the diverticulum, and these were sent off separately for pathologic analysis. A Bugbee was used  to fulgurate the biopsied sites and any bleeding areas. The bladder was re-examined under low pressure and hemostasis was confirmed. At this point, the bladder was drained and the cystoscope withdrawn.    Patient was then returned to the supine position. The procedure was then concluded. The patient was transferred to the postanesthesia care unit in stable condition and tolerated the procedure well.    POSTOP PLAN:  1. PACU, then discharge to home  2. Follow up later this week for urine DNA testing  3. Antibiotic plan based on DNA testing    Wilbert More MD  Urology Resident, PGY-4  .  Patient was seen, evaluated and plan was formulated in conjunction with me and I agree with the above.  I was present for the entire procedure.  Kacy Anderson MD

## 2022-07-27 ENCOUNTER — TELEPHONE (OUTPATIENT)
Dept: UROLOGY | Facility: CLINIC | Age: 62
End: 2022-07-27

## 2022-07-27 LAB
PATH REPORT.COMMENTS IMP SPEC: NORMAL
PATH REPORT.COMMENTS IMP SPEC: NORMAL
PATH REPORT.FINAL DX SPEC: NORMAL
PATH REPORT.GROSS SPEC: NORMAL
PATH REPORT.MICROSCOPIC SPEC OTHER STN: NORMAL
PATH REPORT.RELEVANT HX SPEC: NORMAL
PHOTO IMAGE: NORMAL

## 2022-07-27 NOTE — NURSING NOTE
[facility]    Once completed please fax to [phone]  E-script to [email or web address]    A. Please include patient demographics and chart notes (ex: indefinite retention) with this order     Name: Ilene Gaviria   : 1960   Phone: [phone]  Address: [address] [place] [address]    B. Diagnosis    X Retention of urine (788.20/R33.9)   Urinary Incontinence (788.30/R30)    Incomplete Bladder Emptying (788.21/R39-14)   Urge Incontinence (788.31/N39.41)    Other Specified Retention of Urine (788.29/R33.8)   Other:     C. Order Information/Start Date: 22    Number of Refills: 99    Length of Need: 99 months    X Patient may receive up to a 90-day supply at one time; therefore, quantity will be three (3) times amount below.                                       CHECK PRODUCT [place] FREQ OF USE QUANTITY PER STRAIGHT  COUDE    ONE  SIZE PER DAY MONTH TO DISPESE     X Intermittent Catheter 14 6 180 X     Intermittent Catheter with         Insertion Supplies          Condom or External Catheters          ADDITIONAL PRODUCTS/ITEMS ORDERED (check all that apply)     PRODUCT FREQ OF USE QUANTITY PER  ___ Patient Choice     PER MONTH MONTH TO DISPENSE  ___ Bard    Tube of Lubricant     ___ Coloplast    Leg Bags    ___ Cure Medical    Night Bags    ___ GentleCath    Penile Clamp (Cunningham)    ___ Hi-Slip    Disinfection/BZK-PDI Wipes    ___ [place]    Haque Insertion Tray    ___Lo Fric    Vacuum Erection Device    ___Magic3    Haque Catheters:    ___ MTG    Straight    ___ Melissa-Teleflex    Coude    ___ SpediCath    [place] size        Balloon size       Needs Lube also. Not sure which catheter she likes. Kathleen she is out. Would you rush her out some catheters please. Thanks  Bailey VEGA Physician's Information:      Physician's Name: Kacy Anderson  Phone: [phone]  Date: 22    [facility]  Clinic and Urology Clinic  [address] [place] [address] [id number]  [place], [place], [address]    [facility]  Office: [phone]  Mobile: [phone]  Fax: [phone]  [email or web address]    Bailey Moseley RN, BSN

## 2022-07-27 NOTE — TELEPHONE ENCOUNTER
[facility] Call Center    Phone Message    May a detailed message be left on voicemail: yes     Reason for Call: Other: Ilene is calling stating that she had a biopsy done yesterday and that she already feels so much better and has almost no pain. Please call her back with questions, thanks!     Action Taken: Message routed to:  [facility] ([facility]): [facility] uro    Travel Screening: Not Applicable

## 2022-07-28 ENCOUNTER — PATIENT OUTREACH (OUTPATIENT)
Dept: UROLOGY | Facility: CLINIC | Age: 62
End: 2022-07-28

## 2022-08-01 ENCOUNTER — THERAPY VISIT (OUTPATIENT)
Dept: PHYSICAL THERAPY | Facility: CLINIC | Age: 62
End: 2022-08-01
Attending: OBSTETRICS & GYNECOLOGY
Payer: COMMERCIAL

## 2022-08-01 ENCOUNTER — PATIENT OUTREACH (OUTPATIENT)
Dept: UROLOGY | Facility: CLINIC | Age: 62
End: 2022-08-01

## 2022-08-01 DIAGNOSIS — N39.41 URGE INCONTINENCE: ICD-10-CM

## 2022-08-01 DIAGNOSIS — M99.05 SOMATIC DYSFUNCTION OF PELVIC REGION: Primary | ICD-10-CM

## 2022-08-01 PROCEDURE — 97161 PT EVAL LOW COMPLEX 20 MIN: CPT | Mod: GP

## 2022-08-01 PROCEDURE — 97110 THERAPEUTIC EXERCISES: CPT | Mod: GP

## 2022-08-01 PROCEDURE — 97140 MANUAL THERAPY 1/> REGIONS: CPT | Mod: GP

## 2022-08-01 NOTE — PROGRESS NOTES
"Physical Therapy Initial Evaluation  Subjective:  Ilene has a history of rectal ca with stoma and bladder sling 2018. Recently her bladder has not been emptying. Had recent cystoscopy and colonoscopy with no malignancies detected. She thinks this has been gradual onset, worsening in the past 3 mo. She recently began self-cathing and says this has been incredibly beneficial. She is not getting sensation of when her bladder is filling, but does feel a \"pressure\" in her bladder that tells her when she needs to go. Is still trying to urinate on her own, emptying 25-40%, then self caths after. Slow stream for 6-10 sec, denies difficulty starting the stream of urine.       Denies pain but she does complain of fullness/pressure. Will get \"phantom\" pain of feeling like she needs a BM, had this after stoma and it's come back recently.  Has not tried pelvic floor exercises recently for these issues.   She reports is physically active at work, not currently exercising outside of this. Goals: Would like to not self-cath one day    Urination:  Do you leak on the way to the bathroom or with a strong urge to void? n   Do you leak with cough,sneeze, jumping, running? n  Any other activities that cause leaking?  n  Do you have triggers that make you feel you can't wait to go to the bathroom?  What are they? n  Type of pad and number used per day? n/a  When you leak what is the amount? n/a  How long can you delay the need to urinate? 3-4 hr  Frequency of daytime urination:4x, every 4 hours  Frequency of nighttime urination:2x  Can you stop the flow of urine when on the toilet? n  Is the volume of urine passed usually:  (8sec rule= 250ml with average bladder storing 400-600ml) sm  Do you strain to pass urine? y  Do you have a slow or hesitant urinary stream? y  Do you have difficulty initiating the urine stream? y  Is urination painful? n  How many bladder infections have you had in last 12 months?3  Fluid intake(one glass is 8oz " or one cup) 4 glasses/day, 2 caffinated glasses/day  0 alcohol glasses/day.    Bowel habits:  Frequency of bowel movements? 2 times a d, has a stoma  Consistancy of stool? varies  Do you ignore the urge to defecate? n  Do you strain to pass stool? n    Pelvic Pain:  Do you have any pelvic pain with intercourse, exams, use of tampons? n  Are you sexually active? n  Have you ever been worried for your physical safety? n  Have you practiced the PF(kegel) exercises for 4 or more weeks?n    The history is provided by the patient. No  was used.                       Objective:  System         Lumbar/SI Evaluation  ROM:  AROM Lumbar: normal                                                Pelvic Dysfunction Evaluation:        Flexibility:    Tightness present at:Adductors  Tightness not present at:  Hamstrings      Pelvic Clock Exam:  normal                External Assessment:              Muscle Contraction/Perineal Mobility:  Slight lift, no urogential triangle descent  Internal Assessment:  Internal assessment pelvic: Increased mm tone with palpation and delayed relaxation following contraction. Pt demos improved coordination following manual therapy.    Contraction/Grade:  Weak squeeze, 2 second hold (2)          Additional History:  Delivery History:      Number of Live Births: 3            Hip Evaluation  Hip PROM:  Hip PROM:  Left Hip:    Normal  Right Hip:  Normal                          Hip Strength:  Hip Strength:    Left:    Normal  Right:  Normal                                         General     ROS    Assessment/Plan:    Patient is a 61 year old female with pelvic complaints.    Patient has the following significant findings with corresponding treatment plan.                Diagnosis 1:  Pelvic Floor Dysfunction  Decreased ROM/flexibility - manual therapy, therapeutic exercise and therapeutic activity  Impaired muscle performance - biofeedback and neuro re-education  Decreased  function - therapeutic activities and home program    Therapy Evaluation Codes:   1) History comprised of:   Personal factors that impact the plan of care:      None.    Comorbidity factors that impact the plan of care are:      Cancer, High blood pressure and Overweight.     Medications impacting care: High blood pressure and metformin.  2) Examination of Body Systems comprised of:   Body structures and functions that impact the plan of care:      Hip, Lumbar spine and Pelvis.   Activity limitations that impact the plan of care are:      urination.  3) Clinical presentation characteristics are:   Stable/Uncomplicated.  4) Decision-Making    Low complexity using standardized patient assessment instrument and/or measureable assessment of functional outcome.  Cumulative Therapy Evaluation is: Low complexity.    Previous and current functional limitations:  (See Goal Flow Sheet for this information)    Short term and Long term goals: (See Goal Flow Sheet for this information)     Communication ability:  Patient appears to be able to clearly communicate and understand verbal and written communication and follow directions correctly.  Treatment Explanation - The following has been discussed with the patient:   RX ordered/plan of care  Anticipated outcomes  Possible risks and side effects  This patient would benefit from PT intervention to resume normal activities.   Rehab potential is good.    Frequency:  1 X week, once daily  Duration:  for 4 weeks tapering to 2 X a month over 12 weeks  Discharge Plan:  Achieve all LTG.  Independent in home treatment program.  Reach maximal therapeutic benefit.    Please refer to the daily flowsheet for treatment today, total treatment time and time spent performing 1:1 timed codes.     Gayle Hill, PT, DPT

## 2022-08-01 NOTE — PROGRESS NOTES
Physical Therapy Initial Evaluation  Subjective:    Patient Health History             Pertinent medical history includes: high blood pressure and overweight ([place] rectal cancer 2018).     Medical allergies: none.   Surgeries include:  Other (Stoma).    Current medications:  High blood pressure medication (Metformin).    Current occupation is [employer].   Primary job tasks include:  Computer work, lifting/carrying, prolonged sitting, prolonged standing and pushing/pulling.                                    Objective:  System    Physical Exam    General     ROS    Assessment/Plan:

## 2022-08-01 NOTE — TELEPHONE ENCOUNTER
Patient apologized for missing her appointment on Friday but she did not get the call until late that night due to cell phone confusion.  Patient has confirmed she will be there tomorrow at 8 am for DNA test    Bailey Moseley RN, BSN  Care Coordinator Urology

## 2022-08-02 ENCOUNTER — ALLIED HEALTH/NURSE VISIT (OUTPATIENT)
Dept: UROLOGY | Facility: CLINIC | Age: 62
End: 2022-08-02
Payer: COMMERCIAL

## 2022-08-02 DIAGNOSIS — R39.15 URINARY URGENCY: Primary | ICD-10-CM

## 2022-08-02 DIAGNOSIS — R33.9 URINARY RETENTION: ICD-10-CM

## 2022-08-02 PROCEDURE — 99000 SPECIMEN HANDLING OFFICE-LAB: CPT | Performed by: PATHOLOGY

## 2022-08-02 PROCEDURE — 99207 PR NO BILLABLE SERVICE THIS VISIT: CPT

## 2022-08-02 NOTE — PROGRESS NOTES
Chief Complaint   Patient presents with     Allied Health Visit     DNA sequencing       Patient Active Problem List   Diagnosis     BMI 30.0-30.9,adult     Rectal cancer (H)     Colostomy in place (H)     Lesion of bladder     Somatic dysfunction of pelvic region       Allergies   Allergen Reactions     No Known Allergies        Current Outpatient Medications   Medication Sig Dispense Refill     celecoxib (CELEBREX) 200 MG capsule Take 1 capsule (200 mg) by mouth daily as needed for moderate pain (Patient not taking: Reported on 7/22/2022) 15 capsule 0     estradiol (ESTRACE) 0.1 MG/GM vaginal cream Place 2 g vaginally three times a week (Patient not taking: Reported on 7/22/2022) 42.5 g 3     glycerin (LAXATIVE) 1.2 g suppository Place 1 suppository rectally daily as needed (constipation) (Patient not taking: Reported on 7/22/2022) 5 suppository 0     hyoscyamine ER (LEVBID) 375 mcg 12 hr tablet Take 1 tablet (0.375 mg) by mouth every 12 hours as needed for cramping (Patient not taking: Reported on 7/22/2022) 30 tablet 0     lisinopril (ZESTRIL) 20 MG tablet Take 1 tablet (20 mg) by mouth daily (Patient taking differently: Take 20 mg by mouth every evening) 30 tablet 3     metFORMIN (GLUCOPHAGE) 500 MG tablet Take 500 mg by mouth 2 times daily (with meals)       semaglutide (OZEMPIC, 0.25 OR 0.5 MG/DOSE,) 2 MG/1.5ML SOPN pen Week 1 through week 4 : 0.25 mg once weekly. Week 5 through week 8: 0.5 mg once weekly. Week 9 : 1 mg once weekly. 3 mg 3     sennosides (SENOKOT) 8.6 MG tablet Take 1 tablet by mouth daily as needed for constipation (Patient not taking: Reported on 7/22/2022) 30 tablet 0     simvastatin (ZOCOR) 20 MG tablet Take 1 tablet by mouth daily (with dinner)       tamsulosin (FLOMAX) 0.4 MG capsule Take 1 capsule (0.4 mg) by mouth daily for 360 days (Patient taking differently: Take 0.4 mg by mouth every evening) 30 capsule 11     tolterodine ER (DETROL LA) 2 MG 24 hr capsule Take 1 capsule (2 mg) by  mouth daily (Patient not taking: Reported on 7/22/2022) 30 capsule 3       Social History     Tobacco Use     Smoking status: Never Smoker     Smokeless tobacco: Never Used   Substance Use Topics     Alcohol use: Yes     Comment: rare     Drug use: No       Ilene Gaviria comes into clinic today at the request of No Dr. Kacy Anderson for DNA sequencing.    Patient diagnosis: Urinary urgency    Urine obtained from patient and sent to the lab along with signed paperwork and copy of insurance card.    This service provided today was under the direct supervision of Dr. Brian, who was available if needed.    Lavelle Mac EMT  8/2/2022  8:24 AM

## 2022-08-04 ENCOUNTER — NURSE TRIAGE (OUTPATIENT)
Dept: UROLOGY | Facility: CLINIC | Age: 62
End: 2022-08-04

## 2022-08-04 DIAGNOSIS — R39.15 URINARY URGENCY: Primary | ICD-10-CM

## 2022-08-04 NOTE — TELEPHONE ENCOUNTER
[facility] Call Center    Phone Message    May a detailed message be left on voicemail: yes     Reason for Call: Patient calling because she believes she has a bladder infection and would like to talk to care team about it. Patient has noticed these symptoms in the last 24 hrs. Says urine has a smell. Please reach out to patient to discuss. Thank you    Action Taken: Message routed to:  [facility] ([facility]): Uro    Travel Screening: Not Applicable

## 2022-08-05 ENCOUNTER — LAB (OUTPATIENT)
Dept: LAB | Facility: CLINIC | Age: 62
End: 2022-08-05
Payer: COMMERCIAL

## 2022-08-05 ENCOUNTER — TELEPHONE (OUTPATIENT)
Dept: UROLOGY | Facility: CLINIC | Age: 62
End: 2022-08-05

## 2022-08-05 DIAGNOSIS — R39.15 URINARY URGENCY: ICD-10-CM

## 2022-08-05 DIAGNOSIS — N39.0 URINARY TRACT INFECTION: Primary | ICD-10-CM

## 2022-08-05 LAB
ALBUMIN UR-MCNC: 300 MG/DL
APPEARANCE UR: ABNORMAL
BACTERIA #/AREA URNS HPF: ABNORMAL /HPF
BILIRUB UR QL STRIP: NEGATIVE
CAOX CRY #/AREA URNS HPF: ABNORMAL /HPF
COLOR UR AUTO: ABNORMAL
GLUCOSE UR STRIP-MCNC: NEGATIVE MG/DL
HGB UR QL STRIP: ABNORMAL
KETONES UR STRIP-MCNC: NEGATIVE MG/DL
LEUKOCYTE ESTERASE UR QL STRIP: ABNORMAL
MUCOUS THREADS #/AREA URNS LPF: PRESENT /LPF
NITRATE UR QL: POSITIVE
PH UR STRIP: 6 [PH] (ref 5–7)
RBC URINE: >182 /HPF
SCANNED LAB RESULT: ABNORMAL
SP GR UR STRIP: 1.03 (ref 1–1.03)
SQUAMOUS EPITHELIAL: 4 /HPF
UROBILINOGEN UR STRIP-MCNC: NORMAL MG/DL
WBC URINE: >182 /HPF

## 2022-08-05 PROCEDURE — 81001 URINALYSIS AUTO W/SCOPE: CPT

## 2022-08-05 PROCEDURE — 87086 URINE CULTURE/COLONY COUNT: CPT

## 2022-08-05 RX ORDER — SULFAMETHOXAZOLE/TRIMETHOPRIM 800-160 MG
1 TABLET ORAL 2 TIMES DAILY
Qty: 10 TABLET | Refills: 0 | Status: SHIPPED | OUTPATIENT
Start: 2022-08-05 | End: 2023-05-11

## 2022-08-05 NOTE — TELEPHONE ENCOUNTER
Spoke to pt. Reviewed UA results. Sent order per protocol. No other questions noted. Pt is aware that when culture is finalize, order may be updated.     Rukhsana Dickey RN MSN    Signed Prescriptions:                        Disp   Refills    sulfamethoxazole-trimethoprim (BACTRIM DS)*10 tab*0        Sig: Take 1 tablet by mouth 2 times daily  Authorizing Provider: FRANCIS BUNN  Ordering User: RUKHSAAN DICKEY

## 2022-08-05 NOTE — TELEPHONE ENCOUNTER
[facility] Call Center    Phone Message    May a detailed message be left on voicemail: yes     Reason for Call: Patient called stating she had labs done today on her urine and that she is wanting to know what the results are stating. She is hoping that if she is going to need any medications that someone can have those medications sent to her pharmacy before they close on the weekend. Please reach out to the patient ASAP. Thank you    Action Taken: Message routed to:  [facility] ([facility]): Urology    Travel Screening: Not Applicable

## 2022-08-05 NOTE — TELEPHONE ENCOUNTER
Nurse Triage SBAR    Is this a 2nd Level Triage? NO    Situation: Spoke to pt. Pt reports having UTI like symptoms.     Background: Pt does CIC.   Pt had cystoscopy procedure with Dr. Anderson on 7/26/22.     Assessment: Pt reports increased urge. Noted some phlegm comint out with catheter when she does CIC. Noted foul odor. Passing catheter has been going well. Feels like bladder spasms for about 10 minutes after CIC is done. Symptoms started a few days ago. Progressively getting more uncomfortable since the day after procedure. 6/10. Urine is yellow and cloudy. Pt is drinking a lot of fluids. No hematuria. No fever. No flank pain.     Protocol Recommended Disposition:   No disposition on file.    Recommendation: UA/UC.   Increase water intake to at least 8 glasses daily.   Tylenol, ibuprofen as needed.   Heating pad.        .    Does the patient meet one of the following criteria for ADS visit consideration? No     Rukhsana Black RN MSN    Orders Placed This Encounter   Procedures     Routine UA with microscopic - No culture     Standing Status:   Future     Standing Expiration Date:   8/5/2023     Urine Culture Aerobic Bacterial     Standing Status:   Future     Standing Expiration Date:   8/5/2023         Reason for Disposition    Bad or foul-smelling urine    Protocols used: URINARY SYMPTOMS-A-OH

## 2022-08-08 ENCOUNTER — TELEPHONE (OUTPATIENT)
Dept: UROLOGY | Facility: CLINIC | Age: 62
End: 2022-08-08

## 2022-08-08 DIAGNOSIS — N39.0 URINARY TRACT INFECTION: Primary | ICD-10-CM

## 2022-08-08 LAB — BACTERIA UR CULT: ABNORMAL

## 2022-08-08 NOTE — TELEPHONE ENCOUNTER
Results of DNA of urine reviewed with the patient.  Plan:  Stop Bactrim. Start Augmentin for 10 days and then prophylatic daily once determined.   This writer will notify the patient     Bailey Moseley RN, BSN  Care Coordinator Urology

## 2022-08-11 ENCOUNTER — PRE VISIT (OUTPATIENT)
Dept: UROLOGY | Facility: CLINIC | Age: 62
End: 2022-08-11

## 2022-08-11 RX ORDER — GRANULES FOR ORAL 3 G/1
3 POWDER ORAL WEEKLY
Qty: 4 PACKET | Refills: 0 | Status: SHIPPED | OUTPATIENT
Start: 2022-08-11 | End: 2023-03-13

## 2022-08-11 NOTE — TELEPHONE ENCOUNTER
Reason for Visit: Follow-up on DNA urine test    Diagnosis: Urinary urgency, Urinary retention    Orders/Procedures/Records: in system    Contact Patient: n/a    Rooming Requirements: Neeraj Weinberg  08/11/22  4:13 PM

## 2022-08-17 ENCOUNTER — VIRTUAL VISIT (OUTPATIENT)
Dept: UROLOGY | Facility: CLINIC | Age: 62
End: 2022-08-17
Payer: COMMERCIAL

## 2022-08-17 DIAGNOSIS — R33.9 URINARY RETENTION: Primary | ICD-10-CM

## 2022-08-17 DIAGNOSIS — N95.2 ATROPHIC VAGINITIS: ICD-10-CM

## 2022-08-17 DIAGNOSIS — N39.0 RECURRENT UTI: ICD-10-CM

## 2022-08-17 PROCEDURE — 99215 OFFICE O/P EST HI 40 MIN: CPT | Mod: 95 | Performed by: UROLOGY

## 2022-08-17 NOTE — PROGRESS NOTES
Ilene is a 62 year old who is being evaluated via a billable video visit.      How would you like to obtain your AVS? Mail a copy  If the video visit is dropped, the invitation should be resent by: Send to e-mail at: No e-mail address on record [email or web address]  Will anyone else be joining your video visit? No        Video-Visit Details    Video Start Time: 9:42 AM    Type of service:  Video Visit    Video End Time:9:58 AM    Originating Location (pt. Location): Home    Distant Location (provider location):  [facility] [place]     Platform used for Video Visit: [employer]

## 2022-08-17 NOTE — PATIENT INSTRUCTIONS
- continue CIC  - Continue vaginal estrogen cream three times weekly  - continue flomax nightly  - UDS to better evaluate voiding function  - schedule IM gentamycin on MTW of next week.

## 2022-08-17 NOTE — PROGRESS NOTES
Reason for Visit:  F/u on bladder biopsy.    Clinical Data:  Ms. Gaviria is a 61 year old female with frequency, urgency and now recent episode of retention.  History of colorectal cancer s/p resection and colostomy placement.  History of sling shortly after and then had trouble with UTI like symptoms after.  Describes recent symptoms as pain when bladder fills up.  UDS done once at outside facility but during a time she believes she had UTI.  Cysto recently done by Dr. Mckeon which showed erythematous lesion.     She underwent a cystoscopy and biopsy on 7/26/22  Final Diagnosis   A. Bladder,right lateral wall, biopsy:  - Denuded urothelium, granulation tissue  - Negative for malignancy     B.  Bladder, posterior wall, biopsy:  - Bladder mucosa with acute and chronic inflammation and reactive changes  - Negative for malignancy     Performs CIC and she voids about 25% of the time on her own and the rest by catheter and reports to be feeling better than she has in a while.    She did however have a UTI in August with 3 different sensitivities.  She has not had the IM gent yet.    7/5/22 CT abd/pelvis  IMPRESSION:   No acute process demonstrated.      Review of Labs:  The following labs were reviewed by me and discussed with the patient:  Lab Results   Component Value Date    WBC 9.0 07/01/2022    WBC 8.2 08/08/2018     Lab Results   Component Value Date    RBC 4.17 07/01/2022    RBC 3.39 08/08/2018     Lab Results   Component Value Date    HGB 12.9 07/01/2022    HGB 10.8 08/08/2018     Lab Results   Component Value Date    HCT 38.8 07/01/2022    HCT 32.9 08/08/2018     Lab Results   Component Value Date    MCV 93 07/01/2022    MCV 97 08/08/2018     Lab Results   Component Value Date    MCH 30.9 07/01/2022    MCH 31.9 08/08/2018     Lab Results   Component Value Date    MCHC 33.2 07/01/2022    MCHC 32.8 08/08/2018     Lab Results   Component Value Date    RDW 12.6 07/01/2022    RDW 13.5 08/08/2018     Lab Results    Component Value Date     07/01/2022     08/10/2018        Last Comprehensive Metabolic Panel:  Sodium   Date Value Ref Range Status   07/01/2022 139 133 - 144 mmol/L Final   12/17/2019 141 133 - 144 mmol/L Final     Potassium   Date Value Ref Range Status   07/01/2022 5.0 3.4 - 5.3 mmol/L Final   12/17/2019 4.3 3.4 - 5.3 mmol/L Final     Chloride   Date Value Ref Range Status   07/01/2022 106 94 - 109 mmol/L Final   12/17/2019 107 94 - 109 mmol/L Final     Carbon Dioxide   Date Value Ref Range Status   12/17/2019 28 20 - 32 mmol/L Final     Carbon Dioxide (CO2)   Date Value Ref Range Status   07/01/2022 25 20 - 32 mmol/L Final     Anion Gap   Date Value Ref Range Status   07/01/2022 8 3 - 14 mmol/L Final   12/17/2019 6 3 - 14 mmol/L Final     Glucose   Date Value Ref Range Status   07/01/2022 204 (H) 70 - 99 mg/dL Final   12/17/2019 100 (H) 70 - 99 mg/dL Final     GLUCOSE BY METER POCT   Date Value Ref Range Status   07/26/2022 101 (H) 70 - 99 mg/dL Final     Urea Nitrogen   Date Value Ref Range Status   07/01/2022 31 (H) 7 - 30 mg/dL Final   12/17/2019 18 7 - 30 mg/dL Final     Creatinine   Date Value Ref Range Status   07/01/2022 1.41 (H) 0.52 - 1.04 mg/dL Final   12/17/2019 0.60 0.52 - 1.04 mg/dL Final     GFR Estimate   Date Value Ref Range Status   07/01/2022 42 (L) >60 mL/min/1.73m2 Final     Comment:     Effective December 21, 2021 eGFRcr in adults is calculated using the 2021 CKD-EPI creatinine equation which includes age and gender (Víctor et al., NEJM, DOI: 10.1056/XCEDir1076470)   05/10/2021 86 >60 mL/min/[1.73_m2] Final     GFR, ESTIMATED POCT   Date Value Ref Range Status   05/23/2022 >60 >60 mL/min/1.73m2 Final     Calcium   Date Value Ref Range Status   07/01/2022 9.2 8.5 - 10.1 mg/dL Final   12/17/2019 9.1 8.5 - 10.1 mg/dL Final     Bilirubin Total   Date Value Ref Range Status   07/01/2022 0.8 0.2 - 1.3 mg/dL Final   04/24/2017 0.3 0.2 - 1.3 mg/dL Final     Alkaline Phosphatase   Date  Value Ref Range Status   07/01/2022 94 40 - 150 U/L Final   04/24/2017 83 40 - 150 U/L Final     ALT   Date Value Ref Range Status   07/01/2022 49 0 - 50 U/L Final   04/24/2017 36 0 - 50 U/L Final     AST   Date Value Ref Range Status   07/01/2022 19 0 - 45 U/L Final   04/24/2017 20 0 - 45 U/L Final                Color Urine (no units)   Date Value   08/05/2022 Orange (A)     Appearance Urine (no units)   Date Value   08/05/2022 Cloudy (A)     Glucose Urine (mg/dL)   Date Value   08/05/2022 Negative     Bilirubin Urine (no units)   Date Value   08/05/2022 Negative     Ketones Urine (mg/dL)   Date Value   08/05/2022 Negative     Specific Gravity Urine (no units)   Date Value   08/05/2022 1.027     pH Urine (no units)   Date Value   08/05/2022 6.0     Protein Albumin Urine (mg/dL)   Date Value   08/05/2022 300  (A)     Nitrite Urine (no units)   Date Value   08/05/2022 Positive (A)     Leukocyte Esterase Urine (no units)   Date Value   08/05/2022 Large (A)          Assessment & Plan   61F with chronic frequency, urgency and now recent episode of retention on CIC.  She has an acute cystitis and still needs IM gentamycin which she would like to get on MTW.  Finally will proceed with UDS to help elicit whether she has detrusor function or obstruction from her sling.  - continue CIC  - Continue vaginal estrogen cream three times weekly  - continue flomax nightly  - UDS to better evaluate voiding function  - schedule IM gentamycin on MTW of next week.      Kacy Anderson MD  [facility] [place]    Patient was seen, evaluated and plan was formulated in conjunction with me and I agree with the above.  Kacy Anderson MD      ==========================      Additional Coding Information:    Time spent:  46 minutes spent on the date of the encounter doing chart review, history and exam, documentation and further activities per the note

## 2022-08-17 NOTE — LETTER
8/17/2022       RE: Ilene Gaviria  [address] [facility] [facility] [id number]     Dear Colleague,    Thank you for referring your patient, Ilene Gaviria, to the [facility] [place] at [facility]. Please see a copy of my visit note below.    Ilene is a 62 year old who is being evaluated via a billable video visit.      How would you like to obtain your AVS? Mail a copy  If the video visit is dropped, the invitation should be resent by: Send to e-mail at: No e-mail address on record [email or web address]  Will anyone else be joining your video visit? No        Video-Visit Details    Video Start Time: 9:42 AM    Type of service:  Video Visit    Video End Time:9:58 AM    Originating Location (pt. Location): Home    Distant Location (provider location):  [facility]     Platform used for Video Visit: [employer]    Reason for Visit:  F/u on bladder biopsy.    Clinical Data:  Ms. Gaviria is a 61 year old female with frequency, urgency and now recent episode of retention.  History of colorectal cancer s/p resection and colostomy placement.  History of sling shortly after and then had trouble with UTI like symptoms after.  Describes recent symptoms as pain when bladder fills up.  UDS done once at outside facility but during a time she believes she had UTI.  Cysto recently done by Dr. Mckeon which showed erythematous lesion.     She underwent a cystoscopy and biopsy on 7/26/22  Final Diagnosis   A. Bladder,right lateral wall, biopsy:  - Denuded urothelium, granulation tissue  - Negative for malignancy     B.  Bladder, posterior wall, biopsy:  - Bladder mucosa with acute and chronic inflammation and reactive changes  - Negative for malignancy     Performs CIC and she voids about 25% of the time on her own and the rest by catheter and reports to be feeling better than she has in a while.    She did however have a UTI in  August with 3 different sensitivities.  She has not had the IM gent yet.    7/5/22 CT abd/pelvis  IMPRESSION:   No acute process demonstrated.      Review of Labs:  The following labs were reviewed by me and discussed with the patient:  Lab Results   Component Value Date    WBC 9.0 07/01/2022    WBC 8.2 08/08/2018     Lab Results   Component Value Date    RBC 4.17 07/01/2022    RBC 3.39 08/08/2018     Lab Results   Component Value Date    HGB 12.9 07/01/2022    HGB 10.8 08/08/2018     Lab Results   Component Value Date    HCT 38.8 07/01/2022    HCT 32.9 08/08/2018     Lab Results   Component Value Date    MCV 93 07/01/2022    MCV 97 08/08/2018     Lab Results   Component Value Date    MCH 30.9 07/01/2022    MCH 31.9 08/08/2018     Lab Results   Component Value Date    MCHC 33.2 07/01/2022    MCHC 32.8 08/08/2018     Lab Results   Component Value Date    RDW 12.6 07/01/2022    RDW 13.5 08/08/2018     Lab Results   Component Value Date     07/01/2022     08/10/2018        Last Comprehensive Metabolic Panel:  Sodium   Date Value Ref Range Status   07/01/2022 139 133 - 144 mmol/L Final   12/17/2019 141 133 - 144 mmol/L Final     Potassium   Date Value Ref Range Status   07/01/2022 5.0 3.4 - 5.3 mmol/L Final   12/17/2019 4.3 3.4 - 5.3 mmol/L Final     Chloride   Date Value Ref Range Status   07/01/2022 106 94 - 109 mmol/L Final   12/17/2019 107 94 - 109 mmol/L Final     Carbon Dioxide   Date Value Ref Range Status   12/17/2019 28 20 - 32 mmol/L Final     Carbon Dioxide (CO2)   Date Value Ref Range Status   07/01/2022 25 20 - 32 mmol/L Final     Anion Gap   Date Value Ref Range Status   07/01/2022 8 3 - 14 mmol/L Final   12/17/2019 6 3 - 14 mmol/L Final     Glucose   Date Value Ref Range Status   07/01/2022 204 (H) 70 - 99 mg/dL Final   12/17/2019 100 (H) 70 - 99 mg/dL Final     GLUCOSE BY METER POCT   Date Value Ref Range Status   07/26/2022 101 (H) 70 - 99 mg/dL Final     Urea Nitrogen   Date Value Ref  Range Status   07/01/2022 31 (H) 7 - 30 mg/dL Final   12/17/2019 18 7 - 30 mg/dL Final     Creatinine   Date Value Ref Range Status   07/01/2022 1.41 (H) 0.52 - 1.04 mg/dL Final   12/17/2019 0.60 0.52 - 1.04 mg/dL Final     GFR Estimate   Date Value Ref Range Status   07/01/2022 42 (L) >60 mL/min/1.73m2 Final     Comment:     Effective December 21, 2021 eGFRcr in adults is calculated using the 2021 CKD-EPI creatinine equation which includes age and gender (Víctor et al., NEJM, DOI: 10.1056/XLPIot1036010)   05/10/2021 86 >60 mL/min/[1.73_m2] Final     GFR, ESTIMATED POCT   Date Value Ref Range Status   05/23/2022 >60 >60 mL/min/1.73m2 Final     Calcium   Date Value Ref Range Status   07/01/2022 9.2 8.5 - 10.1 mg/dL Final   12/17/2019 9.1 8.5 - 10.1 mg/dL Final     Bilirubin Total   Date Value Ref Range Status   07/01/2022 0.8 0.2 - 1.3 mg/dL Final   04/24/2017 0.3 0.2 - 1.3 mg/dL Final     Alkaline Phosphatase   Date Value Ref Range Status   07/01/2022 94 40 - 150 U/L Final   04/24/2017 83 40 - 150 U/L Final     ALT   Date Value Ref Range Status   07/01/2022 49 0 - 50 U/L Final   04/24/2017 36 0 - 50 U/L Final     AST   Date Value Ref Range Status   07/01/2022 19 0 - 45 U/L Final   04/24/2017 20 0 - 45 U/L Final                Color Urine (no units)   Date Value   08/05/2022 Orange (A)     Appearance Urine (no units)   Date Value   08/05/2022 Cloudy (A)     Glucose Urine (mg/dL)   Date Value   08/05/2022 Negative     Bilirubin Urine (no units)   Date Value   08/05/2022 Negative     Ketones Urine (mg/dL)   Date Value   08/05/2022 Negative     Specific Gravity Urine (no units)   Date Value   08/05/2022 1.027     pH Urine (no units)   Date Value   08/05/2022 6.0     Protein Albumin Urine (mg/dL)   Date Value   08/05/2022 300  (A)     Nitrite Urine (no units)   Date Value   08/05/2022 Positive (A)     Leukocyte Esterase Urine (no units)   Date Value   08/05/2022 Large (A)          Assessment & Plan   61F with chronic  frequency, urgency and now recent episode of retention on CIC.  She has an acute cystitis and still needs IM gentamycin which she would like to get on MTW.  Finally will proceed with UDS to help elicit whether she has detrusor function or obstruction from her sling.  - continue CIC  - Continue vaginal estrogen cream three times weekly  - continue flomax nightly  - UDS to better evaluate voiding function  - schedule IM gentamycin on MTW of next week.      Kacy Anderson MD  [facility] [place]    Patient was seen, evaluated and plan was formulated in conjunction with me and I agree with the above.  Kacy Anderson MD      ==========================      Additional Coding Information:    Time spent:  46 minutes spent on the date of the encounter doing chart review, history and exam, documentation and further activities per the note

## 2022-08-19 ENCOUNTER — TELEPHONE (OUTPATIENT)
Dept: UROLOGY | Facility: CLINIC | Age: 62
End: 2022-08-19

## 2022-08-19 ENCOUNTER — PRE VISIT (OUTPATIENT)
Dept: UROLOGY | Facility: CLINIC | Age: 62
End: 2022-08-19

## 2022-08-19 NOTE — TELEPHONE ENCOUNTER
Detailed voicemail left for patient today.  Patient is scheduled for Urodynamics testing on 8/24 and performs CIC several times per day.  Would like to know if she is currently having any UTI symptoms (fever, chills, dysuria, hematuria, incontinence, cloudy urine).  If so, will need to get a UA/UC on her ASAP.  Since it is nearing the weekend, she would probably need to go to an Urgent care for the urine sample.  If no symptoms, OK to proceed with Urodynamics testing without UA/UC prior.    Damaris Davis, CMA

## 2022-08-22 ENCOUNTER — OFFICE VISIT (OUTPATIENT)
Dept: UROLOGY | Facility: CLINIC | Age: 62
End: 2022-08-22

## 2022-08-22 DIAGNOSIS — N39.0 RECURRENT UTI: Primary | ICD-10-CM

## 2022-08-22 PROCEDURE — 99207 PR NO CHARGE NURSE ONLY: CPT

## 2022-08-22 PROCEDURE — 96372 THER/PROPH/DIAG INJ SC/IM: CPT | Performed by: UROLOGY

## 2022-08-22 RX ORDER — GENTAMICIN 40 MG/ML
160 INJECTION, SOLUTION INTRAMUSCULAR; INTRAVENOUS ONCE
Status: COMPLETED | OUTPATIENT
Start: 2022-08-22 | End: 2022-08-22

## 2022-08-22 RX ADMIN — GENTAMICIN 160 MG: 40 INJECTION, SOLUTION INTRAMUSCULAR; INTRAVENOUS at 09:10

## 2022-08-22 NOTE — PROGRESS NOTES
Ilene Gaviria comes into clinic today at the request of Dr. Anderson with the diagnosis of recurrent UTI for a gentamicin injection.    The following medication was given:     MEDICATION: Gentamicin   ROUTE: IM  SITE: L deltoid  DOSE: 160mg  LOT #: [id number]  : [employer]  EXPIRATION DATE: 06/23  NDC#: 20009-382-85   Was there drug waste? No    Pt assessed for appropriate site for injection. Pt does not have good muscle mass in her buttocks to support an injection. L deltoid was chosen.       This service provided today was under the supervising provider of the day Raiza Ritchie, who was available if needed.    Linda Rowe RN

## 2022-08-23 ENCOUNTER — OFFICE VISIT (OUTPATIENT)
Dept: UROLOGY | Facility: CLINIC | Age: 62
End: 2022-08-23
Payer: COMMERCIAL

## 2022-08-23 DIAGNOSIS — N39.0 RECURRENT UTI: Primary | ICD-10-CM

## 2022-08-23 PROCEDURE — 99207 PR NO BILLABLE SERVICE THIS VISIT: CPT

## 2022-08-23 PROCEDURE — 96372 THER/PROPH/DIAG INJ SC/IM: CPT | Performed by: UROLOGY

## 2022-08-23 RX ORDER — GENTAMICIN 40 MG/ML
80 INJECTION, SOLUTION INTRAMUSCULAR; INTRAVENOUS ONCE
Status: COMPLETED | OUTPATIENT
Start: 2022-08-23 | End: 2022-08-23

## 2022-08-23 RX ADMIN — GENTAMICIN 80 MG: 40 INJECTION, SOLUTION INTRAMUSCULAR; INTRAVENOUS at 09:14

## 2022-08-23 NOTE — PROGRESS NOTES
Ilene Gaviria comes into clinic today at the request of Dr. Anderson with the diagnosis of recurrent UTI for a gentamicin injection.     The following medication was given:     MEDICATION: Gentamicin   ROUTE: IM  SITE: R deltoid  DOSE: 80mg  LOT #: [id number]  : [employer]  EXPIRATION DATE: 06/23  NDC#: 94886-215-44   Was there drug waste? No     Pt assessed for appropriate site for injection. Pt does not have good muscle mass in her buttocks to support an injection. R deltoid was chosen.         This service provided today was under the supervising provider of the day Laureen Mendiola, who was available if needed.     Linda Rowe RN

## 2022-08-24 ENCOUNTER — ALLIED HEALTH/NURSE VISIT (OUTPATIENT)
Dept: UROLOGY | Facility: CLINIC | Age: 62
End: 2022-08-24
Payer: COMMERCIAL

## 2022-08-24 ENCOUNTER — OFFICE VISIT (OUTPATIENT)
Dept: UROLOGY | Facility: CLINIC | Age: 62
End: 2022-08-24
Payer: COMMERCIAL

## 2022-08-24 VITALS
WEIGHT: 200 LBS | DIASTOLIC BLOOD PRESSURE: 102 MMHG | BODY MASS INDEX: 30.31 KG/M2 | HEART RATE: 101 BPM | SYSTOLIC BLOOD PRESSURE: 137 MMHG | HEIGHT: 68 IN

## 2022-08-24 DIAGNOSIS — N39.0 RECURRENT UTI: Primary | ICD-10-CM

## 2022-08-24 DIAGNOSIS — R33.9 URINARY RETENTION: Primary | ICD-10-CM

## 2022-08-24 DIAGNOSIS — R39.15 URINARY URGENCY: ICD-10-CM

## 2022-08-24 DIAGNOSIS — N39.0 RECURRENT UTI: ICD-10-CM

## 2022-08-24 LAB
ALBUMIN UR-MCNC: 30 MG/DL
APPEARANCE UR: CLEAR
BILIRUB UR QL STRIP: NEGATIVE
COLOR UR AUTO: YELLOW
GLUCOSE UR STRIP-MCNC: NEGATIVE MG/DL
HGB UR QL STRIP: ABNORMAL
KETONES UR STRIP-MCNC: NEGATIVE MG/DL
LEUKOCYTE ESTERASE UR QL STRIP: ABNORMAL
NITRATE UR QL: NEGATIVE
PH UR STRIP: 5.5 [PH] (ref 5–8)
SP GR UR STRIP: 1.02 (ref 1–1.03)
UROBILINOGEN UR STRIP-ACNC: 0.2 E.U./DL

## 2022-08-24 PROCEDURE — 51784 ANAL/URINARY MUSCLE STUDY: CPT | Mod: 51 | Performed by: PHYSICIAN ASSISTANT

## 2022-08-24 PROCEDURE — 99207 PR NO BILLABLE SERVICE THIS VISIT: CPT

## 2022-08-24 PROCEDURE — 51600 INJECTION FOR BLADDER X-RAY: CPT | Performed by: PHYSICIAN ASSISTANT

## 2022-08-24 PROCEDURE — 51728 CYSTOMETROGRAM W/VP: CPT | Performed by: PHYSICIAN ASSISTANT

## 2022-08-24 PROCEDURE — 81003 URINALYSIS AUTO W/O SCOPE: CPT | Performed by: PHYSICIAN ASSISTANT

## 2022-08-24 PROCEDURE — 51741 ELECTRO-UROFLOWMETRY FIRST: CPT | Mod: 51 | Performed by: PHYSICIAN ASSISTANT

## 2022-08-24 PROCEDURE — 74430 CONTRAST X-RAY BLADDER: CPT | Performed by: PHYSICIAN ASSISTANT

## 2022-08-24 PROCEDURE — 51797 INTRAABDOMINAL PRESSURE TEST: CPT | Performed by: PHYSICIAN ASSISTANT

## 2022-08-24 PROCEDURE — 81003 URINALYSIS AUTO W/O SCOPE: CPT | Performed by: PATHOLOGY

## 2022-08-24 PROCEDURE — 96372 THER/PROPH/DIAG INJ SC/IM: CPT | Performed by: UROLOGY

## 2022-08-24 RX ORDER — IOPAMIDOL 510 MG/ML
200 INJECTION, SOLUTION INTRAVASCULAR ONCE
Status: COMPLETED | OUTPATIENT
Start: 2022-08-24 | End: 2022-08-24

## 2022-08-24 RX ORDER — GENTAMICIN 40 MG/ML
80 INJECTION, SOLUTION INTRAMUSCULAR; INTRAVENOUS ONCE
Status: COMPLETED | OUTPATIENT
Start: 2022-08-24 | End: 2022-08-24

## 2022-08-24 RX ORDER — GENTAMICIN 40 MG/ML
80 INJECTION, SOLUTION INTRAMUSCULAR; INTRAVENOUS ONCE
Status: CANCELLED | OUTPATIENT
Start: 2022-08-24 | End: 2022-08-24

## 2022-08-24 RX ADMIN — GENTAMICIN 80 MG: 40 INJECTION, SOLUTION INTRAMUSCULAR; INTRAVENOUS at 09:13

## 2022-08-24 RX ADMIN — IOPAMIDOL 200 ML: 510 INJECTION, SOLUTION INTRAVASCULAR at 15:14

## 2022-08-24 ASSESSMENT — PAIN SCALES - GENERAL: PAINLEVEL: NO PAIN (0)

## 2022-08-24 NOTE — PROGRESS NOTES
Ilene Gaviria comes into clinic today at the request of Dr. Anderson with the diagnosis of recurrent UTI for a gentamicin injection.     The following medication was given:     MEDICATION: Gentamicin   ROUTE: IM  SITE: L deltoid  DOSE: 80mg  LOT #: [id number]  : [employer]  EXPIRATION DATE: 06/23  NDC#: 74826-741-72   Was there drug waste? No     Pt assessed for appropriate site for injection. Pt does not have good muscle mass in her buttocks to support an injection. L deltoid was chosen.         This service provided today was under the supervising provider of the day Laureen Mendiola, who was available if needed.     Linda Rowe RN

## 2022-08-24 NOTE — PROGRESS NOTES
PREPROCEDURE DIAGNOSES:    1. Urinary retention  2. Urinary frequency and urgency  3. Recurrent UTI  4. History of stress incontinence s/p midurethral sling  5. History of colorectal cancer s/p resection and colostomy as well as radiation    POSTPROCEDURE DIAGNOSES:  -Maximum cystometric capacity 360 mL with normal filling sensations.  -Good bladder compliance without detrusor overactivity or stress incontinence.  -Maximum detrusor contraction during voiding reaches 38 cm H2O.  -With catheters in place, she voids 10 mL with Qmax 0.8 ml/s and increased EMG activity. BOOI is 31.1 which is equivocal for bladder outlet obstruction.  -With catheters removed, she voids an additional 32 mL with Qmax 2.3 ml/s and incomplete bladder emptying with final catheterized postvoid residual of 320 mL.   -Fluoroscopy reveals a moderately trabeculated bladder wall without diverticuli or VUR. The bladder neck was closed during filling; voiding images unavailable as patient transferred to [facility] due to being unable to void on Sonesta chair.    PROCEDURE:    1. Sterile urethral catheterization for measurement of residual urine volume.  2. Complex filling cystometrogram with measurement of bladder and rectal pressures.  3. Complex voiding cystometrogram with measurement of bladder and rectal pressures.  4. Electromyography of the pelvic floor during urodynamics.  5. Fluoroscopic imaging of the bladder during urodynamics, at least 3 views.    6. Uroflowmetry.  7. Interpretation of urodynamics and flouroscopic imaging.      INDICATIONS FOR PROCEDURE:  Ms. Ilene Gaviria is a pleasant 62 year old female with a history of colorectal cancer s/p resection and radiation, as well as stress incontinence s/p midurethral sling. Now with urinary frequency, urgency, as well as retention for which she performs CIC. Baseline video urodynamic assessment is requested today by Dr. Anderson to better characterize Ms. Ilene Gaviria's voiding  dysfunction.      VOIDING DIARY:  Did not complete.    DESCRIPTION OF PROCEDURE:  Risks, benefits, and alternatives to urodynamics were discussed with the patient and she wished to proceed.  Urodynamics are planned to better assess the primary etiology for Ms. Gaviria's urologic dysfunction.  The patient does not currently take anticholinergic or beta 3 agonist medications for her bladder.  After informed consent was obtained, the patient was taken to the procedure room where uroflowmetry was performed. Findings below.     PRE-STUDY UROFLOWMETRY:  Not performed as patient had no urge to void.  Residual by catheter: 280 mL.  Pretest urine dipstick was negative for nitrites, positive for moderate leukocytes. The patient denies any UTI symptoms today; specifically, denies dysuria, worsening frequency, urgency, gross hematuria, fevers, chills, N/V. In this patient who self-catheterizes multiple times per day, colonization is likely and therefore some degree of pyuria is not unexpected. In addition, she completed a 3 day course of IM gentamicin this morning for recent positive urine culture.    Next a 7F double-lumen urodynamics catheter was inserted into the bladder under sterile technique via urethra.  A 7F abdominal manometry catheter was placed in the vagina.  EMG pads were placed on both sides of the anal verge.  The bladder was filled with 200 mL of Isovue-250 at 40 mL/minute and serial pressures were recorded.  With coughing there was an appropriate rise in vesical and abdominal pressures with no change in detrusor pressure, confirming good study catheter placement.    DURING THE FILLING PHASE:  First sensation: 136 mL.  First Desire: 254 mL.  Strong Desire: 292 mL.  Maximum Capacity: 360 mL.    Uninhibited detrusor contractions: none.  Compliance: good. PDet essentially 0 cm H2O throughout filling.  Continence: no DOI or NBA.  EMG: concordant during filling.    DURING THE VOIDING PHASE:  Maximum detrusor  contraction with void: 38 cm of H2O pressure.  Voided volume: 10 mL (+ an additional 32 mL with catheters removed).  Maximum flow rate: 0.8 mL/sec (2.3 mL/sc).  Postvoid Residual: 320 mL after double voiding.  EMG activity: increased.  Character of voiding curve: flat, prolonged.  BOOI: 31.1 (suggesting equivocal for  obstruction - see key below)  [obstructed (MURPHY index [BOOI] ? 40); equivocal (no definite   obstruction; BOOI 20-40); and no obstruction (BOOI ? 20)]    FLUOROSCOPIC IMAGING OF THE BLADDER DURING URODYNAMICS:  Please note, image numbers on UDS tracings correlate with iSite series numbers on PACS images. Fluoroscopy during today's procedure demonstrated a moderately trabeculated bladder wall without diverticulae or cellules.  No vesicoureteral reflux was observed.  The bladder neck was closed during filling; voiding images unavailable as patient transferred to [facility] to void.  After voiding to completion, all catheters were removed, and the patient straight catheterized for residual volume.    ASSESSMENT/PLAN:  Ms. Ilene Gaviria is a pleasant 62 year old female with urinary retention and frequency/urgency who demonstrated the following findings today on urodynamic evaluation:    -Maximum cystometric capacity 360 mL with normal filling sensations.  -Good bladder compliance without detrusor overactivity or stress incontinence.  -Maximum detrusor contraction during voiding reaches 38 cm H2O.  -With catheters in place, she voids 10 mL with Qmax 0.8 ml/s and increased EMG activity. BOOI is 31.1 which is equivocal for bladder outlet obstruction.  -With catheters removed, she voids an additional 32 mL with Qmax 2.3 ml/s and incomplete bladder emptying with final catheterized postvoid residual of 320 mL.   -Fluoroscopy reveals a moderately trabeculated bladder wall without diverticuli or VUR. The bladder neck was closed during filling; voiding images unavailable as patient transferred to [facility] due to  being unable to void on Sonesta chair.    The patient will follow up as scheduled with Dr. Anderson to further discuss today's study results and make plans for how best to proceed.      - The patient received her third dose of IM gentamicin this morning for recent positive urine culture. Therefore, additional antibiotic prophylaxis was not administered today. The risk of UTI with VUDS is low at ~2.5-3%.      Thank you for allowing me to participate in the care of Ms. Ilene Gaviria and please don't hesitate to contact me with any questions or concerns.      TANYA KimC  Urology Physician Assistant

## 2022-08-24 NOTE — NURSING NOTE
"  Chief Complaint   Patient presents with     Urodynamics Study     Urinary retentioh       Blood pressure (!) 137/102, pulse 101, height 1.727 m (5' 8\"), weight 90.7 kg (200 lb), not currently breastfeeding. Body mass index is 30.41 kg/m .    Patient Active Problem List   Diagnosis     BMI 30.0-30.9,adult     Rectal cancer (H)     Colostomy in place (H)     Lesion of bladder     Somatic dysfunction of pelvic region       Allergies   Allergen Reactions     No Known Allergies        Current Outpatient Medications   Medication Sig Dispense Refill     metFORMIN (GLUCOPHAGE) 500 MG tablet Take 500 mg by mouth 2 times daily (with meals)       simvastatin (ZOCOR) 20 MG tablet Take 1 tablet by mouth daily (with dinner)       tamsulosin (FLOMAX) 0.4 MG capsule Take 1 capsule (0.4 mg) by mouth daily for 360 days (Patient taking differently: Take 0.4 mg by mouth every evening) 30 capsule 11     celecoxib (CELEBREX) 200 MG capsule Take 1 capsule (200 mg) by mouth daily as needed for moderate pain (Patient not taking: Reported on 7/22/2022) 15 capsule 0     estradiol (ESTRACE) 0.1 MG/GM vaginal cream Place 2 g vaginally three times a week (Patient not taking: No sig reported) 42.5 g 3     fosfomycin (MONUROL) 3 g Packet Take 1 packet (3 g) by mouth once a week (Patient not taking: Reported on 8/24/2022) 4 packet 0     glycerin (LAXATIVE) 1.2 g suppository Place 1 suppository rectally daily as needed (constipation) (Patient not taking: No sig reported) 5 suppository 0     hyoscyamine ER (LEVBID) 375 mcg 12 hr tablet Take 1 tablet (0.375 mg) by mouth every 12 hours as needed for cramping (Patient not taking: No sig reported) 30 tablet 0     lisinopril (ZESTRIL) 20 MG tablet Take 1 tablet (20 mg) by mouth daily (Patient not taking: Reported on 8/24/2022) 30 tablet 3     semaglutide (OZEMPIC, 0.25 OR 0.5 MG/DOSE,) 2 MG/1.5ML SOPN pen Week 1 through week 4 : 0.25 mg once weekly. Week 5 through week 8: 0.5 mg once weekly. Week 9 : 1 " mg once weekly. (Patient not taking: Reported on 2022) 3 mg 3     sennosides (SENOKOT) 8.6 MG tablet Take 1 tablet by mouth daily as needed for constipation (Patient not taking: No sig reported) 30 tablet 0     sulfamethoxazole-trimethoprim (BACTRIM DS) 800-160 MG tablet Take 1 tablet by mouth 2 times daily (Patient not taking: Reported on 2022) 10 tablet 0     tolterodine ER (DETROL LA) 2 MG 24 hr capsule Take 1 capsule (2 mg) by mouth daily (Patient not taking: No sig reported) 30 capsule 3       Social History     Tobacco Use     Smoking status: Never Smoker     Smokeless tobacco: Never Used   Substance Use Topics     Alcohol use: Yes     Comment: rare     Drug use: No       Invasive Procedure Safety Checklist:    Procedure: Urodynamics    Action: Complete sections and checkboxes as appropriate.  Pre-procedure:  1. Patient ID Verified with 2 identifiers (Zeinab and  or MRN) : YES    2. Procedure and site verified with patient/designee (when able) : YES    3. Accurate consent documentation in medical record : YES    4. H&P (or appropriate assessment) documented in medical record : N/A  H&P must be up to 30 days prior to procedure an updated within 24 hours of Procedure as applicable.     5. Relevant diagnostic and radiology test results appropriately labeled and displayed as applicable : YES    6. Blood products, implants, devices, and/or special equipment available for the procedure as applicable : YES    7. Procedure site(s) marked with provider initials [Exclusions: none] : NO    8. Marking not required. Reason : Yes  Procedure does not require site marking    Time Out:     Time-Out performed immediately prior to starting procedure, including verbal and active participation of all team members addressing: YES    1. Correct patient identity.  2. Confirmed that the correct side and site are marked.  3. An accurate procedure to be done.  4. Agreement on the procedure to be done.  5. Correct patient  position.  6. Relevant images and results are properly labeled and appropriately displayed.  7. The need to administer antibiotics or fluids for irrigation purposes during the procedure as applicable.  8. Safety precautions based on patient history or medication use.    During Procedure: Verification of correct person, site, and procedure occurs any time the responsibility for care of the patient is transferred to another member of the care team.        The following medication was given:     MEDICATION:  Isovue-250  ROUTE: Provider Administered  SITE: Provider Administered via catheter  DOSE: 200mL  LOT #: [id number]  : Denia  EXPIRATION DATE: 10/31/2025  NDC#: 1662-7072-67  Was there drug waste? No        Damaris Davis CMA  8/24/2022  3:12 PM

## 2022-08-29 ENCOUNTER — NURSE TRIAGE (OUTPATIENT)
Dept: UROLOGY | Facility: CLINIC | Age: 62
End: 2022-08-29

## 2022-08-29 DIAGNOSIS — N39.0 RECURRENT UTI: Primary | ICD-10-CM

## 2022-08-29 DIAGNOSIS — R30.0 DYSURIA: ICD-10-CM

## 2022-08-29 NOTE — TELEPHONE ENCOUNTER
Attempted to call pt. Left detailed message to call clinic back at [phone].   To discuss symptoms.     Rukhsana Black RN MSN

## 2022-08-29 NOTE — TELEPHONE ENCOUNTER
Nurse Triage SBAR    Is this a 2nd Level Triage? YES, LICENSED PRACTITIONER REVIEW IS REQUIRED    Situation: Spoke to pt. Pt reports UTI like symptoms.     Background: History of UTI's.   Pt received IM Gentamycin last week x 3.     Assessment: Pt reports increased urgency, frequency, and dysuria that started yesterday. Symptoms are better today. Dysuria rated 4/10 at this time. Pt has [place] [employer], but drinking at least 6-8 glasses of water daily. Urine is yellow and cloudy. Noted foul odor. No fever and no flank pain. Chart and problems list reviewed.    Protocol Recommended Disposition:   See in Office Today    Recommendation: UA/UC  Increase fluid intake to at least 8 glasses daily.   Tylenol and ibuprofen as needed.      Routed to provider    Does the patient meet one of the following criteria for ADS visit consideration? No     Rukhsana Black RN MSN    Orders Placed This Encounter   Procedures     Routine UA with microscopic - No culture     Standing Status:   Future     Standing Expiration Date:   8/29/2023     Urine Culture Aerobic Bacterial     Standing Status:   Future     Standing Expiration Date:   8/29/2023         Reason for Disposition    Urinating more frequently than usual (i.e., frequency)    Protocols used: URINARY SYMPTOMS-A-OH

## 2022-08-29 NOTE — TELEPHONE ENCOUNTER
[facility] Call Center    Phone Message    May a detailed message be left on voicemail: yes     Reason for Call: Other: . Pt is calling with symptoms of a UTI, she would like an antibiotic called into [id number] - [place] - [address] [facility] [place] [facility], she has a rodriguez at the fair and will barely have time to make it to the clinic for a UA/UC, please call pt thank you     Action Taken: Message routed to:  Other: uro    Travel Screening: Not Applicable

## 2022-08-30 ENCOUNTER — LAB (OUTPATIENT)
Dept: LAB | Facility: CLINIC | Age: 62
End: 2022-08-30
Payer: COMMERCIAL

## 2022-08-30 DIAGNOSIS — R30.0 DYSURIA: ICD-10-CM

## 2022-08-30 LAB
ALBUMIN UR-MCNC: NEGATIVE MG/DL
APPEARANCE UR: CLEAR
BILIRUB UR QL STRIP: NEGATIVE
COLOR UR AUTO: YELLOW
GLUCOSE UR STRIP-MCNC: NEGATIVE MG/DL
HGB UR QL STRIP: ABNORMAL
KETONES UR STRIP-MCNC: NEGATIVE MG/DL
LEUKOCYTE ESTERASE UR QL STRIP: ABNORMAL
MUCOUS THREADS #/AREA URNS LPF: PRESENT /LPF
NITRATE UR QL: POSITIVE
PH UR STRIP: 8 [PH] (ref 5–7)
RBC URINE: 61 /HPF
SP GR UR STRIP: 1.01 (ref 1–1.03)
UROBILINOGEN UR STRIP-MCNC: NORMAL MG/DL
WBC CLUMPS #/AREA URNS HPF: PRESENT /HPF
WBC URINE: >182 /HPF

## 2022-08-30 PROCEDURE — 99000 SPECIMEN HANDLING OFFICE-LAB: CPT | Performed by: PATHOLOGY

## 2022-08-30 PROCEDURE — 87086 URINE CULTURE/COLONY COUNT: CPT | Mod: 90 | Performed by: PATHOLOGY

## 2022-08-30 PROCEDURE — 81001 URINALYSIS AUTO W/SCOPE: CPT | Performed by: PATHOLOGY

## 2022-08-31 NOTE — PROGRESS NOTES
Attempted to call pt. Left detailed message to call clinic back at [phone].   To inform pt of order sent to [facility] in [place]. Culture still pending and order may have to be changed once those results are finalized. Pt is to  treatment now.     Rukhsana Dickey, RN MSN    Signed Prescriptions:                        Disp   Refills    amoxicillin-clavulanate (AUGMENTIN) 875-12*20 tab*0        Sig: Take 1 tablet by mouth 2 times daily  Authorizing Provider: FRANCIS BUNN  Ordering User: RUKHSANA DICKEY

## 2022-09-01 LAB — BACTERIA UR CULT: NORMAL

## 2022-09-01 NOTE — PROGRESS NOTES
Spoke to pt. Informed pt of culture results and advised to stay on current treatment sent. 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 Pt verbalized understanding. Will call clinic back as needed.     Rukhsana Black, RN MSN

## 2022-09-09 NOTE — PROGRESS NOTES
Outcome for 09/09/22 12:13 PM: Left Voicemail   Maria Elena Stout, ERNA  Outcome for 09/12/22 9:52 AM: Left Voicemail   ERNA Flores  Outcome for 09/12/22 3:32 PM: Patient is not checking blood sugars  Maria Elena Stout, VF

## 2022-09-13 ENCOUNTER — VIRTUAL VISIT (OUTPATIENT)
Dept: ENDOCRINOLOGY | Facility: CLINIC | Age: 62
End: 2022-09-13
Payer: COMMERCIAL

## 2022-09-13 ENCOUNTER — TELEPHONE (OUTPATIENT)
Dept: ENDOCRINOLOGY | Facility: CLINIC | Age: 62
End: 2022-09-13

## 2022-09-13 DIAGNOSIS — E11.40 TYPE 2 DIABETES MELLITUS WITH DIABETIC NEUROPATHY, WITHOUT LONG-TERM CURRENT USE OF INSULIN (H): Primary | ICD-10-CM

## 2022-09-13 PROCEDURE — 99214 OFFICE O/P EST MOD 30 MIN: CPT | Mod: 95 | Performed by: STUDENT IN AN ORGANIZED HEALTH CARE EDUCATION/TRAINING PROGRAM

## 2022-09-13 RX ORDER — TIRZEPATIDE 2.5 MG/.5ML
2.5 INJECTION, SOLUTION SUBCUTANEOUS WEEKLY
Qty: 2 ML | Refills: 3 | Status: SHIPPED | OUTPATIENT
Start: 2022-09-13 | End: 2023-03-13

## 2022-09-13 NOTE — TELEPHONE ENCOUNTER
PA Initiation    Medication: Mounjaro-Started  Insurance Company: [employer] - Phone [phone] Fax [phone]  Pharmacy Filling the Rx:    Filling Pharmacy Phone:    Filling Pharmacy Fax:    Start Date: 9/13/2022

## 2022-09-13 NOTE — PROGRESS NOTES
Endocrinology Clinic Visit 6/1/2022    Video-Visit Details     Type of service:  Video Visit     Video Start Time: 9:05 am   Video End Time: 9:17 am      I spent a total of 30 minutes on the date of encounter reviewing medical records, evaluating the patient, coordinating care and documenting in the EHR, as detailed above.        Platform used for Video Visit: Babybe      NAME:  Ilene Gaviria  PCP:  Trevor Rizo  MRN:  [id number]  Reason for Consult: DM2  Requesting Provider:  Referred Self    Chief Complaint     Chief Complaint   Patient presents with     Diabetes     Type 2        History of Present Illness     Ilene Gaviria is a 61 year old female who is seen in clinic for DM2    Her past medical history is significant for rectal cancer status post surgery and chemotherapy currently in remission.  Never been on steroids before.  The patient was diagnosed with type 2 diabetes . 6 month ago new diagnosis of DM2. A1C was checked no records, she said it was in the range of 8.  She was started on metformin, currently at 2 g daily.  She is tolerating it very well.  Her A1c was down to 6.1.    Last visit she wanted to start wt loss meds and we discussed ozempic. Her weight has always been always between 180-210 since having her kids. It is been stable but not able to loose weight despite dieting. She saw nutritionist in the past, does not think it was very helpful. ozempic was not covered by her insurance. She lost 5-10lbs during summer by being more active.  She watched the Master Equation for Mounjaro and was asking about it.    Otherwise she reported feeling great. She reported recurrent UTI and working with urology.    Previous A1C in records reviewed. Today A1C is 6.1 significantly improved compared to previous.    Weight and records as below  Wt Readings from Last 4 Encounters:   08/24/22 90.7 kg (200 lb)   07/26/22 91.1 kg (200 lb 12.8 oz)   07/22/22 91.1 kg (200 lb 12.8 oz)   07/20/22 90.7  kg (200 lb)         Diabetes Care/Complications:   Eyes: no eye  exam  Kidneys: no urine microalb, normal Cr  Nerves: tingling and numbness bottom at first but now at the ankle. Foot exam completed 6/2022  Smoking: no  Blood Pressure: elevated today and multiplr previously elevated readings.  Lipids: on simvastatin 20 mg daily  Macrovascular: no   Hypoglycemia: no     Previous DM related Hospitalization: no    Current treatment strategy:   Metformin 2 g daily     Blood Glucose Monitoring:   No SMBG    Diet: 2 meals and a snack-- no changes  Breakfast: cereal or eggs.   Lunch: big meal: a lot of salads and veggies. Rarely protein. Sometime asian food.  Snack at night: fruits.  Drinks: no sugary drinks. Occasional alcohol.  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 She lives with her .    Problem List     Patient Active Problem List   Diagnosis     BMI 30.0-30.9,adult     Rectal cancer (H)     Colostomy in place (H)     Lesion of bladder     Somatic dysfunction of pelvic region        Medications     Current Outpatient Medications   Medication     amoxicillin-clavulanate (AUGMENTIN) 875-125 MG tablet     metFORMIN (GLUCOPHAGE) 500 MG tablet     simvastatin (ZOCOR) 20 MG tablet     celecoxib (CELEBREX) 200 MG capsule     estradiol (ESTRACE) 0.1 MG/GM vaginal cream     fosfomycin (MONUROL) 3 g Packet     glycerin (LAXATIVE) 1.2 g suppository     hyoscyamine ER (LEVBID) 375 mcg 12 hr tablet     lisinopril (ZESTRIL) 20 MG tablet     semaglutide (OZEMPIC, 0.25 OR 0.5 MG/DOSE,) 2 MG/1.5ML SOPN pen     sennosides (SENOKOT) 8.6 MG tablet     sulfamethoxazole-trimethoprim (BACTRIM DS) 800-160 MG tablet     tamsulosin (FLOMAX) 0.4 MG capsule     tolterodine ER (DETROL LA) 2 MG 24 hr capsule     No current facility-administered medications for this visit.        Allergies     Allergies   Allergen Reactions     No Known Allergies         Medical / Surgical History     Past Medical History:   Diagnosis Date     Cancer (H)     rectal cancer     Past Surgical History:   Procedure Laterality Date     CYSTOSCOPY, BIOPSY BLADDER, COMBINED N/A 7/26/2022    Procedure: CYSTOSCOPY, WITH BLADDER BIOPSY AND FULGURATION (Look in the bladder and sample red  tissue then burn the area involved);  Surgeon: Kacy Anderson MD;  Location: [facility] OR     DAVINCI COLECTOMY N/A 8/7/2018    Procedure: DAVINCI XI COLECTOMY;  DAVINCI ABDOMINAL PERINEAL RESECTION WITH PERMANENT COLOSTOMY ( PARI ) LEFT GLUTEAL FLAP TO RECTAL DEFECT ( JERE ) ;  Surgeon: Jonny Finney MD;  Location:  OR     GENITOURINARY SURGERY      bladder sling 12/18     GRAFT FLAP GLUTEUS TO PERINEUM N/A 8/7/2018    Procedure: GRAFT FLAP GLUTEUS TO PERINEUM;;  Surgeon: Conchita Ramos MD;  Location:  OR     GYN SURGERY      c section X3, tubal      IR CYSTOGRAM  8/24/2022     SIGMOIDOSCOPY FLEXIBLE N/A 7/9/2018    Procedure: SIGMOIDOSCOPY FLEXIBLE;  FLEXIBILE SIGMOIDOSCOPY;  Surgeon: Jonny Finney MD;  Location:  GI       Social History     Social History     Socioeconomic History     Marital status:      Spouse name: Not on file     Number of children: Not on file     Years of education: Not on file     Highest education level: Not on file   Occupational History     Not on file   Tobacco Use     Smoking status: Never Smoker     Smokeless tobacco: Never Used   Substance and Sexual Activity     Alcohol use: Yes     Comment: rare     Drug use: No     Sexual activity: Not Currently     Partners: Male   Other Topics Concern     Parent/sibling w/ CABG, MI or angioplasty before 65F 55M? Not Asked   Social History Narrative     Not on file     Social Determinants of Health     Financial Resource Strain: Not on file   Food Insecurity: Not on file   Transportation Needs: Not on file   Physical Activity: Not on file   Stress: Not on file   Social Connections: Not on file    Intimate Partner Violence: Not on file   Housing Stability: Not on file       Family History   Noncontributory    ROS     12 ROS completed, pertinent positive and negative in HPI    Physical Exam   There were no vitals taken for this visit.   /GENERAL: Healthy, alert and no distress  EYES: Eyes grossly normal to inspection.  No discharge or erythema, or obvious scleral/conjunctival abnormalities.  RESP: No audible wheeze, cough, or visible cyanosis.  No visible retractions or increased work of breathing.    SKIN: Visible skin clear. No significant rash, abnormal pigmentation or lesions.  NEURO: Cranial nerves grossly intact.  Mentation and speech appropriate for age.  PSYCH: Mentation appears normal, affect normal/bright, judgement and insight intact, normal speech and appearance well-groomed.      Labs/Imaging     Pertinent Labs were reviewed and updated in EPIC and discussed briefly.  Radiology Results were  reviewed and updated in EPIC and discussed briefly.    Summary of recent findings:   No results found for: A1C    No results found for: TSH, T4    Creatinine   Date Value Ref Range Status   07/01/2022 1.41 (H) 0.52 - 1.04 mg/dL Final   12/17/2019 0.60 0.52 - 1.04 mg/dL Final       Recent Labs   Lab Test 12/17/19  0940   CHOL 206*   HDL 39*   LDL 97   TRIG 348*       No results found for: [id number], [id number], [id number]    I personally reviewed the patient's outside records from [facility] EMR and faxed records. Summary of pertinent findings in [facility].    Impression / Plan     1. Diabetes Mellitus: Type 2  2. Obesity class II    Her A1c significantly improved on metformin, down to 6.1. She is interested in weight loss medication.  We reviewed last visit  the available FDA approved weight loss medications.  Given her underlying diabetes, she is interested in trying a GLP-1 agonist.  She has no prior history of pancreatitis, no family history or personal history of thyroid cancer.  Ozempic was prescribed but was not  covered by her insurance. She would like to check the cost of Mounjaro          2. Diabetes Complications: She is due for an eye exam, ophthalmology referral placed.  Neuropathy could be related to previous chemotherapy.     3. Blood Pressure Management: Blood pressure is uncontrolled. Currently is  not on pharmacotherapy for this.  Hypertension is a new diagnosis for her, I reviewed lisinopril side effects with her.  Plan to start lisinopril 20 mg daily     4.Lipid Management: Per the new ACC/RALPH/NHLBI guidelines, statins are recommended for individuals with diabetes aged 40-75 with LDL  without ASCVD, and for any individual with ASCVD. Currently the patient is on a statin.      5. Smoking Status: Patient Pt is smoke free..     6. recurrent UTI: not related to diabetes given great glycemic control. Following with urology.    Review of the result(s) of each unique test - A1c and diabetes related labs.          Test and/or medications prescribed today:  No orders of the defined types were placed in this encounter.        Follow up: 3 month      Christal Monteiro MD  Endocrinology, Diabetes and Metabolism  [facility]

## 2022-09-13 NOTE — LETTER
9/13/2022       RE: Ilene Gaviria  [address] [facility] [address]     Dear Colleague,    Thank you for referring your patient, Ilene Gaviria, to the [facility]OLOG[facility]. Please see a copy of my visit note below.    Outcome for 09/09/22 12:13 PM: Left Voicemail   Maria Elena Stout, VF  Outcome for 09/12/22 9:52 AM: Left Voicemail   Maria Elena Stout, VF  Outcome for 09/12/22 3:32 PM: Patient is not checking blood sugars  Maria Elena Stout, VF            Ilene Gaviria  is being evaluated via a billable video visit.      How would you like to obtain your AVS? Mail a copy  For the video visit, send the invitation by: Text to cell phone: [phone]  Will anyone else be joining your video visit? No          Endocrinology Clinic Visit 6/1/2022    Video-Visit Details     Type of service:  Video Visit     Video Start Time: 9:05 am   Video End Time: 9:17 am      I spent a total of 30 minutes on the date of encounter reviewing medical records, evaluating the patient, coordinating care and documenting in the EHR, as detailed above.        Platform used for Video Visit: Icinetic      NAME:  Ilene Gaviria  PCP:  Trevor Rizo  MRN:  [id number]  Reason for Consult: DM2  Requesting Provider:  Referred Self    Chief Complaint     Chief Complaint   Patient presents with     Diabetes     Type 2        History of Present Illness     Ilene Gaviria is a 61 year old female who is seen in clinic for DM2    Her past medical history is significant for rectal cancer status post surgery and chemotherapy currently in remission.  Never been on steroids before.  The patient was diagnosed with type 2 diabetes . 6 month ago new diagnosis of DM2. A1C was checked no records, she said it was in the range of 8.  She was started on metformin, currently at 2 g daily.  She is tolerating it very well.  Her A1c was down to  6.1.    Last visit she wanted to start wt loss meds and we discussed ozempic. Her weight has always been always between 180-210 since having her kids. It is been stable but not able to loose weight despite dieting. She saw nutritionist in the past, does not think it was very helpful. ozempic was not covered by her insurance. She lost 5-10lbs during summer by being more active.  She watched the [employer] for Mounjaro and was asking about it.    Otherwise she reported feeling great. She reported recurrent UTI and working with urology.    Previous A1C in records reviewed. Today A1C is 6.1 significantly improved compared to previous.    Weight and records as below  Wt Readings from Last 4 Encounters:   08/24/22 90.7 kg (200 lb)   07/26/22 91.1 kg (200 lb 12.8 oz)   07/22/22 91.1 kg (200 lb 12.8 oz)   07/20/22 90.7 kg (200 lb)         Diabetes Care/Complications:   Eyes: no eye  exam  Kidneys: no urine microalb, normal Cr  Nerves: tingling and numbness bottom at first but now at the ankle. Foot exam completed 6/2022  Smoking: no  Blood Pressure: elevated today and multiplr previously elevated readings.  Lipids: on simvastatin 20 mg daily  Macrovascular: no   Hypoglycemia: no     Previous DM related Hospitalization: no    Current treatment strategy:   Metformin 2 g daily     Blood Glucose Monitoring:   No SMBG    Diet: 2 meals and a snack-- no changes  Breakfast: cereal or eggs.   Lunch: big meal: a lot of salads and veggies. Rarely protein. Sometime asian food.  Snack at night: fruits.  Drinks: no sugary drinks. Occasional alcohol.  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 She lives with her .    Problem List     Patient Active Problem List   Diagnosis     BMI 30.0-30.9,adult     Rectal cancer (H)     Colostomy in place (H)     Lesion of bladder     Somatic dysfunction of pelvic region        Medications     Current  Outpatient Medications   Medication     amoxicillin-clavulanate (AUGMENTIN) 875-125 MG tablet     metFORMIN (GLUCOPHAGE) 500 MG tablet     simvastatin (ZOCOR) 20 MG tablet     celecoxib (CELEBREX) 200 MG capsule     estradiol (ESTRACE) 0.1 MG/GM vaginal cream     fosfomycin (MONUROL) 3 g Packet     glycerin (LAXATIVE) 1.2 g suppository     hyoscyamine ER (LEVBID) 375 mcg 12 hr tablet     lisinopril (ZESTRIL) 20 MG tablet     semaglutide (OZEMPIC, 0.25 OR 0.5 MG/DOSE,) 2 MG/1.5ML SOPN pen     sennosides (SENOKOT) 8.6 MG tablet     sulfamethoxazole-trimethoprim (BACTRIM DS) 800-160 MG tablet     tamsulosin (FLOMAX) 0.4 MG capsule     tolterodine ER (DETROL LA) 2 MG 24 hr capsule     No current facility-administered medications for this visit.        Allergies     Allergies   Allergen Reactions     No Known Allergies        Medical / Surgical History     Past Medical History:   Diagnosis Date     Cancer (H)     rectal cancer     Past Surgical History:   Procedure Laterality Date     CYSTOSCOPY, BIOPSY BLADDER, COMBINED N/A 7/26/2022    Procedure: CYSTOSCOPY, WITH BLADDER BIOPSY AND FULGURATION (Look in the bladder and sample red  tissue then burn the area involved);  Surgeon: Kacy Anderson MD;  [place] [facility] [place]     DAVINCI COLECTOMY N/A 8/7/2018    Procedure: DAVINCI XI COLECTOMY;  DAVINCI ABDOMINAL PERINEAL RESECTION WITH PERMANENT COLOSTOMY ( PARI ) LEFT GLUTEAL FLAP TO RECTAL DEFECT ( JERE ) ;  Surgeon: Jonny Finney MD;  Location:  OR     GENITOURINARY SURGERY      bladder sling 12/18     GRAFT FLAP GLUTEUS TO PERINEUM N/A 8/7/2018    Procedure: GRAFT FLAP GLUTEUS TO PERINEUM;;  Surgeon: Conchita Ramos MD;  Location:  OR     GYN SURGERY      c section X3, tubal      IR CYSTOGRAM  8/24/2022     SIGMOIDOSCOPY FLEXIBLE N/A 7/9/2018    Procedure: SIGMOIDOSCOPY FLEXIBLE;  FLEXIBILE SIGMOIDOSCOPY;  Surgeon: Jonny Finney MD;  Location:  GI       Social History     Social History      Socioeconomic History     Marital status:      Spouse name: Not on file     Number of children: Not on file     Years of education: Not on file     Highest education level: Not on file   Occupational History     Not on file   Tobacco Use     Smoking status: Never Smoker     Smokeless tobacco: Never Used   Substance and Sexual Activity     Alcohol use: Yes     Comment: rare     Drug use: No     Sexual activity: Not Currently     Partners: Male   Other Topics Concern     Parent/sibling w/ CABG, MI or angioplasty before 65F 55M? Not Asked   Social History Narrative     Not on file     Social Determinants of Health     Financial Resource Strain: Not on file   Food Insecurity: Not on file   Transportation Needs: Not on file   Physical Activity: Not on file   Stress: Not on file   Social Connections: Not on file   Intimate Partner Violence: Not on file   Housing Stability: Not on file       Family History   Noncontributory    ROS     12 ROS completed, pertinent positive and negative in HPI    Physical Exam   There were no vitals taken for this visit.   /GENERAL: Healthy, alert and no distress  EYES: Eyes grossly normal to inspection.  No discharge or erythema, or obvious scleral/conjunctival abnormalities.  RESP: No audible wheeze, cough, or visible cyanosis.  No visible retractions or increased work of breathing.    SKIN: Visible skin clear. No significant rash, abnormal pigmentation or lesions.  NEURO: Cranial nerves grossly intact.  Mentation and speech appropriate for age.  PSYCH: Mentation appears normal, affect normal/bright, judgement and insight intact, normal speech and appearance well-groomed.      Labs/Imaging     Pertinent Labs were reviewed and updated in EPIC and discussed briefly.  Radiology Results were  reviewed and updated in EPIC and discussed briefly.    Summary of recent findings:   No results found for: A1C    No results found for: TSH, T4    Creatinine   Date Value Ref Range Status    07/01/2022 1.41 (H) 0.52 - 1.04 mg/dL Final   12/17/2019 0.60 0.52 - 1.04 mg/dL Final       Recent Labs   Lab Test 12/17/19  0940   CHOL 206*   HDL 39*   LDL 97   TRIG 348*       No results found for: [id number], [id number], [id number]    I personally reviewed the patient's outside records from [facility] EMR and faxed records. Summary of pertinent findings in HPI.    Impression / Plan     1. Diabetes Mellitus: Type 2  2. Obesity class II    Her A1c significantly improved on metformin, down to 6.1. She is interested in weight loss medication.  We reviewed last visit  the available FDA approved weight loss medications.  Given her underlying diabetes, she is interested in trying a GLP-1 agonist.  She has no prior history of pancreatitis, no family history or personal history of thyroid cancer.  Ozempic was prescribed but was not covered by her insurance. She would like to check the cost of Mounjaro          2. Diabetes Complications: She is due for an eye exam, ophthalmology referral placed.  Neuropathy could be related to previous chemotherapy.     3. Blood Pressure Management: Blood pressure is uncontrolled. Currently is  not on pharmacotherapy for this.  Hypertension is a new diagnosis for her, I reviewed lisinopril side effects with her.  Plan to start lisinopril 20 mg daily     4.Lipid Management: Per the new ACC/RALPH/NHLBI guidelines, statins are recommended for individuals with diabetes aged 40-75 with LDL  without ASCVD, and for any individual with ASCVD. Currently the patient is on a statin.      5. Smoking Status: Patient Pt is smoke free..     6. recurrent UTI: not related to diabetes given great glycemic control. Following with urology.    Review of the result(s) of each unique test - A1c and diabetes related labs.          Test and/or medications prescribed today:  No orders of the defined types were placed in this encounter.        Follow up: 3 month      Christal Monteiro MD  Endocrinology, Diabetes  and Metabolism  [facility]

## 2022-09-13 NOTE — PROGRESS NOTES
Ilene Gaviria  is being evaluated via a billable video visit.      How would you like to obtain your AVS? Mail a copy  For the video visit, send the invitation by: Text to cell phone: [phone]  Will anyone else be joining your video visit? No

## 2022-09-14 NOTE — TELEPHONE ENCOUNTER
PRIOR AUTHORIZATION DENIED    Medication: Mounjaro-Denied    Denial Date: 9/14/2022    Denial Rational: needs to have tried/failed 2 formulary alternatives. Bydureon, Byetta, Rybelsus, Trulicity, Ozempic (tried)    Appeal Information: fax [phone]  [phone]    Would you like to appeal or try/fail another alternative

## 2022-09-15 RX ORDER — DULAGLUTIDE 0.75 MG/.5ML
0.75 INJECTION, SOLUTION SUBCUTANEOUS
Qty: 3 ML | Refills: 1 | Status: SHIPPED | OUTPATIENT
Start: 2022-09-15 | End: 2023-03-13

## 2022-09-16 ENCOUNTER — OFFICE VISIT (OUTPATIENT)
Dept: FAMILY MEDICINE | Facility: CLINIC | Age: 62
End: 2022-09-16
Payer: COMMERCIAL

## 2022-09-16 VITALS
SYSTOLIC BLOOD PRESSURE: 153 MMHG | TEMPERATURE: 97.7 F | OXYGEN SATURATION: 98 % | HEIGHT: 68 IN | RESPIRATION RATE: 16 BRPM | BODY MASS INDEX: 30.01 KG/M2 | WEIGHT: 198 LBS | DIASTOLIC BLOOD PRESSURE: 100 MMHG | HEART RATE: 67 BPM

## 2022-09-16 DIAGNOSIS — R05.9 COUGH: Primary | ICD-10-CM

## 2022-09-16 DIAGNOSIS — Z11.52 ENCOUNTER FOR SCREENING FOR COVID-19: ICD-10-CM

## 2022-09-16 DIAGNOSIS — R05.8 POST-VIRAL COUGH SYNDROME: ICD-10-CM

## 2022-09-16 DIAGNOSIS — J06.9 UPPER RESPIRATORY TRACT INFECTION, UNSPECIFIED TYPE: ICD-10-CM

## 2022-09-16 LAB — SARS-COV-2 RNA RESP QL NAA+PROBE: NEGATIVE

## 2022-09-16 PROCEDURE — U0003 INFECTIOUS AGENT DETECTION BY NUCLEIC ACID (DNA OR RNA); SEVERE ACUTE RESPIRATORY SYNDROME CORONAVIRUS 2 (SARS-COV-2) (CORONAVIRUS DISEASE [COVID-19]), AMPLIFIED PROBE TECHNIQUE, MAKING USE OF HIGH THROUGHPUT TECHNOLOGIES AS DESCRIBED BY CMS-2020-01-R: HCPCS | Performed by: INTERNAL MEDICINE

## 2022-09-16 PROCEDURE — 99214 OFFICE O/P EST MOD 30 MIN: CPT | Mod: CS | Performed by: INTERNAL MEDICINE

## 2022-09-16 PROCEDURE — U0005 INFEC AGEN DETEC AMPLI PROBE: HCPCS | Performed by: INTERNAL MEDICINE

## 2022-09-16 RX ORDER — PREDNISONE 20 MG/1
20 TABLET ORAL 2 TIMES DAILY
Qty: 20 TABLET | Refills: 0 | Status: SHIPPED | OUTPATIENT
Start: 2022-09-16 | End: 2022-09-26

## 2022-09-16 ASSESSMENT — PAIN SCALES - GENERAL: PAINLEVEL: NO PAIN (0)

## 2022-09-16 NOTE — PROGRESS NOTES
Subjective   Ilene is a 62 year old presenting for the following health issues:      HPI       Chief Complaint:     Cough concern     HPI:   Patient Ilene Gaviria is a very pleasant 62 year old female with history of previous COVID infection in 2022 who presents to Internal Medicine clinic today for evaluation of recent intermittent dry coughing spells symptoms. No chest pain, headaches, fever or chills at this time. patient did work at the [place] [employer] recently.    Current Medications:     Current Outpatient Medications   Medication Sig Dispense Refill     amoxicillin-clavulanate (AUGMENTIN) 875-125 MG tablet Take 1 tablet by mouth 2 times daily 20 tablet 0     dulaglutide (TRULICITY) 0.75 MG/0.5ML pen Inject 0.75 mg Subcutaneous every 7 days 3 mL 1     estradiol (ESTRACE) 0.1 MG/GM vaginal cream Place 2 g vaginally three times a week 42.5 g 3     fosfomycin (MONUROL) 3 g Packet Take 1 packet (3 g) by mouth once a week 4 packet 0     glycerin (LAXATIVE) 1.2 g suppository Place 1 suppository rectally daily as needed (constipation) 5 suppository 0     hyoscyamine ER (LEVBID) 375 mcg 12 hr tablet Take 1 tablet (0.375 mg) by mouth every 12 hours as needed for cramping 30 tablet 0     lisinopril (ZESTRIL) 20 MG tablet Take 1 tablet (20 mg) by mouth daily 30 tablet 3     metFORMIN (GLUCOPHAGE) 500 MG tablet Take 500 mg by mouth 2 times daily (with meals)       predniSONE (DELTASONE) 20 MG tablet Take 1 tablet (20 mg) by mouth 2 times daily for 10 days 20 tablet 0     semaglutide (OZEMPIC, 0.25 OR 0.5 MG/DOSE,) 2 MG/1.5ML SOPN pen Week 1 through week 4 : 0.25 mg once weekly. Week 5 through week 8: 0.5 mg once weekly. Week 9 : 1 mg once weekly. 3 mg 3     sennosides (SENOKOT) 8.6 MG tablet Take 1 tablet by mouth daily as needed for constipation 30 tablet 0     simvastatin (ZOCOR) 20 MG tablet Take 1 tablet by mouth daily (with dinner)       sulfamethoxazole-trimethoprim (BACTRIM DS) 800-160 MG tablet Take  1 tablet by mouth 2 times daily 10 tablet 0     tamsulosin (FLOMAX) 0.4 MG capsule Take 1 capsule (0.4 mg) by mouth daily for 360 days (Patient taking differently: Take 0.4 mg by mouth every evening) 30 capsule 11     Tirzepatide (MOUNJARO) 2.5 MG/0.5ML SOPN Inject 2.5 mg Subcutaneous once a week 2 mL 3     tolterodine ER (DETROL LA) 2 MG 24 hr capsule Take 1 capsule (2 mg) by mouth daily 30 capsule 3     celecoxib (CELEBREX) 200 MG capsule Take 1 capsule (200 mg) by mouth daily as needed for moderate pain (Patient not taking: Reported on 7/22/2022) 15 capsule 0         Allergies:      Allergies   Allergen Reactions     No Known Allergies             Past Medical History:     Past Medical History:   Diagnosis Date     Cancer (H)     rectal cancer         Past Surgical History:     Past Surgical History:   Procedure Laterality Date     CYSTOSCOPY, BIOPSY BLADDER, COMBINED N/A 7/26/2022    Procedure: CYSTOSCOPY, WITH BLADDER BIOPSY AND FULGURATION (Look in the bladder and sample red  tissue then burn the area involved);  Surgeon: Kacy Anderson MD;  [place] [facility] [place]     DAVINCI COLECTOMY N/A 8/7/2018    Procedure: DAVINCI XI COLECTOMY;  DAVINCI ABDOMINAL PERINEAL RESECTION WITH PERMANENT COLOSTOMY ( PARI ) LEFT GLUTEAL FLAP TO RECTAL DEFECT ( JERE ) ;  Surgeon: Jonny Finney MD;  Location:  OR     GENITOURINARY SURGERY      bladder sling 12/18     GRAFT FLAP GLUTEUS TO PERINEUM N/A 8/7/2018    Procedure: GRAFT FLAP GLUTEUS TO PERINEUM;;  Surgeon: Conchita Ramos MD;  Location:  OR     GYN SURGERY      c section X3, tubal      IR CYSTOGRAM  8/24/2022     SIGMOIDOSCOPY FLEXIBLE N/A 7/9/2018    Procedure: SIGMOIDOSCOPY FLEXIBLE;  FLEXIBILE SIGMOIDOSCOPY;  Surgeon: Jonny Finney MD;  Location:  GI         Family Medical History:     Family History   Problem Relation Age of Onset     Anesthesia Reaction No family hx of      Deep Vein Thrombosis (DVT) No family hx of          Social History:  "    Social History     Socioeconomic History     Marital status:      Spouse name: Not on file     Number of children: Not on file     Years of education: Not on file     Highest education level: Not on file   Occupational History     Not on file   Tobacco Use     Smoking status: Never Smoker     Smokeless tobacco: Never Used   Substance and Sexual Activity     Alcohol use: Yes     Comment: rare     Drug use: No     Sexual activity: Not Currently     Partners: Male   Other Topics Concern     Parent/sibling w/ CABG, MI or angioplasty before 65F 55M? Not Asked   Social History Narrative     Not on file     Social Determinants of Health     Financial Resource Strain: Not on file   Food Insecurity: Not on file   Transportation Needs: Not on file   Physical Activity: Not on file   Stress: Not on file   Social Connections: Not on file   Intimate Partner Violence: Not on file   Housing Stability: Not on file           Review of System:     Constitutional: Negative for fever or chills  Skin: Negative for rashes  Ears/Nose/Throat: Negative for nasal congestion, sore throat  Respiratory: positive for cough, previous COVID infection  Cardiovascular: Negative for chest pain  Gastrointestinal: Negative for nausea, vomiting  Genitourinary: Negative for dysuria, hematuria  Musculoskeletal: Negative for myalgias  Neurologic: Negative for headaches  Psychiatric: Negative for depression, anxiety  Hematologic/Lymphatic/Immunologic: Negative  Endocrine: Negative  Behavioral: Negative for tobacco use       Physical Exam:   BP (!) 153/100 (BP Location: Left arm, Patient Position: Sitting, Cuff Size: Adult Regular)   Pulse 67   Temp 97.7  F (36.5  C) (Temporal)   Resp 16   Ht 1.727 m (5' 8\")   Wt 89.8 kg (198 lb)   SpO2 98%   BMI 30.11 kg/m      GENERAL: alert and no distress  EYES: eyes grossly normal to inspection, and conjunctivae and sclerae normal  HENT: Normocephalic atraumatic. Nose and mouth without ulcers or " lesions  NECK: supple  RESP: intermittent dry coughing spells present  CV: regular rate and rhythm, normal S1 S2  LYMPH: no peripheral edema   ABDOMEN: nondistended  MS: no gross musculoskeletal defects noted  SKIN: no suspicious lesions or rashes  NEURO: Alert & Oriented x 3.   PSYCH: mentation appears normal, affect normal        Diagnostic Test Results:         ASSESSMENT/PLAN:       Ilene was seen today for cough.    Diagnoses and all orders for this visit:    Cough    Post-viral cough syndrome  -     REVIEW OF HEALTH MAINTENANCE PROTOCOL ORDERS  -     Symptomatic; Unknown COVID-19 Virus (Coronavirus) by PCR; Future  -     predniSONE (DELTASONE) 20 MG tablet; Take 1 tablet (20 mg) by mouth 2 times daily for 10 days  -     Symptomatic; Unknown COVID-19 Virus (Coronavirus) by PCR Nose    Encounter for screening for COVID-19    Upper respiratory tract infection, unspecified type            Follow Up Plan:     Patient is instructed to return to Internal Medicine clinic for follow-up visit in 1 week.        Sweta Macario MD  Internal Medicine  [facility]

## 2022-09-16 NOTE — LETTER
QUICK REFERENCE INFORMATION:  The ABCs of the Annual Wellness Visit    Medicare Subsequent Wellness Visit    Chief Complaint   Patient presents with   • Medicare Wellness-subsequent        Patient is a pleasant 82 y/o male who presents to the office for routine subsequent annual medicare wellness exam.  Patient's past medical history is significant for Paget's disease, DM type 2, hypertension, hyperlipidemia, osteoarthritis, GERD and recent seizure.  Patient was recently hospitalized after suffering two seizures.  Cause of seizures is unknown and patient is now taking keppra daily.  He has not had a seizure since starting this medication.  He denies alcohol use, admits to smoking and has no desire to quit.  His blood pressure is elevated.  He is compliant on current medications.  Not taking CCB.  He recently was referred to hematology and oncology for follow-up of his Paget's disease and is pending bone scan and survey.  Patient was previously seen by Dr. Treviño for right knee steroid injection which provided him with significant relief.  His last injection was in June of 2018 and he would like to have another one if possible, will make referral.  He has significant hearing loss.  He had a hearing aid years ago and lost it, he would like hearing aids but is concerned about cost.  Will refer him to audiology to evaluate and discuss options.  His wife is present during appointment.         Narendra Cochran is a 81 y.o. male presenting for Medicare Wellness-subsequent  .     Past Medical History:   Diagnosis Date   • Arthritis    • Diabetes mellitus (CMS/HCC)    • Diverticulitis    • GERD (gastroesophageal reflux disease)    • Hyperlipidemia    • Hypertension    • Paget disease of bone       Past Surgical History:   Procedure Laterality Date   • APPENDECTOMY     • COLON RESECTION      second to diverticulitis    • FOOT SURGERY Left      Family History   Problem Relation Age of Onset   • Diabetes Sister       Social History  September 19, 2022      Ilene Gaviria  [address] [facility] [address]        Dear ,    We are writing to inform you of your test results.        Resulted Orders   Symptomatic; Unknown COVID-19 Virus (Coronavirus) by PCR Nose   Result Value Ref Range    SARS CoV2 PCR Negative Negative      Comment:      NEGATIVE: SARS-CoV-2 (COVID-19) RNA not detected, presumed negative.    Narrative    Testing was performed using the [employer] SARS-CoV-2 Assay on the  [employer] CV Instrument System. Additional information about this  Emergency Use Authorization (EUA) assay can be found via the Lab  Guide. This test should be ordered for the detection of SARS-CoV-2 in  individuals who meet SARS-CoV-2 clinical and/or epidemiological  criteria. Test performance is unknown in asymptomatic patients. This  test is for in vitro diagnostic use under the FDA EUA for  laboratories certified under CLIA to perform high complexity testing.  This test has not been FDA cleared or approved. A negative result  does not rule out the presence of PCR inhibitors in the specimen or  target RNA in concentration below the limit of detection for the  assay. The possibility of a false negative should be considered if  the patient's recent exposure or clinical presentation suggests  COVID-19. This test was validated by the [facility]. This laboratory is certified under  the Clinical Laboratory Improvement Amendments of 1988 (CLIA-88) as  qualified to perform high complexity laboratory testing.       If you have any questions or concerns, please call the clinic at the number listed above.       Sincerely,      Sweta Macario MD               Socioeconomic History   • Marital status:      Spouse name: Not on file   • Number of children: Not on file   • Years of education: Not on file   • Highest education level: Not on file   Social Needs   • Financial resource strain: Not on file   • Food insecurity - worry: Not on file   • Food insecurity - inability: Not on file   • Transportation needs - medical: Not on file   • Transportation needs - non-medical: Not on file   Occupational History   • Not on file   Tobacco Use   • Smoking status: Current Every Day Smoker     Packs/day: 0.25     Years: 65.00     Pack years: 16.25     Types: Cigars, Cigarettes   • Smokeless tobacco: Never Used   Substance and Sexual Activity   • Alcohol use: No     Comment: rarely   • Drug use: No   • Sexual activity: Defer   Other Topics Concern   • Not on file   Social History Narrative   • Not on file      No Known Allergies   Outpatient Medications Prior to Visit   Medication Sig Dispense Refill   • ACCU-CHEK FASTCLIX LANCETS misc Use daily. 102 each 0   • ACCU-CHEK SMARTVIEW test strip Use daily 100 each 0   • aspirin 81 MG chewable tablet Chew 1 tablet Daily.     • polyethylene glycol (MIRALAX) packet Take 17 g by mouth Daily.     • bumetanide (BUMEX) 0.5 MG tablet Take 1 tablet by mouth Daily. 90 tablet 1   • metFORMIN (GLUCOPHAGE) 1000 MG tablet Take 1 tablet by mouth Daily With Breakfast. 30 tablet 0   • metoprolol succinate XL (TOPROL-XL) 50 MG 24 hr tablet Take 1 tablet by mouth Daily. 90 tablet 1   • potassium chloride ER (K-TAB) 20 MEQ tablet controlled-release ER tablet Take 1 tablet by mouth Daily. 90 tablet 0   • rosuvastatin (CRESTOR) 20 MG tablet Take 1 tablet by mouth Daily. 90 tablet 1   • tiotropium bromide-olodaterol (STIOLTO RESPIMAT) 2.5-2.5 MCG/ACT aerosol solution inhaler Inhale 2 puffs Daily.     • PHARMACY MEDS TO BED CONSULT Daily (Monday-Friday). 1 each 0   • KLOR-CON 20 MEQ CR tablet Take 20 mEq by mouth Daily.     • lactobacillus acidophilus  (RISAQUAD) capsule capsule Take 1 capsule by mouth Daily for 41 days. Take through 2/14/2019 30 capsule 1   • levETIRAcetam (KEPPRA) 250 MG tablet Take 250 mg by mouth 2 (Two) Times a Day.     • levETIRAcetam (KEPPRA) 500 MG tablet Take 1 tablet by mouth Every 12 (Twelve) Hours.       No facility-administered medications prior to visit.        Reviewed use of high risk medication in the elderly: yes  Reviewed for potential of harmful drug interactions in the elderly: yes    The following portions of the patient's history were reviewed and updated as appropriate: past family history and past surgical history.    Review of Systems   Constitutional: Positive for fatigue. Negative for activity change, appetite change, chills, diaphoresis, fever, unexpected weight gain and unexpected weight loss.   HENT: Positive for hearing loss. Negative for congestion, dental problem, ear pain, nosebleeds, sinus pressure, sore throat and trouble swallowing.    Eyes: Positive for blurred vision. Negative for pain, redness and visual disturbance.   Respiratory: Positive for shortness of breath. Negative for apnea, cough, chest tightness and wheezing.    Cardiovascular: Negative for chest pain, palpitations and leg swelling.   Gastrointestinal: Negative for abdominal distention, abdominal pain, anal bleeding, blood in stool, constipation, diarrhea, nausea, vomiting, GERD and indigestion.   Endocrine: Negative for cold intolerance, heat intolerance, polydipsia, polyphagia and polyuria.   Genitourinary: Negative for urinary incontinence, decreased urine volume, difficulty urinating, dysuria, frequency, hematuria and urgency.   Musculoskeletal: Positive for arthralgias, gait problem and joint swelling. Negative for bursitis.   Skin: Negative for dry skin, rash, skin lesions and bruise.   Neurological: Negative for dizziness, tremors, seizures, syncope, speech difficulty, weakness, light-headedness, headache, memory problem and confusion.  "  Hematological: Does not bruise/bleed easily.   Psychiatric/Behavioral: Positive for agitation and depressed mood. Negative for behavioral problems, decreased concentration, hallucinations, sleep disturbance, suicidal ideas and stress. The patient is nervous/anxious.         Vitals:    02/18/19 1309   BP: 152/78   BP Location: Right arm   Patient Position: Sitting   Cuff Size: Adult   Pulse: 70   SpO2: 98%   Weight: 90.3 kg (199 lb)   Height: 182.9 cm (72\")       Objective    Physical Exam   Constitutional: He is oriented to person, place, and time. He appears well-developed and well-nourished. He is active and cooperative. He does not appear ill. No distress. He is obese.  HENT:   Head: Normocephalic and atraumatic.   Right Ear: Tympanic membrane, external ear and ear canal normal. Decreased hearing is noted.   Left Ear: Tympanic membrane, external ear and ear canal normal. Decreased hearing is noted.   Nose: Nose normal. Right sinus exhibits no maxillary sinus tenderness and no frontal sinus tenderness. Left sinus exhibits no maxillary sinus tenderness and no frontal sinus tenderness.   Mouth/Throat: Uvula is midline, oropharynx is clear and moist and mucous membranes are normal.   Eyes: Conjunctivae, EOM and lids are normal. Pupils are equal, round, and reactive to light.   Cataracts appreciated.   Neck: Trachea normal, normal range of motion and phonation normal. Carotid bruit is not present. No thyroid mass and no thyromegaly present.   Cardiovascular: Normal rate, regular rhythm and normal heart sounds.   Pulmonary/Chest: Effort normal and breath sounds normal.   Abdominal: Soft. Normal appearance. He exhibits no distension. There is no tenderness. There is no rigidity, no guarding and no CVA tenderness.   Musculoskeletal:        Right knee: He exhibits decreased range of motion and swelling. Tenderness found.   Lymphadenopathy:     He has no cervical adenopathy.        Right cervical: No superficial cervical " adenopathy present.       Left cervical: No superficial cervical adenopathy present.   Neurological: He is alert and oriented to person, place, and time. He has normal strength and normal reflexes. Coordination and gait normal.   CN grossly intact   Skin: Skin is warm and intact. No rash noted. He is not diaphoretic. No cyanosis or erythema. Nails show no clubbing.   Psychiatric: He has a normal mood and affect. His speech is normal and behavior is normal. Judgment and thought content normal. He is not actively hallucinating.   Difficult to assess memory given significance of hearing loss, possibly not hearing questions correctly He is attentive.   Vitals reviewed.       HEALTH RISK ASSESSMENT    1937    Recent Hospitalizations:  Recently treated at the following:  [facility].      Current Medical Providers:  Patient Care Team:  Marguerite Moore PA-C as PCP - General (Physician Assistant)      Smoking Status:  Social History     Tobacco Use   Smoking Status Current Every Day Smoker   • Packs/day: 0.25   • Years: 65.00   • Pack years: 16.25   • Types: Cigars, Cigarettes   Smokeless Tobacco Never Used       Alcohol Consumption:  Social History     Substance and Sexual Activity   Alcohol Use No    Comment: rarely       Depression Screen:   PHQ-2/PHQ-9 Depression Screening 2/18/2019   Little interest or pleasure in doing things 0   Feeling down, depressed, or hopeless 0   Total Score 0       Health Habits and Functional and Cognitive Screening:  Functional & Cognitive Status 2/18/2019   Do you have difficulty preparing food and eating? No   Do you have difficulty bathing yourself, getting dressed or grooming yourself? No   Do you have difficulty using the toilet? No   Do you have difficulty moving around from place to place? No   Do you have trouble with steps or getting out of a bed or a chair? No   In the past year have you fallen or experienced a near fall? No   Current Diet Well Balanced  Diet   Dental Exam Not up to date   Eye Exam Up to date   Exercise (times per week) 2 times per week   Current Exercise Activities Include Walking   Do you need help using the phone?  No   Are you deaf or do you have serious difficulty hearing?  No   Do you need help with transportation? No   Do you need help shopping? No   Do you need help preparing meals?  No   Do you need help with housework?  No   Do you need help with laundry? No   Do you need help taking your medications? No   Do you need help managing money? No   Do you ever drive or ride in a car without wearing a seat belt? No   Have you felt unusual stress, anger or loneliness in the last month? No   Who do you live with? Spouse   If you need help, do you have trouble finding someone available to you? No   Have you been bothered in the last four weeks by sexual problems? No   Do you have difficulty concentrating, remembering or making decisions? No           Does the patient have evidence of cognitive impairment? Yes    Aspirin use counseling? Does not need ASA (and currently is not on it)      Recent Lab Results:  CMP:  Lab Results   Component Value Date     (H) 11/16/2018    BUN 19 02/18/2019    CREATININE 1.23 02/18/2019    EGFRIFNONA 90 11/16/2018    EGFRIFAFRI 69 02/18/2019    BCR 15.4 02/18/2019     02/18/2019    K 4.5 02/18/2019    CO2 28.0 02/18/2019    CALCIUM 9.6 02/18/2019    PROTENTOTREF 6.6 11/16/2018    ALBUMIN 4.20 02/18/2019    LABGLOBREF 2.3 11/16/2018    LABIL2 1.9 11/16/2018    BILITOT 0.5 02/18/2019    ALKPHOS 82 02/18/2019    AST 20 02/18/2019    ALT 16 02/18/2019     Lipid Panel:  Lab Results   Component Value Date    CHOL 132 02/18/2019    TRIG 161 (H) 02/18/2019    HDL 36 (L) 02/18/2019     HbA1c:  Lab Results   Component Value Date    HGBA1C 6.50 (H) 01/26/2019       Visual Acuity:  No exam data present    Age-appropriate Screening Schedule:  Refer to the list below for future screening recommendations based on  patient's age, sex and/or medical conditions. Orders for these recommended tests are listed in the plan section. The patient has been provided with a written plan.    Health Maintenance   Topic Date Due   • URINE MICROALBUMIN  1937   • TDAP/TD VACCINES (1 - Tdap) 10/27/1957   • ZOSTER VACCINE (1 of 2) 10/27/1988   • HEMOGLOBIN A1C  07/26/2019   • PNEUMOCOCCAL VACCINES (65+ LOW/MEDIUM RISK) (2 of 2 - PCV13) 02/18/2020   • LIPID PANEL  02/18/2020   • INFLUENZA VACCINE  Completed          Advance Care Planning:  has NO advance directive - not interested in additional information    Identification of Risk Factors:  Risk factors include: weight , unhealthy diet, tobacco use, alcohol use, vision limitations and hearing limitations.    Compared to one year ago, the patient feels his physical health is the same.  Compared to one year ago, the patient feels his mental health is the same.      Narendra was seen today for medicare wellness-subsequent.    Diagnoses and all orders for this visit:    Medicare annual wellness visit, subsequent    Seizure (CMS/[facility])  -recently had two seizures, evaluated at [facility]  -cause unknown per patient and wife  -taking keppra as prescribed, no further seizures  -     levETIRAcetam (KEPPRA) 250 MG tablet; Take 1 tablet by mouth 2 (Two) Times a Day.    Chronic obstructive pulmonary disease, unspecified COPD type (CMS/[facility])  -stable with inhaler  -     tiotropium bromide-olodaterol (STIOLTO RESPIMAT) 2.5-2.5 MCG/ACT aerosol solution inhaler; Inhale 2 puffs Daily.    Tobacco abuse  -patient is smoking daily, he has no desire to quit  -he is aware of options to help him quit if he decides    Essential hypertension  -blood pressure continues to be elevated  -not on CCB, will add amlodipine  -discussed monitoring at home and calling if above 140/90  -     bumetanide (BUMEX) 0.5 MG tablet; Take 1 tablet by mouth Daily.  -     metoprolol succinate XL (TOPROL-XL) 50 MG 24 hr tablet; Take  1 tablet by mouth Daily.  -     potassium chloride ER (K-TAB) 20 MEQ tablet controlled-release ER tablet; Take 1 tablet by mouth Daily.  -     amLODIPine (NORVASC) 5 MG tablet; Take 1 tablet by mouth Daily.    Type 2 diabetes mellitus with complication, without long-term current use of insulin (CMS/HCC)  -hemoglobin AIC has been stable and under 7%  -     metFORMIN (GLUCOPHAGE) 1000 MG tablet; Take 1 tablet by mouth Daily With Breakfast.  -     Ambulatory Referral for Diabetic Eye Exam-Ophthalmology  -     POC Microalbumin    Hyperlipidemia, unspecified hyperlipidemia type  -     rosuvastatin (CRESTOR) 20 MG tablet; Take 1 tablet by mouth Daily.  -     Lipid Panel; Future    Chronic pain of right knee  -previously seen by Dr. Treviño for right knee injection, very successful in improving patient's pain  -was seen in June 2018, will make referral back for possible additional injection  -     Ambulatory Referral to Orthopedic Surgery    Paget disease of bone  -established with hematology and oncology  -pending bone scan and surveillance    Chronic idiopathic thrombocytopenia (CMS/HCC)  -stable blood work compared to previous labs    Bilateral hearing loss, unspecified hearing loss type  -significant hearing loss interfering with patient's ability to manage daily tasks  -previously had hearing aid and lost it, reports this was 15+ years ago  -concerned about cost of hearing aids  -will refer for evaluation and discussion on options  -     Ambulatory Referral to Audiology    Vitamin D deficiency  -     Vitamin D 25 Hydroxy; Future    Screening for prostate cancer  -     PSA Screen; Future    Need for pneumococcal vaccine  -     Pneumococcal Polysaccharide Vaccine 23-Valent Greater Than or Equal To 3yo Subcutaneous / IM        Procedure   Procedures       Patient Self-Management and Personalized Health Advice  The patient has been provided with information about: tobacco cessation, supplements and hearing/vision  screenings and preventive services including:   · Diabetes screening, see lab orders, Pneumococcal vaccine , Prostate cancer screening discussed.      Follow Up:  Return in about 3 months (around 5/18/2019) for Recheck.     An After Visit Summary and PPPS with all of these plans were given to the patient.

## 2022-09-18 ENCOUNTER — LAB (OUTPATIENT)
Dept: LAB | Facility: CLINIC | Age: 62
End: 2022-09-18
Payer: COMMERCIAL

## 2022-09-18 ENCOUNTER — TELEPHONE (OUTPATIENT)
Dept: UROLOGY | Facility: CLINIC | Age: 62
End: 2022-09-18

## 2022-09-18 DIAGNOSIS — N39.0 URINARY TRACT INFECTION WITHOUT HEMATURIA, SITE UNSPECIFIED: Primary | ICD-10-CM

## 2022-09-18 DIAGNOSIS — N39.0 URINARY TRACT INFECTION WITHOUT HEMATURIA, SITE UNSPECIFIED: ICD-10-CM

## 2022-09-18 PROCEDURE — 87088 URINE BACTERIA CULTURE: CPT

## 2022-09-18 NOTE — TELEPHONE ENCOUNTER
62F with hx of sling, recurrent UTIs, who describes UTI like symptoms she typically has and requests urine culture (does not want treatment right now). Denies systemic symptoms such as fevers, chills, etc.     Will order. Understands strict ED precautions such as fevers, chills, etc.

## 2022-09-19 ENCOUNTER — TELEPHONE (OUTPATIENT)
Dept: UROLOGY | Facility: CLINIC | Age: 62
End: 2022-09-19

## 2022-09-19 ENCOUNTER — TELEPHONE (OUTPATIENT)
Dept: ENDOCRINOLOGY | Facility: CLINIC | Age: 62
End: 2022-09-19

## 2022-09-19 DIAGNOSIS — E11.40 TYPE 2 DIABETES MELLITUS WITH DIABETIC NEUROPATHY, WITHOUT LONG-TERM CURRENT USE OF INSULIN (H): Primary | ICD-10-CM

## 2022-09-19 NOTE — TELEPHONE ENCOUNTER
[facility] Call Center    Phone Message    May a detailed message be left on voicemail: yes     Reason for Call: Medication Question or concern regarding medication   Prescription Clarification    Name of Medication:   * Metformin  * Tirzepatide (MOUNJARO) 2.5 MG/0.5ML SOPN  *semaglutide (OZEMPIC, 0.25 OR 0.5 MG/DOSE,) 2 MG/1.5ML SOPN pen    Prescribing Provider: [facility]     Pharmacy: [id number] - [facility] [address] AT [facility] [address]     What on the order needs clarification? Per Patient states the prescription was denied and states she is needing a prescription put in to be able to receive.     Patient states she had gotten a denied on the medication Tirzepatide (MOUNJARO) 2.5 MG/0.5ML SOPN and states she is going to be using semaglutide (OZEMPIC, 0.25 OR 0.5 MG/DOSE,) 2 MG/1.5ML SOPN pen instead.           Action Taken: Message routed to:  [facility] Endo    Travel Screening: Not Applicable

## 2022-09-19 NOTE — TELEPHONE ENCOUNTER
Spoke to pt. Pt reports UTI symptoms returned on Saturday and worsening since. Symptoms include malodorous urine, cloudy urine, and dysuria. Taking AZO since Saturday. Pt is also needing to do CIC more frequently especially at night time. Denies any other symptoms. Pt has culture pending. Will monitor for results. Pt agrees to increase fluid intake as she is only drinking about 6 glasses daily.     Rukhsana Black RN MSN

## 2022-09-19 NOTE — TELEPHONE ENCOUNTER
Attempted to call pt. Left detailed message to call clinic back at [phone].   To inform pt that she needs to schedule nurse visit to get IM gentamycin treatment for current UTI noted.     Rukhsana Black RN MSN

## 2022-09-19 NOTE — TELEPHONE ENCOUNTER
Need to call patieint on Ozempic use. I see Trulicity sent in most recently Gaye Schmitt RN on 9/19/2022 at 11:15 AM

## 2022-09-19 NOTE — TELEPHONE ENCOUNTER
[facility] Call Center    Phone Message    May a detailed message be left on voicemail: yes     Reason for Call: Patient has another UTI. Just got off antibiotics.  Patient gave urine sample at [facility] on 9/18/22.  Please reach out to patient.    Action Taken: Message routed to:  [facility] Urology    Travel Screening: Not Applicable

## 2022-09-20 NOTE — TELEPHONE ENCOUNTER
Pt states that the injections do no work for her UTI's pt is requesting medication and a call back, please call pt back to further discuss, thanks!

## 2022-09-20 NOTE — TELEPHONE ENCOUNTER
Pt called and would like to speak with a nurse regarding message below. Please reach out to pt to discuss. Thanks

## 2022-09-20 NOTE — TELEPHONE ENCOUNTER
Spoke to pt. She understands that she can receive the IM gent at this time or wait for sensitivity results. She chooses to wait. Will monitor results to finalize.     Rukhsana Black RN MSN

## 2022-09-20 NOTE — TELEPHONE ENCOUNTER
Kacy Anderson MD Bratsch, Angie J RN  C[facility]: [facility]  Caller: Unspecified (Yesterday, 12:14 PM)  We will have to wait until the sensitivities come back then.     Attempted to call pt. Left detailed message to call clinic back at [phone].     Rukhsana Black, RN MSN

## 2022-09-21 ENCOUNTER — OFFICE VISIT (OUTPATIENT)
Dept: UROLOGY | Facility: CLINIC | Age: 62
End: 2022-09-21
Payer: COMMERCIAL

## 2022-09-21 DIAGNOSIS — N39.0 URINARY TRACT INFECTION WITHOUT HEMATURIA, SITE UNSPECIFIED: Primary | ICD-10-CM

## 2022-09-21 PROCEDURE — 99207 PR NO CHARGE NURSE ONLY: CPT

## 2022-09-21 PROCEDURE — 96372 THER/PROPH/DIAG INJ SC/IM: CPT | Performed by: UROLOGY

## 2022-09-21 RX ORDER — GENTAMICIN 40 MG/ML
160 INJECTION, SOLUTION INTRAMUSCULAR; INTRAVENOUS ONCE
Status: COMPLETED | OUTPATIENT
Start: 2022-09-21 | End: 2022-09-21

## 2022-09-21 RX ADMIN — GENTAMICIN 160 MG: 40 INJECTION, SOLUTION INTRAMUSCULAR; INTRAVENOUS at 16:10

## 2022-09-21 NOTE — TELEPHONE ENCOUNTER
Pt called in regards to below and would like Rukhsana to give her a call back - please call pt to further discuss, thanks!

## 2022-09-21 NOTE — TELEPHONE ENCOUNTER
Attempted to call pt. Left detailed message to call clinic back at [phone].   To inform pt that gentamicin IM is only treatment available per UC results. Pt to schedule treatment.     Rukhsana Black RN MSN

## 2022-09-21 NOTE — TELEPHONE ENCOUNTER
Spoke to pt. She is agreeable to plan for gent IM. Plan to schedule nurse visit today. Will call her back to schedule. Pt is most available at 1230p-1p for scheduling.     Rukhsana Black RN MSN

## 2022-09-21 NOTE — NURSING NOTE
Chief Complaint   Patient presents with     Clinic Care Coordination - Face To Face     Patient here for gent injection due to urinary tract infection   The following medication was given:     MEDICATION: gentamicin  ROUTE: IM  SITE: Deltoid - Left & Right  LOT #: [id number]  :  [employer]ters  EXPIRATION DATE:  06/23  NDC#: 3 [id number]    Was there drug waste? No    Ilene Gaviria comes into clinic today at the request of Dr. Anderson Ordering Provider for Med Injection only Gentamicin.        This service provided today was under the supervising provider of the day Dr Rodriguez, who was available if needed.          Bailey Moseley, RN, BSN  September 21, 2022

## 2022-09-21 NOTE — PROGRESS NOTES
See nursing note from today for details for today's visit     Bailey Moseley, RN, BSN  Care Coordinator Urology

## 2022-09-22 ENCOUNTER — ALLIED HEALTH/NURSE VISIT (OUTPATIENT)
Dept: UROLOGY | Facility: CLINIC | Age: 62
End: 2022-09-22
Payer: COMMERCIAL

## 2022-09-22 DIAGNOSIS — N39.0 URINARY TRACT INFECTION WITHOUT HEMATURIA, SITE UNSPECIFIED: Primary | ICD-10-CM

## 2022-09-22 LAB — BACTERIA UR CULT: ABNORMAL

## 2022-09-22 PROCEDURE — 99207 PR NO CHARGE NURSE ONLY: CPT

## 2022-09-22 PROCEDURE — 96372 THER/PROPH/DIAG INJ SC/IM: CPT | Performed by: UROLOGY

## 2022-09-22 RX ORDER — GENTAMICIN 40 MG/ML
80 INJECTION, SOLUTION INTRAMUSCULAR; INTRAVENOUS ONCE
Status: COMPLETED | OUTPATIENT
Start: 2022-09-22 | End: 2022-09-22

## 2022-09-22 RX ADMIN — GENTAMICIN 80 MG: 40 INJECTION, SOLUTION INTRAMUSCULAR; INTRAVENOUS at 10:44

## 2022-09-22 NOTE — NURSING NOTE
Chief Complaint   Patient presents with     Clinic Care Coordination - Face To Face     Patient here for second gent injection for UTI   Ilene Gaviria comes into clinic today at the request of Dr Anderson Ordering Provider for Med Injection only second gent injection of 80 mg per protocol.  Patient injection tolerated well      This service provided today was under the supervising provider of the day Dr. Kris Lancaster, who was available if needed.    The following medication was given:     MEDICATION:  gentamicin  ROUTE: IM  SITE: Deltoid - Right  DOSE: 80 mg  LOT #: [id number]   : Local Eye Site  EXPIRATION DATE: 06/23   NDC#: 46475-934-91-6   Was there drug waste? No      Bailey Moseley RN, BSN  September 22, 2022

## 2022-09-23 ENCOUNTER — ALLIED HEALTH/NURSE VISIT (OUTPATIENT)
Dept: UROLOGY | Facility: CLINIC | Age: 62
End: 2022-09-23
Payer: COMMERCIAL

## 2022-09-23 DIAGNOSIS — N39.0 URINARY TRACT INFECTION WITHOUT HEMATURIA, SITE UNSPECIFIED: Primary | ICD-10-CM

## 2022-09-23 PROCEDURE — 96372 THER/PROPH/DIAG INJ SC/IM: CPT

## 2022-09-23 RX ORDER — GENTAMICIN 40 MG/ML
80 INJECTION, SOLUTION INTRAMUSCULAR; INTRAVENOUS ONCE
Status: DISCONTINUED | OUTPATIENT
Start: 2022-09-23 | End: 2022-11-01

## 2022-09-23 NOTE — NURSING NOTE
Clinic Administered Medication Documentation          Injectable Medication Documentation    Patient was given Gentamicin. Prior to medication administration, verified patients identity using patient s name and date of birth. Please see MAR and medication order for additional information. Patient instructed to stay in clinic after the injection but patient declined.      Was entire vial of medication used? Yes  Vial/Syringe: Single dose vial  Expiration Date:  06/23  Was this medication supplied by the patient? Yes, Medication was received from [facility] per protocol    Name of provider who requested the medication administration: Monica  Name of provider on site (faculty or community preceptor) at the time of performing the medication administration: 12 noon    Date of next administration: 9/23/22  Date of next office visit with provider to renew medication plan (must be seen annually): last 8/17/22    Patient is scheduled to see Dr Anderson on Wednesday 9/28/22 virtually.  Patient tolerated injection well.  The following medication was given:     MEDICATION:  gentamicin  ROUTE: IM  SITE: Deltoid - Left  DOSE: 80 mg  LOT #: [id number]  : [employer]  EXPIRATION DATE: 06/23  NDC#: 80268-468-57-7   Was there drug waste? No  Ilene Gaviria comes into clinic today at the request of Dr. Anderson Ordering Provider for Med Injection only gentamicin.        This service provided today was under the supervising provider of the day Dr. Lancaster, who was available if needed.    Bailey Moseley, RN    Bailey Moseley, RN, BSN  September 23, 2022

## 2022-09-27 ENCOUNTER — PRE VISIT (OUTPATIENT)
Dept: UROLOGY | Facility: CLINIC | Age: 62
End: 2022-09-27

## 2022-09-27 NOTE — TELEPHONE ENCOUNTER
Reason for Visit: Follow-up on UDS    Diagnosis: Urinary retention, Recurrent UTI, Urinary urgency    Rooming Requirements: Normal      Maya Weinberg  09/27/22  1:39 PM

## 2022-09-28 ENCOUNTER — VIRTUAL VISIT (OUTPATIENT)
Dept: UROLOGY | Facility: CLINIC | Age: 62
End: 2022-09-28
Payer: COMMERCIAL

## 2022-09-28 DIAGNOSIS — N95.2 ATROPHIC VAGINITIS: ICD-10-CM

## 2022-09-28 DIAGNOSIS — R33.9 URINARY RETENTION: Primary | ICD-10-CM

## 2022-09-28 DIAGNOSIS — N30.00 ACUTE CYSTITIS WITHOUT HEMATURIA: ICD-10-CM

## 2022-09-28 DIAGNOSIS — N39.0 RECURRENT UTI: Primary | ICD-10-CM

## 2022-09-28 DIAGNOSIS — N39.0 RECURRENT UTI: ICD-10-CM

## 2022-09-28 PROCEDURE — 99214 OFFICE O/P EST MOD 30 MIN: CPT | Mod: 95 | Performed by: UROLOGY

## 2022-09-28 RX ORDER — CEFAZOLIN SODIUM 2 G/50ML
2 SOLUTION INTRAVENOUS SEE ADMIN INSTRUCTIONS
Status: CANCELLED | OUTPATIENT
Start: 2022-09-28

## 2022-09-28 RX ORDER — CEFAZOLIN SODIUM 2 G/50ML
2 SOLUTION INTRAVENOUS
Status: CANCELLED | OUTPATIENT
Start: 2022-09-28

## 2022-09-28 RX ORDER — METHENAMINE HIPPURATE 1000 MG/1
1 TABLET ORAL 2 TIMES DAILY
Qty: 60 TABLET | Refills: 11 | Status: SHIPPED | OUTPATIENT
Start: 2022-09-28 | End: 2023-11-03

## 2022-09-28 NOTE — NURSING NOTE
Allergies & Medications have been reviewed.     PT stated she thinks she has a new UTI.    Brenda Alonzo VF   The patient is a 39y Male complaining of shortness of breath.

## 2022-09-28 NOTE — LETTER
Date:September 29, 2022      Patient was self referred, no letter generated. Do not send.        [facility] Health Information

## 2022-09-28 NOTE — PROGRESS NOTES
Reason for Visit:  F/u on urinary tract infections.    Clinical Data:  Ms. Gaviria is a 62 year old female with frequency, urgency and now recent episode of retention.  History of colorectal cancer s/p resection and colostomy placement.  History of sling shortly after and then had trouble with UTI like symptoms after.  She has been getting some uti's.  She performs CIC 5-6 times per day but with infections it is more often.  She notes that the amount of residual is less than half what she voids.    She underwent a cystoscopy and biopsy on 7/26/22  Final Diagnosis   A. Bladder,right lateral wall, biopsy:  - Denuded urothelium, granulation tissue  - Negative for malignancy     B.  Bladder, posterior wall, biopsy:  - Bladder mucosa with acute and chronic inflammation and reactive changes  - Negative for malignancy       7/5/22 CT abd/pelvis  IMPRESSION:   No acute process demonstrated.      Review of Labs:  The following labs were reviewed by me and discussed with the patient:     Color Urine (no units)   Date Value   08/30/2022 Yellow     Appearance Urine (no units)   Date Value   08/30/2022 Clear     Glucose Urine (mg/dL)   Date Value   08/30/2022 Negative     Bilirubin Urine (no units)   Date Value   08/30/2022 Negative     Ketones Urine (mg/dL)   Date Value   08/30/2022 Negative     Specific Gravity Urine (no units)   Date Value   08/30/2022 1.010     pH Urine (no units)   Date Value   08/30/2022 8.0 (H)     Protein Albumin Urine (mg/dL)   Date Value   08/30/2022 Negative     UROBILINOGEN, URINE POCT (E.U./dL)   Date Value   08/24/2022 0.2     Nitrite Urine (no units)   Date Value   08/30/2022 Positive (A)     Leukocyte Esterase Urine (no units)   Date Value   08/30/2022 Large (A)      Ucx on 9/18 showed multidrug resistant Proteus and she was treated with IM gent which resolved her discomfort but now feels that the urine is getting cloudy again.    Reviewed UDS report from 8/24/22 with the patient:    -Maximum  cystometric capacity 360 mL with normal filling sensations.  -Good bladder compliance without detrusor overactivity or stress incontinence.  -Maximum detrusor contraction during voiding reaches 38 cm H2O.  -With catheters in place, she voids 10 mL with Qmax 0.8 ml/s and increased EMG activity. BOOI is 31.1 which is equivocal for bladder outlet obstruction.  -With catheters removed, she voids an additional 32 mL with Qmax 2.3 ml/s and incomplete bladder emptying with final catheterized postvoid residual of 320 mL.   -Fluoroscopy reveals a moderately trabeculated bladder wall without diverticuli or VUR. The bladder neck was closed during filling; voiding images unavailable as patient transferred to [facility] due to being unable to void on [employer] chair.    Assessment & Plan   63 y/o F with chronic frequency, urgency and urinary retention with incomplete bladder emptying and recurrent uti's.  She was recently treated with IM gent for an infection but feels it is coming right back.  We discussed repeat injection as well as methenamine.  We also discussed referral to ID for other recommendations.    Regarding the urinary retention she does show some obstruction and so we discussed cutting her sling to help with this since she does have good detrusor function.  She was advised that there is a risk of recurrence of her stress incontinence but we could always try some periurthral bulking in that case.  Pt. Agrees with plan.   -nursing visit for repeat IM injection for recurrence of acute cystitis  -referral to ID  -continue to stay well hydrated.  -start methenameine and vit C for prophylaxis for recurrent uti  -continue estrace for atrophic vaginitis  -continue flomax nightly.  -continue CIC prn, pt. To keep a record of voided and  post void volumes.  -schedule excision of one side of the midurethal sling.      Kacy Anderson MD  [facility]OLOG[facility] [place]    Thank you for allowing me to participate in  the care of  Ms. Ilene SARAH Gaviria and I will keep you updated on her progress.    Kacy Anderson MD         ==========================      Additional Coding Information:    Time spent:  30 minutes spent on the date of the encounter doing chart review, history and exam, documentation and further activities per the note

## 2022-09-28 NOTE — PROGRESS NOTES
Ilene is a 62 year old who is being evaluated via a billable video visit.      How would you like to obtain your AVS? [employer]  If the video visit is dropped, the invitation should be resent by: Text to cell phone: [phone]  Will anyone else be joining your video visit? No        Video-Visit Details    Video Start Time: 10:33 AM    Type of service:  Video Visit    Video End Time:10:52 AM    Originating Location (pt. Location): Home    Distant Location (provider location):  [facility] UROLOGY [facility]     Platform used for Video Visit: Twilio

## 2022-09-28 NOTE — LETTER
9/28/2022       RE: Ilene Gaviria  [address] [facility] [address] [facility] [address]     Dear Colleague,    Thank you for referring your patient, Ilene Gaviria, to the [facility] [place] at [facility]. Please see a copy of my visit note below.    Ielne is a 62 year old who is being evaluated via a billable video visit.      How would you like to obtain your AVS? MyChart  If the video visit is dropped, the invitation should be resent by: Text to cell phone: [phone]  Will anyone else be joining your video visit? No        Video-Visit Details    Video Start Time: 10:33 AM    Type of service:  Video Visit    Video End Time:10:52 AM    Originating Location (pt. Location): Home    Distant Location (provider location):  [facility] [place]     Platform used for Video Visit: OnForce    Reason for Visit:  F/u on urinary tract infections.    Clinical Data:  Ms. Gaviria is a 62 year old female with frequency, urgency and now recent episode of retention.  History of colorectal cancer s/p resection and colostomy placement.  History of sling shortly after and then had trouble with UTI like symptoms after.  She has been getting some uti's.  She performs CIC 5-6 times per day but with infections it is more often.  She notes that the amount of residual is less than half what she voids.    She underwent a cystoscopy and biopsy on 7/26/22  Final Diagnosis   A. Bladder,right lateral wall, biopsy:  - Denuded urothelium, granulation tissue  - Negative for malignancy     B.  Bladder, posterior wall, biopsy:  - Bladder mucosa with acute and chronic inflammation and reactive changes  - Negative for malignancy       7/5/22 CT abd/pelvis  IMPRESSION:   No acute process demonstrated.      Review of Labs:  The following labs were reviewed by me and discussed with the patient:     Color Urine (no units)   Date Value   08/30/2022 Yellow      Appearance Urine (no units)   Date Value   08/30/2022 Clear     Glucose Urine (mg/dL)   Date Value   08/30/2022 Negative     Bilirubin Urine (no units)   Date Value   08/30/2022 Negative     Ketones Urine (mg/dL)   Date Value   08/30/2022 Negative     Specific Gravity Urine (no units)   Date Value   08/30/2022 1.010     pH Urine (no units)   Date Value   08/30/2022 8.0 (H)     Protein Albumin Urine (mg/dL)   Date Value   08/30/2022 Negative     UROBILINOGEN, URINE POCT (E.U./dL)   Date Value   08/24/2022 0.2     Nitrite Urine (no units)   Date Value   08/30/2022 Positive (A)     Leukocyte Esterase Urine (no units)   Date Value   08/30/2022 Large (A)      Ucx on 9/18 showed multidrug resistant Proteus and she was treated with IM gent which resolved her discomfort but now feels that the urine is getting cloudy again.    Reviewed UDS report from 8/24/22 with the patient:    -Maximum cystometric capacity 360 mL with normal filling sensations.  -Good bladder compliance without detrusor overactivity or stress incontinence.  -Maximum detrusor contraction during voiding reaches 38 cm H2O.  -With catheters in place, she voids 10 mL with Qmax 0.8 ml/s and increased EMG activity. BOOI is 31.1 which is equivocal for bladder outlet obstruction.  -With catheters removed, she voids an additional 32 mL with Qmax 2.3 ml/s and incomplete bladder emptying with final catheterized postvoid residual of 320 mL.   -Fluoroscopy reveals a moderately trabeculated bladder wall without diverticuli or VUR. The bladder neck was closed during filling; voiding images unavailable as patient transferred to [facility] due to being unable to void on [employer] chair.    Assessment & Plan   61 y/o F with chronic frequency, urgency and urinary retention with incomplete bladder emptying and recurrent uti's.  She was recently treated with IM gent for an infection but feels it is coming right back.  We discussed repeat injection as well as methenamine.  We  also discussed referral to ID for other recommendations.    Regarding the urinary retention she does show some obstruction and so we discussed cutting her sling to help with this since she does have good detrusor function.  She was advised that there is a risk of recurrence of her stress incontinence but we could always try some periurthral bulking in that case.  Pt. Agrees with plan.   -nursing visit for repeat IM injection for recurrence of acute cystitis  -referral to ID  -continue to stay well hydrated.  -start methenameine and vit C for prophylaxis for recurrent uti  -continue estrace for atrophic vaginitis  -continue flomax nightly.  -continue CIC prn, pt. To keep a record of voided and  post void volumes.  -schedule excision of one side of the midurethal sling.      Kacy Anderson MD  [facility]OLOG[facility] [place]    Thank you for allowing me to participate in the care of  Ms. Ilene Gaviria and I will keep you updated on her progress.    Kacy Anderson MD         ==========================      Additional Coding Information:    Time spent:  30 minutes spent on the date of the encounter doing chart review, history and exam, documentation and further activities per the note              Again, thank you for allowing me to participate in the care of your patient.      Sincerely,    Kacy Anderson MD

## 2022-09-28 NOTE — PATIENT INSTRUCTIONS
-nursing visit for repeat IM injection  -referral to ID  -continue to stay well hydrated.  -start methenameine and vit C for prophylaxis  -continue estrace.  -continue flomax nightly.  -continue CIC prn, pt. To keep a record of voided and  post void volumes.  -schedule excision of one side of the midurethal sling.

## 2022-10-05 ENCOUNTER — OFFICE VISIT (OUTPATIENT)
Dept: UROLOGY | Facility: CLINIC | Age: 62
End: 2022-10-05
Payer: COMMERCIAL

## 2022-10-05 DIAGNOSIS — N39.0 RECURRENT UTI: Primary | ICD-10-CM

## 2022-10-05 PROCEDURE — 96372 THER/PROPH/DIAG INJ SC/IM: CPT | Performed by: UROLOGY

## 2022-10-05 PROCEDURE — 99207 PR NO CHARGE NURSE ONLY: CPT

## 2022-10-05 RX ORDER — GENTAMICIN 40 MG/ML
80 INJECTION, SOLUTION INTRAMUSCULAR; INTRAVENOUS ONCE
Status: COMPLETED | OUTPATIENT
Start: 2022-10-06 | End: 2022-10-06

## 2022-10-05 RX ORDER — GENTAMICIN 40 MG/ML
80 INJECTION, SOLUTION INTRAMUSCULAR; INTRAVENOUS ONCE
Status: DISCONTINUED | OUTPATIENT
Start: 2022-10-07 | End: 2022-11-01

## 2022-10-05 RX ORDER — GENTAMICIN 40 MG/ML
160 INJECTION, SOLUTION INTRAMUSCULAR; INTRAVENOUS ONCE
Status: COMPLETED | OUTPATIENT
Start: 2022-10-05 | End: 2022-10-05

## 2022-10-05 RX ADMIN — GENTAMICIN 160 MG: 40 INJECTION, SOLUTION INTRAMUSCULAR; INTRAVENOUS at 09:13

## 2022-10-05 NOTE — PATIENT INSTRUCTIONS
The following medication was given:     MEDICATION:  gentamicin  ROUTE: IM  SITE: Deltoid - Left & Deltoid- Right   DOSE: 160 mg  LOT #: [id number]  : [employer]  EXPIRATION DATE: 06/23  NDC#: 871671-027-47  Was there drug waste? Sandra Gaviria comes into clinic today at the request of Dr. Anderson Ordering Provider for Med Injection only gentamicin .        This service provided today was under the supervising provider of the day Dr Rodriguez, who was available if needed.        October 5, 2022   Patient tolerated injection well. Band-Aids applied to both arms. Patient will return tomorrow for second dose  Bailey Moseley RN, BSN  Care Coordinator Urology

## 2022-10-05 NOTE — CONFIDENTIAL NOTE
See nurse note for more details about today's visit   Bailey Moseley, RN, BSN  Care Coordinator Urology

## 2022-10-06 ENCOUNTER — OFFICE VISIT (OUTPATIENT)
Dept: UROLOGY | Facility: CLINIC | Age: 62
End: 2022-10-06
Payer: COMMERCIAL

## 2022-10-06 VITALS — TEMPERATURE: 97.7 F

## 2022-10-06 DIAGNOSIS — N39.0 RECURRENT UTI: Primary | ICD-10-CM

## 2022-10-06 PROCEDURE — 99207 PR NON-BILLABLE SERV PER CHARTING: CPT | Performed by: UROLOGY

## 2022-10-06 PROCEDURE — 99207 PR NON-BILLABLE SERV PER CHARTING: CPT

## 2022-10-06 RX ADMIN — GENTAMICIN 80 MG: 40 INJECTION, SOLUTION INTRAMUSCULAR; INTRAVENOUS at 09:16

## 2022-10-06 NOTE — CONFIDENTIAL NOTE
See nurse's note for details of today visit     Bailey Moseley, RN, BSN  Care Coordinator Urology

## 2022-10-06 NOTE — NURSING NOTE
The following medication was given:     MEDICATION:  Gentamicin   ROUTE: IM  SITE: Deltoid - Right  DOSE: 80 mg  LOT #: [id number]   : [employer]  EXPIRATION DATE: 06/23  NDC#: 05141-665-39   Was there drug waste? No      Bailey Moseley RN, BSN  October 6, 2022     Patient tolerated injection well.  Patient was instructed on her upcoming surgery with Dr Anderson 11/1/22   Patient plans to have pre-op done at her primary and her home Covid test done two days prior to the procedure. Patient  plans to have her spouse for transportation and stay with her the first twenty four hours post procedure. Will discuss medications with the patient also. Patient is taking Mounjaro.     Patient will start

## 2022-10-07 ENCOUNTER — OFFICE VISIT (OUTPATIENT)
Dept: UROLOGY | Facility: CLINIC | Age: 62
End: 2022-10-07
Payer: COMMERCIAL

## 2022-10-07 DIAGNOSIS — N39.0 RECURRENT UTI: Primary | ICD-10-CM

## 2022-10-07 PROCEDURE — 96372 THER/PROPH/DIAG INJ SC/IM: CPT | Performed by: UROLOGY

## 2022-10-07 PROCEDURE — 99207 PR NO BILLABLE SERVICE THIS VISIT: CPT

## 2022-10-07 RX ORDER — GENTAMICIN 40 MG/ML
80 INJECTION, SOLUTION INTRAMUSCULAR; INTRAVENOUS ONCE
Status: COMPLETED | OUTPATIENT
Start: 2022-10-07 | End: 2022-10-07

## 2022-10-07 RX ADMIN — GENTAMICIN 80 MG: 40 INJECTION, SOLUTION INTRAMUSCULAR; INTRAVENOUS at 09:13

## 2022-10-07 NOTE — PROGRESS NOTES
Ilene Gaviria comes into clinic today at the request of Dr. Anderson with the diagnosis of Caitlin for a gentamicin injection.    The following medication was given:     MEDICATION: Gentamicin  ROUTE: IM  SITE: left deltoid  DOSE: 80 mg/ 2 mL  LOT #: [id number]  : [employer]  EXPIRATION DATE: 09/23  NDC#: 99957-629-09   Was there drug waste? No    Prior to administration, verified patient identity using patient's name and date of birth.    Drug Amount Wasted:  None.  Vial/Syringe: syringe      Patient tolerated procedure well.      This service provided today was under the supervising provider of the day Dr. Lancaster, who was available if needed.    Marjorie Loya MA  October 7, 2022  9:15 AM

## 2022-10-11 ENCOUNTER — TELEPHONE (OUTPATIENT)
Dept: UROLOGY | Facility: CLINIC | Age: 62
End: 2022-10-11

## 2022-10-11 PROBLEM — R33.9 URINARY RETENTION: Status: ACTIVE | Noted: 2022-10-11

## 2022-10-11 NOTE — TELEPHONE ENCOUNTER
Patient is scheduled for surgery with Dr. Anderson     Spoke with: Patient via phone     Date of Surgery: Tuesday November 01, 2022     Location: [facility] OR      Informed patient they will need an adult : Yes     Pre-op: Yes      H&P: Patient to schedule     Pre-procedure COVID-19 Test: Home test     Post-op: Wednesday November 30, 2022    Additional imaging/appointments:     Additional comments:      Surgery packet: Given     Patient is aware that surgery time is tentative to change and to expect a call 3-1 business days from Pre Admission Nursing for instructions and arrival time

## 2022-10-12 NOTE — PROGRESS NOTES
[facility]  General Infectious Disease/HIV Clinic Note: New Patient     Patient:  Ilene Gaviria, Date of birth 1960   Medical record number [id number]  Date of Visit:  10/13/2022   Consult requested by Dr. Kacy Anderson for evaluation of recurrent UTIs with MDR organisms.      Assessment       1. Recurrent urinary tract infections, predominant organism: ESBL Proteus mirabilis  2. S/p urethral sling procedure (2018)  3. Urinary retention requiring intermittent self-catheterization  4. Rectal cancer s/p chemo/radiation and resection w/ end colostomy (2018)  5. Post-menopausal  6. DMII, well-controlled    Ilene Gaviria has unfortunately developed recurrent UTIs with increasingly resistant Proteus and urinary retention requiring self-catheterization. I suspect the etiology of her recurrent infections is multifactorial with age, post-menopausal status, urinary retention, self-catheterization, anatomic abnormality (presence of urethral sling), hydration, and the virulence factors of Proteus all playing a role. Regarding the Proteus, it is known to be a urea-splitting organism that can raise urinary pH (on 8/30 UA pH was 8.0) which leads to proliferation of the organism as well as salt precipitation/stone formation in the urine. Urinary stasis can exacerbate this, so we discussed increasing her hydration and more frequent CIC to keep the bladder as empty as possible. Increasing the urine pH with methenamine and Vitamin C will also be helpful in fostering a less favorable environment for the Proteus, so would continue both of these. As she is post-menopausal and vaginal dryness can lead to UTIs, would also continue vaginal estrogen. Unfortunately, given the resistance profile of the Proteus, we are running out of antibiotic options. IM gentamicin is very appropriate at this time, but we may run into resistance in the future. Would add on fosfomycin susceptibilities to her next urine  "culture to see if this is an option. We also discussed at length today the need for only treating her with antibiotics when there is a very clear, symptomatic UTI occurring. If she is starting to have UTI symptoms, we discussed increasing fluid intake and frequency of catheterization prior to immediately taking antibiotics. We also discussed \"red flag\" symptoms for which she should always seek care.     I am not sure to what degree the urethral sling might be playing a role, though it is a possibility. Agree with Urology plan to remove to see if this helps with symptoms. She is concerned about stress incontinence if this procedure is performed, for which she could certainly try pelvic floor physical therapy.      Recommendations     1. Recommend increasing hydration (>2L water/day)  2. Recommend more frequent catheterization, consider scheduling.   3. Agree with methinamine and Vitamin C supplementation to acidify the urine.   4. At the onset of urinary discomfort, increase hydration and frequency of catheterization to see if this helps. If symptoms persist or any \"red flag\" symptoms develop, recommend seeking care immediately.   5. With next urine culture, would add on fosfomycin susceptibilities     RTC: 3 months, virtual ok. PRN if acute symptoms arise.     I spent a total of 74 minutes on the day of the visit.   Time spent doing chart review, history and exam, documentation and further activities per the note    Eunice Larry MD  Pager [phone]  Infectious Diseases      History of the Infectious Disease lllness:   CC: Recurrent UTIs    HPI:   Ilene Gaviria is a pleasant 62 year old female with a past medical history of colorectal cancer s/p resection with colostomy, s/p urethral sling, now with urinary retention requiring CIC, recurrent UTIs, HTN, HLD, and DMII (last HbA1c 6.1%) who presents to ID clinic for evaluation/management of recurrent UTIs.     Ms. Gaviria was diagnosed with colorectal cancer in " 2018 and underwent colonic resection with end colostomy formation. Shortly following this, she underwent a urethral sling placement for urinary leakage. For the first few years following this, she describes 3-4 UTIs in a year. They were acutely symptomatic with dysuria and increased urinary frequency, and rapidly resolved with antibiotic therapy. Earlier this year, however, she began to develop UTIs with severe urinary retention. She had frequent ED visits while awaiting Urology referral, and it was noted that she was retaining urine. She saw one Urologist who recommended a chronic indwelling miguel catheter, but this was too cumbersome for her daily life, and she ultimately saw another Urologist (Dr. Anderson). She has had multiple urine cultures (outlined below) over the past 6 months. Many of them had mixed wong, but the predominant organism that we have cultured has been an ESBL Proteus mirabilis. She has been treated with IM gentamicin twice for this. She also had bladder biopsies in July of this year that were unrevealing for malignancy or radiation changes. Currently, she denies any urinary symptoms, but notes that when she feels that she is having a UTI she has increased frequency (feels that she has to cath more often), pain, and a change in the smell and consistency of her urine. She denies ever having suprapubic pain, back pain, fevers, or chills. On a normal day, she waits to feel pressure in the bladder prior to catheterizing, and thinks that she caths on average 3-4 times/day. She notes that she chronically has poor hydration and drinks a lot of coffee, but finds it difficult to drink much water. She most recently saw her Urologist on 9/28 at which time she was started on methenamine and vitamin C, which she has been taking.     Culture results:   9/18/22 Urine culture: >100,000 CFU Proteus mirabilis, ESBL  Susceptibility     Proteus mirabilis ESBL     JAN     Ampicillin >=32 ug/mL Resistant     Cefazolin  >=64 ug/mL Resistant 1     Cefepime  Resistant     Cefoxitin <=4 ug/mL Susceptible     Ceftazidime  Resistant     Ceftriaxone  Resistant     Ciprofloxacin >=4 ug/mL Resistant     Gentamicin <=1 ug/mL Susceptible     Levofloxacin >=8 ug/mL Resistant     Meropenem <=0.25 ug/mL Susceptible 2     Nitrofurantoin  Resistant 3     Piperacillin/Tazobactam <=4 ug/mL Susceptible     Tobramycin <=1 ug/mL Susceptible     Trimethoprim/Sulfamethoxazole >16/304 ug/mL Resistant        8/30/22 Urine culture: >100,000 CFU Mixed urogenital wong  8/5/22 Urine culture: >100,000 CFU Proteus mirabilis, ESBL    Same susceptibility profile as above  7/1/22 Urine culture: No growth  6/20/22 Urine culture: >100,000 CFU Mixed urogenital wong  6/3/22 Urine culture: 10,000-50,000 CFU Mixed urogenital wong    Review of Systems: The remainder of a complete ROS was negative, except as noted in HPI.     Past Medical History:   Diagnosis Date     Cancer (H)     rectal cancer       Past Surgical History:   Procedure Laterality Date     CYSTOSCOPY, BIOPSY BLADDER, COMBINED N/A 7/26/2022    Procedure: CYSTOSCOPY, WITH BLADDER BIOPSY AND FULGURATION (Look in the bladder and sample red  tissue then burn the area involved);  Surgeon: Kacy Anderson MD;  [place] [facility] [place]     DAVINCI COLECTOMY N/A 8/7/2018    Procedure: DAVINCI XI COLECTOMY;  DAVINCI ABDOMINAL PERINEAL RESECTION WITH PERMANENT COLOSTOMY ( PARI ) LEFT GLUTEAL FLAP TO RECTAL DEFECT ( JERE ) ;  Surgeon: Jonny Finney MD;  Location:  OR     GENITOURINARY SURGERY      bladder sling 12/18     GRAFT FLAP GLUTEUS TO PERINEUM N/A 8/7/2018    Procedure: GRAFT FLAP GLUTEUS TO PERINEUM;;  Surgeon: Conchita Ramos MD;  Location:  OR     GYN SURGERY      c section X3, tubal      IR CYSTOGRAM  8/24/2022     SIGMOIDOSCOPY FLEXIBLE N/A 7/9/2018    Procedure: SIGMOIDOSCOPY FLEXIBLE;  FLEXIBILE SIGMOIDOSCOPY;  Surgeon: Jonny Finney MD;  Location:  GI       Family History    Problem Relation Age of Onset     Anesthesia Reaction No family hx of      Deep Vein Thrombosis (DVT) No family hx of      Social History     Tobacco Use     Smoking status: Never     Smokeless tobacco: Never   Substance Use Topics     Alcohol use: Yes     Comment: rare     Drug use: No       Patient Active Problem List   Diagnosis     BMI 30.0-30.9,adult     Rectal cancer (H)     Colostomy in place (H)     Lesion of bladder     Somatic dysfunction of pelvic region     Urinary retention       Allergies   Allergen Reactions     No Known Allergies             Physical Exam:   Vitals were reviewed.  All vitals stable  There were no vitals taken for this visit.  Wt Readings from Last 4 Encounters:   09/16/22 89.8 kg (198 lb)   08/24/22 90.7 kg (200 lb)   07/26/22 91.1 kg (200 lb 12.8 oz)   07/22/22 91.1 kg (200 lb 12.8 oz)       Exam:  GENERAL: well-developed, well-nourished, alert, oriented, in no acute distress.  HEAD: Head is normocephalic, atraumatic   EYES: Eyes have anicteric sclerae, no conjunctival injection. MMM.   ENT: Oropharynx is moist without exudates or ulcers.  NECK: Supple.  LUNGS: Normal WOB on RA.   NEUROLOGIC: Grossly nonfocal.          Laboratory Data:     Metabolic Studies    Recent Labs   Lab Test 07/26/22  1055 07/01/22  0350 06/26/22  1617 05/23/22  0850 03/09/22  0954 12/17/19  0940 08/08/18  0732   NA  --  139 135  --  139   < > 141   POTASSIUM  --  5.0 4.2  --  4.0   < > 4.1   CHLORIDE  --  106 103  --  106   < > 108   CO2  --  25 28  --  28   < > 26   ANIONGAP  --  8 4  --  5   < > 7   BUN  --  31* 15  --  15   < > 15   CR  --  1.41* 0.79 0.6 0.64   < > 0.60   GFRESTIMATED  --  42* 85 >60 >90   < > >90   * 204* 155*  --  150*   < > 103*   ARTHUR  --  9.2 9.3  --  9.4   < > 8.2*   PHOS  --   --   --   --   --   --  3.2   MAG  --   --   --   --   --   --  1.8   LACT  --   --  1.7  --   --   --   --     < > = values in this interval not displayed.     Hematology Studies     Recent Labs    Lab Test 22  0350 22  1617 22  0954 08/10/18  0844 18  0732 18  0919 17  1659   WBC 9.0 6.3 4.8  --  8.2  --  5.6   ANEU  --   --   --   --   --   --  3.1   ALYM  --   --   --   --   --   --  2.1   BETZY  --   --   --   --   --   --  0.3   AEOS  --   --   --   --   --   --  0.1   HGB 12.9 14.4 14.9   < > 10.8*   < > 14.2   HCT 38.8 43.9 44.6   < > 32.9*  --  41.6    195 184   < > 120*   < > 163    < > = values in this interval not displayed.     Urine Studies     Recent Labs   Lab Test 22  1355 22  1502 22  1210 22  0417 22  0026   URINEPH 8.0* 5.5 6.0 6.0 5.5   NITRITE Positive* Negative Positive* Negative Negative   LEUKEST Large* Moderate* Large* Large* Large*   WBCU >182*  --  >182* >182* >182*        Imagin22 CT Abd/pelvis  HEPATOBILIARY: No significant mass or bile duct dilatation. No  calcified gallstones.      PANCREAS: No significant mass, duct dilatation, or inflammatory  change.     SPLEEN: Normal size.     ADRENAL GLANDS: No significant nodules.     KIDNEYS/BLADDER: No significant mass, stones, or hydronephrosis.     BOWEL: No obstruction or inflammatory change. Nonobstructing bowel  containing parastomal hernia on the left.     VASCULATURE: No abdominal aortic aneurysm.     PELVIC ORGANS: No pelvic masses.     OTHER: No free air or free fluid.     MUSCULOSKELETAL: No suspicious bony lesions.                                                                      IMPRESSION:   No acute process demonstrated.

## 2022-10-13 ENCOUNTER — OFFICE VISIT (OUTPATIENT)
Dept: CT IMAGING | Facility: CLINIC | Age: 62
End: 2022-10-13
Attending: UROLOGY
Payer: COMMERCIAL

## 2022-10-13 VITALS
OXYGEN SATURATION: 96 % | HEART RATE: 75 BPM | WEIGHT: 201.9 LBS | TEMPERATURE: 97.7 F | SYSTOLIC BLOOD PRESSURE: 152 MMHG | HEIGHT: 68 IN | DIASTOLIC BLOOD PRESSURE: 90 MMHG | BODY MASS INDEX: 30.6 KG/M2

## 2022-10-13 DIAGNOSIS — N39.0 RECURRENT UTI: ICD-10-CM

## 2022-10-13 PROCEDURE — 99205 OFFICE O/P NEW HI 60 MIN: CPT | Performed by: INTERNAL MEDICINE

## 2022-10-13 PROCEDURE — G0463 HOSPITAL OUTPT CLINIC VISIT: HCPCS

## 2022-10-13 ASSESSMENT — PAIN SCALES - GENERAL: PAINLEVEL: MILD PAIN (2)

## 2022-10-13 NOTE — PATIENT INSTRUCTIONS
Hydrate!!!    Continue the methinamine and vitamin C.     Schedule catheterizations, especially when you feel symptoms coming on.     Use the vaginal estrogen as prescribed.     Red flag symptoms: fever, chills, severe back pain, nausea.

## 2022-10-13 NOTE — NURSING NOTE
"Chief Complaint   Patient presents with     Consult     Consult for recurrent UTI     BP (!) 152/90 (BP Location: Left arm, Patient Position: Sitting, Cuff Size: Adult Regular)   Pulse 75   Temp 97.7  F (36.5  C) (Oral)   Ht 1.727 m (5' 7.99\")   Wt 91.6 kg (201 lb 14.4 oz)   SpO2 96%   BMI 30.71 kg/m      Charmaine Wright on 10/13/2022 at 8:08 AM    "

## 2022-10-25 ENCOUNTER — LAB (OUTPATIENT)
Dept: LAB | Facility: CLINIC | Age: 62
End: 2022-10-25
Payer: COMMERCIAL

## 2022-10-25 DIAGNOSIS — N39.0 RECURRENT UTI: ICD-10-CM

## 2022-10-25 DIAGNOSIS — Z11.59 NEED FOR HEPATITIS C SCREENING TEST: ICD-10-CM

## 2022-10-25 DIAGNOSIS — Z11.4 SCREENING FOR HIV (HUMAN IMMUNODEFICIENCY VIRUS): ICD-10-CM

## 2022-10-25 DIAGNOSIS — Z13.220 SCREENING FOR HYPERLIPIDEMIA: ICD-10-CM

## 2022-10-25 DIAGNOSIS — N39.0 RECURRENT UTI: Primary | ICD-10-CM

## 2022-10-25 DIAGNOSIS — E11.40 TYPE 2 DIABETES MELLITUS WITH DIABETIC NEUROPATHY, WITHOUT LONG-TERM CURRENT USE OF INSULIN (H): ICD-10-CM

## 2022-10-25 LAB
ALBUMIN UR-MCNC: NEGATIVE MG/DL
APPEARANCE UR: CLEAR
BACTERIA #/AREA URNS HPF: ABNORMAL /HPF
BILIRUB UR QL STRIP: NEGATIVE
COLOR UR AUTO: YELLOW
GLUCOSE UR STRIP-MCNC: NEGATIVE MG/DL
HGB UR QL STRIP: NEGATIVE
KETONES UR STRIP-MCNC: NEGATIVE MG/DL
LEUKOCYTE ESTERASE UR QL STRIP: ABNORMAL
NITRATE UR QL: NEGATIVE
PH UR STRIP: 5 [PH] (ref 5–7)
RBC #/AREA URNS AUTO: ABNORMAL /HPF
SP GR UR STRIP: 1.01 (ref 1–1.03)
SQUAMOUS #/AREA URNS AUTO: ABNORMAL /LPF
UROBILINOGEN UR STRIP-ACNC: 0.2 E.U./DL
WBC #/AREA URNS AUTO: ABNORMAL /HPF

## 2022-10-25 PROCEDURE — 87186 SC STD MICRODIL/AGAR DIL: CPT

## 2022-10-25 PROCEDURE — 87086 URINE CULTURE/COLONY COUNT: CPT

## 2022-10-25 PROCEDURE — 87088 URINE BACTERIA CULTURE: CPT

## 2022-10-25 PROCEDURE — 81001 URINALYSIS AUTO W/SCOPE: CPT

## 2022-10-27 LAB — BACTERIA UR CULT: ABNORMAL

## 2022-10-28 ENCOUNTER — OFFICE VISIT (OUTPATIENT)
Dept: FAMILY MEDICINE | Facility: CLINIC | Age: 62
End: 2022-10-28
Payer: COMMERCIAL

## 2022-10-28 VITALS
BODY MASS INDEX: 32.02 KG/M2 | HEIGHT: 67 IN | RESPIRATION RATE: 20 BRPM | OXYGEN SATURATION: 97 % | TEMPERATURE: 97.3 F | WEIGHT: 204 LBS | HEART RATE: 77 BPM | SYSTOLIC BLOOD PRESSURE: 126 MMHG | DIASTOLIC BLOOD PRESSURE: 84 MMHG

## 2022-10-28 DIAGNOSIS — Z01.818 PRE-OP EXAM: Primary | ICD-10-CM

## 2022-10-28 DIAGNOSIS — R33.9 URINARY RETENTION: ICD-10-CM

## 2022-10-28 DIAGNOSIS — E11.40 TYPE 2 DIABETES MELLITUS WITH DIABETIC NEUROPATHY, WITHOUT LONG-TERM CURRENT USE OF INSULIN (H): ICD-10-CM

## 2022-10-28 LAB
ALBUMIN SERPL-MCNC: 4.1 G/DL (ref 3.4–5)
ALP SERPL-CCNC: 67 U/L (ref 40–150)
ALT SERPL W P-5'-P-CCNC: 56 U/L (ref 0–50)
ANION GAP SERPL CALCULATED.3IONS-SCNC: 7 MMOL/L (ref 3–14)
AST SERPL W P-5'-P-CCNC: 28 U/L (ref 0–45)
BASOPHILS # BLD AUTO: 0 10E3/UL (ref 0–0.2)
BASOPHILS NFR BLD AUTO: 0 %
BILIRUB SERPL-MCNC: 0.5 MG/DL (ref 0.2–1.3)
BUN SERPL-MCNC: 15 MG/DL (ref 7–30)
CALCIUM SERPL-MCNC: 10 MG/DL (ref 8.5–10.1)
CHLORIDE BLD-SCNC: 105 MMOL/L (ref 94–109)
CHOLEST SERPL-MCNC: 198 MG/DL
CO2 SERPL-SCNC: 26 MMOL/L (ref 20–32)
CREAT SERPL-MCNC: 0.6 MG/DL (ref 0.52–1.04)
EOSINOPHIL # BLD AUTO: 0.1 10E3/UL (ref 0–0.7)
EOSINOPHIL NFR BLD AUTO: 1 %
ERYTHROCYTE [DISTWIDTH] IN BLOOD BY AUTOMATED COUNT: 12.7 % (ref 10–15)
FASTING STATUS PATIENT QL REPORTED: NO
GFR SERPL CREATININE-BSD FRML MDRD: >90 ML/MIN/1.73M2
GLUCOSE BLD-MCNC: 166 MG/DL (ref 70–99)
HBA1C MFR BLD: 7 % (ref 0–5.6)
HCT VFR BLD AUTO: 43.7 % (ref 35–47)
HCV AB SERPL QL IA: NONREACTIVE
HDLC SERPL-MCNC: 47 MG/DL
HGB BLD-MCNC: 14.9 G/DL (ref 11.7–15.7)
HIV 1+2 AB+HIV1 P24 AG SERPL QL IA: NONREACTIVE
IMM GRANULOCYTES # BLD: 0 10E3/UL
IMM GRANULOCYTES NFR BLD: 0 %
LDLC SERPL CALC-MCNC: 73 MG/DL
LYMPHOCYTES # BLD AUTO: 2.1 10E3/UL (ref 0.8–5.3)
LYMPHOCYTES NFR BLD AUTO: 35 %
MCH RBC QN AUTO: 31 PG (ref 26.5–33)
MCHC RBC AUTO-ENTMCNC: 34.1 G/DL (ref 31.5–36.5)
MCV RBC AUTO: 91 FL (ref 78–100)
MONOCYTES # BLD AUTO: 0.5 10E3/UL (ref 0–1.3)
MONOCYTES NFR BLD AUTO: 8 %
NEUTROPHILS # BLD AUTO: 3.4 10E3/UL (ref 1.6–8.3)
NEUTROPHILS NFR BLD AUTO: 56 %
NONHDLC SERPL-MCNC: 151 MG/DL
PLATELET # BLD AUTO: 146 10E3/UL (ref 150–450)
POTASSIUM BLD-SCNC: 4.7 MMOL/L (ref 3.4–5.3)
PROT SERPL-MCNC: 7.3 G/DL (ref 6.8–8.8)
RBC # BLD AUTO: 4.81 10E6/UL (ref 3.8–5.2)
SODIUM SERPL-SCNC: 138 MMOL/L (ref 133–144)
TRIGL SERPL-MCNC: 389 MG/DL
WBC # BLD AUTO: 6 10E3/UL (ref 4–11)

## 2022-10-28 PROCEDURE — 85025 COMPLETE CBC W/AUTO DIFF WBC: CPT | Performed by: PHYSICIAN ASSISTANT

## 2022-10-28 PROCEDURE — 36415 COLL VENOUS BLD VENIPUNCTURE: CPT | Performed by: PHYSICIAN ASSISTANT

## 2022-10-28 PROCEDURE — 99214 OFFICE O/P EST MOD 30 MIN: CPT | Performed by: PHYSICIAN ASSISTANT

## 2022-10-28 PROCEDURE — 87389 HIV-1 AG W/HIV-1&-2 AB AG IA: CPT | Performed by: PHYSICIAN ASSISTANT

## 2022-10-28 PROCEDURE — 80053 COMPREHEN METABOLIC PANEL: CPT | Performed by: PHYSICIAN ASSISTANT

## 2022-10-28 PROCEDURE — 80061 LIPID PANEL: CPT | Performed by: PHYSICIAN ASSISTANT

## 2022-10-28 PROCEDURE — 86803 HEPATITIS C AB TEST: CPT | Performed by: PHYSICIAN ASSISTANT

## 2022-10-28 PROCEDURE — 83036 HEMOGLOBIN GLYCOSYLATED A1C: CPT | Performed by: PHYSICIAN ASSISTANT

## 2022-10-28 ASSESSMENT — PAIN SCALES - GENERAL: PAINLEVEL: NO PAIN (0)

## 2022-10-28 NOTE — PROGRESS NOTES
[address], [address]  [facility] [address]  Phone: [phone]  Primary Provider: No Ref-Primary, Physician  Pre-op Performing Provider: LASHA TREVINO      PREOPERATIVE EVALUATION:  Today's date: 10/28/2022    Ilene Gaviria is a 62 year old female who presents for a preoperative evaluation.    Surgical Information:  Surgery/Procedure: EXCISION, sling, VAGINA (cut one side of the sling in the vagina)  [place] Location[place] [facility] [place]  Surgeon: Kacy Anderson  Surgery Date: 11/01/2022  Time of Surgery: 1:55 PM  Where patient plans to recover: At home with family  Fax number for surgical facility: Note does not need to be faxed, will be available electronically in Epic.    Type of Anesthesia Anticipated: MAC    Assessment & Plan     The proposed surgical procedure is considered LOW risk.    Pre-op exam  Cleared for surgery pending blood work.  CBC, CMP, and A1c.  Last A1c was in June which displayed good control.  Hold metformin morning of surgery.  Avoid NSAIDs/vitamins.  Continue lisinopril.  Blood pressure well controlled.  Continue Detrol and Hiprex. No additional medications she is taking.     - CBC with platelets and differential  - Comprehensive metabolic panel (BMP + Alb, Alk Phos, ALT, AST, Total. Bili, TP)  - Hemoglobin A1c    Urinary retention  Close follow-up with urology team.  States she has been having success with Hiprex.    Type 2 diabetes mellitus with diabetic neuropathy, without long-term current use of insulin (H)  Check A1c.  Last was 6.1.  - Hemoglobin A1c  - Lipid panel reflex to direct LDL Non-fasting      Risks and Recommendations:  The patient has the following additional risks and recommendations for perioperative complications:   - No identified additional risk factors other than previously addressed    Medication Instructions:  Patient is to take all scheduled medications on the day of surgery EXCEPT for modifications listed below:  Avoid  NSAIDs and vitamins prior to surgery.  Hold metformin morning of surgery.        RECOMMENDATION:  APPROVAL GIVEN to proceed with proposed procedure pending review of diagnostic evaluation. Labs pending.     30 minutes spent on the date of the encounter doing chart review, review of test results, interpretation of tests, patient visit and documentation         Subjective     HPI related to upcoming procedure:     Presents today for preoperative clearance for urinary retention.  History of sling shortly after colorectal cancer status postresection and colostomy placement.    Personal or family history of adverse events following anesthesia.    Preop Questions 10/28/2022   1. Have you ever had a heart attack or stroke? No   2. Have you ever had surgery on your heart or blood vessels, such as a stent placement, a coronary artery bypass, or surgery on an artery in your head, neck, heart, or legs? No   3. Do you have chest pain with activity? No   4. Do you have a history of  heart failure? No   5. Do you currently have a cold, bronchitis or symptoms of other infection? No   6. Do you have a cough, shortness of breath, or wheezing? No   7. Do you or anyone in your family have previous history of blood clots? No   8. Do you or does anyone in your family have a serious bleeding problem such as prolonged bleeding following surgeries or cuts? No   9. Have you ever had problems with anemia or been told to take iron pills? No   10. Have you had any abnormal blood loss such as black, tarry or bloody stools, or abnormal vaginal bleeding? No   11. Have you ever had a blood transfusion? No   12. Are you willing to have a blood transfusion if it is medically needed before, during, or after your surgery? Yes   13. Have you or any of your relatives ever had problems with anesthesia? No   14. Do you have sleep apnea, excessive snoring or daytime drowsiness? No   15. Do you have any artifical heart valves or other implanted medical  devices like a pacemaker, defibrillator, or continuous glucose monitor? No   16. Do you have artificial joints? No   17. Are you allergic to latex? No       Preoperative Review of :   reviewed - no record of controlled substances prescribed.        Review of Systems  CONSTITUTIONAL: NEGATIVE for fever, chills, change in weight  INTEGUMENTARY/SKIN: NEGATIVE for worrisome rashes, moles or lesions  EYES: NEGATIVE for vision changes or irritation  ENT/MOUTH: NEGATIVE for ear, mouth and throat problems  RESP: NEGATIVE for significant cough or SOB  CV: NEGATIVE for chest pain, palpitations or peripheral edema  GI: NEGATIVE for nausea, abdominal pain, heartburn, or change in bowel habits  : NEGATIVE for frequency, dysuria, or hematuria  MUSCULOSKELETAL: NEGATIVE for significant arthralgias or myalgia  NEURO: NEGATIVE for weakness, dizziness or paresthesias  ENDOCRINE: NEGATIVE for temperature intolerance, skin/hair changes  HEME: NEGATIVE for bleeding problems  PSYCHIATRIC: NEGATIVE for changes in mood or affect    Patient Active Problem List    Diagnosis Date Noted     Urinary retention 10/11/2022     Priority: Medium     Added automatically from request for surgery [id number]       Somatic dysfunction of pelvic region 08/01/2022     Priority: Medium     Lesion of bladder 07/20/2022     Priority: Medium     Added automatically from request for surgery [id number]       Colostomy in place (H) 12/14/2018     Priority: Medium     Rectal cancer (H) 08/07/2018     Priority: Medium     BMI 30.0-30.9,adult 04/27/2017     Priority: Medium      Past Medical History:   Diagnosis Date     Cancer (H)     rectal cancer     Past Surgical History:   Procedure Laterality Date     CYSTOSCOPY, BIOPSY BLADDER, COMBINED N/A 7/26/2022    Procedure: CYSTOSCOPY, WITH BLADDER BIOPSY AND FULGURATION (Look in the bladder and sample red  tissue then burn the area involved);  Surgeon: Kacy Anderson MD;  Location: [facility] [place]     DAVINCI COLECTOMY  N/A 8/7/2018    Procedure: DAVINCI XI COLECTOMY;  DAVINCI ABDOMINAL PERINEAL RESECTION WITH PERMANENT COLOSTOMY ( PARI ) LEFT GLUTEAL FLAP TO RECTAL DEFECT ( JERE ) ;  Surgeon: Jonny Finney MD;  Location:  OR     GENITOURINARY SURGERY      bladder sling 12/18     GRAFT FLAP GLUTEUS TO PERINEUM N/A 8/7/2018    Procedure: GRAFT FLAP GLUTEUS TO PERINEUM;;  Surgeon: Conchita Ramos MD;  Location:  OR     GYN SURGERY      c section X3, tubal      IR CYSTOGRAM  8/24/2022     SIGMOIDOSCOPY FLEXIBLE N/A 7/9/2018    Procedure: SIGMOIDOSCOPY FLEXIBLE;  FLEXIBILE SIGMOIDOSCOPY;  Surgeon: Jonny Finney MD;  Location:  GI     Current Outpatient Medications   Medication Sig Dispense Refill     lisinopril (ZESTRIL) 20 MG tablet Take 1 tablet (20 mg) by mouth daily 30 tablet 3     metFORMIN (GLUCOPHAGE) 500 MG tablet Take 1 tablet (500 mg) by mouth 2 times daily (with meals) 180 tablet 0     metFORMIN (GLUCOPHAGE) 500 MG tablet Take 500 mg by mouth 2 times daily (with meals)       methenamine hippurate (HIPREX) 1 g tablet Take 1 tablet (1 g) by mouth 2 times daily 60 tablet 11     simvastatin (ZOCOR) 20 MG tablet Take 1 tablet by mouth daily (with dinner)       tolterodine ER (DETROL LA) 2 MG 24 hr capsule Take 1 capsule (2 mg) by mouth daily 30 capsule 3     amoxicillin-clavulanate (AUGMENTIN) 875-125 MG tablet Take 1 tablet by mouth 2 times daily (Patient not taking: Reported on 10/28/2022) 20 tablet 0     dulaglutide (TRULICITY) 0.75 MG/0.5ML pen Inject 0.75 mg Subcutaneous every 7 days (Patient not taking: Reported on 10/28/2022) 3 mL 1     estradiol (ESTRACE) 0.1 MG/GM vaginal cream Place 2 g vaginally three times a week (Patient not taking: Reported on 10/28/2022) 42.5 g 3     fosfomycin (MONUROL) 3 g Packet Take 1 packet (3 g) by mouth once a week (Patient not taking: Reported on 10/28/2022) 4 packet 0     glycerin (LAXATIVE) 1.2 g suppository Place 1 suppository rectally daily as needed  "(constipation) (Patient not taking: Reported on 10/28/2022) 5 suppository 0     hyoscyamine ER (LEVBID) 375 mcg 12 hr tablet Take 1 tablet (0.375 mg) by mouth every 12 hours as needed for cramping (Patient not taking: Reported on 10/28/2022) 30 tablet 0     semaglutide (OZEMPIC, 0.25 OR 0.5 MG/DOSE,) 2 MG/1.5ML SOPN pen Week 1 through week 4 : 0.25 mg once weekly. Week 5 through week 8: 0.5 mg once weekly. Week 9 : 1 mg once weekly. (Patient not taking: Reported on 10/28/2022) 3 mg 3     sennosides (SENOKOT) 8.6 MG tablet Take 1 tablet by mouth daily as needed for constipation (Patient not taking: Reported on 10/28/2022) 30 tablet 0     sulfamethoxazole-trimethoprim (BACTRIM DS) 800-160 MG tablet Take 1 tablet by mouth 2 times daily (Patient not taking: Reported on 10/28/2022) 10 tablet 0     tamsulosin (FLOMAX) 0.4 MG capsule Take 1 capsule (0.4 mg) by mouth daily for 360 days (Patient not taking: Reported on 10/28/2022) 30 capsule 11     Tirzepatide (MOUNJARO) 2.5 MG/0.5ML SOPN Inject 2.5 mg Subcutaneous once a week (Patient not taking: Reported on 10/28/2022) 2 mL 3       Allergies   Allergen Reactions     No Known Allergies         Social History     Tobacco Use     Smoking status: Never     Smokeless tobacco: Never   Substance Use Topics     Alcohol use: Yes     Comment: rare     Family History   Problem Relation Age of Onset     Anesthesia Reaction No family hx of      Deep Vein Thrombosis (DVT) No family hx of      History   Drug Use No         Objective     /84 (BP Location: Right arm, Patient Position: Sitting, Cuff Size: Adult Large)   Pulse 77   Temp 97.3  F (36.3  C) (Temporal)   Resp 20   Ht 1.702 m (5' 7\")   Wt 92.5 kg (204 lb)   SpO2 97%   BMI 31.95 kg/m      Physical Exam    GENERAL APPEARANCE: healthy, alert and no distress     EYES: EOMI,  PERRL     HENT: ear canals and TM's normal and nose and mouth without ulcers or lesions     NECK: no adenopathy, no asymmetry, masses, or scars and " thyroid normal to palpation     RESP: lungs clear to auscultation - no rales, rhonchi or wheezes     CV: regular rates and rhythm, normal S1 S2, no S3 or S4 and no murmur, click or rub     ABDOMEN:  soft, nontender, no HSM or masses and bowel sounds normal     MS: extremities normal- no gross deformities noted, no evidence of inflammation in joints, FROM in all extremities.     SKIN: no suspicious lesions or rashes     NEURO: Normal strength and tone, sensory exam grossly normal, mentation intact and speech normal     PSYCH: mentation appears normal. and affect normal/bright     LYMPHATICS: No cervical adenopathy    Recent Labs   Lab Test 07/01/22  0350 06/26/22  1617   HGB 12.9 14.4    195    135   POTASSIUM 5.0 4.2   CR 1.41* 0.79        Diagnostics:  Labs pending at this time.  Results will be reviewed when available.   No EKG required, no history of coronary heart disease, significant arrhythmia, peripheral arterial disease or other structural heart disease.    Revised Cardiac Risk Index (RCRI):  The patient has the following serious cardiovascular risks for perioperative complications:   - No serious cardiac risks = 0 points     RCRI Interpretation: 0 points: Class I (very low risk - 0.4% complication rate)      The likelihood of other entities in the differential is insufficient to justify any further testing for them at this time. This was explained to the patient. The patient was advised that persistent or worsening symptoms would require further evaluation. Patient advised to call the office and if unable to reach to go to the emergency room if they develop any new or worsening symptoms. Expressed understanding and agreement with above stated plan.       Signed Electronically by: Peter Altamirano PA-C  Copy of this evaluation report is provided to requesting physician.

## 2022-10-31 ENCOUNTER — ANESTHESIA EVENT (OUTPATIENT)
Dept: SURGERY | Facility: AMBULATORY SURGERY CENTER | Age: 62
End: 2022-10-31
Payer: COMMERCIAL

## 2022-11-01 ENCOUNTER — HOSPITAL ENCOUNTER (OUTPATIENT)
Facility: AMBULATORY SURGERY CENTER | Age: 62
Discharge: HOME | End: 2022-11-01
Attending: UROLOGY
Payer: COMMERCIAL

## 2022-11-01 ENCOUNTER — ANESTHESIA (OUTPATIENT)
Dept: SURGERY | Facility: AMBULATORY SURGERY CENTER | Age: 62
End: 2022-11-01
Payer: COMMERCIAL

## 2022-11-01 VITALS
OXYGEN SATURATION: 99 % | TEMPERATURE: 97.3 F | HEART RATE: 69 BPM | WEIGHT: 200 LBS | SYSTOLIC BLOOD PRESSURE: 118 MMHG | BODY MASS INDEX: 30.31 KG/M2 | HEIGHT: 68 IN | RESPIRATION RATE: 16 BRPM | DIASTOLIC BLOOD PRESSURE: 64 MMHG

## 2022-11-01 DIAGNOSIS — R33.9 URINARY RETENTION: ICD-10-CM

## 2022-11-01 LAB — GLUCOSE BLDC GLUCOMTR-MCNC: 118 MG/DL (ref 70–99)

## 2022-11-01 PROCEDURE — 57287 REVISE/REMOVE SLING REPAIR: CPT | Mod: GC | Performed by: UROLOGY

## 2022-11-01 PROCEDURE — 57287 REVISE/REMOVE SLING REPAIR: CPT

## 2022-11-01 PROCEDURE — 82962 GLUCOSE BLOOD TEST: CPT | Performed by: PATHOLOGY

## 2022-11-01 RX ORDER — BUPIVACAINE HYDROCHLORIDE 2.5 MG/ML
INJECTION, SOLUTION INFILTRATION; PERINEURAL PRN
Status: DISCONTINUED | OUTPATIENT
Start: 2022-11-01 | End: 2022-11-01 | Stop reason: HOSPADM

## 2022-11-01 RX ORDER — CEFAZOLIN SODIUM 2 G/50ML
2 SOLUTION INTRAVENOUS
Status: COMPLETED | OUTPATIENT
Start: 2022-11-01 | End: 2022-11-01

## 2022-11-01 RX ORDER — SODIUM CHLORIDE, SODIUM LACTATE, POTASSIUM CHLORIDE, CALCIUM CHLORIDE 600; 310; 30; 20 MG/100ML; MG/100ML; MG/100ML; MG/100ML
INJECTION, SOLUTION INTRAVENOUS CONTINUOUS
Status: DISCONTINUED | OUTPATIENT
Start: 2022-11-01 | End: 2022-11-02 | Stop reason: HOSPADM

## 2022-11-01 RX ORDER — CEFAZOLIN SODIUM 2 G/50ML
2 SOLUTION INTRAVENOUS SEE ADMIN INSTRUCTIONS
Status: DISCONTINUED | OUTPATIENT
Start: 2022-11-01 | End: 2022-11-02 | Stop reason: HOSPADM

## 2022-11-01 RX ORDER — DEXAMETHASONE SODIUM PHOSPHATE 4 MG/ML
INJECTION, SOLUTION INTRA-ARTICULAR; INTRALESIONAL; INTRAMUSCULAR; INTRAVENOUS; SOFT TISSUE PRN
Status: DISCONTINUED | OUTPATIENT
Start: 2022-11-01 | End: 2022-11-01

## 2022-11-01 RX ORDER — ONDANSETRON 2 MG/ML
4 INJECTION INTRAMUSCULAR; INTRAVENOUS EVERY 30 MIN PRN
Status: DISCONTINUED | OUTPATIENT
Start: 2022-11-01 | End: 2022-11-02 | Stop reason: HOSPADM

## 2022-11-01 RX ORDER — ACETAMINOPHEN 325 MG/1
975 TABLET ORAL ONCE
Status: COMPLETED | OUTPATIENT
Start: 2022-11-01 | End: 2022-11-01

## 2022-11-01 RX ORDER — FENTANYL CITRATE 50 UG/ML
25 INJECTION, SOLUTION INTRAMUSCULAR; INTRAVENOUS
Status: DISCONTINUED | OUTPATIENT
Start: 2022-11-01 | End: 2022-11-02 | Stop reason: HOSPADM

## 2022-11-01 RX ORDER — LIDOCAINE HYDROCHLORIDE 20 MG/ML
INJECTION, SOLUTION INFILTRATION; PERINEURAL PRN
Status: DISCONTINUED | OUTPATIENT
Start: 2022-11-01 | End: 2022-11-01

## 2022-11-01 RX ORDER — MEPERIDINE HYDROCHLORIDE 25 MG/ML
12.5 INJECTION INTRAMUSCULAR; INTRAVENOUS; SUBCUTANEOUS
Status: DISCONTINUED | OUTPATIENT
Start: 2022-11-01 | End: 2022-11-02 | Stop reason: HOSPADM

## 2022-11-01 RX ORDER — ONDANSETRON 2 MG/ML
INJECTION INTRAMUSCULAR; INTRAVENOUS PRN
Status: DISCONTINUED | OUTPATIENT
Start: 2022-11-01 | End: 2022-11-01

## 2022-11-01 RX ORDER — PROPOFOL 10 MG/ML
INJECTION, EMULSION INTRAVENOUS CONTINUOUS PRN
Status: DISCONTINUED | OUTPATIENT
Start: 2022-11-01 | End: 2022-11-01

## 2022-11-01 RX ORDER — PROPOFOL 10 MG/ML
INJECTION, EMULSION INTRAVENOUS PRN
Status: DISCONTINUED | OUTPATIENT
Start: 2022-11-01 | End: 2022-11-01

## 2022-11-01 RX ORDER — FENTANYL CITRATE 50 UG/ML
25 INJECTION, SOLUTION INTRAMUSCULAR; INTRAVENOUS EVERY 5 MIN PRN
Status: DISCONTINUED | OUTPATIENT
Start: 2022-11-01 | End: 2022-11-02 | Stop reason: HOSPADM

## 2022-11-01 RX ORDER — ONDANSETRON 4 MG/1
4 TABLET, ORALLY DISINTEGRATING ORAL EVERY 30 MIN PRN
Status: DISCONTINUED | OUTPATIENT
Start: 2022-11-01 | End: 2022-11-02 | Stop reason: HOSPADM

## 2022-11-01 RX ORDER — LIDOCAINE 40 MG/G
CREAM TOPICAL
Status: DISCONTINUED | OUTPATIENT
Start: 2022-11-01 | End: 2022-11-02 | Stop reason: HOSPADM

## 2022-11-01 RX ORDER — FENTANYL CITRATE 50 UG/ML
INJECTION, SOLUTION INTRAMUSCULAR; INTRAVENOUS PRN
Status: DISCONTINUED | OUTPATIENT
Start: 2022-11-01 | End: 2022-11-01

## 2022-11-01 RX ORDER — OXYCODONE HYDROCHLORIDE 5 MG/1
5 TABLET ORAL EVERY 6 HOURS PRN
Qty: 10 TABLET | Refills: 0 | Status: SHIPPED | OUTPATIENT
Start: 2022-11-01 | End: 2022-11-04

## 2022-11-01 RX ADMIN — PROPOFOL 150 MCG/KG/MIN: 10 INJECTION, EMULSION INTRAVENOUS at 13:48

## 2022-11-01 RX ADMIN — FENTANYL CITRATE 50 MCG: 50 INJECTION, SOLUTION INTRAMUSCULAR; INTRAVENOUS at 13:46

## 2022-11-01 RX ADMIN — CEFAZOLIN SODIUM 2 G: 2 SOLUTION INTRAVENOUS at 13:44

## 2022-11-01 RX ADMIN — LIDOCAINE HYDROCHLORIDE 80 MG: 20 INJECTION, SOLUTION INFILTRATION; PERINEURAL at 13:48

## 2022-11-01 RX ADMIN — ACETAMINOPHEN 975 MG: 325 TABLET ORAL at 12:36

## 2022-11-01 RX ADMIN — DEXAMETHASONE SODIUM PHOSPHATE 4 MG: 4 INJECTION, SOLUTION INTRA-ARTICULAR; INTRALESIONAL; INTRAMUSCULAR; INTRAVENOUS; SOFT TISSUE at 14:15

## 2022-11-01 RX ADMIN — PROPOFOL 50 MG: 10 INJECTION, EMULSION INTRAVENOUS at 13:48

## 2022-11-01 RX ADMIN — SODIUM CHLORIDE, SODIUM LACTATE, POTASSIUM CHLORIDE, CALCIUM CHLORIDE: 600; 310; 30; 20 INJECTION, SOLUTION INTRAVENOUS at 12:49

## 2022-11-01 RX ADMIN — ONDANSETRON 4 MG: 2 INJECTION INTRAMUSCULAR; INTRAVENOUS at 14:15

## 2022-11-01 RX ADMIN — FENTANYL CITRATE 25 MCG: 50 INJECTION, SOLUTION INTRAMUSCULAR; INTRAVENOUS at 14:11

## 2022-11-01 RX ADMIN — PROPOFOL 20 MG: 10 INJECTION, EMULSION INTRAVENOUS at 14:03

## 2022-11-01 RX ADMIN — PROPOFOL 30 MG: 10 INJECTION, EMULSION INTRAVENOUS at 14:11

## 2022-11-01 NOTE — ANESTHESIA PREPROCEDURE EVALUATION
Anesthesia Pre-Procedure Evaluation    Patient: Ilene Gaviria   MRN: [id number] : 1960        Procedure : Procedure(s):  EXCISION, sling, VAGINA (cut one side of the sling in the vagina)          Past Medical History:   Diagnosis Date     Cancer (H)     rectal cancer      Past Surgical History:   Procedure Laterality Date     CYSTOSCOPY, BIOPSY BLADDER, COMBINED N/A 2022    Procedure: CYSTOSCOPY, WITH BLADDER BIOPSY AND FULGURATION (Look in the bladder and sample red  tissue then burn the area involved);  Surgeon: Kacy Anderson MD;  [place] [facility] [place]     DAVINCI COLECTOMY N/A 2018    Procedure: DAVINCI XI COLECTOMY;  DAVINCI ABDOMINAL PERINEAL RESECTION WITH PERMANENT COLOSTOMY ( PARI ) LEFT GLUTEAL FLAP TO RECTAL DEFECT ( JERE ) ;  Surgeon: Jonny Finney MD;  Location:  OR     GENITOURINARY SURGERY      bladder sling      GRAFT FLAP GLUTEUS TO PERINEUM N/A 2018    Procedure: GRAFT FLAP GLUTEUS TO PERINEUM;;  Surgeon: Conchita Ramos MD;  Location:  OR     GYN SURGERY      c section X3, tubal      IR CYSTOGRAM  2022     SIGMOIDOSCOPY FLEXIBLE N/A 2018    Procedure: SIGMOIDOSCOPY FLEXIBLE;  FLEXIBILE SIGMOIDOSCOPY;  Surgeon: Jonny Finney MD;  Location:  GI      Allergies   Allergen Reactions     No Known Allergies       Social History     Tobacco Use     Smoking status: Never     Smokeless tobacco: Never   Substance Use Topics     Alcohol use: Yes     Comment: rare      Wt Readings from Last 1 Encounters:   22 90.7 kg (200 lb)        Anesthesia Evaluation            ROS/MED HX  ENT/Pulmonary:  - neg pulmonary ROS     Neurologic:  - neg neurologic ROS     Cardiovascular:  - neg cardiovascular ROS     METS/Exercise Tolerance:     Hematologic:  - neg hematologic  ROS     Musculoskeletal:  - neg musculoskeletal ROS     GI/Hepatic:  - neg GI/hepatic ROS     Renal/Genitourinary:  - neg Renal ROS     Endo:  - neg endo ROS     Psychiatric/Substance  Use:  - neg psychiatric ROS     Infectious Disease:  - neg infectious disease ROS     Malignancy:  - neg malignancy ROS     Other:  - neg other ROS          Physical Exam    Airway        Mallampati: II       Respiratory Devices and Support         Dental           Cardiovascular          Rhythm and rate: regular     Pulmonary           breath sounds clear to auscultation           OUTSIDE LABS:  CBC:   Lab Results   Component Value Date    WBC 6.0 10/28/2022    WBC 9.0 07/01/2022    HGB 14.9 10/28/2022    HGB 12.9 07/01/2022    HCT 43.7 10/28/2022    HCT 38.8 07/01/2022     (L) 10/28/2022     07/01/2022     BMP:   Lab Results   Component Value Date     10/28/2022     07/01/2022    POTASSIUM 4.7 10/28/2022    POTASSIUM 5.0 07/01/2022    CHLORIDE 105 10/28/2022    CHLORIDE 106 07/01/2022    CO2 26 10/28/2022    CO2 25 07/01/2022    BUN 15 10/28/2022    BUN 31 (H) 07/01/2022    CR 0.60 10/28/2022    CR 1.41 (H) 07/01/2022     (H) 10/28/2022     (H) 07/26/2022     COAGS: No results found for: PTT, INR, FIBR  POC:   Lab Results   Component Value Date    BGM 92 08/09/2018     HEPATIC:   Lab Results   Component Value Date    ALBUMIN 4.1 10/28/2022    PROTTOTAL 7.3 10/28/2022    ALT 56 (H) 10/28/2022    AST 28 10/28/2022    ALKPHOS 67 10/28/2022    BILITOTAL 0.5 10/28/2022     OTHER:   Lab Results   Component Value Date    LACT 1.7 06/26/2022    A1C 7.0 (H) 10/28/2022    ARTHUR 10.0 10/28/2022    PHOS 3.2 08/08/2018    MAG 1.8 08/08/2018       Anesthesia Plan    ASA Status:  1   NPO Status:  NPO Appropriate    Anesthesia Type: MAC.              Consents    Anesthesia Plan(s) and associated risks, benefits, and realistic alternatives discussed. Questions answered and patient/representative(s) expressed understanding.    - Discussed:     - Discussed with:  Patient         Postoperative Care            Comments:                Milton Perez MD

## 2022-11-01 NOTE — DISCHARGE INSTRUCTIONS
[facility]  Home Care Following Anesthesia  For 24 hours after surgery:  Get plenty of rest.  A responsible adult must stay with you for at least 24 hours after you leave the surgery center.  Do not drive or use heavy equipment.  If you have weakness or tingling, don't drive or use heavy equipment until this feeling goes away.   Do not drink alcohol.   Avoid strenuous or risky activities.  Ask for help when climbing stairs.  You may feel lightheaded.  IF so, sit for a few minutes before standing.  Have someone help you get up.   If you have nausea (feel sick to your stomach): Drink only clear liquids such as apple juice, ginger ale, broth or 7-Up.  Rest may also help.  Be sure to drink enough fluids.  Move to a regular diet as you feel able.   You may have a slight fever.  Call the doctor if your fever is over 100 F (37.7 C) (taken under the tongue) or lasts longer than 24 hours.  You may have a dry mouth, a sore throat, muscle aches or trouble sleeping. These should go away after 24 hours.  Do not make important or legal decisions.   It is recommended to avoid smoking.               Tips for taking pain medications  To get the best pain relief possible, remember these points:  Take pain medications as directed, before pain becomes severe.  Pain medication can upset your stomach: taking it with food may help.  Constipation is a common side effect of pain medication. Drink plenty of  fluids.  Eat foods high in fiber. Take a stool softener if recommended by your doctor or pharmacist.  Do not drink alcohol, drive or operate machinery while taking pain medications.  Ask about other ways to control pain, such as with heat, ice or relaxation.    Tylenol/Acetaminophen Consumption  To help encourage the safe use of acetaminophen, the makers of TYLENOL  have lowered the maximum daily dose for single-ingredient Extra Strength TYLENOL  (acetaminophen) products sold in the [place] from 8 pills  per day (4,000 mg) to 6 pills per day (3,000 mg). The dosing interval has also changed from 2 pills every 4-6 hours to 2 pills every 6 hours.  If you feel your pain relief is insufficient, you may take Tylenol/Acetaminophen in addition to your narcotic pain medication.   Be careful not to exceed 3,000 mg of Tylenol/Acetaminophen in a 24 hour period from all sources.  If you are taking extra strength Tylenol/acetaminophen (500 mg), the maximum dose is 6 tablets in 24 hours.  If you are taking regular strength acetaminophen (325 mg), the maximum dose is 9 tablets in 24 hours.    Call a doctor for any of the following:  Signs of infection (fever, growing tenderness at the surgery site, a large amount of drainage or bleeding, severe pain, foul-smelling drainage, redness, swelling).  It has been over 8 to 10 hours since surgery and you are still not able to urinate (pass water).  Headache for over 24 hours.  Numbness, tingling or weakness the day after surgery (if you had spinal anesthesia).  Signs of Covid-19 infection (temperature over 100 degrees, shortness of breath, cough, loss of taste/smell, generalized body aches, persistent headache, chills, sore throat, nausea/vomiting/diarrhea)  Your doctor is:       Dr. Kacy Anderson, Prostate and Urology: [phone]               Or dial [phone] and ask for the resident on call for:  Prostate Urology  For emergency care, call the:  [facility]:  [phone] (TTY for hearing impaired: [phone])

## 2022-11-01 NOTE — ANESTHESIA POSTPROCEDURE EVALUATION
Patient: Ilene Gaviria    Procedure: Procedure(s):  EXCISION, sling, VAGINA (cut one side of the sling in the vagina), CYSTOSCOPY       Anesthesia Type:  MAC    Note:  Disposition: Outpatient   Postop Pain Control: Uneventful            Sign Out: Well controlled pain   PONV: No   Neuro/Psych: Uneventful            Sign Out: Acceptable/Baseline neuro status   Airway/Respiratory: Uneventful            Sign Out: Acceptable/Baseline resp. status   CV/Hemodynamics: Uneventful            Sign Out: Acceptable CV status; No obvious hypovolemia; No obvious fluid overload   Other NRE: NONE   DID A NON-ROUTINE EVENT OCCUR? No           Last vitals:  Vitals Value Taken Time   /64 11/01/22 1500   Temp 36.3  C (97.3  F) 11/01/22 1500   Pulse 68 11/01/22 1500   Resp 14 11/01/22 1500   SpO2 98 % 11/01/22 1500       Electronically Signed By: Milton Perez MD  November 1, 2022  3:11 PM

## 2022-11-01 NOTE — OP NOTE
OPERATIVE NOTE    PREOPERATIVE DIAGNOSIS:   Urinary retention [R33.9]    POSTOPERATIVE DIAGNOSIS:  Same    PROCEDURES PERFORMED:   Procedure(s):  EXCISION, sling, VAGINA (cut one side of the sling in the vagina), CYSTOSCOPY    STAFF SURGEON:  Surgeon(s):  Kacy Anderson MD    RESIDENT(S):  Sushant Mina MD    ANESTHESIA:  MAC    ESTIMATED BLOOD LOSS: .15 mL   IV FLUIDS: see dictated anesthesia record    COMPLICATIONS: None.     SPECIMEN:    * No specimens in log *    SIGNIFICANT FINDINGS:   Sling palpated and successfully cut     BRIEF OPERATIVE INDICATIONS: Ilene Gaviria is a 62 year old female who presented with a history of colorectal cancer s/p resection and colostomy creation as well as a urethral sling with frequency, urgency and retention requiring CIC. She also perviously underwent cysto/biopsies on 7/26/2022 with no signs of malignancy. She presents today for excision of her sling given retention and recurrent UTIs.     DESCRIPTION OF PROCEDURE: After full informed voluntary consent was obtained, the patient was transported to the operating room, placed lithotomy on the table. After adequate anesthesia was induced, they were prepped and draped in the usual sterile fashion. A timeout was taken to confirm correct patient, procedure and laterality    We began the procedure by placing a lonestar retractor with 4 lonestar hooks at the hymenal ring at 4 corners. The sling could be palpated most easily on the right side of the urethra. A miguel catheter was placed to empty the bladder. We began by grabbing the vaginal mucosa at the level of the sling using two minh clamps. We then applied local anesthetic. The sling was grapsed and incised sharply using black handle Rizwan until it was felt to give and we were completely through it.     At this point we took a 22F rigid cystoscope and inserted it into a well lubricated urethra. There was no urethral or bladder injury.  The bladder had widespread evidence of  cystitis and outflow obstruction as evidenced by the trabeculations. There was also biofilm and debris stuck to the bladder wall which was gently removed with the scope and the bladder was copiously irrigated with much of the debris removed.     We then moved on to closing the vaginal incision and irrigated it copiously. We then closed it in a single layer of 2-0 vicryl using a combination of running and interrupted suture to ensure no further gaps remained. More local anesthetic was applied at the end of the case.     Patient tolerated the procedure well. No apparent complications. She was transported to the postanesthesia care unit in stable condition    Patient was seen, evaluated and plan was formulated in conjunction with me and I agree with the above.  I was present for the entire procedure.  Kacy Anderson MD

## 2022-11-01 NOTE — BRIEF OP NOTE
[facility]    Brief Operative Note    Pre-operative diagnosis: Urinary retention [R33.9]  Post-operative diagnosis Same as pre-operative diagnosis    Procedure: Procedure(s):  EXCISION, sling, VAGINA (cut one side of the sling in the vagina), CYSTOSCOPY  Surgeon: Surgeon(s) and Role:     * Kacy Anderson MD - Primary  Anesthesia: MAC   Estimated Blood Loss: Minimal    Drains: None  Specimens: * No specimens in log *  Findings:   Sling successfully excised   Complications: None.  Implants: * No implants in log *

## 2022-11-01 NOTE — ANESTHESIA CARE TRANSFER NOTE
Patient: Ilene Gaviria    Procedure: Procedure(s):  EXCISION, sling, VAGINA (cut one side of the sling in the vagina), CYSTOSCOPY       Diagnosis: Urinary retention [R33.9]  Diagnosis Additional Information: No value filed.    Anesthesia Type:   MAC     Note:    Oropharynx: oropharynx clear of all foreign objects and spontaneously breathing  Level of Consciousness: awake  Oxygen Supplementation: room air    Independent Airway: airway patency satisfactory and stable  Dentition: dentition unchanged  Vital Signs Stable: post-procedure vital signs reviewed and stable  Report to RN Given: handoff report given  Patient transferred to: Phase II    Handoff Report: Identifed the Patient, Identified the Reponsible Provider, Reviewed the pertinent medical history, Discussed the surgical course, Reviewed Intra-OP anesthesia mangement and issues during anesthesia, Set expectations for post-procedure period and Allowed opportunity for questions and acknowledgement of understanding      Vitals:  Vitals Value Taken Time   /82 11/01/22 1430   Temp 36.3  C (97.3  F) 11/01/22 1430   Pulse 70 11/01/22 1430   Resp 14 11/01/22 1430   SpO2 94 % 11/01/22 1430       Electronically Signed By: RENETTA Lord CRNA  November 1, 2022  2:39 PM

## 2022-11-05 DIAGNOSIS — E11.40 TYPE 2 DIABETES MELLITUS WITH DIABETIC NEUROPATHY, WITHOUT LONG-TERM CURRENT USE OF INSULIN (H): ICD-10-CM

## 2022-11-07 ENCOUNTER — TELEPHONE (OUTPATIENT)
Dept: UROLOGY | Facility: CLINIC | Age: 62
End: 2022-11-07

## 2022-11-07 ENCOUNTER — PATIENT OUTREACH (OUTPATIENT)
Dept: UROLOGY | Facility: CLINIC | Age: 62
End: 2022-11-07

## 2022-11-07 ENCOUNTER — APPOINTMENT (OUTPATIENT)
Dept: LAB | Facility: CLINIC | Age: 62
End: 2022-11-07
Payer: COMMERCIAL

## 2022-11-07 DIAGNOSIS — N39.0 RECURRENT UTI: Primary | ICD-10-CM

## 2022-11-07 LAB
ALBUMIN UR-MCNC: >=300 MG/DL
APPEARANCE UR: ABNORMAL
BACTERIA #/AREA URNS HPF: ABNORMAL /HPF
BILIRUB UR QL STRIP: NEGATIVE
COLOR UR AUTO: YELLOW
GLUCOSE UR STRIP-MCNC: NEGATIVE MG/DL
HGB UR QL STRIP: ABNORMAL
KETONES UR STRIP-MCNC: NEGATIVE MG/DL
LEUKOCYTE ESTERASE UR QL STRIP: ABNORMAL
NITRATE UR QL: POSITIVE
PH UR STRIP: 6 [PH] (ref 5–7)
RBC #/AREA URNS AUTO: ABNORMAL /HPF
SP GR UR STRIP: >=1.03 (ref 1–1.03)
SQUAMOUS #/AREA URNS AUTO: ABNORMAL /LPF
UROBILINOGEN UR STRIP-ACNC: 0.2 E.U./DL
WBC #/AREA URNS AUTO: >100 /HPF

## 2022-11-07 PROCEDURE — 87086 URINE CULTURE/COLONY COUNT: CPT | Mod: 90 | Performed by: PATHOLOGY

## 2022-11-07 PROCEDURE — 87181 SC STD AGAR DILUTION PER AGT: CPT | Mod: 90 | Performed by: PATHOLOGY

## 2022-11-07 PROCEDURE — 87186 SC STD MICRODIL/AGAR DIL: CPT | Mod: 90 | Performed by: PATHOLOGY

## 2022-11-07 PROCEDURE — 87088 URINE BACTERIA CULTURE: CPT | Mod: 90 | Performed by: PATHOLOGY

## 2022-11-07 PROCEDURE — 99000 SPECIMEN HANDLING OFFICE-LAB: CPT | Performed by: PATHOLOGY

## 2022-11-07 PROCEDURE — 81001 URINALYSIS AUTO W/SCOPE: CPT | Performed by: PATHOLOGY

## 2022-11-07 NOTE — TELEPHONE ENCOUNTER
Writer reached out to pt to inquire about current symptoms.     Pt states that she has malodorous urine with some lower left abdominal discomfort for the last couple days. States that they are similar symptoms when she's had UTIs in the past.     No fever and no urgency/frequency.     Encourage increased oral intake.     Shes willing to drop off a urine sample today. Orders placed.     Will continue to follow as needed.

## 2022-11-07 NOTE — TELEPHONE ENCOUNTER
[facility] Call Center    Phone Message    May a detailed message be left on voicemail: yes     Reason for Call: Other: .  Pt calling stating she recently had procedure with Monica. Pt stating she is having some sharp back pain on her left side and feels as if she may have UTI. Pt is wanting to speak with Monica and has questions regarding this. Please call Pt    Action Taken: Message routed to:  Other: Uro    Travel Screening: Not Applicable

## 2022-11-07 NOTE — TELEPHONE ENCOUNTER
Contacted patient to discuss. Patient has left a UA UC which is positive for nitrites and leuks. Last positive UC was susceptible to Macrobid. Will message Bailey Moseley, DANE.  Chuyita Alas LPN  Urology Clinic Service   [facility]

## 2022-11-09 RX ORDER — LISINOPRIL 20 MG/1
20 TABLET ORAL DAILY
Qty: 90 TABLET | Refills: 3 | Status: SHIPPED | OUTPATIENT
Start: 2022-11-09 | End: 2023-03-02

## 2022-11-11 NOTE — TELEPHONE ENCOUNTER
Sent message to the ID team for recommendations since there are limited oral options for treatment    Bailey Moseley RN, BSN  Care Coordinator Urology

## 2022-11-11 NOTE — TELEPHONE ENCOUNTER
[facility] Call Center    Phone Message    May a detailed message be left on voicemail: yes     Reason for Call: Other: Patient called to see if someone can call her regarding her urine results. She states she has been waiting since Monday. Please call to advise.      Action Taken: Other: Urology    Travel Screening: Not Applicable

## 2022-11-16 LAB — BACTERIA UR CULT: ABNORMAL

## 2022-11-30 ENCOUNTER — VIRTUAL VISIT (OUTPATIENT)
Dept: UROLOGY | Facility: CLINIC | Age: 62
End: 2022-11-30
Payer: COMMERCIAL

## 2022-11-30 DIAGNOSIS — N39.0 RECURRENT UTI: Primary | ICD-10-CM

## 2022-11-30 DIAGNOSIS — N95.2 ATROPHIC VAGINITIS: ICD-10-CM

## 2022-11-30 DIAGNOSIS — R33.9 URINARY RETENTION: ICD-10-CM

## 2022-11-30 PROCEDURE — 99024 POSTOP FOLLOW-UP VISIT: CPT | Performed by: UROLOGY

## 2022-11-30 NOTE — PATIENT INSTRUCTIONS
-continue to stay well hydrated.  -continue methenameine and vit C for prophylaxis for recurrent uti  -continue estrace for atrophic vaginitis  -continue CIC prn, pt. To keep a record of voided and  post void volumes and if less than 100 ml of post void residual could stop altogether  -f/u in 6 months to reassess

## 2022-11-30 NOTE — PROGRESS NOTES
Reason for Visit:  F/u on urinary tract infections and sling excision on 11/1/22.    Clinical Data:  Ms. Gaviria is a 62 year old female with frequency, urgency and now recent episode of retention.  History of colorectal cancer s/p resection and colostomy placement.  History of sling shortly after and then had trouble with UTI like symptoms after.  She has been getting some uti's.    Since she has been voiding more on her own since the excision and she reports voiding mostly on her own and still performs  CIC about 4 times per day.    She is using the methenamine and Vit C and things that it helps quite a bit.  And tried to drink more water.    She underwent a cystoscopy and biopsy on 7/26/22  Final Diagnosis   A. Bladder,right lateral wall, biopsy:  - Denuded urothelium, granulation tissue  - Negative for malignancy     B.  Bladder, posterior wall, biopsy:  - Bladder mucosa with acute and chronic inflammation and reactive changes  - Negative for malignancy       7/5/22 CT abd/pelvis  IMPRESSION:   No acute process demonstrated.      Review of Labs:  The following labs were reviewed by me and discussed with the patient:     Color Urine (no units)   Date Value   11/07/2022 Yellow     Appearance Urine (no units)   Date Value   11/07/2022 Turbid (A)     Glucose Urine (mg/dL)   Date Value   11/07/2022 Negative     Bilirubin Urine (no units)   Date Value   11/07/2022 Negative     Ketones Urine (mg/dL)   Date Value   11/07/2022 Negative     Specific Gravity Urine (no units)   Date Value   11/07/2022 >=1.030     pH Urine (no units)   Date Value   11/07/2022 6.0     Protein Albumin Urine (mg/dL)   Date Value   11/07/2022 >=300 (A)     Urobilinogen Urine (E.U./dL)   Date Value   11/07/2022 0.2     Nitrite Urine (no units)   Date Value   11/07/2022 Positive (A)     Leukocyte Esterase Urine (no units)   Date Value   11/07/2022 Moderate (A)      Ucx on 9/18 showed multidrug resistant Proteus and she was treated with IM gent which  resolved her discomfort but now feels that the urine is getting cloudy again.    Reviewed UDS report from 8/24/22 with the patient:    -Maximum cystometric capacity 360 mL with normal filling sensations.  -Good bladder compliance without detrusor overactivity or stress incontinence.  -Maximum detrusor contraction during voiding reaches 38 cm H2O.  -With catheters in place, she voids 10 mL with Qmax 0.8 ml/s and increased EMG activity. BOOI is 31.1 which is equivocal for bladder outlet obstruction.  -With catheters removed, she voids an additional 32 mL with Qmax 2.3 ml/s and incomplete bladder emptying with final catheterized postvoid residual of 320 mL.   -Fluoroscopy reveals a moderately trabeculated bladder wall without diverticuli or VUR. The bladder neck was closed during filling; voiding images unavailable as patient transferred to [facility] due to being unable to void on [place] chair.    Assessment & Plan   63 y/o F with much improved frequency, urgency and urinary retention with incomplete bladder emptying and recurrent uti's.  We discussed measuring her residuals and if low then she can stop this.    She was treated with IM gent for an infection but feels it is coming right back at a low grade infection.  She finds the methenamine really helps.  Pt. Has not had any incontinence and she feels that her control is much improved.  -continue to stay well hydrated.  -continue methenameine and vit C for prophylaxis for recurrent uti  -continue estrace for atrophic vaginitis  -continue CIC prn, pt. To keep a record of voided and  post void volumes and if less than 100 ml of post void residual could stop altogether  -f/u in 6 months to reassess      Kacy Anderson MD  [facility] [place]    Thank you for allowing me to participate in the care of  Ms. Ilene Gaviria and I will keep you updated on her progress.    Kacy Anderson MD

## 2022-11-30 NOTE — LETTER
Date:December 1, 2022      Provider requested that no letter be sent. Do not send.       [facility]

## 2022-11-30 NOTE — LETTER
11/30/2022       RE: Ilene Gaviria  [address] [facility] [address]     Dear Colleague,    Thank you for referring your patient, Ilene Gaviria, to the [facility] [place] at [facility]. Please see a copy of my visit note below.    Ilene is a 62 year old who is being evaluated via a billable video visit.      How would you like to obtain your AVS? Mail a copy  If the video visit is dropped, the invitation should be resent by: Text to cell phone: [phone]  Will anyone else be joining your video visit? No        Video-Visit Details    Video Start Time: 2:27 PM    Type of service:  Video Visit    Video End Time:2:38 PM    Originating Location (pt. Location): Home        Distant Location (provider location):  Off-site    Platform used for Video Visit: Mygeni    Reason for Visit:  F/u on urinary tract infections and sling excision on 11/1/22.    Clinical Data:  Ms. Gaviria is a 62 year old female with frequency, urgency and now recent episode of retention.  History of colorectal cancer s/p resection and colostomy placement.  History of sling shortly after and then had trouble with UTI like symptoms after.  She has been getting some uti's.    Since she has been voiding more on her own since the excision and she reports voiding mostly on her own and still performs  CIC about 4 times per day.    She is using the methenamine and Vit C and things that it helps quite a bit.  And tried to drink more water.    She underwent a cystoscopy and biopsy on 7/26/22  Final Diagnosis   A. Bladder,right lateral wall, biopsy:  - Denuded urothelium, granulation tissue  - Negative for malignancy     B.  Bladder, posterior wall, biopsy:  - Bladder mucosa with acute and chronic inflammation and reactive changes  - Negative for malignancy       7/5/22 CT abd/pelvis  IMPRESSION:   No acute process demonstrated.      Review of Labs:  The following labs  were reviewed by me and discussed with the patient:     Color Urine (no units)   Date Value   11/07/2022 Yellow     Appearance Urine (no units)   Date Value   11/07/2022 Turbid (A)     Glucose Urine (mg/dL)   Date Value   11/07/2022 Negative     Bilirubin Urine (no units)   Date Value   11/07/2022 Negative     Ketones Urine (mg/dL)   Date Value   11/07/2022 Negative     Specific Gravity Urine (no units)   Date Value   11/07/2022 >=1.030     pH Urine (no units)   Date Value   11/07/2022 6.0     Protein Albumin Urine (mg/dL)   Date Value   11/07/2022 >=300 (A)     Urobilinogen Urine (E.U./dL)   Date Value   11/07/2022 0.2     Nitrite Urine (no units)   Date Value   11/07/2022 Positive (A)     Leukocyte Esterase Urine (no units)   Date Value   11/07/2022 Moderate (A)      Ucx on 9/18 showed multidrug resistant Proteus and she was treated with IM gent which resolved her discomfort but now feels that the urine is getting cloudy again.    Reviewed UDS report from 8/24/22 with the patient:    -Maximum cystometric capacity 360 mL with normal filling sensations.  -Good bladder compliance without detrusor overactivity or stress incontinence.  -Maximum detrusor contraction during voiding reaches 38 cm H2O.  -With catheters in place, she voids 10 mL with Qmax 0.8 ml/s and increased EMG activity. BOOI is 31.1 which is equivocal for bladder outlet obstruction.  -With catheters removed, she voids an additional 32 mL with Qmax 2.3 ml/s and incomplete bladder emptying with final catheterized postvoid residual of 320 mL.   -Fluoroscopy reveals a moderately trabeculated bladder wall without diverticuli or VUR. The bladder neck was closed during filling; voiding images unavailable as patient transferred to [facility] due to being unable to void on [employer] chair.    Assessment & Plan   61 y/o F with much improved frequency, urgency and urinary retention with incomplete bladder emptying and recurrent uti's.  We discussed measuring her  residuals and if low then she can stop this.    She was treated with IM gent for an infection but feels it is coming right back at a low grade infection.  She finds the methenamine really helps.  Pt. Has not had any incontinence and she feels that her control is much improved.  -continue to stay well hydrated.  -continue methenameine and vit C for prophylaxis for recurrent uti  -continue estrace for atrophic vaginitis  -continue CIC prn, pt. To keep a record of voided and  post void volumes and if less than 100 ml of post void residual could stop altogether  -f/u in 6 months to reassess      Kacy Anderson MD  [facility] [place]    Thank you for allowing me to participate in the care of  Ms. Ilene Gaviria and I will keep you updated on her progress.    Kacy Anderson MD                 Again, thank you for allowing me to participate in the care of your patient.      Sincerely,    Kacy Anderson MD

## 2022-11-30 NOTE — PROGRESS NOTES
Ilene is a 62 year old who is being evaluated via a billable video visit.      How would you like to obtain your AVS? Mail a copy  If the video visit is dropped, the invitation should be resent by: Text to cell phone: [phone]  Will anyone else be joining your video visit? No        Video-Visit Details    Video Start Time: 2:27 PM    Type of service:  Video Visit    Video End Time:2:38 PM    Originating Location (pt. Location): Home        Distant Location (provider location):  Off-site    Platform used for Video Visit: Sangeetha

## 2022-12-01 ENCOUNTER — TRANSFERRED RECORDS (OUTPATIENT)
Dept: HEALTH INFORMATION MANAGEMENT | Facility: CLINIC | Age: 62
End: 2022-12-01

## 2022-12-04 NOTE — TELEPHONE ENCOUNTER
DIAGNOSIS: Bilateral Foot and Ankle Pain   APPOINTMENT DATE: 12/21/2022   NOTES STATUS DETAILS   OFFICE NOTE from referring provider SELF    MEDICATION LIST Internal    LABS     CBC/DIFF Internal 10/28/2022

## 2022-12-21 ENCOUNTER — ANCILLARY PROCEDURE (OUTPATIENT)
Dept: GENERAL RADIOLOGY | Facility: CLINIC | Age: 62
End: 2022-12-21
Attending: PODIATRIST
Payer: COMMERCIAL

## 2022-12-21 ENCOUNTER — OFFICE VISIT (OUTPATIENT)
Dept: ORTHOPEDICS | Facility: CLINIC | Age: 62
End: 2022-12-21
Payer: COMMERCIAL

## 2022-12-21 ENCOUNTER — PRE VISIT (OUTPATIENT)
Dept: ORTHOPEDICS | Facility: CLINIC | Age: 62
End: 2022-12-21

## 2022-12-21 ENCOUNTER — TELEPHONE (OUTPATIENT)
Dept: INFECTIOUS DISEASES | Facility: CLINIC | Age: 62
End: 2022-12-21

## 2022-12-21 DIAGNOSIS — M79.672 PAIN IN BOTH FEET: ICD-10-CM

## 2022-12-21 DIAGNOSIS — M95.5 PELVIS TILTED: ICD-10-CM

## 2022-12-21 DIAGNOSIS — M79.671 PAIN IN BOTH FEET: ICD-10-CM

## 2022-12-21 DIAGNOSIS — M21.70 LEG LENGTH DISCREPANCY: ICD-10-CM

## 2022-12-21 DIAGNOSIS — M79.671 PAIN IN BOTH FEET: Primary | ICD-10-CM

## 2022-12-21 DIAGNOSIS — M79.672 PAIN IN BOTH FEET: Primary | ICD-10-CM

## 2022-12-21 DIAGNOSIS — R33.9 URINARY RETENTION: Primary | ICD-10-CM

## 2022-12-21 PROCEDURE — 73630 X-RAY EXAM OF FOOT: CPT | Mod: LT | Performed by: RADIOLOGY

## 2022-12-21 PROCEDURE — 99204 OFFICE O/P NEW MOD 45 MIN: CPT | Performed by: PODIATRIST

## 2022-12-21 PROCEDURE — 77073 BONE LENGTH STUDIES: CPT | Performed by: STUDENT IN AN ORGANIZED HEALTH CARE EDUCATION/TRAINING PROGRAM

## 2022-12-21 ASSESSMENT — ENCOUNTER SYMPTOMS
DIFFICULTY URINATING: 1
HEMATURIA: 0
DYSURIA: 1
FLANK PAIN: 0

## 2022-12-21 NOTE — PROGRESS NOTES
Date of Service: 12/21/2022    Chief Complaint   Patient presents with     Consult     Pain, bilateral foot and ankle. Pronation. Flat foot. Patient had inserts made in summertime but she thinks that the gradient is too great to start. No images. Diabetic, recently diagnosed.           HPI: Ilene is a 62 year old female who presents today for further evaluation of BL foot and ankle pain.  Nature: dull aching    Location: BL Midtarsal joints    Duration: about a year    Onset: gradual. No trauma    Course: waxes and wanes    Aggravating/alleviating factors: walking and weightbearing aggravate. Rest alleviates.     Previous Treatments: None      Review of Systems: No n/v/d/f/c/ns/sob/cp    PMH:   Past Medical History:   Diagnosis Date     Cancer (H)     rectal cancer       PSxH:   Past Surgical History:   Procedure Laterality Date     CYSTOSCOPY, BIOPSY BLADDER, COMBINED N/A 7/26/2022    Procedure: CYSTOSCOPY, WITH BLADDER BIOPSY AND FULGURATION (Look in the bladder and sample red  tissue then burn the area involved);  Surgeon: Kacy Anderson MD;  Location: [facility] OR     DAVINCI COLECTOMY N/A 8/7/2018    Procedure: DAVINCI XI COLECTOMY;  DAVINCI ABDOMINAL PERINEAL RESECTION WITH PERMANENT COLOSTOMY ( PARI ) LEFT GLUTEAL FLAP TO RECTAL DEFECT ( JERE ) ;  Surgeon: Jonny Finney MD;  Location:  OR     GENITOURINARY SURGERY      bladder sling 12/18     GRAFT FLAP GLUTEUS TO PERINEUM N/A 8/7/2018    Procedure: GRAFT FLAP GLUTEUS TO PERINEUM;;  Surgeon: Conchita Ramos MD;  Location:  OR     GYN SURGERY      c section X3, tubal      IR CYSTOGRAM  8/24/2022     REMOVE MESH VAGINA N/A 11/1/2022    Procedure: EXCISION, sling, VAGINA (cut one side of the sling in the vagina), CYSTOSCOPY;  Surgeon: Kacy Anderson MD;  Location: [facility] OR     SIGMOIDOSCOPY FLEXIBLE N/A 7/9/2018    Procedure: SIGMOIDOSCOPY FLEXIBLE;  FLEXIBILE SIGMOIDOSCOPY;  Surgeon: Jonny Finney MD;  Location:  GI        Allergies: No known allergies    SH:   Social History     Socioeconomic History     Marital status:      Spouse name: Not on file     Number of children: Not on file     Years of education: Not on file     Highest education level: Not on file   Occupational History     Not on file   Tobacco Use     Smoking status: Never     Smokeless tobacco: Never   Substance and Sexual Activity     Alcohol use: Yes     Comment: rare     Drug use: No     Sexual activity: Not Currently     Partners: Male   Other Topics Concern     Parent/sibling w/ CABG, MI or angioplasty before 65F 55M? Not Asked   Social History Narrative     Not on file     Social Determinants of Health     Financial Resource Strain: Not on file   Food Insecurity: Not on file   Transportation Needs: Not on file   Physical Activity: Not on file   Stress: Not on file   Social Connections: Not on file   Intimate Partner Violence: Not on file   Housing Stability: Not on file       FH:   Family History   Problem Relation Age of Onset     Anesthesia Reaction No family hx of      Deep Vein Thrombosis (DVT) No family hx of        Objective:  Data Unavailable Data Unavailable Data Unavailable Data Unavailable Data Unavailable 0 lbs 0 oz    PT and DP pulses are 2/4 bilaterally. CRT is instant. Positive pedal hair.   Gross sensation is intact bilaterally.   Equinus is noted bilaterally.  With weightbearing, it appears that the left foot is in more pronation on the right foot is more supination.  When laying down, her legs measure both to be 100 cm long.  Pain is noted today along the course of the right PT tendon.  She also has pain with right MPJ range of motion.  No pain noted along the course of the bilateral peroneal or Achilles tendons.  Many muscle testing/S5 with pain with plantarflexion and dorsiflexion on the right side.  Some pain noted along the midtarsal joint with palpation.  No pain noted with palpation of bilateral talar domes.  Nails normal  bilaterally. No open lesions are noted.     bilateral foot xrays indicated in 6 weightbearing views and a six foot standing view.    Some arthritic changes noted to the midfoot bilaterally.  No acute process noted.  On the 6 standing x-ray, she does have pelvic tilt with the right side higher than the left.      Assessment: Ilene is a 62-year-old with pelvic tilt creating a functional limb length discrepancy and pain in the right foot.    Plan:  - Pt seen and evaluated.  -X-rays were taken and discussed with her.  -I discussed with her that with this functional limb length discrepancy, I recommend she use a lift on the short side.  The short side is a supinating side, which is right.  She does agree to this.  I did give her the left, and she related that she did feel better with this.  I have also recommended physical therapy for her.  She does agree to this.  -See again in 6 weeks.  On the date of service spent 45 minutes with patient care, documentation, image review, chart review, care coordination

## 2022-12-21 NOTE — TELEPHONE ENCOUNTER
Health Call Center    Phone Message    May a detailed message be left on voicemail: yes     Reason for Call: Other: Patient is requesting a call back regarding her recurrent UTI. She has been able to keep it at bay for the last few months but would like to try IV infusion for treatment that was discussed with Dr. Larry. She is requesting to do what ever type of culture or testing that is required to get moving on the treatment. Please follow up. Thank you.    Action Taken: Other: ID    Travel Screening: Not Applicable

## 2022-12-21 NOTE — NURSING NOTE
Reason For Visit:   Chief Complaint   Patient presents with     Consult     Pain, bilateral foot and ankle. Pronation. Flat foot. Patient had inserts made in summertime but she thinks that the gradient is too great to start. No images. Diabetic, recently diagnosed.        Pain Assessment  Patient Currently in Pain: Yes  Primary Pain Location: Foot  Pain Descriptors: Discomfort        Allergies   Allergen Reactions     No Known Allergies            Lisa Galarza LPN

## 2022-12-21 NOTE — LETTER
12/21/2022         RE: Ilene Gaviria  [address] [facility] [address]        Dear Colleague,    Thank you for referring your patient, Ilene Gaviria, to the [facility] [place]. Please see a copy of my visit note below.    Date of Service: 12/21/2022    Chief Complaint   Patient presents with     Consult     Pain, bilateral foot and ankle. Pronation. Flat foot. Patient had inserts made in summertime but she thinks that the gradient is too great to start. No images. Diabetic, recently diagnosed.           HPI: Ilene is a 62 year old female who presents today for further evaluation of BL foot and ankle pain.  Nature: dull aching    Location: BL Midtarsal joints    Duration: about a year    Onset: gradual. No trauma    Course: waxes and wanes    Aggravating/alleviating factors: walking and weightbearing aggravate. Rest alleviates.     Previous Treatments: None      Review of Systems: No n/v/d/f/c/ns/sob/cp    PMH:   Past Medical History:   Diagnosis Date     Cancer (H)     rectal cancer       PSxH:   Past Surgical History:   Procedure Laterality Date     CYSTOSCOPY, BIOPSY BLADDER, COMBINED N/A 7/26/2022    Procedure: CYSTOSCOPY, WITH BLADDER BIOPSY AND FULGURATION (Look in the bladder and sample red  tissue then burn the area involved);  Surgeon: Kacy Anderson MD;  Location: [facility]     DAVINCI COLECTOMY N/A 8/7/2018    Procedure: DAVINCI XI COLECTOMY;  DAVINCI ABDOMINAL PERINEAL RESECTION WITH PERMANENT COLOSTOMY ( PARI ) LEFT GLUTEAL FLAP TO RECTAL DEFECT ( JERE ) ;  Surgeon: Jonny Finney MD;  Location:  OR     GENITOURINARY SURGERY      bladder sling 12/18     GRAFT FLAP GLUTEUS TO PERINEUM N/A 8/7/2018    Procedure: GRAFT FLAP GLUTEUS TO PERINEUM;;  Surgeon: Conchita Ramos MD;  Location:  OR     GYN SURGERY      c section X3, tubal      IR CYSTOGRAM  8/24/2022     REMOVE MESH VAGINA N/A 11/1/2022    Procedure: EXCISION, sling, VAGINA (cut  one side of the sling in the vagina), CYSTOSCOPY;  Surgeon: Kacy Anderson MD;  Location: [facility] OR     SIGMOIDOSCOPY FLEXIBLE N/A 7/9/2018    Procedure: SIGMOIDOSCOPY FLEXIBLE;  FLEXIBILE SIGMOIDOSCOPY;  Surgeon: Jonny Finney MD;  Location:  GI       Allergies: No known allergies    SH:   Social History     Socioeconomic History     Marital status:      Spouse name: Not on file     Number of children: Not on file     Years of education: Not on file     Highest education level: Not on file   Occupational History     Not on file   Tobacco Use     Smoking status: Never     Smokeless tobacco: Never   Substance and Sexual Activity     Alcohol use: Yes     Comment: rare     Drug use: No     Sexual activity: Not Currently     Partners: Male   Other Topics Concern     Parent/sibling w/ CABG, MI or angioplasty before 65F 55M? Not Asked   Social History Narrative     Not on file     Social Determinants of Health     Financial Resource Strain: Not on file   Food Insecurity: Not on file   Transportation Needs: Not on file   Physical Activity: Not on file   Stress: Not on file   Social Connections: Not on file   Intimate Partner Violence: Not on file   Housing Stability: Not on file       FH:   Family History   Problem Relation Age of Onset     Anesthesia Reaction No family hx of      Deep Vein Thrombosis (DVT) No family hx of        Objective:  Data Unavailable Data Unavailable Data Unavailable Data Unavailable Data Unavailable 0 lbs 0 oz    PT and DP pulses are 2/4 bilaterally. CRT is instant. Positive pedal hair.   Gross sensation is intact bilaterally.   Equinus is noted bilaterally.  With weightbearing, it appears that the left foot is in more pronation on the right foot is more supination.  When laying down, her legs measure both to be 100 cm long.  Pain is noted today along the course of the right PT tendon.  She also has pain with right MPJ range of motion.  No pain noted along the course of the bilateral  peroneal or Achilles tendons.  Many muscle testing/S5 with pain with plantarflexion and dorsiflexion on the right side.  Some pain noted along the midtarsal joint with palpation.  No pain noted with palpation of bilateral talar domes.  Nails normal bilaterally. No open lesions are noted.     bilateral foot xrays indicated in 6 weightbearing views and a six foot standing view.    Some arthritic changes noted to the midfoot bilaterally.  No acute process noted.  On the 6 standing x-ray, she does have pelvic tilt with the right side higher than the left.      Assessment: Ilene is a 62-year-old with pelvic tilt creating a functional limb length discrepancy and pain in the right foot.    Plan:  - Pt seen and evaluated.  -X-rays were taken and discussed with her.  -I discussed with her that with this functional limb length discrepancy, I recommend she use a lift on the short side.  The short side is a supinating side, which is right.  She does agree to this.  I did give her the left, and she related that she did feel better with this.  I have also recommended physical therapy for her.  She does agree to this.  -See again in 6 weeks.  On the date of service spent 45 minutes with patient care, documentation, image review, chart review, care coordination               Again, thank you for allowing me to participate in the care of your patient.        Sincerely,        Adams Stephens DPM

## 2022-12-21 NOTE — LETTER
Date:December 21, 2022      Provider requested that no letter be sent. Do not send.       [facility]

## 2022-12-27 ENCOUNTER — LAB (OUTPATIENT)
Dept: LAB | Facility: CLINIC | Age: 62
End: 2022-12-27
Attending: INTERNAL MEDICINE
Payer: COMMERCIAL

## 2022-12-27 ENCOUNTER — HOSPITAL ENCOUNTER (OUTPATIENT)
Dept: MAMMOGRAPHY | Facility: CLINIC | Age: 62
Discharge: HOME | End: 2022-12-27
Attending: INTERNAL MEDICINE
Payer: COMMERCIAL

## 2022-12-27 DIAGNOSIS — E11.40 TYPE 2 DIABETES MELLITUS WITH DIABETIC NEUROPATHY, WITHOUT LONG-TERM CURRENT USE OF INSULIN (H): ICD-10-CM

## 2022-12-27 DIAGNOSIS — Z12.31 VISIT FOR SCREENING MAMMOGRAM: ICD-10-CM

## 2022-12-27 DIAGNOSIS — R33.9 URINARY RETENTION: ICD-10-CM

## 2022-12-27 LAB
ALBUMIN UR-MCNC: 100 MG/DL
APPEARANCE UR: ABNORMAL
BILIRUB UR QL STRIP: NEGATIVE
COLOR UR AUTO: ABNORMAL
CREAT UR-MCNC: 113 MG/DL
GLUCOSE UR STRIP-MCNC: NEGATIVE MG/DL
HGB UR QL STRIP: NEGATIVE
KETONES UR STRIP-MCNC: NEGATIVE MG/DL
LEUKOCYTE ESTERASE UR QL STRIP: ABNORMAL
MICROALBUMIN UR-MCNC: 111 MG/L
MICROALBUMIN/CREAT UR: 98.23 MG/G CR (ref 0–25)
MUCOUS THREADS #/AREA URNS LPF: PRESENT /LPF
NITRATE UR QL: POSITIVE
PH UR STRIP: 8 [PH] (ref 5–7)
RBC URINE: 16 /HPF
SP GR UR STRIP: 1.02 (ref 1–1.03)
SQUAMOUS EPITHELIAL: 6 /HPF
UROBILINOGEN UR STRIP-MCNC: NORMAL MG/DL
WBC URINE: >182 /HPF

## 2022-12-27 PROCEDURE — 81001 URINALYSIS AUTO W/SCOPE: CPT

## 2022-12-27 PROCEDURE — 77067 SCR MAMMO BI INCL CAD: CPT

## 2022-12-27 PROCEDURE — 87086 URINE CULTURE/COLONY COUNT: CPT

## 2022-12-27 PROCEDURE — 82570 ASSAY OF URINE CREATININE: CPT

## 2022-12-27 NOTE — TELEPHONE ENCOUNTER
"Called patient to discuss, she reports she self caths several times per day and has been noticing \"puss or phlegm\" on the catheter when she self caths. She denies classic UTI symptoms- no fever, back pain. Will leave UA sample today and will make plan based on culture results. Based on culture results, patient will need a one time dose of an antibiotic in an infusion center.  "

## 2022-12-27 NOTE — TELEPHONE ENCOUNTER
Per Patient is calling back. Patient is wanting to have orders be put in to have done for recurrent UTI. Patient is wanting to get a call back when this has been done to be aware and able to schedule the lab work. Please advise.

## 2022-12-28 DIAGNOSIS — E11.40 TYPE 2 DIABETES MELLITUS WITH DIABETIC NEUROPATHY, WITHOUT LONG-TERM CURRENT USE OF INSULIN (H): ICD-10-CM

## 2022-12-29 ENCOUNTER — THERAPY VISIT (OUTPATIENT)
Dept: PHYSICAL THERAPY | Facility: CLINIC | Age: 62
End: 2022-12-29
Attending: PODIATRIST
Payer: COMMERCIAL

## 2022-12-29 ENCOUNTER — TELEPHONE (OUTPATIENT)
Dept: INFECTIOUS DISEASES | Facility: CLINIC | Age: 62
End: 2022-12-29

## 2022-12-29 ENCOUNTER — INFUSION THERAPY VISIT (OUTPATIENT)
Dept: INFUSION THERAPY | Facility: CLINIC | Age: 62
End: 2022-12-29
Attending: INTERNAL MEDICINE
Payer: COMMERCIAL

## 2022-12-29 VITALS
WEIGHT: 210.3 LBS | BODY MASS INDEX: 31.98 KG/M2 | OXYGEN SATURATION: 97 % | HEART RATE: 73 BPM | SYSTOLIC BLOOD PRESSURE: 164 MMHG | DIASTOLIC BLOOD PRESSURE: 103 MMHG | RESPIRATION RATE: 16 BRPM | TEMPERATURE: 97.9 F

## 2022-12-29 DIAGNOSIS — N39.0 URINARY TRACT INFECTION ASSOCIATED WITH CATHETERIZATION OF URINARY TRACT (H): Primary | ICD-10-CM

## 2022-12-29 DIAGNOSIS — M21.70 LEG LENGTH DISCREPANCY: ICD-10-CM

## 2022-12-29 DIAGNOSIS — N39.0 URINARY TRACT INFECTION ASSOCIATED WITH CATHETERIZATION OF URINARY TRACT (H): ICD-10-CM

## 2022-12-29 DIAGNOSIS — M79.671 PAIN IN BOTH FEET: ICD-10-CM

## 2022-12-29 DIAGNOSIS — T83.511A URINARY TRACT INFECTION ASSOCIATED WITH CATHETERIZATION OF URINARY TRACT (H): Primary | ICD-10-CM

## 2022-12-29 DIAGNOSIS — M79.672 PAIN IN BOTH FEET: ICD-10-CM

## 2022-12-29 DIAGNOSIS — T83.511A URINARY TRACT INFECTION ASSOCIATED WITH CATHETERIZATION OF URINARY TRACT (H): ICD-10-CM

## 2022-12-29 DIAGNOSIS — M95.5 PELVIS TILTED: ICD-10-CM

## 2022-12-29 LAB
BACTERIA UR CULT: ABNORMAL
BACTERIA UR CULT: ABNORMAL

## 2022-12-29 PROCEDURE — 97161 PT EVAL LOW COMPLEX 20 MIN: CPT | Mod: GP | Performed by: PHYSICAL THERAPIST

## 2022-12-29 PROCEDURE — 96365 THER/PROPH/DIAG IV INF INIT: CPT

## 2022-12-29 PROCEDURE — 250N000011 HC RX IP 250 OP 636: Performed by: INTERNAL MEDICINE

## 2022-12-29 PROCEDURE — 97110 THERAPEUTIC EXERCISES: CPT | Mod: 59 | Performed by: PHYSICAL THERAPIST

## 2022-12-29 PROCEDURE — 97530 THERAPEUTIC ACTIVITIES: CPT | Mod: GP | Performed by: PHYSICAL THERAPIST

## 2022-12-29 PROCEDURE — 258N000003 HC RX IP 258 OP 636: Performed by: INTERNAL MEDICINE

## 2022-12-29 RX ORDER — METHYLPREDNISOLONE SODIUM SUCCINATE 125 MG/2ML
125 INJECTION, POWDER, LYOPHILIZED, FOR SOLUTION INTRAMUSCULAR; INTRAVENOUS
Status: CANCELLED
Start: 2022-12-29

## 2022-12-29 RX ORDER — ALBUTEROL SULFATE 0.83 MG/ML
2.5 SOLUTION RESPIRATORY (INHALATION)
Status: CANCELLED | OUTPATIENT
Start: 2022-12-29

## 2022-12-29 RX ORDER — DIPHENHYDRAMINE HYDROCHLORIDE 50 MG/ML
50 INJECTION INTRAMUSCULAR; INTRAVENOUS
Status: CANCELLED
Start: 2022-12-29

## 2022-12-29 RX ORDER — ALBUTEROL SULFATE 90 UG/1
1-2 AEROSOL, METERED RESPIRATORY (INHALATION)
Status: CANCELLED
Start: 2022-12-29

## 2022-12-29 RX ORDER — EPINEPHRINE 1 MG/ML
0.3 INJECTION, SOLUTION INTRAMUSCULAR; SUBCUTANEOUS EVERY 5 MIN PRN
Status: CANCELLED | OUTPATIENT
Start: 2022-12-29

## 2022-12-29 RX ORDER — EPINEPHRINE 1 MG/ML
0.3 INJECTION, SOLUTION, CONCENTRATE INTRAVENOUS EVERY 5 MIN PRN
Status: CANCELLED | OUTPATIENT
Start: 2022-12-29

## 2022-12-29 RX ORDER — MEPERIDINE HYDROCHLORIDE 25 MG/ML
25 INJECTION INTRAMUSCULAR; INTRAVENOUS; SUBCUTANEOUS EVERY 30 MIN PRN
Status: CANCELLED | OUTPATIENT
Start: 2022-12-29

## 2022-12-29 RX ADMIN — SODIUM CHLORIDE 1000 ML: 9 INJECTION, SOLUTION INTRAVENOUS at 13:22

## 2022-12-29 RX ADMIN — GENTAMICIN SULFATE 480 MG: 40 INJECTION, SOLUTION INTRAMUSCULAR; INTRAVENOUS at 13:25

## 2022-12-29 ASSESSMENT — PAIN SCALES - GENERAL: PAINLEVEL: NO PAIN (0)

## 2022-12-29 NOTE — LETTER
Date:December 30, 2022      Provider requested that no letter be sent. Do not send.       [facility]

## 2022-12-29 NOTE — LETTER
12/29/2022         RE: Ilene Gaviria  [address] [facility] [address]        Dear Colleague,    Thank you for referring your patient, Ilene Gaviria, to the [facility]. Please see a copy of my visit note below.    Infusion Nursing Note:  Ilene Gaviria presents today for Gentamycin.    Patient seen by provider today: No   present during visit today: Not Applicable.    Note: Patient requesting IVF in addition to antibiotic. Call placed to ordering provider  - orders received for 1L NS bolus.  Patient's BP elevated, patient asymptomatic. Patient did receive 1L IVF and typically self caths for retention. Patient states she will self cath when she gets home.     Intravenous Access:  Peripheral IV placed.    Treatment Conditions:  Not Applicable.    Post Infusion Assessment:  Patient tolerated infusion without incident.  Blood return noted pre and post infusion.  Site patent and intact, free from redness, edema or discomfort.  No evidence of extravasations.  Access discontinued per protocol.     Discharge Plan:   Discharge instructions reviewed with: Patient.  Patient and/or family verbalized understanding of discharge instructions and all questions answered.  AVS to patient via [employer].   Patient discharged in stable condition accompanied by: self.  Departure Mode: Ambulatory.    Administrations This Visit     0.9% sodium chloride BOLUS     Admin Date  12/29/2022 Action  New Bag Dose  1,000 mL Route  Intravenous Administered By  Carline Oswald, YOGESH          gentamicin (GARAMYCIN) 480 mg in sodium chloride 0.9 % 67 mL intermittent infusion     Admin Date  12/29/2022 Action  New Bag Dose  480 mg Route  Intravenous Administered By  Carline Oswald, YOGESH Oswald RN                        Again, thank you for allowing me to participate in the care of your patient.        Sincerely,        Specialty Infusion Nurse

## 2022-12-29 NOTE — TELEPHONE ENCOUNTER
Called patient to inform that will need 1x tx of gentamicin.     Patient verbalized understanding and will go anywhere anytime.       Patient scheduled for today at 1.      Reese Guevara RN  Infectious Disease 11:21 AM 12/29/22

## 2022-12-29 NOTE — PATIENT INSTRUCTIONS
Dear Ilene Gaviria    Thank you for choosing [facility] ([facility]) for your infusion.  The following information is a summary of our appointment as well as important reminders.     We look forward in seeing you on your next appointment here at [facility] ([facility]).  Please don t hesitate to call us at [phone] to reschedule any of your appointments or to speak with one of the [facility] registered nurses.  It was a pleasure taking care of you today.    Sincerely,    [facility]  [address] [place] [address]  [address]  Phone:  [phone]

## 2022-12-29 NOTE — PROGRESS NOTES
Physical Therapy Initial Evaluation  Subjective:                    Objective:  System    Physical Exam    General     ROS    Assessment/Plan:    Patient is a 62 year old female with both sides ankle complaints.    Patient has the following significant findings with corresponding treatment plan.                Diagnosis 1:  Pain arnaldo feet  Pain -  hot/cold therapy, splint/taping/bracing/orthotics and home program  Decreased strength - therapeutic exercise and therapeutic activities  Impaired balance - neuro re-education and therapeutic activities    Therapy Evaluation Codes:   1) History comprised of:   Personal factors that impact the plan of care:      None.    Comorbidity factors that impact the plan of care are:      None.     Medications impacting care: None.  2) Examination of Body Systems comprised of:   Body structures and functions that impact the plan of care:      Ankle.   Activity limitations that impact the plan of care are:      Standing, Walking and Working.  3) Clinical presentation characteristics are:   Stable/Uncomplicated.  4) Decision-Making    Low complexity using standardized patient assessment instrument and/or measureable assessment of functional outcome.  Cumulative Therapy Evaluation is: Low complexity.    Previous and current functional limitations:  (See Goal Flow Sheet for this information)    Short term and Long term goals: (See Goal Flow Sheet for this information)     Communication ability:  Patient appears to be able to clearly communicate and understand verbal and written communication and follow directions correctly.  Treatment Explanation - The following has been discussed with the patient:   RX ordered/plan of care  Anticipated outcomes  Possible risks and side effects  This patient would benefit from PT intervention to resume normal activities.   Rehab potential is good.    Frequency:  1 X week, once daily  Duration:  for 6 weeks  Discharge Plan:  Achieve all LTG.  Independent  in home treatment program.  Reach maximal therapeutic benefit.    Please refer to the daily flowsheet for treatment today, total treatment time and time spent performing 1:1 timed codes.

## 2022-12-29 NOTE — TELEPHONE ENCOUNTER
Dx: Urinary retention     Specimen Information: Urine, Midstream    0 Result Notes    Component Ref Range & Units 2 d ago   (12/27/22) 1 mo ago   (11/7/22) 1 mo ago   (11/7/22) 2 mo ago   (10/25/22) 2 mo ago   (10/25/22) 4 mo ago   (8/30/22) 4 mo ago   (8/5/22)    Color Urine Colorless, Straw, Light Yellow, Yellow Orange Abnormal    Yellow   Yellow  Yellow  Orange Abnormal      Appearance Urine Clear Cloudy Abnormal    Turbid Abnormal    Clear  Clear  Cloudy Abnormal      Glucose Urine Negative mg/dL Negative   Negative R   Negative R  Negative  Negative     Bilirubin Urine Negative Negative   Negative   Negative  Negative  Negative     Ketones Urine Negative mg/dL Negative   Negative R   Negative R  Negative  Negative     Specific Gravity Urine 1.003 - 1.035 1.022   >=1.030   1.010  1.010  1.027     Blood Urine Negative Negative   Moderate Abnormal    Negative  Moderate Abnormal   Large Abnormal      pH Urine 5.0 - 7.0 8.0 High    6.0   5.0  8.0 High   6.0     Protein Albumin Urine Negative mg/dL 100 Abnormal    >=300 Abnormal  R   Negative R  Negative  300 Abnormal      Urobilinogen Urine Normal, 2.0 mg/dL Normal      Normal  Normal     Nitrite Urine Negative Positive Abnormal    Positive Abnormal    Negative  Positive Abnormal   Positive Abnormal      Leukocyte Esterase Urine Negative Large Abnormal    Moderate Abnormal    Small Abnormal   Large Abnormal   Large Abnormal      Mucus Urine None Seen /LPF Present Abnormal       Present Abnormal   Present Abnormal      RBC Urine <=2 /HPF 16 High   2-5 Abnormal  R   0-2 R   61 High   >182 High      WBC Urine <=5 /HPF >182 High   >100 Abnormal  R   5-10 Abnormal  R   >182 High   >182 High      Squamous Epithelials Urine <=1 /HPF 6 High        4 High     Resulting Agency  [facility] [place] [facility]           Narrative  Performed by: [facility]  Urine Culture ordered based on laboratory criteria      Specimen Collected:  12/27/22 11:30 AM Last Resulted: 12/27/22 11:48 AM

## 2022-12-29 NOTE — PROGRESS NOTES
Infusion Nursing Note:  Ilene Gaviria presents today for Gentamycin.    Patient seen by provider today: No   present during visit today: Not Applicable.    Note: Patient requesting IVF in addition to antibiotic. Call placed to ordering provider  - orders received for 1L NS bolus.  Patient's BP elevated, patient asymptomatic. Patient did receive 1L IVF and typically self caths for retention. Patient states she will self cath when she gets home.     Intravenous Access:  Peripheral IV placed.    Treatment Conditions:  Not Applicable.    Post Infusion Assessment:  Patient tolerated infusion without incident.  Blood return noted pre and post infusion.  Site patent and intact, free from redness, edema or discomfort.  No evidence of extravasations.  Access discontinued per protocol.     Discharge Plan:   Discharge instructions reviewed with: Patient.  Patient and/or family verbalized understanding of discharge instructions and all questions answered.  AVS to patient via [employer].   Patient discharged in stable condition accompanied by: self.  Departure Mode: Ambulatory.    Administrations This Visit     0.9% sodium chloride BOLUS     Admin Date  12/29/2022 Action  New Bag Dose  1,000 mL Route  Intravenous Administered By  Carline Oswald, YOGESH          gentamicin (GARAMYCIN) 480 mg in sodium chloride 0.9 % 67 mL intermittent infusion     Admin Date  12/29/2022 Action  New Bag Dose  480 mg Route  Intravenous Administered By  Carline Oswald, YOGESH Oswald, YOGESH

## 2022-12-29 NOTE — TELEPHONE ENCOUNTER
[facility] Call Center    Phone Message    May a detailed message be left on voicemail: yes     Reason for Call: Other: Patient calling for results from Urine  Culture .   Thank you     Action Taken: Other: ID     Travel Screening: Not Applicable

## 2023-01-03 NOTE — PROGRESS NOTES
Physical Therapy Initial Evaluation  Subjective:    Patient Health History             Pertinent medical history includes: overweight, cancer and diabetes.   Red flags:  None as reported by patient.      Other surgeries: Colostomy, shoulder surgery     Current medications:  Other. Other medications details: Metformin .    Current occupation is Sales.   Primary job tasks include:  Computer work and prolonged standing.                                    Objective:  System    Physical Exam    General     ROS    Assessment/Plan:

## 2023-01-12 ENCOUNTER — TELEPHONE (OUTPATIENT)
Dept: INFECTIOUS DISEASES | Facility: CLINIC | Age: 63
End: 2023-01-12

## 2023-01-12 DIAGNOSIS — N39.0 URINARY TRACT INFECTION ASSOCIATED WITH CATHETERIZATION OF URINARY TRACT (H): Primary | ICD-10-CM

## 2023-01-12 DIAGNOSIS — T83.511A URINARY TRACT INFECTION ASSOCIATED WITH CATHETERIZATION OF URINARY TRACT (H): Primary | ICD-10-CM

## 2023-01-12 NOTE — TELEPHONE ENCOUNTER
[facility] Call Center    Phone Message    May a detailed message be left on voicemail: yes     Reason for Call: Other: Per  patient would like to speak to care team asap  about treatment of her UTI and why it's not getting any better. Patient is also upset that she showed up to her appointment today and provider was not there.   Please follow up .   Thank you     Action Taken: Other: ID     Travel Screening: Not Applicable

## 2023-01-12 NOTE — TELEPHONE ENCOUNTER
Per VO from Dr. Larry order CT abd pelvis then will determine next steps.     Called patient and informed of plan. Gave phone number and patient will schedule.     Reese Guevara RN  Infectious Disease 9:38 AM 01/12/23

## 2023-01-12 NOTE — TELEPHONE ENCOUNTER
Patient currently has cloudy. Odor, self caths 4-5x day, cath has phlegm on it. Some days are worse than others. No fevers, flank pain or abd pain. Hydrating with water and taking methenamine hippurate.      Patient feels that she needs the next level of care. Had infusion 10 days ago. Gentamicin x1 dose. Does not feel like it has helped.     Patient is concerned that she is going to get to a point where antibiotics are not going to work and get a blood infection or something.     Patient decided she does not want to have an appt and would request that a message be sent to Dr. Larry.     Will route to provider to advise.       Reese Guevara RN  Infectious Disease 9:10 AM 01/12/23

## 2023-01-12 NOTE — TELEPHONE ENCOUNTER
Patient has had UTI since May, had appt this morning with Dr. Larry that she showed up for and provider was not in clinic.     Patient took off work and is upset that she did not get a call that the appt was cancelled. She has a return of UTI and really needs something to be done.     Set up an appt with Dr. Bowers via Video.       Reese Guevara RN  Infectious Disease 8:56 AM 01/12/23

## 2023-01-13 ENCOUNTER — ANCILLARY PROCEDURE (OUTPATIENT)
Dept: CT IMAGING | Facility: CLINIC | Age: 63
End: 2023-01-13
Attending: INTERNAL MEDICINE
Payer: COMMERCIAL

## 2023-01-13 DIAGNOSIS — T83.511A URINARY TRACT INFECTION ASSOCIATED WITH CATHETERIZATION OF URINARY TRACT (H): ICD-10-CM

## 2023-01-13 DIAGNOSIS — N39.0 URINARY TRACT INFECTION ASSOCIATED WITH CATHETERIZATION OF URINARY TRACT (H): ICD-10-CM

## 2023-01-13 PROCEDURE — 74176 CT ABD & PELVIS W/O CONTRAST: CPT

## 2023-01-21 ENCOUNTER — TELEPHONE (OUTPATIENT)
Dept: INFECTIOUS DISEASES | Facility: CLINIC | Age: 63
End: 2023-01-21
Payer: COMMERCIAL

## 2023-01-21 NOTE — TELEPHONE ENCOUNTER
Patient call stating that she would like to have a phone call back on. Patient symptoms has got worse. Please call at [phone]. Thank you.

## 2023-01-23 NOTE — TELEPHONE ENCOUNTER
Called patient to discuss symptoms. Today is feeling a bit better. She self caths. When she pulls the cath out it is full of mucus. Does not feel like it goes to a full UTI, gets some burning and urgency. Takes the methenamine that really helps along with the self cath to completely empty the bladder.     Patient has upcoming appt to address recent CT and UTIs.       Reese Guevara RN  Infectious Disease 8:46 AM 01/23/23

## 2023-01-25 NOTE — PROGRESS NOTES
[facility]  General Infectious Disease Note: Return to Clinic     Patient:  Ilene Gaviria, Date of birth 1960   Medical record number [id number]  Date of Visit:  01/26/2023   Consult requested by Dr. Kacy Anderson for evaluation of recurrent UTIs with MDR organisms.      Assessment       1. Recurrent urinary tract infections, predominant organism: ESBL Proteus mirabilis  2. S/p urethral sling procedure (2018), removal 11/1/22  3. Urinary retention requiring intermittent self-catheterization  4. Rectal cancer s/p chemo/radiation and resection w/ end colostomy (2018)  5. Post-menopausal  6. DMII, last HbA1c 7.0%  7. Hepatic steatosis    Ms. Gaviria is currently stable from a urinary tract perspective. I continue to suspect the etiology of her recurrent infections is multifactorial with age, post-menopausal status, urinary retention, trauam of self-catheterization, hydration, and the virulence factors of Proteus all playing a role. Today we discussed methods for preventing UTIs, and recognition of true UTIs vs discomfort related to known bacterial colonization in the bladder. She seems to have had some improvement following the urethral sling removal in November, and her acute symptoms seem to improve with increasing hydration. It is also notable that she often has hernia symptoms at the same time. I wonder if the abdominal discomfort she is experiencing is more directly related to the hernia than her bladder. Noted that the pH on her last urinalysi was 8.0 which was likely 2/2 Proteus overgrowth. We discussed continuing methenamine and vitamin C to attempt to acidify the urine to make a less favorable environment for Proteus, and hopefully prevent stone formation/crystallization. She did not have any stones or clear urinary tract abnormality on most recent CT scan which is reassuring. Also noted that the most recent Proteus isolates are more antibiotic-susceptible than prior  "(specifically susceptible to cephalosporins and pip/tazo). Suspect there are multiple colonies of Proteus present that may have varying resistance profiles. Would continue to treat any future exacerbations or invasive infections with antibiotics directed at the most resistant isolates (which includes fosfomycin unfortunately). Strongly encouraged adequate hydration, and more aggressive glucose control will also likely be helpful in decreasing urinary frequency and potential complications of a UTI.      Recommendations     1. Recommend increased hydration (>2L water/day)  2. Recommend frequent catheterization, consider scheduling.   3. Continue methinamine and Vitamin C supplementation to acidify the urine.   4. At the onset of urinary discomfort, increase hydration and frequency of catheterization to see if this helps. If symptoms persist or any \"red flag\" symptoms develop, recommend seeking care immediately.    - With next UTI, would give gentamicin based on most resistant Proteus ASTs, rather than most recent.     RTC: 3 months or PRN        Eunice Larry MD  Pager [phone]  Infectious Diseases      History of the Infectious Disease lllness:   CC: Recurrent UTIs    HPI: Ilene Gaviria is a pleasant 62 year old female with a past medical history of colorectal cancer s/p resection with colostomy, s/p urethral sling, now with urinary retention requiring CIC, recurrent UTIs, HTN, HLD, and DMII (last HbA1c 7.0%) who presents to ID clinic for ongoing management of recurrent UTIs.      Please see initial consult note dated 10/13/22 for full details. Briefly, Ms. Gaviria was diagnosed with colorectal cancer in 2018 and underwent colonic resection with end colostomy formation. Shortly following this, she underwent a urethral sling placement for urinary leakage. For the first few years following this, she describes 3-4 UTIs in a year. They were acutely symptomatic with dysuria and increased urinary frequency, and " rapidly resolved with antibiotic therapy. In 2022, however, she began to develop UTIs with severe urinary retention. She had frequent ED visits while awaiting Urology referral, and it was noted that she was retaining urine. She saw one Urologist who recommended a chronic indwelling miguel catheter, but this was too cumbersome for her daily life, and she ultimately saw another Urologist (Dr. Anderson). She has had multiple urine cultures, many of them had mixed wong, but the predominant organism that we have cultured has been an ESBL Proteus mirabilis. She hae been treated with IM gentamicin twice for this prior to referral to ID. She also had bladder biopsies in July 2022 that were unrevealing for malignancy or radiation changes. When I saw her in consultation in October, we discussed continuing methenamine and vitamin C, increasing hydration, and increasing frequency of CIC. Since that time, she underwent removal of her urethral sling with Urology (11/1/22). She started to develop UTI symptoms around 11/7/22 and dropped off a urine sample (same ESBL Proteus mirabilis). She increased her hydration at the same time and the symptoms subsided. Fosfomycin susceptibilities were added to this specimen and it was resistant. Her symptoms returned around 12/21, at which time she also noted purulent material on her catheter. She again dropped off a urine sample. This time, Proteus mirabilis again grew, but was more susceptible than prior cultures. Of note, the pH of her urine at the time was 8.0. She was given a 1 time dose of IV gentamicin at an infusion center on 12/29/22. On 1/12/23 she was scheduled for a clinic appointment that did not occur due to a scheduling error. She relayed concern for worsening UTI symptoms so a CT scan f her abdomen/pelvis was obtained (1/13/23) for further evaluation. There was no evidence of kidney stones, hydronephrosis, or other intraabdominal complication related to the  tract, though she  "does have a large parastomal hernia and newly noted fatty liver.     Today, Ms. Gaviria states that she feels well. She is not having the typical symptoms she gets when she feels that she has a UTI (foul odor of the urine, increased frequency/pressure, mucous on catheter, etc.). She notes that she is taking methenamine regularly, and does think it helps prevent UTIs. She has noticed sometimes that when she is out of the methenamine she develops symptoms. She denies having fevers, chills, back pain, or other constitutional symptoms. She notes that that's the way it typically is for her, and that she feels that a UTI is developing, but not in the same way that she had UTIs when she was younger. She does think she's had some improvement following the urethral sling removal in November. She also notes that she will occasionally have \"flare ups\" of her parastomal hernia (noted on CT), and these seem to correlate with UTI symptoms. She will have abdominal and back pain that is improved with increased hydration, heat packs, and warm water.      Relevant Microbiology:   12/27/22 Urine culture >100,000 CFU Proteus mirabilis, ESBL (X2 strains)  Susceptibility     Proteus mirabilis (1) Proteus mirabilis (2)     JAN JAN     Ampicillin >=32 ug/mL Resistant >=32 ug/mL Resistant     Ampicillin/ Sulbactam <=2 ug/mL Susceptible <=2 ug/mL Susceptible     Cefazolin <=4 ug/mL Susceptible 1 <=4 ug/mL Susceptible 1     Cefepime <=1 ug/mL Susceptible <=1 ug/mL Susceptible     Cefoxitin <=4 ug/mL Susceptible <=4 ug/mL Susceptible     Ceftazidime <=1 ug/mL Susceptible <=1 ug/mL Susceptible     Ceftriaxone <=1 ug/mL Susceptible <=1 ug/mL Susceptible     Ciprofloxacin >=4 ug/mL Resistant >=4 ug/mL Resistant     Gentamicin <=1 ug/mL Susceptible <=1 ug/mL Susceptible     Levofloxacin >=8 ug/mL Resistant >=8 ug/mL Resistant     Nitrofurantoin >=512 ug/mL Resistant 2 64 ug/mL Resistant 2     Piperacillin/Tazobactam <=4 ug/mL Susceptible <=4 ug/mL " Susceptible     Tobramycin <=1 ug/mL Susceptible <=1 ug/mL Susceptible     Trimethoprim/Sulfamethoxazole >16/304 ug/mL Resistant >16/304 ug/mL Resistant      11/7/22 Urine culture: >100,000 CFU Proteus mirabilis, ESBL   Similiar AST, fosfomycin added - resistant.   9/18/22 Urine culture: >100,000 CFU Proteus mirabilis, ESBL  Susceptibility              Proteus mirabilis ESBL       JAN       Ampicillin >=32 ug/mL Resistant       Cefazolin >=64 ug/mL Resistant 1       Cefepime   Resistant       Cefoxitin <=4 ug/mL Susceptible       Ceftazidime   Resistant       Ceftriaxone   Resistant       Ciprofloxacin >=4 ug/mL Resistant       Gentamicin <=1 ug/mL Susceptible       Levofloxacin >=8 ug/mL Resistant       Meropenem <=0.25 ug/mL Susceptible 2       Nitrofurantoin   Resistant 3       Piperacillin/Tazobactam <=4 ug/mL Susceptible       Tobramycin <=1 ug/mL Susceptible       Trimethoprim/Sulfamethoxazole >16/304 ug/mL Resistant           8/30/22 Urine culture: >100,000 CFU Mixed urogenital wong  8/5/22 Urine culture: >100,000 CFU Proteus mirabilis, ESBL                Same susceptibility profile as above  7/1/22 Urine culture: No growth  6/20/22 Urine culture: >100,000 CFU Mixed urogenital wong  6/3/22 Urine culture: 10,000-50,000 CFU Mixed urogenital wong     Recent Antimicrobials:   12/29/22 IV gentamicin X1 dose     Review of Systems: The remainder of a complete ROS was negative, except as noted in HPI.     Past Medical History:   Diagnosis Date     Cancer (H)     rectal cancer       Past Surgical History:   Procedure Laterality Date     CYSTOSCOPY, BIOPSY BLADDER, COMBINED N/A 7/26/2022    Procedure: CYSTOSCOPY, WITH BLADDER BIOPSY AND FULGURATION (Look in the bladder and sample red  tissue then burn the area involved);  Surgeon: Kacy Anderson MD;  Location: [facility] OR     DAVINCI COLECTOMY N/A 8/7/2018    Procedure: DAVINCI XI COLECTOMY;  DAVINCI ABDOMINAL PERINEAL RESECTION WITH PERMANENT COLOSTOMY ( YAÑEZ )  "LEFT GLUTEAL FLAP TO RECTAL DEFECT ( JERE ) ;  Surgeon: Jonny Finney MD;  Location:  OR     GENITOURINARY SURGERY      bladder sling 12/18     GRAFT FLAP GLUTEUS TO PERINEUM N/A 8/7/2018    Procedure: GRAFT FLAP GLUTEUS TO PERINEUM;;  Surgeon: Conchita Ramos MD;  Location:  OR     GYN SURGERY      c section X3, tubal      IR CYSTOGRAM  8/24/2022     REMOVE MESH VAGINA N/A 11/1/2022    Procedure: EXCISION, sling, VAGINA (cut one side of the sling in the vagina), CYSTOSCOPY;  Surgeon: Kacy Anderson MD;  Location: [facility] OR     SIGMOIDOSCOPY FLEXIBLE N/A 7/9/2018    Procedure: SIGMOIDOSCOPY FLEXIBLE;  FLEXIBILE SIGMOIDOSCOPY;  Surgeon: Jonny Finney MD;  Location:  GI       Family History   Problem Relation Age of Onset     Anesthesia Reaction No family hx of      Deep Vein Thrombosis (DVT) No family hx of        Social History     Tobacco Use     Smoking status: Never     Smokeless tobacco: Never   Substance Use Topics     Alcohol use: Yes     Comment: rare     Drug use: No   Daughter is pregnant with quadruplets, due in July.     Patient Active Problem List   Diagnosis     BMI 30.0-30.9,adult     Rectal cancer (H)     Colostomy in place (H)     Lesion of bladder     Somatic dysfunction of pelvic region     Urinary retention     Urinary tract infection associated with catheterization of urinary tract (H)       Allergies   Allergen Reactions     No Known Allergies      Tomato Rash            Physical Exam:   BP (!) 147/102 (BP Location: Right arm, Patient Position: Sitting, Cuff Size: Adult Large)   Pulse 78   Temp 97.6  F (36.4  C) (Oral)   Ht 1.727 m (5' 7.99\")   Wt 93.8 kg (206 lb 14.4 oz)   SpO2 97%   BMI 31.47 kg/m    Wt Readings from Last 4 Encounters:   12/29/22 95.4 kg (210 lb 4.8 oz)   11/01/22 90.7 kg (200 lb)   10/28/22 92.5 kg (204 lb)   10/13/22 91.6 kg (201 lb 14.4 oz)     Exam:   GENERAL: well-developed, well-nourished, alert, oriented, in no acute distress.  HEAD: Head is " normocephalic, atraumatic   EYES: Eyes have anicteric sclerae.    LUNGS: Normal WOB on RA.   ABDOMEN: Normal bowel sounds, soft, nontender.  SKIN: No acute rashes.  NEUROLOGIC: Grossly nonfocal.         Laboratory Data:     Metabolic Studies    Recent Labs   Lab Test 11/01/22  1247 10/28/22  0837 07/26/22  1055 07/01/22  0350 06/26/22  1617 12/17/19  0940 08/08/18  0732   NA  --  138  --  139 135   < > 141   POTASSIUM  --  4.7  --  5.0 4.2   < > 4.1   CHLORIDE  --  105  --  106 103   < > 108   CO2  --  26  --  25 28   < > 26   ANIONGAP  --  7  --  8 4   < > 7   BUN  --  15  --  31* 15   < > 15   CR  --  0.60  --  1.41* 0.79   < > 0.60   GFRESTIMATED  --  >90  --  42* 85   < > >90   * 166*   < > 204* 155*   < > 103*   ARTHUR  --  10.0  --  9.2 9.3   < > 8.2*   PHOS  --   --   --   --   --   --  3.2   MAG  --   --   --   --   --   --  1.8   LACT  --   --   --   --  1.7  --   --     < > = values in this interval not displayed.     Hematology Studies     Recent Labs   Lab Test 10/28/22  0837 07/01/22  0350 06/26/22  1617 03/09/22  0954 07/26/18  0919 04/24/17  1659   WBC 6.0 9.0 6.3 4.8   < > 5.6   ANEU  --   --   --   --   --  3.1   ALYM  --   --   --   --   --  2.1   BETZY  --   --   --   --   --  0.3   AEOS  --   --   --   --   --  0.1   HGB 14.9 12.9 14.4 14.9   < > 14.2   HCT 43.7 38.8 43.9 44.6   < > 41.6   * 202 195 184   < > 163    < > = values in this interval not displayed.     Urine Studies     Recent Labs   Lab Test 12/27/22  1130 11/07/22  1237 10/25/22  1107 08/30/22  1355 08/24/22  1502 08/05/22  1210   URINEPH 8.0* 6.0 5.0 8.0* 5.5 6.0   NITRITE Positive* Positive* Negative Positive* Negative Positive*   LEUKEST Large* Moderate* Small* Large* Moderate* Large*   WBCU >182* >100* 5-10* >182*  --  >182*     Imaging:   Results for orders placed or performed in visit on 01/13/23   CT Abdomen Pelvis w/o Contrast    Narrative    EXAM: CT ABDOMEN PELVIS W/O CONTRAST  LOCATION: [facility]  DATE/TIME: 1/13/2023 4:21 PM    INDICATION: Urinary tract infection associated with catheterization of urinary tract (H), Urinary tract infection associated with catheterization of urinary tract (H)  COMPARISON: CT abdomen and pelvis on 07/05/2022  TECHNIQUE: CT scan of the abdomen and pelvis was performed without intravenous contrast Multiplanar reformats were obtained. Dose reduction techniques were used.    FINDINGS:   LOWER CHEST: Basilar pulmonary opacities, likely atelectasis.    ABDOMEN/PELVIS: Limited evaluation of the abdominal organs due to lack of intravenous contrast.    HEPATOBILIARY: Diffuse hypodense appearance of the liver, likely due to underlying hepatic steatosis. The gallbladder is unremarkable.    PANCREAS: The unenhanced pancreas is grossly unremarkable.    SPLEEN: No splenomegaly.    ADRENAL GLANDS: 1.1 cm left adrenal nodule (series 4 image 69) with of -13, likely represents adrenal adenoma. Diffuse nodular thickening of the right adrenal gland without discrete nodule.    KIDNEYS/BLADDER: No radiodense kidney/ureteral stones or hydronephrosis in either kidney.    BOWEL: Postsurgical changes of abdominoperineal resection with left lower quadrant colostomy. There is  moderate-sized parastomal hernia containing fat and nondilated small bowel loops. No abnormally dilated bowel loops.    PERITONEUM: No evidence of free fluid in the abdomen and pelvis. No free peritoneal or portal venous gas.    PELVIC ORGANS: Unremarkable.    VASCULATURE: Scattered atherosclerotic vascular calcification of the abdominal aorta and iliac vessels.    LYMPH NODES: Unremarkable.    MUSCULOSKELETAL: No suspicious osseous lesion.      Impression    IMPRESSION:   1. No radiodense kidney/ureteral stones or hydronephrosis in either kidney. Stable postsurgical changes of abdominoperineal resection with left lower quadrant colostomy. There is a moderate-sized parastomal hernia containing fat and nondilated  bowel   loops.  2. Hepatic steatosis.  3. 1.1 cm left adrenal nodule with imaging characteristic most consistent with lipid rich adenoma.

## 2023-01-26 ENCOUNTER — OFFICE VISIT (OUTPATIENT)
Dept: CT IMAGING | Facility: CLINIC | Age: 63
End: 2023-01-26
Attending: UROLOGY
Payer: COMMERCIAL

## 2023-01-26 VITALS
OXYGEN SATURATION: 97 % | TEMPERATURE: 97.6 F | BODY MASS INDEX: 31.36 KG/M2 | SYSTOLIC BLOOD PRESSURE: 147 MMHG | DIASTOLIC BLOOD PRESSURE: 102 MMHG | HEIGHT: 68 IN | WEIGHT: 206.9 LBS | HEART RATE: 78 BPM

## 2023-01-26 DIAGNOSIS — N39.0 RECURRENT UTI: Primary | ICD-10-CM

## 2023-01-26 PROBLEM — E11.9 DIABETES MELLITUS, TYPE 2 (H): Status: ACTIVE | Noted: 2023-01-26

## 2023-01-26 PROBLEM — E10.9 DIABETES MELLITUS TYPE 1 (H): Status: ACTIVE | Noted: 2023-01-26

## 2023-01-26 PROCEDURE — 99215 OFFICE O/P EST HI 40 MIN: CPT | Mod: 24 | Performed by: INTERNAL MEDICINE

## 2023-01-26 ASSESSMENT — PAIN SCALES - GENERAL: PAINLEVEL: NO PAIN (0)

## 2023-01-26 NOTE — NURSING NOTE
"Chief Complaint   Patient presents with     RECHECK     Follow-up for recurrent UTI     BP (!) 147/102 (BP Location: Right arm, Patient Position: Sitting, Cuff Size: Adult Large)   Pulse 78   Temp 97.6  F (36.4  C) (Oral)   Ht 1.727 m (5' 7.99\")   Wt 93.8 kg (206 lb 14.4 oz)   SpO2 97%   BMI 31.47 kg/m      Charmaine Wright on 1/26/2023 at 8:09 AM    "

## 2023-02-01 ENCOUNTER — OFFICE VISIT (OUTPATIENT)
Dept: ORTHOPEDICS | Facility: CLINIC | Age: 63
End: 2023-02-01
Payer: COMMERCIAL

## 2023-02-01 DIAGNOSIS — M79.671 PAIN IN BOTH FEET: ICD-10-CM

## 2023-02-01 DIAGNOSIS — M79.672 PAIN IN BOTH FEET: ICD-10-CM

## 2023-02-01 DIAGNOSIS — M19.072 ARTHRITIS OF MIDTARSAL JOINT OF LEFT FOOT: Primary | ICD-10-CM

## 2023-02-01 DIAGNOSIS — M21.70 LEG LENGTH DISCREPANCY: ICD-10-CM

## 2023-02-01 PROCEDURE — 99214 OFFICE O/P EST MOD 30 MIN: CPT | Performed by: PODIATRIST

## 2023-02-01 NOTE — PROGRESS NOTES
Date of Service: 12/21/2022    Chief Complaint   Patient presents with     Follow Up     Bilateral ankle and foot pain. Patient relates that she has had some improvement but she is still having quite a bit of pain.           HPI: Ilene is a 62 year old female who presents today for further evaluation of BL foot and ankle pain. She relates that she went to PT once. She does get some relief from the lift. She relates that when she walks distance, she does still get pain in the top of the foot.  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  This is where she is having most of her pain.    Review of Systems: No n/v/d/f/c/ns/sob/cp    PMH:   Past Medical History:   Diagnosis Date     Cancer (H)     rectal cancer       PSxH:   Past Surgical History:   Procedure Laterality Date     CYSTOSCOPY, BIOPSY BLADDER, COMBINED N/A 7/26/2022    Procedure: CYSTOSCOPY, WITH BLADDER BIOPSY AND FULGURATION (Look in the bladder and sample red  tissue then burn the area involved);  Surgeon: Kacy Anderson MD;  Location: [facility] OR     DAVINCI COLECTOMY N/A 8/7/2018    Procedure: DAVINCI XI COLECTOMY;  DAVINCI ABDOMINAL PERINEAL RESECTION WITH PERMANENT COLOSTOMY ( PARI ) LEFT GLUTEAL FLAP TO RECTAL DEFECT ( JERE ) ;  Surgeon: Jonny Finney MD;  Location:  OR     GENITOURINARY SURGERY      bladder sling 12/18     GRAFT FLAP GLUTEUS TO PERINEUM N/A 8/7/2018    Procedure: GRAFT FLAP GLUTEUS TO PERINEUM;;  Surgeon: Conchita Ramos MD;  Location:  OR     GYN SURGERY      c section X3, tubal      IR CYSTOGRAM  8/24/2022     REMOVE MESH VAGINA N/A 11/1/2022    Procedure: EXCISION, sling, VAGINA (cut one side of the sling in the vagina), CYSTOSCOPY;  Surgeon: Kacy Anderson MD;  Location: [facility] OR     SIGMOIDOSCOPY FLEXIBLE N/A 7/9/2018    Procedure: SIGMOIDOSCOPY FLEXIBLE;  FLEXIBILE SIGMOIDOSCOPY;  Surgeon: Jonny Finney MD;  Location:  GI       Allergies: No known  allergies and Tomato    SH:   Social History     Socioeconomic History     Marital status:      Spouse name: Not on file     Number of children: Not on file     Years of education: Not on file     Highest education level: Not on file   Occupational History     Not on file   Tobacco Use     Smoking status: Never     Smokeless tobacco: Never   Substance and Sexual Activity     Alcohol use: Yes     Comment: rare     Drug use: No     Sexual activity: Not Currently     Partners: Male   Other Topics Concern     Parent/sibling w/ CABG, MI or angioplasty before 65F 55M? Not Asked   Social History Narrative     Not on file     Social Determinants of Health     Financial Resource Strain: Not on file   Food Insecurity: Not on file   Transportation Needs: Not on file   Physical Activity: Not on file   Stress: Not on file   Social Connections: Not on file   Intimate Partner Violence: Not on file   Housing Stability: Not on file       FH:   Family History   Problem Relation Age of Onset     Anesthesia Reaction No family hx of      Deep Vein Thrombosis (DVT) No family hx of        Objective:  Data Unavailable Data Unavailable Data Unavailable Data Unavailable Data Unavailable 0 lbs 0 oz    PT and DP pulses are 2/4 bilaterally. CRT is instant. Positive pedal hair.   Gross sensation is intact bilaterally.   Equinus is noted bilaterally.  Some pain noted along the midtarsal joint with palpation.  Dorsal osteophytes noted at the base of the second metatarsal.  No pain noted with palpation of bilateral talar domes.  Nails normal bilaterally. No open lesions are noted.     Previous x-rays were discussed with her.    Assessment: Ilene is a 62-year-old with pelvic tilt creating a functional limb length discrepancy and pain in bilateral.  The heel lift is helping to correct the limb length discrepancy.  She still has pain in her arthritic joints.  I discussed treatment options with her.  The first would be to try topical  anti-inflammatories.  She would like to proceed with this.  The second would be to try a steroid injection.  She does also have some ankle pain, and this may be compensatory to the arthritis in the foot.  She does agree to this, however I recommend she start on the left side as it will be the easiest, as she does not have much joint space on the right.  If her ankle pain goes away after she gets some relief from the injection, it is likely compensatory.  We may need to do further work-up for this.    Plan:  - Pt seen and evaluated.  -X-rays were discussed with her.  -I ordered an injection for the left second metatarsal-cuneiform joint.  She does agree to this.  -Prescription was written for topical Voltaren.  We could always upgrade to an oral diclofenac if needed  -See again via telephone 3 weeks after the injection..  On the date of service spent 30 minutes with patient care, documentation, image review, chart review, care coordination

## 2023-02-01 NOTE — LETTER
Date:February 2, 2023      Provider requested that no letter be sent. Do not send.       [facility]

## 2023-02-01 NOTE — LETTER
2/1/2023         RE: Ilene Gaviria  [address] [facility] [address]        Dear Colleague,    Thank you for referring your patient, Ilene Gaviria, to the [facility] [place]. Please see a copy of my visit note below.    Date of Service: 12/21/2022    Chief Complaint   Patient presents with     Follow Up     Bilateral ankle and foot pain. Patient relates that she has had some improvement but she is still having quite a bit of pain.           HPI: Ilene is a 62 year old female who presents today for further evaluation of BL foot and ankle pain. She relates that she went to PT once. She does get some relief from the lift. She relates that when she walks distance, she does still get pain in the top of the foot.  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  This is where she is having most of her pain.    Review of Systems: No n/v/d/f/c/ns/sob/cp    PMH:   Past Medical History:   Diagnosis Date     Cancer (H)     rectal cancer       PSxH:   Past Surgical History:   Procedure Laterality Date     CYSTOSCOPY, BIOPSY BLADDER, COMBINED N/A 7/26/2022    Procedure: CYSTOSCOPY, WITH BLADDER BIOPSY AND FULGURATION (Look in the bladder and sample red  tissue then burn the area involved);  Surgeon: Kacy Anderson MD;  [place] [facility] [place]     DAVINCI COLECTOMY N/A 8/7/2018    Procedure: DAVINCI XI COLECTOMY;  DAVINCI ABDOMINAL PERINEAL RESECTION WITH PERMANENT COLOSTOMY ( PARI ) LEFT GLUTEAL FLAP TO RECTAL DEFECT ( JERE ) ;  Surgeon: Jonny Finney MD;  Location:  OR     GENITOURINARY SURGERY      bladder sling 12/18     GRAFT FLAP GLUTEUS TO PERINEUM N/A 8/7/2018    Procedure: GRAFT FLAP GLUTEUS TO PERINEUM;;  Surgeon: Conchita Ramos MD;  Location:  OR     GYN SURGERY      c section X3, tubal      IR CYSTOGRAM  8/24/2022     REMOVE MESH VAGINA N/A 11/1/2022    Procedure: EXCISION, sling, VAGINA (cut one side of the  sling in the vagina), CYSTOSCOPY;  Surgeon: Kacy Anderson MD;  Location: [facility] OR     SIGMOIDOSCOPY FLEXIBLE N/A 7/9/2018    Procedure: SIGMOIDOSCOPY FLEXIBLE;  FLEXIBILE SIGMOIDOSCOPY;  Surgeon: Jonny Finney MD;  Location:  GI       Allergies: No known allergies and Tomato    SH:   Social History     Socioeconomic History     Marital status:      Spouse name: Not on file     Number of children: Not on file     Years of education: Not on file     Highest education level: Not on file   Occupational History     Not on file   Tobacco Use     Smoking status: Never     Smokeless tobacco: Never   Substance and Sexual Activity     Alcohol use: Yes     Comment: rare     Drug use: No     Sexual activity: Not Currently     Partners: Male   Other Topics Concern     Parent/sibling w/ CABG, MI or angioplasty before 65F 55M? Not Asked   Social History Narrative     Not on file     Social Determinants of Health     Financial Resource Strain: Not on file   Food Insecurity: Not on file   Transportation Needs: Not on file   Physical Activity: Not on file   Stress: Not on file   Social Connections: Not on file   Intimate Partner Violence: Not on file   Housing Stability: Not on file       FH:   Family History   Problem Relation Age of Onset     Anesthesia Reaction No family hx of      Deep Vein Thrombosis (DVT) No family hx of        Objective:  Data Unavailable Data Unavailable Data Unavailable Data Unavailable Data Unavailable 0 lbs 0 oz    PT and DP pulses are 2/4 bilaterally. CRT is instant. Positive pedal hair.   Gross sensation is intact bilaterally.   Equinus is noted bilaterally.  Some pain noted along the midtarsal joint with palpation.  Dorsal osteophytes noted at the base of the second metatarsal.  No pain noted with palpation of bilateral talar domes.  Nails normal bilaterally. No open lesions are noted.     Previous x-rays were discussed with her.    Assessment: Ilene is a 62-year-old with pelvic tilt  creating a functional limb length discrepancy and pain in bilateral.  The heel lift is helping to correct the limb length discrepancy.  She still has pain in her arthritic joints.  I discussed treatment options with her.  The first would be to try topical anti-inflammatories.  She would like to proceed with this.  The second would be to try a steroid injection.  She does also have some ankle pain, and this may be compensatory to the arthritis in the foot.  She does agree to this, however I recommend she start on the left side as it will be the easiest, as she does not have much joint space on the right.  If her ankle pain goes away after she gets some relief from the injection, it is likely compensatory.  We may need to do further work-up for this.    Plan:  - Pt seen and evaluated.  -X-rays were discussed with her.  -I ordered an injection for the left second metatarsal-cuneiform joint.  She does agree to this.  -Prescription was written for topical Voltaren.  We could always upgrade to an oral diclofenac if needed  -See again via telephone 3 weeks after the injection..  On the date of service spent 30 minutes with patient care, documentation, image review, chart review, care coordination               Again, thank you for allowing me to participate in the care of your patient.        Sincerely,        Adams Stephens DPM

## 2023-02-07 ENCOUNTER — TELEPHONE (OUTPATIENT)
Dept: INFECTIOUS DISEASES | Facility: CLINIC | Age: 63
End: 2023-02-07

## 2023-02-07 ENCOUNTER — LAB (OUTPATIENT)
Dept: LAB | Facility: CLINIC | Age: 63
End: 2023-02-07
Payer: COMMERCIAL

## 2023-02-07 DIAGNOSIS — T83.511D URINARY TRACT INFECTION ASSOCIATED WITH CATHETERIZATION OF URINARY TRACT, UNSPECIFIED INDWELLING URINARY CATHETER TYPE, SUBSEQUENT ENCOUNTER: Primary | ICD-10-CM

## 2023-02-07 DIAGNOSIS — N39.0 URINARY TRACT INFECTION ASSOCIATED WITH CATHETERIZATION OF URINARY TRACT, UNSPECIFIED INDWELLING URINARY CATHETER TYPE, SUBSEQUENT ENCOUNTER: Primary | ICD-10-CM

## 2023-02-07 DIAGNOSIS — N39.0 URINARY TRACT INFECTION ASSOCIATED WITH CATHETERIZATION OF URINARY TRACT, UNSPECIFIED INDWELLING URINARY CATHETER TYPE, SUBSEQUENT ENCOUNTER: ICD-10-CM

## 2023-02-07 DIAGNOSIS — N39.0 URINARY TRACT INFECTION ASSOCIATED WITH CATHETERIZATION OF URINARY TRACT (H): Primary | ICD-10-CM

## 2023-02-07 DIAGNOSIS — T83.511D URINARY TRACT INFECTION ASSOCIATED WITH CATHETERIZATION OF URINARY TRACT, UNSPECIFIED INDWELLING URINARY CATHETER TYPE, SUBSEQUENT ENCOUNTER: ICD-10-CM

## 2023-02-07 DIAGNOSIS — T83.511A URINARY TRACT INFECTION ASSOCIATED WITH CATHETERIZATION OF URINARY TRACT (H): Primary | ICD-10-CM

## 2023-02-07 LAB
ALBUMIN UR-MCNC: 100 MG/DL
APPEARANCE UR: ABNORMAL
BACTERIA #/AREA URNS HPF: ABNORMAL /HPF
BILIRUB UR QL STRIP: NEGATIVE
COLOR UR AUTO: ABNORMAL
GLUCOSE UR STRIP-MCNC: NEGATIVE MG/DL
HGB UR QL STRIP: ABNORMAL
HYALINE CASTS: 7 /LPF
KETONES UR STRIP-MCNC: NEGATIVE MG/DL
LEUKOCYTE ESTERASE UR QL STRIP: ABNORMAL
MUCOUS THREADS #/AREA URNS LPF: PRESENT /LPF
NITRATE UR QL: POSITIVE
PH UR STRIP: 6.5 [PH] (ref 5–7)
RBC URINE: 111 /HPF
SP GR UR STRIP: 1.03 (ref 1–1.03)
SQUAMOUS EPITHELIAL: 5 /HPF
UROBILINOGEN UR STRIP-MCNC: NORMAL MG/DL
WBC URINE: >182 /HPF

## 2023-02-07 PROCEDURE — 81001 URINALYSIS AUTO W/SCOPE: CPT

## 2023-02-07 PROCEDURE — 87086 URINE CULTURE/COLONY COUNT: CPT

## 2023-02-07 RX ORDER — DIPHENHYDRAMINE HYDROCHLORIDE 50 MG/ML
50 INJECTION INTRAMUSCULAR; INTRAVENOUS
Status: CANCELLED
Start: 2023-02-08

## 2023-02-07 RX ORDER — ALBUTEROL SULFATE 0.83 MG/ML
2.5 SOLUTION RESPIRATORY (INHALATION)
Status: CANCELLED | OUTPATIENT
Start: 2023-02-08

## 2023-02-07 RX ORDER — EPINEPHRINE 1 MG/ML
0.3 INJECTION, SOLUTION, CONCENTRATE INTRAVENOUS EVERY 5 MIN PRN
Status: CANCELLED | OUTPATIENT
Start: 2023-02-08

## 2023-02-07 RX ORDER — MEPERIDINE HYDROCHLORIDE 25 MG/ML
25 INJECTION INTRAMUSCULAR; INTRAVENOUS; SUBCUTANEOUS EVERY 30 MIN PRN
Status: CANCELLED | OUTPATIENT
Start: 2023-02-08

## 2023-02-07 RX ORDER — HEPARIN SODIUM,PORCINE 10 UNIT/ML
5 VIAL (ML) INTRAVENOUS
Status: CANCELLED | OUTPATIENT
Start: 2023-02-08

## 2023-02-07 RX ORDER — HEPARIN SODIUM (PORCINE) LOCK FLUSH IV SOLN 100 UNIT/ML 100 UNIT/ML
5 SOLUTION INTRAVENOUS
Status: CANCELLED | OUTPATIENT
Start: 2023-02-08

## 2023-02-07 RX ORDER — ALBUTEROL SULFATE 90 UG/1
1-2 AEROSOL, METERED RESPIRATORY (INHALATION)
Status: CANCELLED
Start: 2023-02-08

## 2023-02-07 RX ORDER — METHYLPREDNISOLONE SODIUM SUCCINATE 125 MG/2ML
125 INJECTION, POWDER, LYOPHILIZED, FOR SOLUTION INTRAMUSCULAR; INTRAVENOUS
Status: CANCELLED
Start: 2023-02-08

## 2023-02-07 NOTE — TELEPHONE ENCOUNTER
Called patient.     Patient has tried hydration and increased cauterization.     Patient is having blood in urine which is new. Odor and urgency. Painful with cathing. Can empty bladder about 50 % then has to cath to empty bladder completely.     Will send to provider to address. Patient has already dropped sample off at [facility] and they can keep sample till end of day and will wait for order.       If positive UA -  per Dr. Larry patient will get Gentamicin 5mg/kg one time IV therapy. Orders are in therapy plan and patient will be able to contact infusion center of her choice once results come in.       Reese Guevara RN  Infectious Disease 1:02 PM 02/07/23

## 2023-02-07 NOTE — TELEPHONE ENCOUNTER
[facility] Call Center    Phone Message    May a detailed message be left on voicemail: yes     Reason for Call: Other: Patient is having UTI symtoms and needs lab orders added to her record, please advise when complete so she can schedule a lab test     Action Taken: Other: ID    Travel Screening: Not Applicable

## 2023-02-08 ENCOUNTER — INFUSION THERAPY VISIT (OUTPATIENT)
Dept: INFUSION THERAPY | Facility: CLINIC | Age: 63
End: 2023-02-08
Attending: INTERNAL MEDICINE
Payer: COMMERCIAL

## 2023-02-08 ENCOUNTER — DOCUMENTATION ONLY (OUTPATIENT)
Dept: INFECTIOUS DISEASES | Facility: CLINIC | Age: 63
End: 2023-02-08
Payer: COMMERCIAL

## 2023-02-08 VITALS
HEART RATE: 106 BPM | RESPIRATION RATE: 16 BRPM | OXYGEN SATURATION: 97 % | SYSTOLIC BLOOD PRESSURE: 132 MMHG | DIASTOLIC BLOOD PRESSURE: 91 MMHG | TEMPERATURE: 98 F

## 2023-02-08 DIAGNOSIS — T83.511A URINARY TRACT INFECTION ASSOCIATED WITH CATHETERIZATION OF URINARY TRACT (H): Primary | ICD-10-CM

## 2023-02-08 DIAGNOSIS — N39.0 URINARY TRACT INFECTION ASSOCIATED WITH CATHETERIZATION OF URINARY TRACT (H): Primary | ICD-10-CM

## 2023-02-08 PROCEDURE — 96365 THER/PROPH/DIAG IV INF INIT: CPT

## 2023-02-08 PROCEDURE — 250N000011 HC RX IP 250 OP 636: Performed by: INTERNAL MEDICINE

## 2023-02-08 PROCEDURE — 258N000003 HC RX IP 258 OP 636: Performed by: INTERNAL MEDICINE

## 2023-02-08 RX ORDER — MEPERIDINE HYDROCHLORIDE 25 MG/ML
25 INJECTION INTRAMUSCULAR; INTRAVENOUS; SUBCUTANEOUS EVERY 30 MIN PRN
Status: CANCELLED | OUTPATIENT
Start: 2023-02-08

## 2023-02-08 RX ORDER — HEPARIN SODIUM,PORCINE 10 UNIT/ML
5 VIAL (ML) INTRAVENOUS
Status: CANCELLED | OUTPATIENT
Start: 2023-02-08

## 2023-02-08 RX ORDER — METHYLPREDNISOLONE SODIUM SUCCINATE 125 MG/2ML
125 INJECTION, POWDER, LYOPHILIZED, FOR SOLUTION INTRAMUSCULAR; INTRAVENOUS
Status: CANCELLED
Start: 2023-02-08

## 2023-02-08 RX ORDER — ALBUTEROL SULFATE 90 UG/1
1-2 AEROSOL, METERED RESPIRATORY (INHALATION)
Status: CANCELLED
Start: 2023-02-08

## 2023-02-08 RX ORDER — ALBUTEROL SULFATE 0.83 MG/ML
2.5 SOLUTION RESPIRATORY (INHALATION)
Status: CANCELLED | OUTPATIENT
Start: 2023-02-08

## 2023-02-08 RX ORDER — DIPHENHYDRAMINE HYDROCHLORIDE 50 MG/ML
50 INJECTION INTRAMUSCULAR; INTRAVENOUS
Status: CANCELLED
Start: 2023-02-08

## 2023-02-08 RX ORDER — HEPARIN SODIUM (PORCINE) LOCK FLUSH IV SOLN 100 UNIT/ML 100 UNIT/ML
5 SOLUTION INTRAVENOUS
Status: CANCELLED | OUTPATIENT
Start: 2023-02-08

## 2023-02-08 RX ORDER — EPINEPHRINE 1 MG/ML
0.3 INJECTION, SOLUTION INTRAMUSCULAR; SUBCUTANEOUS EVERY 5 MIN PRN
Status: CANCELLED | OUTPATIENT
Start: 2023-02-08

## 2023-02-08 RX ADMIN — GENTAMICIN SULFATE 380 MG: 40 INJECTION, SOLUTION INTRAMUSCULAR; INTRAVENOUS at 13:31

## 2023-02-08 ASSESSMENT — PAIN SCALES - GENERAL: PAINLEVEL: EXTREME PAIN (8)

## 2023-02-08 NOTE — TELEPHONE ENCOUNTER
Called patient to inform that will do one dose gentamicin at infusion center with fluids. Patient will call to schedule. Orders in therapy plan.     Patient verbalized understands and agrees with plan.       Reese Guevara RN  Infectious Disease 9:49 AM 02/08/23

## 2023-02-08 NOTE — PATIENT INSTRUCTIONS
Dear Ilene Gaviria    Thank you for choosing [facility] ([facility]) for your infusion.  The following information is a summary of our appointment as well as important reminders.      We look forward in seeing you on your next appointment here at [facility] ([facility]).  Please don t hesitate to call us at [phone] to reschedule any of your appointments or to speak with one of the [facility] registered nurses.  It was a pleasure taking care of you today.    Sincerely,    [facility]  [address] [place] [address]  [address]  Phone:  [phone]

## 2023-02-08 NOTE — PROGRESS NOTES
Nursing Note  Ilene Gaviria presents [facility] for:   Chief Complaint   Patient presents with     Infusion     Gentamycin     During today's [facility] appointment, orders from Dr Larry were completed.  Frequency: once    Progress note:  Patient identification verified by name and date of birth.  Assessment completed.  Vitals recorded in Doc Flowsheets.  Patient was provided with education regarding medication/procedure and possible side effects.  Patient verbalized understanding.     present during visit today: Not Applicable.    Treatment Conditions: Non-applicable.    Premedications: were not ordered.    Infusion length and rate:  infusion given over approximately 1 hours    Labs: were not ordered for this appointment.    Vascular access: peripheral IV placed today.    Post Infusion Assessment:  Patient tolerated infusion without incident.  Site patent and intact, free from redness, edema or discomfort.  Access discontinued per protocol.     Discharge Plan:   Follow up plan of care with: ongoing infusions at [facility]. and ordering provider as scheduled.  Discharge instructions were reviewed with patient.  Patient/representative verbalized understanding of discharge instructions and all questions answered.  Patient discharged from [facility] in stable condition.    Chel Trinh RN    Administrations This Visit     gentamicin (GARAMYCIN) 380 mg in sodium chloride 0.9 % 64.5 mL intermittent infusion     Admin Date  02/08/2023 Action  New Bag Dose  380 mg Rate  64.5 mL/hr Route  Intravenous Administered By  Chel Trinh RN                BP (!) 132/91   Pulse 106   Temp 98  F (36.7  C) (Oral)   Resp 16   SpO2 97%

## 2023-02-08 NOTE — LETTER
2/8/2023         RE: Ilene Gaviria  [address] [facility] [address]        Dear Colleague,    Thank you for referring your patient, Ilene Gaviria, to the [facility]. Please see a copy of my visit note below.    Nursing Note  Ilene Gaviria [facility] for:   Chief Complaint   Patient presents with     Infusion     Gentamycin     During today's [facility] appointment, orders from Dr Larry were completed.  Frequency: once    Progress note:  Patient identification verified by name and date of birth.  Assessment completed.  Vitals recorded in Doc Flowsheets.  Patient was provided with education regarding medication/procedure and possible side effects.  Patient verbalized understanding.     present during visit today: Not Applicable.    Treatment Conditions: Non-applicable.    Premedications: were not ordered.    Infusion length and rate:  infusion given over approximately 1 hours    Labs: were not ordered for this appointment.    Vascular access: peripheral IV placed today.    Post Infusion Assessment:  Patient tolerated infusion without incident.  Site patent and intact, free from redness, edema or discomfort.  Access discontinued per protocol.     Discharge Plan:   Follow up plan of care with: ongoing infusions at [facility]. and ordering provider as scheduled.  Discharge instructions were reviewed with patient.  Patient/representative verbalized understanding of discharge instructions and all questions answered.  Patient discharged from [facility] in stable condition.    Chel Trinh RN    Administrations This Visit     gentamicin (GARAMYCIN) 380 mg in sodium chloride 0.9 % 64.5 mL intermittent infusion     Admin Date  02/08/2023 Action  New Bag Dose  380 mg Rate  64.5 mL/hr  Route  Intravenous Administered By  Chel Trinh RN                BP (!) 132/91   Pulse 106   Temp 98  F (36.7  C) (Oral)   Resp 16   SpO2 97%         Again, thank you for allowing me to participate in the care of your patient.        Sincerely,        Specialty Infusion Nurse

## 2023-02-08 NOTE — LETTER
Date:February 9, 2023      Provider requested that no letter be sent. Do not send.       [facility]

## 2023-02-09 ENCOUNTER — TELEPHONE (OUTPATIENT)
Dept: INFECTIOUS DISEASES | Facility: CLINIC | Age: 63
End: 2023-02-09
Payer: COMMERCIAL

## 2023-02-09 LAB
BACTERIA UR CULT: ABNORMAL
BACTERIA UR CULT: ABNORMAL

## 2023-02-09 NOTE — TELEPHONE ENCOUNTER
Called patient to inquire about status. Patient states she had IV infusion 2/8/23 @ 1 pm with 350 ml of IV fluids. Went home and slept for 15 hours.     Today she feels 100% better, urine is clearer, no blood and urgency/odor are gone.     Patient will follow up if there are issues in the future.       Reese Guevara RN  Infectious Disease 3:21 PM 02/09/23

## 2023-02-09 NOTE — TELEPHONE ENCOUNTER
----- Message from Eunice Larry MD sent at 2/9/2023  2:12 PM CST -----  Regarding: Pt call  Tequila Leigh,    I tried to call Ilene twice today to see if her UTI symptoms had improved with the gent. It went to voicemail both times. If you get a chance later today or tomorrow would you mind calling her? I just want to know if we need to have a longer course of antibiotics, but hopefully the gent took care of it!    Eunice

## 2023-02-10 ENCOUNTER — TELEPHONE (OUTPATIENT)
Dept: FAMILY MEDICINE | Facility: CLINIC | Age: 63
End: 2023-02-10
Payer: COMMERCIAL

## 2023-02-10 DIAGNOSIS — E11.40 TYPE 2 DIABETES MELLITUS WITH DIABETIC NEUROPATHY, WITHOUT LONG-TERM CURRENT USE OF INSULIN (H): Primary | ICD-10-CM

## 2023-02-10 NOTE — TELEPHONE ENCOUNTER
Patient Quality Outreach    Patient is due for the following:   Diabetes -  Eye Exam    Next Steps:   Schedule a office visit for eye exam    Type of outreach:    Phone, spoke to patient/parent. Needs referral for eye exam. Last eye exam many years ago      Questions for provider review:    Please put referral in for eye exam per patient request  Azalia Saldana MA

## 2023-02-24 ENCOUNTER — ANCILLARY PROCEDURE (OUTPATIENT)
Dept: GENERAL RADIOLOGY | Facility: CLINIC | Age: 63
End: 2023-02-24
Attending: PODIATRIST
Payer: COMMERCIAL

## 2023-02-24 DIAGNOSIS — M19.072 ARTHRITIS OF MIDTARSAL JOINT OF LEFT FOOT: ICD-10-CM

## 2023-02-24 PROCEDURE — 20600 DRAIN/INJ JOINT/BURSA W/O US: CPT | Mod: LT | Performed by: RADIOLOGY

## 2023-02-24 PROCEDURE — 77002 NEEDLE LOCALIZATION BY XRAY: CPT | Mod: GC | Performed by: RADIOLOGY

## 2023-02-24 RX ORDER — TRIAMCINOLONE ACETONIDE 40 MG/ML
40 INJECTION, SUSPENSION INTRA-ARTICULAR; INTRAMUSCULAR ONCE
Status: COMPLETED | OUTPATIENT
Start: 2023-02-24 | End: 2023-02-24

## 2023-02-24 RX ORDER — BUPIVACAINE HYDROCHLORIDE 2.5 MG/ML
10 INJECTION, SOLUTION EPIDURAL; INFILTRATION; INTRACAUDAL ONCE
Status: COMPLETED | OUTPATIENT
Start: 2023-02-24 | End: 2023-02-24

## 2023-02-24 RX ORDER — IOPAMIDOL 408 MG/ML
10 INJECTION, SOLUTION INTRATHECAL ONCE
Status: COMPLETED | OUTPATIENT
Start: 2023-02-24 | End: 2023-02-24

## 2023-02-24 RX ADMIN — BUPIVACAINE HYDROCHLORIDE 2 ML: 2.5 INJECTION, SOLUTION EPIDURAL; INFILTRATION; INTRACAUDAL at 09:46

## 2023-02-24 RX ADMIN — IOPAMIDOL 2 ML: 408 INJECTION, SOLUTION INTRATHECAL at 09:46

## 2023-02-24 RX ADMIN — TRIAMCINOLONE ACETONIDE 40 MG: 40 INJECTION, SUSPENSION INTRA-ARTICULAR; INTRAMUSCULAR at 09:46

## 2023-02-27 DIAGNOSIS — E11.40 TYPE 2 DIABETES MELLITUS WITH DIABETIC NEUROPATHY, WITHOUT LONG-TERM CURRENT USE OF INSULIN (H): ICD-10-CM

## 2023-03-02 RX ORDER — LISINOPRIL 20 MG/1
20 TABLET ORAL DAILY
Qty: 30 TABLET | Refills: 0 | Status: SHIPPED | OUTPATIENT
Start: 2023-03-02 | End: 2023-03-13

## 2023-03-02 NOTE — TELEPHONE ENCOUNTER
METFORMIN 500MG TABLETS      Last Written Prescription Date:  12/28/22  Last Fill Quantity: 180,   # refills: 3      Verified same pharmacy, same dose. Refused, should have refills available.

## 2023-03-02 NOTE — TELEPHONE ENCOUNTER
lisinopril (ZESTRIL) 20 MG tablet      Last Written Prescription Date:  11/9/22  Last Fill Quantity: 90,   # refills: 3  Last Office Visit : 9/13/22  Future Office visit:  3/13/23    Routing refill request to provider for review/approval because:  Blood pressure out of range     Called [facility] to verify available refills. Per pharmacy tech, pt's last refill was in June and no further refills are available.     30d refill sent to cover pt until next appt 3/13/23.

## 2023-03-09 ENCOUNTER — TELEPHONE (OUTPATIENT)
Dept: ENDOCRINOLOGY | Facility: CLINIC | Age: 63
End: 2023-03-09
Payer: COMMERCIAL

## 2023-03-09 NOTE — PROGRESS NOTES
Outcome for 03/09/23 11:23 AM: Patient is not checking blood sugars  Nicki Wallace LPN     Patient is showing 3/5 MNCM met. BP out range and Aspirin not prescribed   Nicki Wallace LPN

## 2023-03-09 NOTE — TELEPHONE ENCOUNTER
Spoke with patient in regards to obtain blood sugar data for appointment scheduled 3/13/2023. Patient is currently not checking blood sugars.     Nicki Wallace LPN 03/09/23 11:23 AM

## 2023-03-13 ENCOUNTER — VIRTUAL VISIT (OUTPATIENT)
Dept: ENDOCRINOLOGY | Facility: CLINIC | Age: 63
End: 2023-03-13
Payer: COMMERCIAL

## 2023-03-13 ENCOUNTER — TELEPHONE (OUTPATIENT)
Dept: ENDOCRINOLOGY | Facility: CLINIC | Age: 63
End: 2023-03-13

## 2023-03-13 DIAGNOSIS — E11.40 TYPE 2 DIABETES MELLITUS WITH DIABETIC NEUROPATHY, WITHOUT LONG-TERM CURRENT USE OF INSULIN (H): ICD-10-CM

## 2023-03-13 PROCEDURE — 99214 OFFICE O/P EST MOD 30 MIN: CPT | Mod: VID | Performed by: STUDENT IN AN ORGANIZED HEALTH CARE EDUCATION/TRAINING PROGRAM

## 2023-03-13 RX ORDER — SIMVASTATIN 20 MG
20 TABLET ORAL
Qty: 90 TABLET | Refills: 3 | Status: SHIPPED | OUTPATIENT
Start: 2023-03-13 | End: 2024-07-11

## 2023-03-13 RX ORDER — LISINOPRIL 20 MG/1
20 TABLET ORAL DAILY
Qty: 30 TABLET | Refills: 0 | Status: SHIPPED | OUTPATIENT
Start: 2023-03-13 | End: 2023-04-30

## 2023-03-13 RX ORDER — SEMAGLUTIDE 1.34 MG/ML
INJECTION, SOLUTION SUBCUTANEOUS
Qty: 3 ML | Refills: 0 | Status: SHIPPED | OUTPATIENT
Start: 2023-03-13 | End: 2023-05-11

## 2023-03-13 NOTE — LETTER
3/13/2023       RE: Ilene Gaviria  [address] [facility] [address]     Dear Colleague,    Thank you for referring your patient, Ilene Gaviria, to the [facility] ENDOCRINOLOGY CLINIC [facility]. Please see a copy of my visit note below.    Outcome for 03/09/23 11:23 AM: Patient is not checking blood sugars  Nicki Wallace LPN     Patient is showing 3/5 [place] met. BP out range and Aspirin not prescribed   Nicki Wallace LPN      Endocrinology Clinic Visit     Video-Visit Details     Type of service:  Video Visit     Joined the call at 3/13/2023, 8:36:49 am.  Left the call at 3/13/2023, 8:55:03 am.    I spent a total of 30 minutes on the date of encounter reviewing medical records, evaluating the patient, coordinating care and documenting in the EHR, as detailed above.        Patient location home'  Provider location off site    Platform used for Video Visit: [facility]      NAME:  Ilene Gaviria  PCP:  Trevor Rizo  MRN:  [id number]  Reason for Consult: DM2  Requesting Provider:  Referred Self    Chief Complaint     Chief Complaint   Patient presents with     Follow Up     Video Visit       History of Present Illness     Ilene Gaviria is a 61 year old female who is seen in clinic for DM2 and weight management.    Her past medical history is significant for rectal cancer status post surgery and chemotherapy currently in remission.  Never been on steroids before.  The patient was diagnosed with type 2 diabetes 1 year ago . A1C was checked no records, she said it was in the range of 8.  She was started on metformin, currently at 2 g daily.  She is tolerating it very well.  Her A1c was down to 6.1. most recent a1c 10/2022 was 7.     Last visit she wanted to start wt loss meds and we discussed ozempic. Her weight has always been always between 180-210 since having her kids. It is been stable but not able to loose weight  despite dieting. She has problem with portion control. She has Ozempic at home but did not start it yet.   She ran out of her meds 1-2 weeks ago.    Otherwise she reported feeling great. She reported recurrent UTI and working with urology.    Weight stable ( lost few lbs and gained them back) and records as below  Wt Readings from Last 4 Encounters:   01/26/23 93.8 kg (206 lb 14.4 oz)   12/29/22 95.4 kg (210 lb 4.8 oz)   11/01/22 90.7 kg (200 lb)   10/28/22 92.5 kg (204 lb)         Diabetes Care/Complications:   Eyes: no eye  exam  Kidneys: positive urine microalb 12/2022, normal Cr  Nerves: tingling and numbness bottom at first but now at the ankle. Foot exam completed 6/2022  Smoking: no  Blood Pressure: has been out of lisinopril for 2 weeks  Lipids: on simvastatin 20 mg daily. 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 She lives with her .    Problem List     Patient Active Problem List   Diagnosis     BMI 30.0-30.9,adult     Rectal cancer (H)     Colostomy in place (H)     Lesion of bladder     Somatic dysfunction of pelvic region     Urinary retention     Urinary tract infection associated with catheterization of urinary tract (H)     Diabetes mellitus type 1 (H)     Diabetes mellitus, type 2 (H)        Medications     Current Outpatient Medications   Medication     methenamine hippurate (HIPREX) 1 g tablet     estradiol (ESTRACE) 0.1 MG/GM vaginal cream     hyoscyamine ER (LEVBID) 375 mcg 12 hr tablet     lisinopril (ZESTRIL) 20 MG tablet     metFORMIN (GLUCOPHAGE) 500 MG tablet     metFORMIN (GLUCOPHAGE) 500 MG tablet     semaglutide (OZEMPIC, 0.25 OR 0.5 MG/DOSE,) 2 MG/1.5ML SOPN pen     simvastatin (ZOCOR) 20 MG tablet     sulfamethoxazole-trimethoprim (BACTRIM DS) 800-160  MG tablet     tamsulosin (FLOMAX) 0.4 MG capsule     tolterodine ER (DETROL LA) 2 MG 24 hr capsule     No current facility-administered medications for this visit.        Allergies     Allergies   Allergen Reactions     No Known Allergies      Tomato Rash       Medical / Surgical History     Past Medical History:   Diagnosis Date     Cancer (H)     rectal cancer     Past Surgical History:   Procedure Laterality Date     CYSTOSCOPY, BIOPSY BLADDER, COMBINED N/A 7/26/2022    Procedure: CYSTOSCOPY, WITH BLADDER BIOPSY AND FULGURATION (Look in the bladder and sample red  tissue then burn the area involved);  Surgeon: Kacy Anderson MD;  Location: [facility] OR     DAVINCI COLECTOMY N/A 8/7/2018    Procedure: DAVINCI XI COLECTOMY;  DAVINCI ABDOMINAL PERINEAL RESECTION WITH PERMANENT COLOSTOMY ( PARI ) LEFT GLUTEAL FLAP TO RECTAL DEFECT ( JERE ) ;  Surgeon: Jonny Finney MD;  Location:  OR     GENITOURINARY SURGERY      bladder sling 12/18     GRAFT FLAP GLUTEUS TO PERINEUM N/A 8/7/2018    Procedure: GRAFT FLAP GLUTEUS TO PERINEUM;;  Surgeon: Conchita Ramos MD;  Location:  OR     GYN SURGERY      c section X3, tubal      IR CYSTOGRAM  8/24/2022     REMOVE MESH VAGINA N/A 11/1/2022    Procedure: EXCISION, sling, VAGINA (cut one side of the sling in the vagina), CYSTOSCOPY;  Surgeon: Kacy Anderson MD;  Location: [facility] OR     SIGMOIDOSCOPY FLEXIBLE N/A 7/9/2018    Procedure: SIGMOIDOSCOPY FLEXIBLE;  FLEXIBILE SIGMOIDOSCOPY;  Surgeon: Jonny Finney MD;  Location:  GI       Social History     Social History     Socioeconomic History     Marital status:      Spouse name: Not on file     Number of children: Not on file     Years of education: Not on file     Highest education level: Not on file   Occupational History     Not on file   Tobacco Use     Smoking status: Never     Smokeless tobacco: Never   Substance and Sexual Activity     Alcohol use: Yes     Comment: rare     Drug use: No      Sexual activity: Not Currently     Partners: Male   Other Topics Concern     Parent/sibling w/ CABG, MI or angioplasty before 65F 55M? Not Asked   Social History Narrative     Not on file     Social Determinants of Health     Financial Resource Strain: Not on file   Food Insecurity: Not on file   Transportation Needs: Not on file   Physical Activity: Not on file   Stress: Not on file   Social Connections: Not on file   Intimate Partner Violence: Not on file   Housing Stability: Not on file       Family History   Noncontributory    ROS     12 ROS completed, pertinent positive and negative in HPI    Physical Exam   There were no vitals taken for this visit.   /GENERAL: Healthy, alert and no distress  EYES: Eyes grossly normal to inspection.  No discharge or erythema, or obvious scleral/conjunctival abnormalities.  RESP: No audible wheeze, cough, or visible cyanosis.  No visible retractions or increased work of breathing.    SKIN: Visible skin clear. No significant rash, abnormal pigmentation or lesions.  NEURO: Cranial nerves grossly intact.  Mentation and speech appropriate for age.  PSYCH: Mentation appears normal, affect normal/bright, judgement and insight intact, normal speech and appearance well-groomed.      Labs/Imaging     Pertinent Labs were reviewed and updated in EPIC and discussed briefly.  Radiology Results were  reviewed and updated in EPIC and discussed briefly.    Summary of recent findings:   No results found for: A1C    No results found for: TSH, T4    Creatinine   Date Value Ref Range Status   10/28/2022 0.60 0.52 - 1.04 mg/dL Final   12/17/2019 0.60 0.52 - 1.04 mg/dL Final       Recent Labs   Lab Test 12/17/19  0940   CHOL 206*   HDL 39*   LDL 97   TRIG 348*       No results found for: [id number], [id number], [id number]    I personally reviewed the patient's outside records from [facility] EMR and faxed records. Summary of pertinent findings in [facility].    Impression / Plan     1. Diabetes Mellitus: Type  2  2. Obesity class II    Her A1c significantly improved on metformin, last a1c 7 on 10/2022. She is interested in weight loss medication.  We again discussed glp1a for DM2 and wt loss. She is planning to start Ozempic. I refilled her metformin.  Due for a1c,        2. Diabetes Complications: She is due for an eye exam, ophthalmology referral placed last visit.  Neuropathy could be related to previous chemotherapy.     3. Blood Pressure Management: Blood pressure is uncontrolled. Currently is  on pharmacotherapy for this.  Lisinopril was started last visit. BP improved. We will reassess next visit if higher dose is needed.     4.Lipid Management: Per the new ACC/RALPH/NHLBI guidelines, statins are recommended for individuals with diabetes aged 40-75 with LDL  without ASCVD, and for any individual with ASCVD. Currently the patient is on a statin.      5. Smoking Status: Patient Pt is smoke free..     6. recurrent UTI: not related to diabetes given great glycemic control. Following with urology.    Review of the result(s) of each unique test - A1c and diabetes related labs.          Test and/or medications prescribed today:  No orders of the defined types were placed in this encounter.        Follow up: 3 month      Christal Monteiro MD  Endocrinology, Diabetes and Metabolism  [facility]

## 2023-03-13 NOTE — TELEPHONE ENCOUNTER
Prior Authorization Not Needed per Insurance    Medication: Ozempic  Insurance Company: [employer] - Phone [phone] Fax [phone]  Expected CoPay: $791.59 1 month $1,132.09 3 month    Pharmacy Filling the Rx:    Pharmacy Notified:    Patient Notified:      Spoke to PT.  Pt is taking Trulicity not ozempic

## 2023-03-13 NOTE — NURSING NOTE
Is the patient currently in the state of [place]? YES    Visit mode:VIDEO    If the visit is dropped, the patient can be reconnected by: VIDEO VISIT: Text to cell phone: [phone]    Will anyone else be joining the visit? NO      How would you like to obtain your AVS? Mail a copy    Are changes needed to the allergy or medication list? NO    Reason for visit: Follow up

## 2023-03-13 NOTE — PROGRESS NOTES
Endocrinology Clinic Visit     Video-Visit Details     Type of service:  Video Visit     Joined the call at 3/13/2023, 8:36:49 am.  Left the call at 3/13/2023, 8:55:03 am.    I spent a total of 30 minutes on the date of encounter reviewing medical records, evaluating the patient, coordinating care and documenting in the EHR, as detailed above.        Patient location home'  Provider location off site    Platform used for Video Visit: astrid      NAME:  Ilene Gaviria  PCP:  Trevor Rizo  MRN:  [id number]  Reason for Consult: DM2  Requesting Provider:  Referred Self    Chief Complaint     Chief Complaint   Patient presents with     Follow Up     Video Visit       History of Present Illness     Ilene Gaviria is a 61 year old female who is seen in clinic for DM2 and weight management.    Her past medical history is significant for rectal cancer status post surgery and chemotherapy currently in remission.  Never been on steroids before.  The patient was diagnosed with type 2 diabetes 1 year ago . A1C was checked no records, she said it was in the range of 8.  She was started on metformin, currently at 2 g daily.  She is tolerating it very well.  Her A1c was down to 6.1. most recent a1c 10/2022 was 7.     Last visit she wanted to start wt loss meds and we discussed ozempic. Her weight has always been always between 180-210 since having her kids. It is been stable but not able to loose weight despite dieting. She has problem with portion control. She has Ozempic at home but did not start it yet.   She ran out of her meds 1-2 weeks ago.    Otherwise she reported feeling great. She reported recurrent UTI and working with urology.    Weight stable ( lost few lbs and gained them back) and records as below  Wt Readings from Last 4 Encounters:   01/26/23 93.8 kg (206 lb 14.4 oz)   12/29/22 95.4 kg (210 lb 4.8 oz)   11/01/22 90.7 kg (200 lb)   10/28/22 92.5 kg (204 lb)         Diabetes  Care/Complications:   Eyes: no eye  exam  Kidneys: positive urine microalb 12/2022, normal Cr  Nerves: tingling and numbness bottom at first but now at the ankle. Foot exam completed 6/2022  Smoking: no  Blood Pressure: has been out of lisinopril for 2 weeks  Lipids: on simvastatin 20 mg daily. 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 She lives with her .    Problem List     Patient Active Problem List   Diagnosis     BMI 30.0-30.9,adult     Rectal cancer (H)     Colostomy in place (H)     Lesion of bladder     Somatic dysfunction of pelvic region     Urinary retention     Urinary tract infection associated with catheterization of urinary tract (H)     Diabetes mellitus type 1 (H)     Diabetes mellitus, type 2 (H)        Medications     Current Outpatient Medications   Medication     methenamine hippurate (HIPREX) 1 g tablet     estradiol (ESTRACE) 0.1 MG/GM vaginal cream     hyoscyamine ER (LEVBID) 375 mcg 12 hr tablet     lisinopril (ZESTRIL) 20 MG tablet     metFORMIN (GLUCOPHAGE) 500 MG tablet     metFORMIN (GLUCOPHAGE) 500 MG tablet     semaglutide (OZEMPIC, 0.25 OR 0.5 MG/DOSE,) 2 MG/1.5ML SOPN pen     simvastatin (ZOCOR) 20 MG tablet     sulfamethoxazole-trimethoprim (BACTRIM DS) 800-160 MG tablet     tamsulosin (FLOMAX) 0.4 MG capsule     tolterodine ER (DETROL LA) 2 MG 24 hr capsule     No current facility-administered medications for this visit.        Allergies     Allergies   Allergen Reactions     No Known Allergies      Tomato Rash       Medical / Surgical History     Past Medical History:   Diagnosis Date     Cancer (H)     rectal cancer     Past Surgical History:   Procedure Laterality Date     CYSTOSCOPY, BIOPSY BLADDER, COMBINED N/A 7/26/2022    Procedure:  CYSTOSCOPY, WITH BLADDER BIOPSY AND FULGURATION (Look in the bladder and sample red  tissue then burn the area involved);  Surgeon: Kacy Anderson MD;  [place] [facility] [place]     DAVINCI COLECTOMY N/A 8/7/2018    Procedure: DAVINCI XI COLECTOMY;  DAVINCI ABDOMINAL PERINEAL RESECTION WITH PERMANENT COLOSTOMY ( PARI ) LEFT GLUTEAL FLAP TO RECTAL DEFECT ( JERE ) ;  Surgeon: Jonny Finney MD;  Location:  OR     GENITOURINARY SURGERY      bladder sling 12/18     GRAFT FLAP GLUTEUS TO PERINEUM N/A 8/7/2018    Procedure: GRAFT FLAP GLUTEUS TO PERINEUM;;  Surgeon: Conchita Ramos MD;  Location:  OR     GYN SURGERY      c section X3, tubal      IR CYSTOGRAM  8/24/2022     REMOVE MESH VAGINA N/A 11/1/2022    Procedure: EXCISION, sling, VAGINA (cut one side of the sling in the vagina), CYSTOSCOPY;  Surgeon: Kacy Anderson MD;  Location: [facility] [place]     SIGMOIDOSCOPY FLEXIBLE N/A 7/9/2018    Procedure: SIGMOIDOSCOPY FLEXIBLE;  FLEXIBILE SIGMOIDOSCOPY;  Surgeon: Jonny Finney MD;  Location:  GI       Social History     Social History     Socioeconomic History     Marital status:      Spouse name: Not on file     Number of children: Not on file     Years of education: Not on file     Highest education level: Not on file   Occupational History     Not on file   Tobacco Use     Smoking status: Never     Smokeless tobacco: Never   Substance and Sexual Activity     Alcohol use: Yes     Comment: rare     Drug use: No     Sexual activity: Not Currently     Partners: Male   Other Topics Concern     Parent/sibling w/ CABG, MI or angioplasty before 65F 55M? Not Asked   Social History Narrative     Not on file     Social Determinants of Health     Financial Resource Strain: Not on file   Food Insecurity: Not on file   Transportation Needs: Not on file   Physical Activity: Not on file   Stress: Not on file   Social Connections: Not on file   Intimate Partner Violence: Not on file   Housing Stability: Not on  file       Family History   Noncontributory    ROS     12 ROS completed, pertinent positive and negative in HPI    Physical Exam   There were no vitals taken for this visit.   /GENERAL: Healthy, alert and no distress  EYES: Eyes grossly normal to inspection.  No discharge or erythema, or obvious scleral/conjunctival abnormalities.  RESP: No audible wheeze, cough, or visible cyanosis.  No visible retractions or increased work of breathing.    SKIN: Visible skin clear. No significant rash, abnormal pigmentation or lesions.  NEURO: Cranial nerves grossly intact.  Mentation and speech appropriate for age.  PSYCH: Mentation appears normal, affect normal/bright, judgement and insight intact, normal speech and appearance well-groomed.      Labs/Imaging     Pertinent Labs were reviewed and updated in EPIC and discussed briefly.  Radiology Results were  reviewed and updated in EPIC and discussed briefly.    Summary of recent findings:   No results found for: A1C    No results found for: TSH, T4    Creatinine   Date Value Ref Range Status   10/28/2022 0.60 0.52 - 1.04 mg/dL Final   12/17/2019 0.60 0.52 - 1.04 mg/dL Final       Recent Labs   Lab Test 12/17/19  0940   CHOL 206*   HDL 39*   LDL 97   TRIG 348*       No results found for: [id number], [id number], [id number]    I personally reviewed the patient's outside records from [facility] EMR and faxed records. Summary of pertinent findings in [facility].    Impression / Plan     1. Diabetes Mellitus: Type 2  2. Obesity class II    Her A1c significantly improved on metformin, last a1c 7 on 10/2022. She is interested in weight loss medication.  We again discussed glp1a for DM2 and wt loss. She is planning to start Ozempic. I refilled her metformin.  Due for a1c,        2. Diabetes Complications: She is due for an eye exam, ophthalmology referral placed last visit.  Neuropathy could be related to previous chemotherapy.     3. Blood Pressure Management: Blood pressure is uncontrolled.  Currently is  on pharmacotherapy for this.  Lisinopril was started last visit. BP improved. We will reassess next visit if higher dose is needed.     4.Lipid Management: Per the new ACC/RALPH/NHLBI guidelines, statins are recommended for individuals with diabetes aged 40-75 with LDL  without ASCVD, and for any individual with ASCVD. Currently the patient is on a statin.      5. Smoking Status: Patient Pt is smoke free..     6. recurrent UTI: not related to diabetes given great glycemic control. Following with urology.    Review of the result(s) of each unique test - A1c and diabetes related labs.          Test and/or medications prescribed today:  No orders of the defined types were placed in this encounter.        Follow up: 3 month      Christal Monteiro MD  Endocrinology, Diabetes and Metabolism  [facility]

## 2023-03-17 ENCOUNTER — OFFICE VISIT (OUTPATIENT)
Dept: ORTHOPEDICS | Facility: CLINIC | Age: 63
End: 2023-03-17
Payer: COMMERCIAL

## 2023-03-17 DIAGNOSIS — M76.822 POSTERIOR TIBIAL TENDON DYSFUNCTION (PTTD) OF BOTH LOWER EXTREMITIES: ICD-10-CM

## 2023-03-17 DIAGNOSIS — M19.072 ARTHRITIS OF MIDTARSAL JOINT OF LEFT FOOT: Primary | ICD-10-CM

## 2023-03-17 DIAGNOSIS — M79.672 PAIN IN BOTH FEET: ICD-10-CM

## 2023-03-17 DIAGNOSIS — M79.671 PAIN IN BOTH FEET: ICD-10-CM

## 2023-03-17 DIAGNOSIS — M76.821 POSTERIOR TIBIAL TENDON DYSFUNCTION (PTTD) OF BOTH LOWER EXTREMITIES: ICD-10-CM

## 2023-03-17 PROCEDURE — 99214 OFFICE O/P EST MOD 30 MIN: CPT | Performed by: PODIATRIST

## 2023-03-17 NOTE — LETTER
3/17/2023         RE: Ilene Gaviria  [address] [facility] [address]        Dear Colleague,    Thank you for referring your patient, Ilene Gaviria, to the [facility] ORTHOPEDIC CLINIC [place]. Please see a copy of my visit note below.    Chief Complaint:   Chief Complaint   Patient presents with     Follow Up          Allergies   Allergen Reactions     No Known Allergies      Tomato Rash         Subjective: Ilene is a 62 year old female who presents to the clinic today for a follow up of bilateral foot pain.  She relates that she did get an injection into the left side and this helped relieve both her ankle pain and the foot pain.  This lasted 2 weeks.  She relates that while the foot pain is still low, the ankle pain has returned.    Objective  Data Unavailable Data Unavailable Data Unavailable Data Unavailable Data Unavailable 0 lbs 0 oz  PT and DP pulses are 2/4 bilaterally. CRT is instant. Positive pedal hair.   Gross sensation is intact bilaterally.   Equinus is noted bilaterally.  Some pain noted along the midtarsal joint with palpation.  Dorsal osteophytes noted at the base of the second metatarsal.  No pain noted with palpation of bilateral talar domes.  Pain noted along the courses of the bilateral PT tendons.  Nails normal bilaterally. No open lesions are noted.     Assessment:   Encounter Diagnoses   Name Primary?     Arthritis of midtarsal joint of left foot Yes     Pain in both feet      Posterior tibial tendon dysfunction (PTTD) of both lower extremities          Plan:   - Pt seen and evaluated  -I discussed with her treatment options.  Because her ankle pain went away with the injection, its likely compensatory.  I would like her to get an opinion from one of our surgeons as to whether she should have the second metatarsal cuneiform joint fused.  She will do this.  For her posterior tibial tendons, I have recommended orthotics with P wings.  However, this will not  fix her underlying arthritis, and she is aware of this.  These were molded and sent to the lab.  - Pt to return to clinic as needed.  On the date of service I spent 30 minutes with patient care, documentation, chart review, and care coordination      Adams Stephens DPM

## 2023-03-17 NOTE — PROGRESS NOTES
Chief Complaint:   Chief Complaint   Patient presents with     Follow Up          Allergies   Allergen Reactions     No Known Allergies      Tomato Rash         Subjective: Ilene is a 62 year old female who presents to the clinic today for a follow up of bilateral foot pain.  She relates that she did get an injection into the left side and this helped relieve both her ankle pain and the foot pain.  This lasted 2 weeks.  She relates that while the foot pain is still low, the ankle pain has returned.    Objective  Data Unavailable Data Unavailable Data Unavailable Data Unavailable Data Unavailable 0 lbs 0 oz  PT and DP pulses are 2/4 bilaterally. CRT is instant. Positive pedal hair.   Gross sensation is intact bilaterally.   Equinus is noted bilaterally.  Some pain noted along the midtarsal joint with palpation.  Dorsal osteophytes noted at the base of the second metatarsal.  No pain noted with palpation of bilateral talar domes.  Pain noted along the courses of the bilateral PT tendons.  Nails normal bilaterally. No open lesions are noted.     Assessment:   Encounter Diagnoses   Name Primary?     Arthritis of midtarsal joint of left foot Yes     Pain in both feet      Posterior tibial tendon dysfunction (PTTD) of both lower extremities          Plan:   - Pt seen and evaluated  -I discussed with her treatment options.  Because her ankle pain went away with the injection, its likely compensatory.  I would like her to get an opinion from one of our surgeons as to whether she should have the second metatarsal cuneiform joint fused.  She will do this.  For her posterior tibial tendons, I have recommended orthotics with P wings.  However, this will not fix her underlying arthritis, and she is aware of this.  These were molded and sent to the lab.  - Pt to return to clinic as needed.  On the date of service I spent 30 minutes with patient care, documentation, chart review, and care coordination

## 2023-03-21 NOTE — TELEPHONE ENCOUNTER
DIAGNOSIS: Arthritis of midtarsal joint of left foot.   APPOINTMENT DATE: 03/28/2023   NOTES STATUS DETAILS   OFFICE NOTE from referring provider Internal 03/17/2023 - Adams Stephens DPM - [facility] Ortho   MEDICATION LIST Internal    INJECTIONS DONE IN RADIOLOGY Internal 02/24/2023 - Left Foot - 2nd MTT Joint   XRAYS (IMAGES & REPORTS) Internal 12/21/2022 - Six Foot Standing  12/21/2022 - Bilateral Foot

## 2023-03-28 ENCOUNTER — OFFICE VISIT (OUTPATIENT)
Dept: ORTHOPEDICS | Facility: CLINIC | Age: 63
End: 2023-03-28
Payer: COMMERCIAL

## 2023-03-28 ENCOUNTER — PRE VISIT (OUTPATIENT)
Dept: ORTHOPEDICS | Facility: CLINIC | Age: 63
End: 2023-03-28

## 2023-03-28 DIAGNOSIS — M19.072 ARTHRITIS OF MIDTARSAL JOINT OF LEFT FOOT: ICD-10-CM

## 2023-03-28 DIAGNOSIS — M79.671 PAIN IN BOTH FEET: ICD-10-CM

## 2023-03-28 DIAGNOSIS — M79.672 PAIN IN BOTH FEET: ICD-10-CM

## 2023-03-28 PROCEDURE — 99203 OFFICE O/P NEW LOW 30 MIN: CPT | Performed by: ORTHOPAEDIC SURGERY

## 2023-03-28 NOTE — LETTER
3/28/2023         RE: Ilene Gaviria  [address] [facility] [address]        Dear Colleague,    Thank you for referring your patient, Ilene Gaviria, to the [facility] [place]. Please see a copy of my visit note below.    CHIEF COMPLAINT:  Bilateral ankle pain.    HISTORY OF PRESENT ILLNESS:  Mrs. Gaviria is a 62-year-old female who presents today for evaluation of both of her ankles.  The patient reports to be fully aware of having some arthritis across the midfoot region, which has been injected with Kenalog.  She reports the pain has improved drastically, but now reports to have some ankle pain.  She believes that probably was present prior to the foot pain, but the foot pain was more intense; therefore, she did not notice it.  She also reports to be walking differently now that the foot pain has improved.    She is fully aware of pronating both of her feet as well.    The patient reports to be extremely active as well as to plan [place] [employer] this [place] and she is concerned that she may not have ankles good enough to be able to perform these activities.    The patient carries a diagnosis of type 1 and type 2 diabetes with a last A1c of 7.0 in 10/2022.    PAST MEDICAL AND SURGICAL HISTORY, CURRENT MEDICATIONS AND DRUG ALLERGIES:  Reviewed.    PHYSICAL EXAMINATION:  On today's visit, she presents in the company of her , in no apparent distress.  Denies to have any constitutional symptoms.  Reports to have a height of 5 feet 7 inches with a weight of 206 pounds.  On physical exam, she presented with a valgus hindfoot that does not correct on the left and corrects on the right.  She presents with pain at the tip of her toes bilaterally.    Exam of the left ankle is significant for showing range of motion from neutral down to 30 degrees of plantar flexion.  There is pain with palpation of the posterior tibialis tendon, which is quite significant.  I  could not conclude if she presents with a flexible deformity or not.  Forefoot exam is unremarkable.    ASSESSMENT:  Bilateral posterior tibialis tendinitis versus tendinosis.    PLAN:  Discussed with the patient that at this point, I would like to get MRIs of both ankles to understand the condition of the posterior tibialis tendon.  In the presence of a large amount of damage, we may recommend to proceed with surgical intervention and in the presence of just tendinitis, we may be able to pull it off by recommending injections and arch supports.    All questions were answered.  The patient was pleased with the discussion.  The patient will be contacted via phone with regards to the MRI results.  In the meantime, she has no activity restrictions although she was encouraged to always proceed with the use of arch supports from off the shelf and so far.    TT:  30 minutes.        Robert Rainey MD

## 2023-03-28 NOTE — NURSING NOTE
Reason For Visit:   Chief Complaint   Patient presents with     Consult     Left foot midtarsal joint arthritis. Bilateral foot pain, worst pain is in the left ankle       There were no vitals taken for this visit.         RAJ Goncalves

## 2023-03-28 NOTE — PROGRESS NOTES
CHIEF COMPLAINT:  Bilateral ankle pain.    HISTORY OF PRESENT ILLNESS:  Mrs. Gaviria is a 62-year-old female who presents today for evaluation of both of her ankles.  The patient reports to be fully aware of having some arthritis across the midfoot region, which has been injected with Kenalog.  She reports the pain has improved drastically, but now reports to have some ankle pain.  She believes that probably was present prior to the foot pain, but the foot pain was more intense; therefore, she did not notice it.  She also reports to be walking differently now that the foot pain has improved.    She is fully aware of pronating both of her feet as well.    The patient reports to be extremely active as well as to plan to [place] [employer] this [place] and she is concerned that she may not have ankles good enough to be able to perform these activities.    The patient carries a diagnosis of type 1 and type 2 diabetes with a last A1c of 7.0 in 10/2022.    PAST MEDICAL AND SURGICAL HISTORY, CURRENT MEDICATIONS AND DRUG ALLERGIES:  Reviewed.    PHYSICAL EXAMINATION:  On today's visit, she presents in the company of her , in no apparent distress.  Denies to have any constitutional symptoms.  Reports to have a height of 5 feet 7 inches with a weight of 206 pounds.  On physical exam, she presented with a valgus hindfoot that does not correct on the left and corrects on the right.  She presents with pain at the tip of her toes bilaterally.    Exam of the left ankle is significant for showing range of motion from neutral down to 30 degrees of plantar flexion.  There is pain with palpation of the posterior tibialis tendon, which is quite significant.  I could not conclude if she presents with a flexible deformity or not.  Forefoot exam is unremarkable.    ASSESSMENT:  Bilateral posterior tibialis tendinitis versus tendinosis.    PLAN:  Discussed with the patient that at this point, I would like to get MRIs of both ankles  to understand the condition of the posterior tibialis tendon.  In the presence of a large amount of damage, we may recommend to proceed with surgical intervention and in the presence of just tendinitis, we may be able to pull it off by recommending injections and arch supports.    All questions were answered.  The patient was pleased with the discussion.  The patient will be contacted via phone with regards to the MRI results.  In the meantime, she has no activity restrictions although she was encouraged to always proceed with the use of arch supports from off the shelf and so far.    TT:  30 minutes.

## 2023-04-04 ENCOUNTER — HOSPITAL ENCOUNTER (OUTPATIENT)
Dept: MRI IMAGING | Facility: CLINIC | Age: 63
Discharge: HOME | End: 2023-04-04
Attending: ORTHOPAEDIC SURGERY
Payer: COMMERCIAL

## 2023-04-04 DIAGNOSIS — M79.672 PAIN IN BOTH FEET: ICD-10-CM

## 2023-04-04 DIAGNOSIS — M79.671 PAIN IN BOTH FEET: ICD-10-CM

## 2023-04-04 PROCEDURE — 73721 MRI JNT OF LWR EXTRE W/O DYE: CPT | Mod: 26 | Performed by: RADIOLOGY

## 2023-04-04 PROCEDURE — 73721 MRI JNT OF LWR EXTRE W/O DYE: CPT | Mod: RT,50

## 2023-04-06 ENCOUNTER — TELEPHONE (OUTPATIENT)
Dept: ORTHOPEDICS | Facility: CLINIC | Age: 63
End: 2023-04-06
Payer: COMMERCIAL

## 2023-04-06 NOTE — TELEPHONE ENCOUNTER
[facility] Call Center    Phone Message    May a detailed message be left on voicemail: yes     Reason for Call: Other: Pt calling for MRI results from 04/04 and next steps     Action Taken: Other: ortho csc    Travel Screening: Not Applicable

## 2023-04-07 NOTE — TELEPHONE ENCOUNTER
LVM. Patient to schedule telephone visit with Dr. Rainey to discuss MRI results and next steps. Clinic number provided.    Tara Holter, RNCC

## 2023-04-11 ENCOUNTER — TELEPHONE (OUTPATIENT)
Dept: ORTHOPEDICS | Facility: CLINIC | Age: 63
End: 2023-04-11

## 2023-04-11 ENCOUNTER — VIRTUAL VISIT (OUTPATIENT)
Dept: ORTHOPEDICS | Facility: CLINIC | Age: 63
End: 2023-04-11
Payer: COMMERCIAL

## 2023-04-11 DIAGNOSIS — M67.80 TENDINOSIS: ICD-10-CM

## 2023-04-11 DIAGNOSIS — M25.571 PAIN IN JOINT, ANKLE AND FOOT, RIGHT: Primary | ICD-10-CM

## 2023-04-11 DIAGNOSIS — M25.572 PAIN IN JOINT, ANKLE AND FOOT, LEFT: ICD-10-CM

## 2023-04-11 PROCEDURE — 99213 OFFICE O/P EST LOW 20 MIN: CPT | Mod: TEL | Performed by: ORTHOPAEDIC SURGERY

## 2023-04-11 NOTE — PROGRESS NOTES
TELEPHONE VISIT     CHIEF COMPLAINT:  Bilateral posterior tibialis tendinosis.    HISTORY OF PRESENT ILLNESS:  Ms. Gaviria was reached out by phone.  The patient authorized the encounter.    I discussed with the patient the findings on MRI for both of her ankles.  The patient presents with a posterior tibialis tendinosis, which is more pronounced on the right than on the left.  She concurs that the left ankle is significantly more painful than the right one.    We discussed the different treatment options available to her, which included observation versus an injection versus surgical approach.  After a lengthy discussion, the patient opted to proceed with an injection of bilateral posterior tibialis tendon sheaths with lidocaine and Kenalog under ultrasound guidance.    I discussed with the patient that the injections may take up to 2 weeks to kick in, and they can be repeated safely every 3 months or later.    All questions were answered.  The patient was pleased with the discussion.  She will follow up on a p.r.n. basis.    I spent 17 minutes with the patient on the phone today.

## 2023-04-11 NOTE — LETTER
4/11/2023         RE: Ilene Gaviria  [address] [facility] [address]        Dear Colleague,    Thank you for referring your patient, Ilene Gaviria, to the [facility] [place]. Please see a copy of my visit note below.    TELEPHONE VISIT     CHIEF COMPLAINT:  Bilateral posterior tibialis tendinosis.    HISTORY OF PRESENT ILLNESS:  Ms. Gaviria was reached out by phone.  The patient authorized the encounter.    I discussed with the patient the findings on MRI for both of her ankles.  The patient presents with a posterior tibialis tendinosis, which is more pronounced on the right than on the left.  She concurs that the left ankle is significantly more painful than the right one.    We discussed the different treatment options available to her, which included observation versus an injection versus surgical approach.  After a lengthy discussion, the patient opted to proceed with an injection of bilateral posterior tibialis tendon sheaths with lidocaine and Kenalog under ultrasound guidance.    I discussed with the patient that the injections may take up to 2 weeks to kick in, and they can be repeated safely every 3 months or later.    All questions were answered.  The patient was pleased with the discussion.  She will follow up on a p.r.n. basis.    I spent 17 minutes with the patient on the phone today.          Robert Rainey MD

## 2023-04-24 ENCOUNTER — TELEPHONE (OUTPATIENT)
Dept: ENDOCRINOLOGY | Facility: CLINIC | Age: 63
End: 2023-04-24
Payer: COMMERCIAL

## 2023-04-24 ENCOUNTER — TELEPHONE (OUTPATIENT)
Dept: INFECTIOUS DISEASES | Facility: CLINIC | Age: 63
End: 2023-04-24

## 2023-04-24 DIAGNOSIS — E11.40 TYPE 2 DIABETES MELLITUS WITH DIABETIC NEUROPATHY, WITHOUT LONG-TERM CURRENT USE OF INSULIN (H): Primary | ICD-10-CM

## 2023-04-24 NOTE — TELEPHONE ENCOUNTER
[facility] Call Center    Phone Message    May a detailed message be left on voicemail: yes     Reason for Call: Other: Patient is doing really well. No flare up's Is going to cancel apt and will call if needed.      Action Taken: Other: ID    Travel Screening: Not Applicable

## 2023-04-24 NOTE — TELEPHONE ENCOUNTER
[facility] Call Center    Phone Message    May a detailed message be left on voicemail: yes     Reason for Call: Other: believes Lysinopril is giving her a dry cough, please advise other options thank you     Action Taken: Other: endo    Travel Screening: Not Applicable

## 2023-04-30 RX ORDER — LOSARTAN POTASSIUM 25 MG/1
25 TABLET ORAL DAILY
Qty: 90 TABLET | Refills: 1 | Status: SHIPPED | OUTPATIENT
Start: 2023-04-30 | End: 2023-12-04

## 2023-05-08 ENCOUNTER — TELEPHONE (OUTPATIENT)
Dept: ENDOCRINOLOGY | Facility: CLINIC | Age: 63
End: 2023-05-08
Payer: COMMERCIAL

## 2023-05-08 DIAGNOSIS — E11.9 DIABETES MELLITUS, TYPE 2 (H): Primary | ICD-10-CM

## 2023-05-08 NOTE — TELEPHONE ENCOUNTER
Hello all,    Pt states that she will run out of her medication before appt with Vinicio Agustin and will be using her last shot on 5/13/23.     Communication Summary: spoke to pt over the phone    Appointment type: New - MTM  Provider: Vinicio Agustin  Return date: 5/31/23  Speciality phone number: [phone]  Additional appointment(s) needed: N/A  Additional notes: Appt scheduled    Milton Cheney on 5/8/2023 at 11:11 AM

## 2023-05-11 ENCOUNTER — VIRTUAL VISIT (OUTPATIENT)
Dept: PHARMACY | Facility: CLINIC | Age: 63
End: 2023-05-11
Payer: COMMERCIAL

## 2023-05-11 DIAGNOSIS — E11.65 TYPE 2 DIABETES MELLITUS WITH HYPERGLYCEMIA, WITH LONG-TERM CURRENT USE OF INSULIN (H): Primary | ICD-10-CM

## 2023-05-11 DIAGNOSIS — R33.9 URINARY RETENTION: ICD-10-CM

## 2023-05-11 DIAGNOSIS — Z79.4 TYPE 2 DIABETES MELLITUS WITH HYPERGLYCEMIA, WITH LONG-TERM CURRENT USE OF INSULIN (H): Primary | ICD-10-CM

## 2023-05-11 PROCEDURE — 99605 MTMS BY PHARM NP 15 MIN: CPT

## 2023-05-11 RX ORDER — DULAGLUTIDE 1.5 MG/.5ML
1.5 INJECTION, SOLUTION SUBCUTANEOUS
Qty: 2 ML | Refills: 3 | Status: SHIPPED | OUTPATIENT
Start: 2023-05-11 | End: 2023-06-15

## 2023-05-11 NOTE — PROGRESS NOTES
Medication Therapy Management (MTM) Encounter    ASSESSMENT:                            Medication Adherence/Access: See below for considerations    Type 2 Diabetes: A1c is less than 7%.  Hx of not taking Trulicity consistently.  Would benefit from education today with reinforcement of importance of titration over the coming months.  Reasonable to titrate to 1.5 mg today given she is taking consistently for the past couple weeks and has 1 more dose of 0.75 mg left.    Recurrent UTI/urinary retention: Appears stable. Follows specialist.    PLAN:                            1. Discussed the importance of taking Trulicity weekly and not skipping doses.  Discussed how the 0.75 mg dose is a nontherapeutic dose to help her body get used to the medicine and we really see improvement in A1c and weight loss would make it to doses higher than 1.5 mg.    2. Send order for Trulicity 1.5 mg for patient to start after she finishes her last pen of 0.75 mg     3. Increase Trulicity to 3 mg weekly mid June at follow-up with Dr. Monteiro, as tolerated/appropriate    4. Completed medication reconciliation with patient and updated EHR accordingly    Endocrine Team & Next Follow-Up:    Dr. Monteiro 6/15/2023    Vinicio (as needed)    SUBJECTIVE/OBJECTIVE:                          Ilene Gaviria is a 62 year old female seen for an initial visit. She was referred to me from the RN triage pool.      Reason for visit: Medication Therapy Management (MTM) -- Trulicity access issue.     Allergies/ADRs: Reviewed in chart  Past Medical History: Reviewed in chart  Social History     Tobacco Use     Smoking status: Never     Smokeless tobacco: Never   Substance Use Topics     Alcohol use: Yes     Comment: rare     Drug use: No         Medication Adherence/Access: Adherence reflected well in the dispense report. No concerns other than Trulicity today. Patient reports that her pharmacy said her doctor cancelled the prescription and a new order  "needed to be sent. Message was forwarded to me by RN nurse triage pool.     Type 2 Diabetes:   Diabetes Medication(s)     Biguanides       metFORMIN (GLUCOPHAGE) 500 MG tablet    Take 500 mg by mouth 2 times daily (with meals)    Incretin Mimetic Agents       dulaglutide (TRULICITY) 0.75 MG/0.5ML pen    Inject 0.75 mg Subcutaneous every 7 days      Saturdays are Trulicity day -- she has one 0.75 mg dose left. She took intermittently (once a month or so) until recently. She has had 3 consistent weeks. No reports of side effects.   Aspirin: No  The 10-year ASCVD risk score (Daniel TONY, et al., 2019) is: 10.8%    Values used to calculate the score:      Age: 62 years      Sex: Female      Is Non- : No      Diabetic: Yes      Tobacco smoker: No      Systolic Blood Pressure: 132 mmHg      Is BP treated: Yes      HDL Cholesterol: 47 mg/dL      Total Cholesterol: 198 mg/dL  Statin: Yes: Zocor 20 mg daily   ACEi/ARB: Yes: Losartan 25 mg daily.   Urine Albumin:   Lab Results   Component Value Date    UMALCR 98.23 (H) 12/27/2022      Lab Results   Component Value Date    A1C 7.0 10/28/2022     Lab Results   Component Value Date    GFRESTIMATED >90 10/28/2022    GFRESTIMATED 42 (L) 07/01/2022    GFRESTIMATED 85 06/26/2022       There is no immunization history on file for this patient.   Estimated body mass index is 31.47 kg/m  as calculated from the following:    Height as of 1/26/23: 5' 7.99\" (1.727 m).    Weight as of 1/26/23: 206 lb 14.4 oz (93.8 kg).     Recurrent UTI/urinary retention: Current medications:     Hiprex 1 g twice daily   No reports of side effects. Follows specialist Dr. Anderson. Has tried Detrol LA, Flomax      BP Readings from Last 1 Encounters:   02/08/23 (!) 132/91     Pulse Readings from Last 1 Encounters:   02/08/23 106     Wt Readings from Last 1 Encounters:   01/26/23 206 lb 14.4 oz (93.8 kg)     Ht Readings from Last 1 Encounters:   01/26/23 5' 7.99\" (1.727 m)     Estimated " "body mass index is 31.47 kg/m  as calculated from the following:    Height as of 1/26/23: 5' 7.99\" (1.727 m).    Weight as of 1/26/23: 206 lb 14.4 oz (93.8 kg).    Temp Readings from Last 1 Encounters:   02/08/23 98  F (36.7  C) (Oral)       ----------------  I spent 10 minutes with this patient today. Dr. Monteiro was provided the recommendations above via routed note and is the authorizing prescriber for this visit through the pharmacist collaborative practice agreement. A copy of the visit note was provided to the patient's provider(s).    The patient was given to the patient a summary of these recommendations.     Vinicio Agustin, PharmD, [facility]  Endocrine & Diabetes [facility] Pharmacist  [address] [place] [id number]  Direct Voicemail: [phone]    Telemedicine Visit Details  Type of service:  Telephone visit  Start Time: 8:00AM  End Time: 8:10AM  Originating Location (pt. Location): Home  Provider has received verbal consent for a visit from the patient? Yes     Medication Therapy Recommendations  Diabetes mellitus, type 2 (H)    Current Medication: dulaglutide (TRULICITY) 0.75 MG/0.5ML pen (Discontinued)   Rationale: Dose too low - Dosage too low - Effectiveness   Recommendation: Increase Dose   Status: Accepted per CPA                 "

## 2023-05-16 NOTE — PROGRESS NOTES
Subjective:  HPI  Physical Exam                    Objective:  System    Physical Exam    General     ROS    Assessment/Plan:    DISCHARGE REPORT    Progress reporting period is from 12/29/22 to 5/16/23.       SUBJECTIVE  Subjective changes noted by patient:  .  Subjective: see ie    Current pain level is NA  .     Previous pain level was  NA  .   Changes in function:  unknown  Adverse reaction to treatment or activity: None    OBJECTIVE  Changes noted in objective findings:  Patient has failed to return to therapy so current objective findings are unknown.  Objective: see ie     ASSESSMENT/PLAN  Updated problem list and treatment plan: Diagnosis 1:  Foot pain  Pain -  hot/cold therapy, self management, education and home program  Decreased ROM/flexibility - manual therapy and therapeutic exercise  Decreased strength - therapeutic exercise and therapeutic activities  STG/LTGs have been met or progress has been made towards goals:  Yes (See Goal flow sheet completed today.)  Assessment of Progress: The patient has not returned to therapy. Current status is unknown.  Self Management Plans:  Patient has been instructed in a home treatment program.    Ilene continues to require the following intervention to meet STG and LTG's:  PT intervention is no longer required to meet STG/LTG.    Recommendations:  This patient is ready to be discharged from therapy and continue their home treatment program.    Please refer to the daily flowsheet for treatment today, total treatment time and time spent performing 1:1 timed codes.

## 2023-05-23 ENCOUNTER — NURSE TRIAGE (OUTPATIENT)
Dept: NURSING | Facility: CLINIC | Age: 63
End: 2023-05-23

## 2023-05-23 ENCOUNTER — LAB (OUTPATIENT)
Dept: LAB | Facility: CLINIC | Age: 63
End: 2023-05-23
Payer: COMMERCIAL

## 2023-05-23 DIAGNOSIS — N39.0 URINARY TRACT INFECTION ASSOCIATED WITH CATHETERIZATION OF URINARY TRACT, UNSPECIFIED INDWELLING URINARY CATHETER TYPE, SUBSEQUENT ENCOUNTER: Primary | ICD-10-CM

## 2023-05-23 DIAGNOSIS — T83.511D URINARY TRACT INFECTION ASSOCIATED WITH CATHETERIZATION OF URINARY TRACT, UNSPECIFIED INDWELLING URINARY CATHETER TYPE, SUBSEQUENT ENCOUNTER: ICD-10-CM

## 2023-05-23 DIAGNOSIS — N39.0 URINARY TRACT INFECTION ASSOCIATED WITH CATHETERIZATION OF URINARY TRACT, UNSPECIFIED INDWELLING URINARY CATHETER TYPE, SUBSEQUENT ENCOUNTER: ICD-10-CM

## 2023-05-23 DIAGNOSIS — T83.511D URINARY TRACT INFECTION ASSOCIATED WITH CATHETERIZATION OF URINARY TRACT, UNSPECIFIED INDWELLING URINARY CATHETER TYPE, SUBSEQUENT ENCOUNTER: Primary | ICD-10-CM

## 2023-05-23 LAB
ALBUMIN UR-MCNC: 30 MG/DL
APPEARANCE UR: CLEAR
BACTERIA #/AREA URNS HPF: ABNORMAL /HPF
BILIRUB UR QL STRIP: NEGATIVE
COLOR UR AUTO: YELLOW
GLUCOSE UR STRIP-MCNC: NEGATIVE MG/DL
HGB UR QL STRIP: ABNORMAL
KETONES UR STRIP-MCNC: NEGATIVE MG/DL
LEUKOCYTE ESTERASE UR QL STRIP: ABNORMAL
NITRATE UR QL: NEGATIVE
PH UR STRIP: 5.5 [PH] (ref 5–7)
RBC #/AREA URNS AUTO: ABNORMAL /HPF
SP GR UR STRIP: >=1.03 (ref 1–1.03)
UROBILINOGEN UR STRIP-ACNC: 0.2 E.U./DL
WBC #/AREA URNS AUTO: >100 /HPF

## 2023-05-23 PROCEDURE — 87086 URINE CULTURE/COLONY COUNT: CPT

## 2023-05-23 PROCEDURE — 81001 URINALYSIS AUTO W/SCOPE: CPT

## 2023-05-23 NOTE — TELEPHONE ENCOUNTER
Nurse Triage SBAR    Is this a 2nd Level Triage? YES, LICENSED PRACTITIONER REVIEW IS REQUIRED    Situation: UA/UC order requested.    Background: Patient calling with concerns that she may be developing a UTI. Symptoms started yesterday. Some slight burning with urination and feeling like she has to go all the time. When patient self caths the tip comes out with a milky urine.   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  Routing message to ID provider requesting orders.  Care advice given.   Patient will call back with worsening symptoms.    Assessment: UA/UC orders     Protocol Recommended Disposition:   See in Office Today    Recommendation: UA/UC orders     Routed to provider    Does the patient meet one of the following criteria for ADS visit consideration? 16+ years old, with an [facility] PCP     TIP  Providers, please consider if this condition is appropriate for management at one of our Acute and Diagnostic Services sites.     If patient is a good candidate, please use dotphrase <dot>triageresponse and select Refer to ADS to document.         Michaela Price RN   05/23/23 10:00 AM  [facility] Nurse Advisor      Reason for Disposition    > 2 UTIs in last year    Additional Information    Negative: Shock suspected (e.g., cold/pale/clammy skin, too weak to stand, low BP, rapid pulse)    Negative: Sounds like a life-threatening emergency to the triager    Negative: Unable to urinate (or only a few drops) and bladder feels very full    Negative: Vomiting    Negative: Patient sounds very sick or weak to the triager    Negative: SEVERE pain with urination    Negative: Fever > 100.4 F (38.0 C)    Negative: Side (flank) or lower back pain present    Negative: Taking antibiotic > 24 hours for UTI and fever persists    Negative: Taking antibiotic > 3 days for UTI and painful urination not improved    Negative: Unusual  vaginal discharge    Protocols used: URINATION PAIN - FEMALE-A-OH

## 2023-05-24 ENCOUNTER — DOCUMENTATION ONLY (OUTPATIENT)
Dept: INFECTIOUS DISEASES | Facility: CLINIC | Age: 63
End: 2023-05-24
Payer: COMMERCIAL

## 2023-05-24 NOTE — PROGRESS NOTES
Called pt to discuss UTI symptoms. She contacted our clinic yesterday to request a urine culture. UA is consistent with infection, though needs to be interpreted with caution given she self-catheterizes. She has a long history of resistant Proteus UTIs, with the most recent being in February. The organism at that time was somewhat more susceptible (ie to cephalosporins) than prior organisms.     She notes she is currently having increased urgency and frequency of urination, mild suprapubic pain, and purulent discharge when she catheterizes. She does not have fever, back pain, fatigue, nausea/vomiting. She thinks this started when she stopped drinking as much water as she typically does. She also notes in the increased heat she has been having more sweating in the perineal area and wondering if this could be contributing.     She currently has no red flag symptoms of pyelonephritis, and given her history of multi-drug resistance, I would prefer to know what we are treating prior to selecting an antibiotic regimen. If the organism we isolate is as susceptible as the last one (certainly possible as she has not had antibiotic exposure in the past 3 months), we could theoretically use an oral cephalosporin. However, if it is resistant to cephalosporins, we will likely need to use IV gentamicin (preferred by pt over IM) which takes more coordination. As she is currently stable, we agreed to wait until we have the culture and susceptibility results prior to treatment, with the caveat that she should call in to clinic or present to care earlier should her symptoms worsen in the interim.     Eunice Larry MD  Pg [phone]

## 2023-05-25 DIAGNOSIS — N39.0 URINARY TRACT INFECTION ASSOCIATED WITH CATHETERIZATION OF URINARY TRACT, UNSPECIFIED INDWELLING URINARY CATHETER TYPE, SUBSEQUENT ENCOUNTER: Primary | ICD-10-CM

## 2023-05-25 DIAGNOSIS — T83.511D URINARY TRACT INFECTION ASSOCIATED WITH CATHETERIZATION OF URINARY TRACT, UNSPECIFIED INDWELLING URINARY CATHETER TYPE, SUBSEQUENT ENCOUNTER: Primary | ICD-10-CM

## 2023-05-25 LAB — BACTERIA UR CULT: NORMAL

## 2023-05-25 NOTE — PROGRESS NOTES
Called pt to discuss urine culture results :<10,000 CFU urogenital wong. She notes that with increasing her water intake she is feeling much better and back to her baseline. With that information, and lack of culture growth, I will not be prescribing her an antibiotic at this time. Recommended she contact clinic with any new or worsening symptoms.     I will also put in a standing order for a urinalysis w/ reflex to culture so she can drop off urine if she is starting to feel symptomatic, rather than waiting to get in touch.     Eunice Larry MD  Pg [phone]

## 2023-05-31 ENCOUNTER — TRANSFERRED RECORDS (OUTPATIENT)
Dept: HEALTH INFORMATION MANAGEMENT | Facility: CLINIC | Age: 63
End: 2023-05-31

## 2023-05-31 ENCOUNTER — OFFICE VISIT (OUTPATIENT)
Dept: FAMILY MEDICINE | Facility: CLINIC | Age: 63
End: 2023-05-31
Payer: COMMERCIAL

## 2023-05-31 VITALS
WEIGHT: 211.8 LBS | BODY MASS INDEX: 32.1 KG/M2 | RESPIRATION RATE: 18 BRPM | HEART RATE: 76 BPM | HEIGHT: 68 IN | TEMPERATURE: 97.3 F | SYSTOLIC BLOOD PRESSURE: 123 MMHG | OXYGEN SATURATION: 96 % | DIASTOLIC BLOOD PRESSURE: 75 MMHG

## 2023-05-31 DIAGNOSIS — Z01.818 PRE-OP EXAM: Primary | ICD-10-CM

## 2023-05-31 DIAGNOSIS — E11.9 TYPE 2 DIABETES MELLITUS WITHOUT COMPLICATION, WITHOUT LONG-TERM CURRENT USE OF INSULIN (H): ICD-10-CM

## 2023-05-31 LAB
ALBUMIN SERPL BCG-MCNC: 4.3 G/DL (ref 3.5–5.2)
ALP SERPL-CCNC: 70 U/L (ref 35–104)
ALT SERPL W P-5'-P-CCNC: 77 U/L (ref 10–35)
ANION GAP SERPL CALCULATED.3IONS-SCNC: 14 MMOL/L (ref 7–15)
AST SERPL W P-5'-P-CCNC: 68 U/L (ref 10–35)
BASOPHILS # BLD AUTO: 0 10E3/UL (ref 0–0.2)
BASOPHILS NFR BLD AUTO: 0 %
BILIRUB SERPL-MCNC: 0.4 MG/DL
BUN SERPL-MCNC: 15 MG/DL (ref 8–23)
CALCIUM SERPL-MCNC: 9.6 MG/DL (ref 8.8–10.2)
CHLORIDE SERPL-SCNC: 103 MMOL/L (ref 98–107)
CREAT SERPL-MCNC: 0.66 MG/DL (ref 0.51–0.95)
DEPRECATED HCO3 PLAS-SCNC: 24 MMOL/L (ref 22–29)
EOSINOPHIL # BLD AUTO: 0.1 10E3/UL (ref 0–0.7)
EOSINOPHIL NFR BLD AUTO: 1 %
ERYTHROCYTE [DISTWIDTH] IN BLOOD BY AUTOMATED COUNT: 13 % (ref 10–15)
GFR SERPL CREATININE-BSD FRML MDRD: >90 ML/MIN/1.73M2
GLUCOSE SERPL-MCNC: 115 MG/DL (ref 70–99)
HBA1C MFR BLD: 7 % (ref 0–5.6)
HCT VFR BLD AUTO: 41.4 % (ref 35–47)
HGB BLD-MCNC: 14.1 G/DL (ref 11.7–15.7)
IMM GRANULOCYTES # BLD: 0 10E3/UL
IMM GRANULOCYTES NFR BLD: 0 %
LYMPHOCYTES # BLD AUTO: 1.7 10E3/UL (ref 0.8–5.3)
LYMPHOCYTES NFR BLD AUTO: 34 %
MCH RBC QN AUTO: 31.6 PG (ref 26.5–33)
MCHC RBC AUTO-ENTMCNC: 34.1 G/DL (ref 31.5–36.5)
MCV RBC AUTO: 93 FL (ref 78–100)
MONOCYTES # BLD AUTO: 0.3 10E3/UL (ref 0–1.3)
MONOCYTES NFR BLD AUTO: 5 %
NEUTROPHILS # BLD AUTO: 3 10E3/UL (ref 1.6–8.3)
NEUTROPHILS NFR BLD AUTO: 60 %
PLATELET # BLD AUTO: 148 10E3/UL (ref 150–450)
POTASSIUM SERPL-SCNC: 4.2 MMOL/L (ref 3.4–5.3)
PROT SERPL-MCNC: 7 G/DL (ref 6.4–8.3)
RBC # BLD AUTO: 4.46 10E6/UL (ref 3.8–5.2)
SODIUM SERPL-SCNC: 141 MMOL/L (ref 136–145)
WBC # BLD AUTO: 5 10E3/UL (ref 4–11)

## 2023-05-31 PROCEDURE — 80053 COMPREHEN METABOLIC PANEL: CPT | Performed by: PHYSICIAN ASSISTANT

## 2023-05-31 PROCEDURE — 99214 OFFICE O/P EST MOD 30 MIN: CPT | Performed by: PHYSICIAN ASSISTANT

## 2023-05-31 PROCEDURE — 36415 COLL VENOUS BLD VENIPUNCTURE: CPT | Performed by: PHYSICIAN ASSISTANT

## 2023-05-31 PROCEDURE — 85025 COMPLETE CBC W/AUTO DIFF WBC: CPT | Performed by: PHYSICIAN ASSISTANT

## 2023-05-31 PROCEDURE — 83036 HEMOGLOBIN GLYCOSYLATED A1C: CPT | Performed by: PHYSICIAN ASSISTANT

## 2023-05-31 ASSESSMENT — PAIN SCALES - GENERAL: PAINLEVEL: MODERATE PAIN (5)

## 2023-05-31 NOTE — PATIENT INSTRUCTIONS
Hold Metformin and Trulicity morning of surgery.  Avoid NSAIDs and vitamins 1 week prior to surgery.

## 2023-05-31 NOTE — LETTER
June 1, 2023      Ilene Gaviria  [address] [facility] [address]        Dear ,    We are writing to inform you of your test results.    Tom Odom,     It was nice seeing you for your preop exam.  Electrolytes and kidney function are stable.  A1c was 7.0%.  Blood counts are normal.  Of note, a few of your liver function tests were elevated.  I would recommend we follow-up in 2-3 months to recheck these.     Good luck with surgery!     DENNIS Birch [facility]    Resulted Orders   Hemoglobin A1c   Result Value Ref Range    Hemoglobin A1C 7.0 (H) 0.0 - 5.6 %      Comment:      Normal <5.7%   Prediabetes 5.7-6.4%    Diabetes 6.5% or higher     Note: Adopted from ADA consensus guidelines.   Comprehensive metabolic panel (BMP + Alb, Alk Phos, ALT, AST, Total. Bili, TP)   Result Value Ref Range    Sodium 141 136 - 145 mmol/L    Potassium 4.2 3.4 - 5.3 mmol/L    Chloride 103 98 - 107 mmol/L    Carbon Dioxide (CO2) 24 22 - 29 mmol/L    Anion Gap 14 7 - 15 mmol/L    Urea Nitrogen 15.0 8.0 - 23.0 mg/dL    Creatinine 0.66 0.51 - 0.95 mg/dL    Calcium 9.6 8.8 - 10.2 mg/dL    Glucose 115 (H) 70 - 99 mg/dL    Alkaline Phosphatase 70 35 - 104 U/L    AST 68 (H) 10 - 35 U/L    ALT 77 (H) 10 - 35 U/L    Protein Total 7.0 6.4 - 8.3 g/dL    Albumin 4.3 3.5 - 5.2 g/dL    Bilirubin Total 0.4 <=1.2 mg/dL    GFR Estimate >90 >60 mL/min/1.73m2      Comment:      eGFR calculated using 2021 CKD-EPI equation.   CBC with platelets and differential   Result Value Ref Range    WBC Count 5.0 4.0 - 11.0 10e3/uL    RBC Count 4.46 3.80 - 5.20 10e6/uL    Hemoglobin 14.1 11.7 - 15.7 g/dL    Hematocrit 41.4 35.0 - 47.0 %    MCV 93 78 - 100 fL    MCH 31.6 26.5 - 33.0 pg    MCHC 34.1 31.5 - 36.5 g/dL    RDW 13.0 10.0 - 15.0 %    Platelet Count 148 (L) 150 - 450 10e3/uL    % Neutrophils 60 %    % Lymphocytes 34 %    % Monocytes 5 %    % Eosinophils 1 %    % Basophils 0 %    % Immature Granulocytes 0 %     Absolute Neutrophils 3.0 1.6 - 8.3 10e3/uL    Absolute Lymphocytes 1.7 0.8 - 5.3 10e3/uL    Absolute Monocytes 0.3 0.0 - 1.3 10e3/uL    Absolute Eosinophils 0.1 0.0 - 0.7 10e3/uL    Absolute Basophils 0.0 0.0 - 0.2 10e3/uL    Absolute Immature Granulocytes 0.0 <=0.4 10e3/uL       If you have any questions or concerns, please call the clinic at the number listed above.

## 2023-05-31 NOTE — PROGRESS NOTES
[address], [address]  [facility] [address]  Phone: [phone]  Primary Provider: Lasha Altamirano  Pre-op Performing Provider: LASHA ALTAMIRANO      PREOPERATIVE EVALUATION:  Today's date: 5/31/2023    Ilene Gaviria is a 62 year old female who presents for a preoperative evaluation.    Surgical Information:  Surgery/Procedure: Left ankle Tendon Transfer.   Surgery Location: [facility]  Surgeon: Dr. Camarillo   Surgery Date: 6/6/23  Time of Surgery: 7:00 am   Where patient plans to recover: At home with family  Fax number for surgical facility: [phone]    Assessment & Plan     The proposed surgical procedure is considered INTERMEDIATE risk.    Pre-op exam  Type 2 diabetes mellitus without complication, without long-term current use of insulin (H)    Cleared for surgery pending labs today. Checking CBC, CMP, and A1c. Hold metformin and Trulicity morning of surgery.  No NSAIDs/vitamins 1 week prior to surgery.  Continue all other medications.    - CBC with platelets and differential  - Comprehensive metabolic panel (BMP + Alb, Alk Phos, ALT, AST, Total. Bili, TP)  - Hemoglobin A1c    Risks and Recommendations:  The patient has the following additional risks and recommendations for perioperative complications:   - No identified additional risk factors other than previously addressed      RECOMMENDATION:  APPROVAL GIVEN to proceed with proposed procedure pending review of diagnostic evaluation.      20 minutes spent by me on the date of the encounter doing chart review, review of test results, interpretation of tests, patient visit and documentation    makes  Subjective       HPI related to upcoming procedure:     Here today for preoperative clearance for upcoming left ankle tendon transfer with [facility] orthopedics on 6/6/2023.  Overall feeling well.  Denies shortness of breath, chest pain, new leg swelling, or palpitations.  Denies history of heart attack,  stroke or blood clot.  No family history of these either.  No personal or family history of problems with anesthesia.    History of type 2 diabetes on Trulicity and metformin.  Well controlled.  Due for A1c.    Daughter expecting twins upcoming.  Owner of [employer].         5/31/2023    11:05 AM   Preop Questions   1. Have you ever had a heart attack or stroke? No   2. Have you ever had surgery on your heart or blood vessels, such as a stent placement, a coronary artery bypass, or surgery on an artery in your head, neck, heart, or legs? No   3. Do you have chest pain with activity? No   4. Do you have a history of  heart failure? No   5. Do you currently have a cold, bronchitis or symptoms of other infection? No   6. Do you have a cough, shortness of breath, or wheezing? No   7. Do you or anyone in your family have previous history of blood clots? No   8. Do you or does anyone in your family have a serious bleeding problem such as prolonged bleeding following surgeries or cuts? No   9. Have you ever had problems with anemia or been told to take iron pills? No   10. Have you had any abnormal blood loss such as black, tarry or bloody stools, or abnormal vaginal bleeding? No   11. Have you ever had a blood transfusion? No   12. Are you willing to have a blood transfusion if it is medically needed before, during, or after your surgery? Yes   13. Have you or any of your relatives ever had problems with anesthesia? No   14. Do you have sleep apnea, excessive snoring or daytime drowsiness? No   15. Do you have any artifical heart valves or other implanted medical devices like a pacemaker, defibrillator, or continuous glucose monitor? No   16. Do you have artificial joints? No   17. Are you allergic to latex? No       Preoperative Review of :   reviewed - no record of controlled substances prescribed.      Review of Systems  CONSTITUTIONAL: NEGATIVE for fever, chills, change in  weight  INTEGUMENTARY/SKIN: NEGATIVE for worrisome rashes, moles or lesions  EYES: NEGATIVE for vision changes or irritation  ENT/MOUTH: NEGATIVE for ear, mouth and throat problems  RESP: NEGATIVE for significant cough or SOB  CV: NEGATIVE for chest pain, palpitations or peripheral edema  GI: NEGATIVE for nausea, abdominal pain, heartburn, or change in bowel habits  : NEGATIVE for frequency, dysuria, or hematuria  MUSCULOSKELETAL: NEGATIVE for significant arthralgias or myalgia  NEURO: NEGATIVE for weakness, dizziness or paresthesias  ENDOCRINE: NEGATIVE for temperature intolerance, skin/hair changes  HEME: NEGATIVE for bleeding problems  PSYCHIATRIC: NEGATIVE for changes in mood or affect    Patient Active Problem List    Diagnosis Date Noted     Diabetes mellitus type 1 (H) 01/26/2023     Priority: Medium     Diabetes mellitus, type 2 (H) 01/26/2023     Priority: Medium     Urinary tract infection associated with catheterization of urinary tract (H) 12/29/2022     Priority: Medium     Proteus mirabilis       Urinary retention 10/11/2022     Priority: Medium     Added automatically from request for surgery [id number]       Somatic dysfunction of pelvic region 08/01/2022     Priority: Medium     Lesion of bladder 07/20/2022     Priority: Medium     Added automatically from request for surgery [id number]       Colostomy in place (H) 12/14/2018     Priority: Medium     Rectal cancer (H) 08/07/2018     Priority: Medium     BMI 30.0-30.9,adult 04/27/2017     Priority: Medium      Past Medical History:   Diagnosis Date     Cancer (H)     rectal cancer     Past Surgical History:   Procedure Laterality Date     CYSTOSCOPY, BIOPSY BLADDER, COMBINED N/A 7/26/2022    Procedure: CYSTOSCOPY, WITH BLADDER BIOPSY AND FULGURATION (Look in the bladder and sample red  tissue then burn the area involved);  Surgeon: Kacy Anderson MD;  Location: [facility] OR     DAVINCI COLECTOMY N/A 8/7/2018    Procedure: DAVINCI XI COLECTOMY;  DAVINCI  ABDOMINAL PERINEAL RESECTION WITH PERMANENT COLOSTOMY ( PARI ) LEFT GLUTEAL FLAP TO RECTAL DEFECT ( JERE ) ;  Surgeon: Jonny Finney MD;  Location:  OR     GENITOURINARY SURGERY      bladder sling 12/18     GRAFT FLAP GLUTEUS TO PERINEUM N/A 8/7/2018    Procedure: GRAFT FLAP GLUTEUS TO PERINEUM;;  Surgeon: Conchita Ramos MD;  Location:  OR     GYN SURGERY      c section X3, tubal      IR CYSTOGRAM  8/24/2022     REMOVE MESH VAGINA N/A 11/1/2022    Procedure: EXCISION, sling, VAGINA (cut one side of the sling in the vagina), CYSTOSCOPY;  Surgeon: Kacy Anderson MD;  Location: [facility] OR     SIGMOIDOSCOPY FLEXIBLE N/A 7/9/2018    Procedure: SIGMOIDOSCOPY FLEXIBLE;  FLEXIBILE SIGMOIDOSCOPY;  Surgeon: Jonny Finney MD;  Location:  GI     Current Outpatient Medications   Medication Sig Dispense Refill     dulaglutide (TRULICITY) 1.5 MG/0.5ML pen Inject 1.5 mg Subcutaneous every 7 days 2 mL 3     estradiol (ESTRACE) 0.1 MG/GM vaginal cream Place 2 g vaginally three times a week (Patient taking differently: Place vaginally three times a week) 42.5 g 3     losartan (COZAAR) 25 MG tablet Take 1 tablet (25 mg) by mouth daily 90 tablet 1     metFORMIN (GLUCOPHAGE) 500 MG tablet Take 1 tablet (500 mg) by mouth 2 times daily (with meals) 180 tablet 3     methenamine hippurate (HIPREX) 1 g tablet Take 1 tablet (1 g) by mouth 2 times daily 60 tablet 11     simvastatin (ZOCOR) 20 MG tablet Take 1 tablet (20 mg) by mouth daily (with dinner) 90 tablet 3       Allergies   Allergen Reactions     No Known Allergies      Tomato Rash        Social History     Tobacco Use     Smoking status: Never     Smokeless tobacco: Never   Vaping Use     Vaping status: Not on file   Substance Use Topics     Alcohol use: Yes     Comment: rare     Family History   Problem Relation Age of Onset     Anesthesia Reaction No family hx of      Deep Vein Thrombosis (DVT) No family hx of      History   Drug Use No         Objective  "    /75   Pulse 76   Temp 97.3  F (36.3  C) (Temporal)   Resp 18   Ht 1.727 m (5' 7.99\")   Wt 96.1 kg (211 lb 12.8 oz)   SpO2 96%   BMI 32.21 kg/m      Physical Exam    GENERAL APPEARANCE: healthy, alert and no distress     EYES: EOMI,  PERRL     HENT: ear canals and TM's normal and nose and mouth without ulcers or lesions     NECK: no adenopathy, no asymmetry, masses, or scars and thyroid normal to palpation     RESP: lungs clear to auscultation - no rales, rhonchi or wheezes     CV: regular rates and rhythm, normal S1 S2, no S3 or S4 and no murmur, click or rub     ABDOMEN:  soft, nontender, no HSM or masses and bowel sounds normal     MS: extremities normal- no gross deformities noted, no evidence of inflammation in joints, FROM in all extremities.     SKIN: no suspicious lesions or rashes     NEURO: Normal strength and tone, sensory exam grossly normal, mentation intact and speech normal     PSYCH: mentation appears normal. and affect normal/bright     LYMPHATICS: No cervical adenopathy    Recent Labs   Lab Test 10/28/22  0837 07/01/22  0350   HGB 14.9 12.9   * 202    139   POTASSIUM 4.7 5.0   CR 0.60 1.41*   A1C 7.0*  --         Diagnostics:  Labs pending at this time.  Results will be reviewed when available.   No EKG required, no history of coronary heart disease, significant arrhythmia, peripheral arterial disease or other structural heart disease.    Revised Cardiac Risk Index (RCRI):  The patient has the following serious cardiovascular risks for perioperative complications:   - No serious cardiac risks = 0 points     RCRI Interpretation: 0 points: Class I (very low risk - 0.4% complication rate)    The likelihood of other entities in the differential is insufficient to justify any further testing for them at this time. This was explained to the patient. The patient was advised that persistent or worsening symptoms would require further evaluation. Patient advised to call the " office and if unable to reach to go to the emergency room if they develop any new or worsening symptoms. Expressed understanding and agreement with above stated plan.     Signed Electronically by: Peter Altamirano PA-C  Copy of this evaluation report is provided to requesting physician.

## 2023-06-01 NOTE — RESULT ENCOUNTER NOTE
Labs are stable. Patient is cleared for surgery. Please send H&P and labs to their surgical team.

## 2023-06-01 NOTE — RESULT ENCOUNTER NOTE
Please send as letter    Tom Odom,    It was nice seeing you for your preop exam.  Electrolytes and kidney function are stable.  A1c was 7.0%.  Blood counts are normal.  Of note, a few of your liver function tests were elevated.  I would recommend we follow-up in 2-3 months to recheck these.    Good luck with surgery!    Peter Altamirano PA-C  [facility]

## 2023-06-13 ENCOUNTER — TELEPHONE (OUTPATIENT)
Dept: ENDOCRINOLOGY | Facility: CLINIC | Age: 63
End: 2023-06-13
Payer: COMMERCIAL

## 2023-06-13 NOTE — TELEPHONE ENCOUNTER
Called patient and left voicemail. Patient has an appointment on 6/15/23. Need to collect the last 14 days worth of blood sugar readings to prepare for patient's visit.   Maria Elena Stout MA

## 2023-06-13 NOTE — PROGRESS NOTES
Virtual Visit Details    Type of service:  Video Visit     Originating Location (pt. Location): Home    Distant Location (provider location):  Off-site  Platform used for Video Visit: Sangeetha    Outcome for 06/13/23 2:34 PM: Left Voicemail   Maria Elena Stout MA  Outcome for 06/14/23 12:59 PM: Left Voicemail   Maria Elena Stout MA  Outcome for 06/14/23 3:31 PM: Left Voicemail   Maria Elena Stout MA  Outcome for 06/15/23 7:49 AM: Patient is not checking blood sugars  Maria Elena Stout MA    Patient is showing 5/5 MNCM met.   Maria Elena Stout MA

## 2023-06-14 ENCOUNTER — VIRTUAL VISIT (OUTPATIENT)
Dept: UROLOGY | Facility: CLINIC | Age: 63
End: 2023-06-14
Payer: COMMERCIAL

## 2023-06-14 ENCOUNTER — TELEPHONE (OUTPATIENT)
Dept: UROLOGY | Facility: CLINIC | Age: 63
End: 2023-06-14

## 2023-06-14 DIAGNOSIS — N30.00 ACUTE CYSTITIS WITHOUT HEMATURIA: Primary | ICD-10-CM

## 2023-06-14 PROCEDURE — 99214 OFFICE O/P EST MOD 30 MIN: CPT | Mod: VID | Performed by: UROLOGY

## 2023-06-14 RX ORDER — CEPHALEXIN 500 MG/1
500 CAPSULE ORAL 4 TIMES DAILY
Qty: 20 CAPSULE | Refills: 0 | Status: SHIPPED | OUTPATIENT
Start: 2023-06-14 | End: 2023-06-15

## 2023-06-14 NOTE — PROGRESS NOTES
Endocrinology Clinic Visit     Video-Visit Details     Type of service:  Video Visit     Joined the call at 6/15/2023, 8:08:30 am.  Left the call at 6/15/2023, 8:24:16 am.    I spent a total of 30 minutes on the date of encounter reviewing medical records, evaluating the patient, coordinating care and documenting in the EHR, as detailed above.        Patient location home'  Provider location off site    Platform used for Video Visit: astrid      NAME:  Ilene Gaviria  PCP:  Trevor Rizo  MRN:  [id number]  Reason for Consult: DM2  Requesting Provider:  Referred Self    Chief Complaint     No chief complaint on file.      History of Present Illness     Ilene Gaviria is a 61 year old female who is seen in clinic for DM2 and weight management. Last seen 3/2024.    Her past medical history is significant for rectal cancer status post surgery and chemotherapy currently in remission.  Never been on steroids before.  The patient was diagnosed with type 2 diabetes 1 year ago . A1C was checked no records, she said it was in the range of 8.  She was started on metformin, she is tolerating it very well.  Her A1c was down to 6.1. most recent a1c 10/2022 was 7.     Last visit she wanted to start wt loss meds and we discussed ozempic. Her weight has always been always between 180-210 since having her kids. It is been stable but not able to loose weight despite dieting. She has problem with portion control.       Interval hx: she had an ankle surgery and has not been active. She maintained her wt. She started trulicity.  She increased her truculity to 1.5 mg weekly 2 weeks ago. She reported stable wt but appetite is down.      Wt Readings from Last 4 Encounters:   05/31/23 96.1 kg (211 lb 12.8 oz)   01/26/23 93.8 kg (206 lb 14.4 oz)   12/29/22 95.4 kg (210 lb 4.8 oz)   11/01/22 90.7 kg (200 lb)         Diabetes Care/Complications:   Eyes: no eye  exam  Kidneys: positive urine microalb 12/2022, normal  Cr  Nerves: tingling and numbness bottom at first but now at the ankle. Foot exam completed 6/2022  Smoking: no  Blood Pressure: has been out of lisinopril for 2 weeks  Lipids: on simvastatin 20 mg daily. 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 She lives with her .    Problem List     Patient Active Problem List   Diagnosis     BMI 30.0-30.9,adult     Rectal cancer (H)     Colostomy in place (H)     Lesion of bladder     Somatic dysfunction of pelvic region     Urinary retention     Urinary tract infection associated with catheterization of urinary tract (H)     Diabetes mellitus type 1 (H)     Diabetes mellitus, type 2 (H)        Medications     Current Outpatient Medications   Medication     dulaglutide (TRULICITY) 1.5 MG/0.5ML pen     estradiol (ESTRACE) 0.1 MG/GM vaginal cream     losartan (COZAAR) 25 MG tablet     metFORMIN (GLUCOPHAGE) 500 MG tablet     methenamine hippurate (HIPREX) 1 g tablet     simvastatin (ZOCOR) 20 MG tablet     No current facility-administered medications for this visit.        Allergies     Allergies   Allergen Reactions     No Known Allergies      Tomato Rash       Medical / Surgical History     Past Medical History:   Diagnosis Date     Cancer (H)     rectal cancer     Past Surgical History:   Procedure Laterality Date     CYSTOSCOPY, BIOPSY BLADDER, COMBINED N/A 7/26/2022    Procedure: CYSTOSCOPY, WITH BLADDER BIOPSY AND FULGURATION (Look in the bladder and sample red  tissue then burn the area involved);  Surgeon: Kacy Anderson MD;  Location: [facility] OR     DAVINCI COLECTOMY N/A 8/7/2018    Procedure: DAVINCI XI COLECTOMY;  DAVINCI ABDOMINAL PERINEAL RESECTION WITH PERMANENT COLOSTOMY ( YAÑEZ ) LEFT  GLUTEAL FLAP TO RECTAL DEFECT ( JERE ) ;  Surgeon: Jonny Finney MD;  Location:  OR     GENITOURINARY SURGERY      bladder sling 12/18     GRAFT FLAP GLUTEUS TO PERINEUM N/A 8/7/2018    Procedure: GRAFT FLAP GLUTEUS TO PERINEUM;;  Surgeon: Conchita Ramos MD;  Location:  OR     GYN SURGERY      c section X3, tubal      IR CYSTOGRAM  8/24/2022     REMOVE MESH VAGINA N/A 11/1/2022    Procedure: EXCISION, sling, VAGINA (cut one side of the sling in the vagina), CYSTOSCOPY;  Surgeon: Kacy Anderson MD;  Location: [facility] OR     SIGMOIDOSCOPY FLEXIBLE N/A 7/9/2018    Procedure: SIGMOIDOSCOPY FLEXIBLE;  FLEXIBILE SIGMOIDOSCOPY;  Surgeon: Jonny Finney MD;  Location:  GI       Social History     Social History     Socioeconomic History     Marital status:      Spouse name: Not on file     Number of children: Not on file     Years of education: Not on file     Highest education level: Not on file   Occupational History     Not on file   Tobacco Use     Smoking status: Never     Smokeless tobacco: Never   Vaping Use     Vaping status: Not on file   Substance and Sexual Activity     Alcohol use: Yes     Comment: rare     Drug use: No     Sexual activity: Not Currently     Partners: Male   Other Topics Concern     Parent/sibling w/ CABG, MI or angioplasty before 65F 55M? Not Asked   Social History Narrative     Not on file     Social Determinants of Health     Financial Resource Strain: Not on file   Food Insecurity: Not on file   Transportation Needs: Not on file   Physical Activity: Not on file   Stress: Not on file   Social Connections: Not on file   Intimate Partner Violence: Not on file   Housing Stability: Not on file       Family History   Noncontributory    ROS     12 ROS completed, pertinent positive and negative in HPI    Physical Exam   There were no vitals taken for this visit.   /GENERAL: Healthy, alert and no distress  EYES: Eyes grossly normal to inspection.  No discharge or  erythema, or obvious scleral/conjunctival abnormalities.  RESP: No audible wheeze, cough, or visible cyanosis.  No visible retractions or increased work of breathing.    SKIN: Visible skin clear. No significant rash, abnormal pigmentation or lesions.  NEURO: Cranial nerves grossly intact.  Mentation and speech appropriate for age.  PSYCH: Mentation appears normal, affect normal/bright, judgement and insight intact, normal speech and appearance well-groomed.      Labs/Imaging     Pertinent Labs were reviewed and updated in EPIC and discussed briefly.  Radiology Results were  reviewed and updated in EPIC and discussed briefly.    Summary of recent findings:   No results found for: A1C    No results found for: TSH, T4    Creatinine   Date Value Ref Range Status   05/31/2023 0.66 0.51 - 0.95 mg/dL Final   12/17/2019 0.60 0.52 - 1.04 mg/dL Final       Recent Labs   Lab Test 12/17/19  0940   CHOL 206*   HDL 39*   LDL 97   TRIG 348*       No results found for: [id number], [id number], [id number]    I personally reviewed the patient's outside records from [facility] EMR and faxed records. Summary of pertinent findings in \[facility]\"".    Impression / Plan     1. Diabetes Mellitus: Type 2  2. Obesity class II    Good glycemic control. Could bring a1c to <7 without risk of lows. She was taking metformin 1 g daily instead of 2 g unsure why. Tolerating trulicity and would like to increase dose for wt loss effect.    Plan:  - increase metformin to 3 g daily  - increase trulicity to 3 mg weekly.        2. Diabetes Complications: She is due for an eye exam, ophthalmology referral placed last visit. Neuropathy could be related to previous chemotherapy.     3. Blood Pressure Management: Blood pressure is improving. Currently is  on pharmacotherapy for this.      4.Lipid Management: Per the new ACC/RALPH/NHLBI guidelines, statins are recommended for individuals with diabetes aged 40-75 with LDL  without ASCVD, and for any individual with ASCVD.  Currently the patient is on a statin.      5. Smoking Status: Patient Pt is smoke free..       6. Elevated liver enzymes: very likely NAFLD but work up was never completed. Discussed with patient and she will review with PCP.    Review of the result(s) of each unique test - A1c and diabetes related labs.          Test and/or medications prescribed today:  No orders of the defined types were placed in this encounter.        Follow up: 6 month      Christal Monteiro MD  Endocrinology, Diabetes and Metabolism  [facility]

## 2023-06-14 NOTE — PATIENT INSTRUCTIONS
-continue to stay well hydrated.  -continue methenameine and vit C for prophylaxis for recurrent uti  -resume estrace for atrophic vaginitis for GSM  -continue CIC    -f/u in 6 months to reassess with PA

## 2023-06-14 NOTE — TELEPHONE ENCOUNTER
Spoke with pt regarding checkout notes from 6/14 VV to schedule -f/u in 6 months to reassess with PA, pt not ready to schedule yet. I advise pt to call back sooner than later to schedule follow up because providers get booked fairly quick. Pt said okay

## 2023-06-14 NOTE — PROGRESS NOTES
Virtual Visit Details    Type of service:  Video Visit     Originating Location (pt. Location): Home    Distant Location (provider location):  On-site  Platform used for Video Visit: Sangeetha

## 2023-06-14 NOTE — LETTER
6/14/2023       RE: Ilene Gaviria  [address] [facility] [address]     Dear Colleague,    Thank you for referring your patient, Ilene Gaviria, to the [facility] UROLOGY CLINIC [place] at [facility]. Please see a copy of my visit note below.    Virtual Visit Details    Type of service:  Video Visit     Originating Location (pt. Location): Home    Distant Location (provider location):  On-site  Platform used for Video Visit: Sangeetha    Reason for Visit:  F/u on urinary tract infections and sling excision on 11/1/22.    Clinical Data:  Ms. Gaviria is a 62 year old female with frequency, urgency and History of colorectal cancer s/p resection and colostomy placement with retention    History of sling shortly after and then had trouble with UTI like symptoms.  She has been getting some uti's.    Since she has been voiding more on her own since the excision and she reports voiding mostly on her own and still performs  CIC about 4 times per day.    She is using the methenamine and Vit C and things that it helps quite a bit.  And tried to drink more water.    Things were going well without infections but recently had surgery on her foot and has noticed she has had increased need for cathing and she has a little sensation of an infection starting.    She underwent a cystoscopy and biopsy on 7/26/22  Final Diagnosis   A. Bladder,right lateral wall, biopsy:  - Denuded urothelium, granulation tissue  - Negative for malignancy     B.  Bladder, posterior wall, biopsy:  - Bladder mucosa with acute and chronic inflammation and reactive changes  - Negative for malignancy       7/5/22 CT abd/pelvis  IMPRESSION:   No acute process demonstrated.      Review of Labs:  The following labs were reviewed by me and discussed with the patient:     Color Urine (no units)   Date Value   05/23/2023 Yellow     Appearance Urine (no units)   Date Value   05/23/2023 Clear      Glucose Urine (mg/dL)   Date Value   05/23/2023 Negative     Bilirubin Urine (no units)   Date Value   05/23/2023 Negative     Ketones Urine (mg/dL)   Date Value   05/23/2023 Negative     Specific Gravity Urine (no units)   Date Value   05/23/2023 >=1.030     pH Urine (no units)   Date Value   05/23/2023 5.5     Protein Albumin Urine (mg/dL)   Date Value   05/23/2023 30 (A)     Urobilinogen Urine (E.U./dL)   Date Value   05/23/2023 0.2     Nitrite Urine (no units)   Date Value   05/23/2023 Negative     Leukocyte Esterase Urine (no units)   Date Value   05/23/2023 Small (A)      Ucx on 9/18 showed multidrug resistant Proteus and she was treated with IM gent which resolved her discomfort but now feels that the urine is getting cloudy again.    UDS from 8/24/22:    -Maximum cystometric capacity 360 mL with normal filling sensations.  -Good bladder compliance without detrusor overactivity or stress incontinence.  -Maximum detrusor contraction during voiding reaches 38 cm H2O.  -With catheters in place, she voids 10 mL with Qmax 0.8 ml/s and increased EMG activity. BOOI is 31.1 which is equivocal for bladder outlet obstruction.  -With catheters removed, she voids an additional 32 mL with Qmax 2.3 ml/s and incomplete bladder emptying with final catheterized postvoid residual of 320 mL.   -Fluoroscopy reveals a moderately trabeculated bladder wall without diverticuli or VUR. The bladder neck was closed during filling; voiding images unavailable as patient transferred [facility].    Assessment & Plan   63 y/o F with much improved frequency, urgency and urinary retention with incomplete bladder emptying and recurrent uti's.  She continues to self cath.  She feels like she may have an infection but is improving with more fluids  -continue to stay well hydrated.  -continue methenameine and vit C for prophylaxis for recurrent uti  -resume estrace for atrophic vaginitis for  GSM  -continue CIC    -f/u in 6 months to reassess with PA       Thank you for allowing me to participate in the care of  Ms. Ilene Gaviria and I will keep you updated on her progress.    Kacy Anderson MD       32 minutes spent by me on the date of the encounter doing chart review, history and exam, documentation and further activities per the note

## 2023-06-14 NOTE — PROGRESS NOTES
Reason for Visit:  F/u on urinary tract infections and sling excision on 11/1/22.    Clinical Data:  Ms. Gaviria is a 62 year old female with frequency, urgency and History of colorectal cancer s/p resection and colostomy placement with retention    History of sling shortly after and then had trouble with UTI like symptoms.  She has been getting some uti's.    Since she has been voiding more on her own since the excision and she reports voiding mostly on her own and still performs  CIC about 4 times per day.    She is using the methenamine and Vit C and things that it helps quite a bit.  And tried to drink more water.    Things were going well without infections but recently had surgery on her foot and has noticed she has had increased need for cathing and she has a little sensation of an infection starting.    She underwent a cystoscopy and biopsy on 7/26/22  Final Diagnosis   A. Bladder,right lateral wall, biopsy:  - Denuded urothelium, granulation tissue  - Negative for malignancy     B.  Bladder, posterior wall, biopsy:  - Bladder mucosa with acute and chronic inflammation and reactive changes  - Negative for malignancy       7/5/22 CT abd/pelvis  IMPRESSION:   No acute process demonstrated.      Review of Labs:  The following labs were reviewed by me and discussed with the patient:     Color Urine (no units)   Date Value   05/23/2023 Yellow     Appearance Urine (no units)   Date Value   05/23/2023 Clear     Glucose Urine (mg/dL)   Date Value   05/23/2023 Negative     Bilirubin Urine (no units)   Date Value   05/23/2023 Negative     Ketones Urine (mg/dL)   Date Value   05/23/2023 Negative     Specific Gravity Urine (no units)   Date Value   05/23/2023 >=1.030     pH Urine (no units)   Date Value   05/23/2023 5.5     Protein Albumin Urine (mg/dL)   Date Value   05/23/2023 30 (A)     Urobilinogen Urine (E.U./dL)   Date Value   05/23/2023 0.2     Nitrite Urine (no units)   Date Value   05/23/2023 Negative      Leukocyte Esterase Urine (no units)   Date Value   05/23/2023 Small (A)      Ucx on 9/18 showed multidrug resistant Proteus and she was treated with IM gent which resolved her discomfort but now feels that the urine is getting cloudy again.    UDS from 8/24/22:    -Maximum cystometric capacity 360 mL with normal filling sensations.  -Good bladder compliance without detrusor overactivity or stress incontinence.  -Maximum detrusor contraction during voiding reaches 38 cm H2O.  -With catheters in place, she voids 10 mL with Qmax 0.8 ml/s and increased EMG activity. BOOI is 31.1 which is equivocal for bladder outlet obstruction.  -With catheters removed, she voids an additional 32 mL with Qmax 2.3 ml/s and incomplete bladder emptying with final catheterized postvoid residual of 320 mL.   -Fluoroscopy reveals a moderately trabeculated bladder wall without diverticuli or VUR. The bladder neck was closed during filling; voiding images unavailable as patient transferred to [facility] due to being unable to void on [place] chair.    Assessment & Plan   61 y/o F with much improved frequency, urgency and urinary retention with incomplete bladder emptying and recurrent uti's.  She continues to self cath.  She feels like she may have an infection but is improving with more fluids  -continue to stay well hydrated.  -continue methenameine and vit C for prophylaxis for recurrent uti  -resume estrace for atrophic vaginitis for GSM  -continue CIC    -f/u in 6 months to reassess with PA       Thank you for allowing me to participate in the care of  Ms. Ilene Gaviria and I will keep you updated on her progress.    Kacy Anderson MD       32 minutes spent by me on the date of the encounter doing chart review, history and exam, documentation and further activities per the note

## 2023-06-14 NOTE — NURSING NOTE
Is the patient currently in the state of [place]? YES    Visit mode:VIDEO    If the visit is dropped, the patient can be reconnected by: VIDEO VISIT: Send to e-mail at: [email or web address]    Will anyone else be joining the visit? NO      How would you like to obtain your AVS? Mail a copy    Are changes needed to the allergy or medication list? YES: Pt has a medication she is no longer taking     Reason for visit: 4 weeks post op per appt notes     Elizabeth Ariza on 6/14/2023 at 2:24 PM

## 2023-06-15 ENCOUNTER — TELEPHONE (OUTPATIENT)
Dept: UROLOGY | Facility: CLINIC | Age: 63
End: 2023-06-15

## 2023-06-15 ENCOUNTER — VIRTUAL VISIT (OUTPATIENT)
Dept: ENDOCRINOLOGY | Facility: CLINIC | Age: 63
End: 2023-06-15
Payer: COMMERCIAL

## 2023-06-15 DIAGNOSIS — E11.40 TYPE 2 DIABETES MELLITUS WITH DIABETIC NEUROPATHY, WITHOUT LONG-TERM CURRENT USE OF INSULIN (H): ICD-10-CM

## 2023-06-15 PROCEDURE — 99214 OFFICE O/P EST MOD 30 MIN: CPT | Mod: VID | Performed by: STUDENT IN AN ORGANIZED HEALTH CARE EDUCATION/TRAINING PROGRAM

## 2023-06-15 RX ORDER — DULAGLUTIDE 3 MG/.5ML
3 INJECTION, SOLUTION SUBCUTANEOUS WEEKLY
Qty: 2 ML | Refills: 0 | Status: SHIPPED | OUTPATIENT
Start: 2023-06-15 | End: 2023-12-17

## 2023-06-15 NOTE — TELEPHONE ENCOUNTER
Pt is calling in regards to message below. Pt stated medication need to be ordered and filled. Pt stated Dr. Monteiro accidentally canceled the medication today. Pt is also requesting for a call once medication is filled. Please call pt, [phone].

## 2023-06-15 NOTE — LETTER
6/15/2023       RE: Ilene Gaviria  [address] [facility] [address]     Dear Colleague,    Thank you for referring your patient, Ilene Gaviria, to the [facility] ENDOCRINOLOGY CLINIC [facility]. Please see a copy of my visit note below.    Virtual Visit Details    Type of service:  Video Visit     Originating Location (pt. Location): Home    Distant Location (provider location):  Off-site  Platform used for Video Visit: Sangeetha    Outcome for 06/13/23 2:34 PM: Left Voicemail   Maria Elena Stout MA  Outcome for 06/14/23 12:59 PM: Left Noéil   Maria Elena Stout MA  Outcome for 06/14/23 3:31 PM: Left Noéil   Maria Elena Stout MA  Outcome for 06/15/23 7:49 AM: Patient is not checking blood sugars  Maria Elena Stout MA    Patient is showing 5/5 [place] met.   Maria Elena Stout MA    Endocrinology Clinic Visit     Video-Visit Details     Type of service:  Video Visit     Joined the call at 6/15/2023, 8:08:30 am.  Left the call at 6/15/2023, 8:24:16 am.    I spent a total of 30 minutes on the date of encounter reviewing medical records, evaluating the patient, coordinating care and documenting in the EHR, as detailed above.        Patient location home'  Provider location off site    Platform used for Video Visit: sangeetha      NAME:  Ilene Gaviria  PCP:  Trevor Rizo  MRN:  [id number]  Reason for Consult: DM2  Requesting Provider:  Referred Self    Chief Complaint     No chief complaint on file.      History of Present Illness     Ilene Gaviria is a 61 year old female who is seen in clinic for DM2 and weight management. Last seen 3/2024.    Her past medical history is significant for rectal cancer status post surgery and chemotherapy currently in remission.  Never been on steroids before.  The patient was diagnosed with type 2 diabetes 1 year ago . A1C was checked no records, she said it was in the range of 8.   She was started on metformin, she is tolerating it very well.  Her A1c was down to 6.1. most recent a1c 10/2022 was 7.     Last visit she wanted to start wt loss meds and we discussed ozempic. Her weight has always been always between 180-210 since having her kids. It is been stable but not able to loose weight despite dieting. She has problem with portion control.       Interval hx: she had an ankle surgery and has not been active. She maintained her wt. She started trulicity.  She increased her truculity to 1.5 mg weekly 2 weeks ago. She reported stable wt but appetite is down.      Wt Readings from Last 4 Encounters:   05/31/23 96.1 kg (211 lb 12.8 oz)   01/26/23 93.8 kg (206 lb 14.4 oz)   12/29/22 95.4 kg (210 lb 4.8 oz)   11/01/22 90.7 kg (200 lb)         Diabetes Care/Complications:   Eyes: no eye  exam  Kidneys: positive urine microalb 12/2022, normal Cr  Nerves: tingling and numbness bottom at first but now at the ankle. Foot exam completed 6/2022  Smoking: no  Blood Pressure: has been out of lisinopril for 2 weeks  Lipids: on simvastatin 20 mg daily. 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 She lives with her .    Problem List     Patient Active Problem List   Diagnosis    BMI 30.0-30.9,adult    Rectal cancer (H)    Colostomy in place (H)    Lesion of bladder    Somatic dysfunction of pelvic region    Urinary retention    Urinary tract infection associated with catheterization of urinary tract (H)    Diabetes mellitus type 1 (H)    Diabetes mellitus, type 2 (H)        Medications     Current Outpatient Medications   Medication    dulaglutide (TRULICITY) 1.5 MG/0.5ML pen    estradiol (ESTRACE) 0.1 MG/GM vaginal cream    losartan  (COZAAR) 25 MG tablet    metFORMIN (GLUCOPHAGE) 500 MG tablet    methenamine hippurate (HIPREX) 1 g tablet    simvastatin (ZOCOR) 20 MG tablet     No current facility-administered medications for this visit.        Allergies     Allergies   Allergen Reactions    No Known Allergies     Tomato Rash       Medical / Surgical History     Past Medical History:   Diagnosis Date    Cancer (H)     rectal cancer     Past Surgical History:   Procedure Laterality Date    CYSTOSCOPY, BIOPSY BLADDER, COMBINED N/A 7/26/2022    Procedure: CYSTOSCOPY, WITH BLADDER BIOPSY AND FULGURATION (Look in the bladder and sample red  tissue then burn the area involved);  Surgeon: Kacy Anderson MD;  [place] [facility] [place]    DAVINCI COLECTOMY N/A 8/7/2018    Procedure: DAVINCI XI COLECTOMY;  DAVINCI ABDOMINAL PERINEAL RESECTION WITH PERMANENT COLOSTOMY ( PARI ) LEFT GLUTEAL FLAP TO RECTAL DEFECT ( JERE ) ;  Surgeon: Jonny Finney MD;  Location:  OR    GENITOURINARY SURGERY      bladder sling 12/18    GRAFT FLAP GLUTEUS TO PERINEUM N/A 8/7/2018    Procedure: GRAFT FLAP GLUTEUS TO PERINEUM;;  Surgeon: Conchita Ramos MD;  Location:  OR    GYN SURGERY      c section X3, tubal     IR CYSTOGRAM  8/24/2022    REMOVE MESH VAGINA N/A 11/1/2022    Procedure: EXCISION, sling, VAGINA (cut one side of the sling in the vagina), CYSTOSCOPY;  Surgeon: Kacy Anderson MD;  [place] [facility] [place]    SIGMOIDOSCOPY FLEXIBLE N/A 7/9/2018    Procedure: SIGMOIDOSCOPY FLEXIBLE;  FLEXIBILE SIGMOIDOSCOPY;  Surgeon: Jonny Finney MD;  Location:  GI       Social History     Social History     Socioeconomic History    Marital status:      Spouse name: Not on file    Number of children: Not on file    Years of education: Not on file    Highest education level: Not on file   Occupational History    Not on file   Tobacco Use    Smoking status: Never    Smokeless tobacco: Never   Vaping Use    Vaping status: Not on file   Substance and Sexual  Activity    Alcohol use: Yes     Comment: rare    Drug use: No    Sexual activity: Not Currently     Partners: Male   Other Topics Concern    Parent/sibling w/ CABG, MI or angioplasty before 65F 55M? Not Asked   Social History Narrative    Not on file     Social Determinants of Health     Financial Resource Strain: Not on file   Food Insecurity: Not on file   Transportation Needs: Not on file   Physical Activity: Not on file   Stress: Not on file   Social Connections: Not on file   Intimate Partner Violence: Not on file   Housing Stability: Not on file       Family History   Noncontributory    ROS     12 ROS completed, pertinent positive and negative in HPI    Physical Exam   There were no vitals taken for this visit.   /GENERAL: Healthy, alert and no distress  EYES: Eyes grossly normal to inspection.  No discharge or erythema, or obvious scleral/conjunctival abnormalities.  RESP: No audible wheeze, cough, or visible cyanosis.  No visible retractions or increased work of breathing.    SKIN: Visible skin clear. No significant rash, abnormal pigmentation or lesions.  NEURO: Cranial nerves grossly intact.  Mentation and speech appropriate for age.  PSYCH: Mentation appears normal, affect normal/bright, judgement and insight intact, normal speech and appearance well-groomed.      Labs/Imaging     Pertinent Labs were reviewed and updated in EPIC and discussed briefly.  Radiology Results were  reviewed and updated in EPIC and discussed briefly.    Summary of recent findings:   No results found for: A1C    No results found for: TSH, T4    Creatinine   Date Value Ref Range Status   05/31/2023 0.66 0.51 - 0.95 mg/dL Final   12/17/2019 0.60 0.52 - 1.04 mg/dL Final       Recent Labs   Lab Test 12/17/19  0940   CHOL 206*   HDL 39*   LDL 97   TRIG 348*       No results found for: [id number], [id number], [id number]    I personally reviewed the patient's outside records from [employer] EMR and faxed records. Summary of pertinent  findings in HPI.    Impression / Plan     1. Diabetes Mellitus: Type 2  2. Obesity class II    Good glycemic control. Could bring a1c to <7 without risk of lows. She was taking metformin 1 g daily instead of 2 g unsure why. Tolerating trulicity and would like to increase dose for wt loss effect.    Plan:  - increase metformin to 3 g daily  - increase trulicity to 3 mg weekly.        2. Diabetes Complications: She is due for an eye exam, ophthalmology referral placed last visit. Neuropathy could be related to previous chemotherapy.     3. Blood Pressure Management: Blood pressure is improving. Currently is  on pharmacotherapy for this.      4.Lipid Management: Per the new ACC/RALPH/NHLBI guidelines, statins are recommended for individuals with diabetes aged 40-75 with LDL  without ASCVD, and for any individual with ASCVD. Currently the patient is on a statin.      5. Smoking Status: Patient Pt is smoke free..       6. Elevated liver enzymes: very likely NAFLD but work up was never completed. Discussed with patient and she will review with PCP.    Review of the result(s) of each unique test - A1c and diabetes related labs.          Test and/or medications prescribed today:  No orders of the defined types were placed in this encounter.        Follow up: 6 month      Christal Monteiro MD  Endocrinology, Diabetes and Metabolism  [facility]

## 2023-06-15 NOTE — NURSING NOTE
Is the patient currently in the state of [place]? YES    Visit mode:VIDEO    If the visit is dropped, the patient can be reconnected by: VIDEO VISIT: Send to e-mail at: [email or web address]    Will anyone else be joining the visit? NO      How would you like to obtain your AVS? mail    Are changes needed to the allergy or medication list? YES: Pt states she is no ttaking keflex. Pt advises she will be restarting estradiol.     Reason for visit: RECHECK

## 2023-06-15 NOTE — TELEPHONE ENCOUNTER
[facility]    Phone Message    May a detailed message be left on voicemail: yes     Reason for Call: Medication Question or concern regarding medication   Prescription Clarification  Name of Medication: cephALEXin (KEFLEX) 500 MG capsule   Prescribing Provider:    Pharmacy: [id number] - [place] - [address] [facility] AT [facility] [employer]   What on the order needs clarification? Pt stated another provider cancelled out her prescription by mistake, pt is requesting to re-send it to her pharmacy,. Please call Ilene once complete, thank you          Action Taken: Message routed to:  [facility] uro    Travel Screening: Not Applicable

## 2023-06-16 DIAGNOSIS — N30.00 ACUTE CYSTITIS WITHOUT HEMATURIA: Primary | ICD-10-CM

## 2023-06-16 RX ORDER — CEPHALEXIN 500 MG/1
500 CAPSULE ORAL 4 TIMES DAILY
Qty: 20 CAPSULE | Refills: 0 | Status: SHIPPED | OUTPATIENT
Start: 2023-06-16 | End: 2023-06-21

## 2023-06-16 NOTE — TELEPHONE ENCOUNTER
Sent cephalexin 500 mg 4 times a day.   Patient says she was at another appointment yesterday where the provider accidentally discontinued her antibiotic. When patient went to the pharmacy to  medication she was told by [facility], she was told the prescription has been voided. Sent replacement script    Bailey Moseley, RN, BSN  Care Coordinator Urology  [facility] [place]  Urology [facility]  [phone]

## 2023-06-26 ENCOUNTER — LAB (OUTPATIENT)
Dept: LAB | Facility: CLINIC | Age: 63
End: 2023-06-26
Payer: COMMERCIAL

## 2023-06-26 DIAGNOSIS — N39.0 URINARY TRACT INFECTION ASSOCIATED WITH CATHETERIZATION OF URINARY TRACT, UNSPECIFIED INDWELLING URINARY CATHETER TYPE, SUBSEQUENT ENCOUNTER: ICD-10-CM

## 2023-06-26 DIAGNOSIS — T83.511D URINARY TRACT INFECTION ASSOCIATED WITH CATHETERIZATION OF URINARY TRACT, UNSPECIFIED INDWELLING URINARY CATHETER TYPE, SUBSEQUENT ENCOUNTER: ICD-10-CM

## 2023-06-26 LAB
ALBUMIN UR-MCNC: 100 MG/DL
APPEARANCE UR: ABNORMAL
BACTERIA #/AREA URNS HPF: ABNORMAL /HPF
BILIRUB UR QL STRIP: NEGATIVE
COLOR UR AUTO: YELLOW
GLUCOSE UR STRIP-MCNC: NEGATIVE MG/DL
HGB UR QL STRIP: ABNORMAL
KETONES UR STRIP-MCNC: NEGATIVE MG/DL
LEUKOCYTE ESTERASE UR QL STRIP: ABNORMAL
MUCOUS THREADS #/AREA URNS LPF: PRESENT /LPF
NITRATE UR QL: POSITIVE
PH UR STRIP: 6 [PH] (ref 5–7)
RBC #/AREA URNS AUTO: ABNORMAL /HPF
SP GR UR STRIP: 1.02 (ref 1–1.03)
SQUAMOUS #/AREA URNS AUTO: ABNORMAL /LPF
UROBILINOGEN UR STRIP-ACNC: 0.2 E.U./DL
WBC #/AREA URNS AUTO: >100 /HPF

## 2023-06-26 PROCEDURE — 87088 URINE BACTERIA CULTURE: CPT

## 2023-06-26 PROCEDURE — 81001 URINALYSIS AUTO W/SCOPE: CPT

## 2023-06-26 PROCEDURE — 87186 SC STD MICRODIL/AGAR DIL: CPT

## 2023-06-26 PROCEDURE — 87086 URINE CULTURE/COLONY COUNT: CPT

## 2023-06-28 ENCOUNTER — TELEPHONE (OUTPATIENT)
Dept: INFECTIOUS DISEASES | Facility: CLINIC | Age: 63
End: 2023-06-28
Payer: COMMERCIAL

## 2023-06-28 DIAGNOSIS — T83.511D URINARY TRACT INFECTION ASSOCIATED WITH CATHETERIZATION OF URINARY TRACT, UNSPECIFIED INDWELLING URINARY CATHETER TYPE, SUBSEQUENT ENCOUNTER: Primary | ICD-10-CM

## 2023-06-28 DIAGNOSIS — N39.0 URINARY TRACT INFECTION ASSOCIATED WITH CATHETERIZATION OF URINARY TRACT, UNSPECIFIED INDWELLING URINARY CATHETER TYPE, SUBSEQUENT ENCOUNTER: Primary | ICD-10-CM

## 2023-06-28 NOTE — TELEPHONE ENCOUNTER
----- Message from Eunice Larry MD sent at 6/28/2023 10:21 AM CDT -----  Regarding: Pt call  Hey all,    I have a standing order in for a UA and urine culture on this pt and she dropped off a sample. Does anyone have time to call her and see if she's symptomatic (sometimes her symptoms improve with increasing hydration and I don't want to treat her if that's the case as she's colonized with a very resistant bug).     If she is symptomatic, we will need to give her gentamicin, so will also need help with that...    Thanks!  Eunice

## 2023-06-28 NOTE — TELEPHONE ENCOUNTER
Patients symptoms very cloudy almost mucous filled. Smells bad and strong. x5 days.     Had burning occasionally, there were 2x where the swelling was bad that she could not cath. Hydration does help some she [place] [employer] this [place] and was possibly dehydrated. Most symptoms have subsided except cloudy and odor.     No fevers, N/V or other symptoms.       Reese Guevara RN  Infectious Disease on 6/28/2023 at 10:29 AM

## 2023-06-29 ENCOUNTER — DOCUMENTATION ONLY (OUTPATIENT)
Dept: INFECTIOUS DISEASES | Facility: CLINIC | Age: 63
End: 2023-06-29
Payer: COMMERCIAL

## 2023-06-29 ENCOUNTER — INFUSION THERAPY VISIT (OUTPATIENT)
Dept: INFUSION THERAPY | Facility: CLINIC | Age: 63
End: 2023-06-29
Attending: INTERNAL MEDICINE
Payer: COMMERCIAL

## 2023-06-29 ENCOUNTER — TELEPHONE (OUTPATIENT)
Dept: INFECTIOUS DISEASES | Facility: CLINIC | Age: 63
End: 2023-06-29
Payer: COMMERCIAL

## 2023-06-29 VITALS
WEIGHT: 213.4 LBS | DIASTOLIC BLOOD PRESSURE: 86 MMHG | HEART RATE: 71 BPM | RESPIRATION RATE: 16 BRPM | TEMPERATURE: 98.4 F | SYSTOLIC BLOOD PRESSURE: 125 MMHG | BODY MASS INDEX: 32.46 KG/M2

## 2023-06-29 DIAGNOSIS — N39.0 URINARY TRACT INFECTION ASSOCIATED WITH CATHETERIZATION OF URINARY TRACT (H): Primary | ICD-10-CM

## 2023-06-29 DIAGNOSIS — T83.511A URINARY TRACT INFECTION ASSOCIATED WITH CATHETERIZATION OF URINARY TRACT (H): Primary | ICD-10-CM

## 2023-06-29 LAB — BACTERIA UR CULT: ABNORMAL

## 2023-06-29 PROCEDURE — 96365 THER/PROPH/DIAG IV INF INIT: CPT

## 2023-06-29 PROCEDURE — 258N000003 HC RX IP 258 OP 636: Performed by: INTERNAL MEDICINE

## 2023-06-29 PROCEDURE — 250N000011 HC RX IP 250 OP 636: Performed by: INTERNAL MEDICINE

## 2023-06-29 RX ORDER — ALBUTEROL SULFATE 0.83 MG/ML
2.5 SOLUTION RESPIRATORY (INHALATION)
Status: CANCELLED | OUTPATIENT
Start: 2023-06-29

## 2023-06-29 RX ORDER — EPINEPHRINE 1 MG/ML
0.3 INJECTION, SOLUTION, CONCENTRATE INTRAVENOUS EVERY 5 MIN PRN
Status: CANCELLED | OUTPATIENT
Start: 2023-06-29

## 2023-06-29 RX ORDER — HEPARIN SODIUM (PORCINE) LOCK FLUSH IV SOLN 100 UNIT/ML 100 UNIT/ML
5 SOLUTION INTRAVENOUS
Status: CANCELLED | OUTPATIENT
Start: 2023-06-29

## 2023-06-29 RX ORDER — MEPERIDINE HYDROCHLORIDE 25 MG/ML
25 INJECTION INTRAMUSCULAR; INTRAVENOUS; SUBCUTANEOUS EVERY 30 MIN PRN
Status: CANCELLED | OUTPATIENT
Start: 2023-06-29

## 2023-06-29 RX ORDER — DIPHENHYDRAMINE HYDROCHLORIDE 50 MG/ML
50 INJECTION INTRAMUSCULAR; INTRAVENOUS
Status: CANCELLED
Start: 2023-06-29

## 2023-06-29 RX ORDER — HEPARIN SODIUM,PORCINE 10 UNIT/ML
5-20 VIAL (ML) INTRAVENOUS DAILY PRN
Status: CANCELLED | OUTPATIENT
Start: 2023-06-29

## 2023-06-29 RX ORDER — ALBUTEROL SULFATE 90 UG/1
1-2 AEROSOL, METERED RESPIRATORY (INHALATION)
Status: CANCELLED
Start: 2023-06-29

## 2023-06-29 RX ORDER — METHYLPREDNISOLONE SODIUM SUCCINATE 125 MG/2ML
125 INJECTION, POWDER, LYOPHILIZED, FOR SOLUTION INTRAMUSCULAR; INTRAVENOUS
Status: CANCELLED
Start: 2023-06-29

## 2023-06-29 RX ORDER — EPINEPHRINE 1 MG/ML
0.3 INJECTION, SOLUTION INTRAMUSCULAR; SUBCUTANEOUS EVERY 5 MIN PRN
Status: CANCELLED | OUTPATIENT
Start: 2023-06-29

## 2023-06-29 RX ADMIN — GENTAMICIN SULFATE 380 MG: 40 INJECTION, SOLUTION INTRAMUSCULAR; INTRAVENOUS at 14:08

## 2023-06-29 NOTE — PROGRESS NOTES
Infusion Nursing Note:  Ilene Gaviria presents today for a one time gentamycin infusion.    Patient seen by provider today: No   present during visit today: Not Applicable.    Note: Pt states that this is a normal course of treatment for her to get one dose for UTI and then it generally is all she needs.   Gentamycin given over 1 hour    Intravenous Access:  Peripheral IV placed.          Post Infusion Assessment:  Patient tolerated infusion without incident.       Discharge Plan:   Patient and/or family verbalized understanding of discharge instructions and all questions answered.      Gertrudis Mondragon RN      Administrations This Visit     gentamicin (GARAMYCIN) 380 mg in sodium chloride 0.9 % 64.5 mL intermittent infusion     Admin Date  06/29/2023 Action  $New Bag Dose  380 mg Rate  64.5 mL/hr Route  Intravenous Administered By  Gertrudis Mondragon RN

## 2023-06-29 NOTE — TELEPHONE ENCOUNTER
Discussed with patient and she states that symptoms are coming/going, burning is more freq.     She would like to do a dose of gentamycin. Late this afternoon or tomorrow.     Will ask provider to put in OPAT orders.       Reese Guevara RN  Infectious Disease on 6/29/2023 at 8:10 AM

## 2023-06-29 NOTE — TELEPHONE ENCOUNTER
EP LVM 6/29 to sched a next avail follow up with Dr. Larry per provider request.      ----- Message from Eunice Larry MD sent at 6/29/2023  9:09 AM CDT -----  Regarding: Pt follow up  Tequila Piper,    It looks like this pt hasn't been seen for several months. Can we schedule her for follow up in the next month or so? Not urgent.     Reese - just cc'ed you in case you have a chance to mention it. I'd like to see her routinely if she's still having infections.     Thanks!  Eunice

## 2023-06-29 NOTE — PROGRESS NOTES
Patient scheduled for IV infusion 6/29 @130 pm.       Reese Guevara RN  Infectious Disease on 6/29/2023 at 9:23 AM

## 2023-06-29 NOTE — TELEPHONE ENCOUNTER
"Outpatient Parenteral Antimicrobial Therapy/ID Follow up    Infectious Diseases Indication: UTI    Antibiotic Information  Name of Antibiotic Dose of Antibiotic1 Anticipated duration   Gentamicin 5 mg/kg once             1.Dose of antibiotic will need to be renally adjusted if creatinine clearance changes    Method of antibiotic delivery:Peripheral IV.Will the line be used for another indication besides antimicrobials? No At the end of therapy should the line used for antimicrobials be removed or de-accessed? Yes. Selecting \"yes\" will function as written order to remove PICC line or de-access the indwelling line at the end of therapy.    Weekly labs required: none    Imaging for ID follow up: ID Imaging: No.     ID provider route note: OPAT RN Care Coordinator [facility] ID Clinic Information:  Phone: [phone]  Fax: [phone] (Attention ID Clinic Nurses)    "

## 2023-07-07 ENCOUNTER — TELEPHONE (OUTPATIENT)
Dept: CT IMAGING | Facility: CLINIC | Age: 63
End: 2023-07-07
Payer: COMMERCIAL

## 2023-07-07 NOTE — TELEPHONE ENCOUNTER
CONSTANCE SPEARSM 7/7 to sched a next avail follow up with Dr. Larry per provider request.  --2nd attempt.       ----- Message from Eunice Larry MD sent at 6/29/2023  9:09 AM CDT -----  Regarding: Pt follow up  Tequila Piper,    It looks like this pt hasn't been seen for several months. Can we schedule her for follow up in the next month or so? Not urgent.     Reese - just cc'ed you in case you have a chance to mention it. I'd like to see her routinely if she's still having infections.     Thanks!  Eunice

## 2023-07-10 ENCOUNTER — TELEPHONE (OUTPATIENT)
Dept: INFECTIOUS DISEASES | Facility: CLINIC | Age: 63
End: 2023-07-10
Payer: COMMERCIAL

## 2023-07-10 NOTE — TELEPHONE ENCOUNTER
Pt returned call from EP 7/10 to sched a next avail follow up with Dr. Larry. Appt is set for 7/28 via in-person.      ----- Message from Eunice Larry MD sent at 6/29/2023  9:09 AM CDT -----  Regarding: Pt follow up  Tequila Piper,    It looks like this pt hasn't been seen for several months. Can we schedule her for follow up in the next month or so? Not urgent.     Reese - just cc'ed you in case you have a chance to mention it. I'd like to see her routinely if she's still having infections.     Thanks!  Eunice

## 2023-07-22 ENCOUNTER — LAB (OUTPATIENT)
Dept: LAB | Facility: CLINIC | Age: 63
End: 2023-07-22
Payer: COMMERCIAL

## 2023-07-22 DIAGNOSIS — T83.511D URINARY TRACT INFECTION ASSOCIATED WITH CATHETERIZATION OF URINARY TRACT, UNSPECIFIED INDWELLING URINARY CATHETER TYPE, SUBSEQUENT ENCOUNTER: ICD-10-CM

## 2023-07-22 DIAGNOSIS — N39.0 URINARY TRACT INFECTION ASSOCIATED WITH CATHETERIZATION OF URINARY TRACT, UNSPECIFIED INDWELLING URINARY CATHETER TYPE, SUBSEQUENT ENCOUNTER: ICD-10-CM

## 2023-07-22 LAB
ALBUMIN UR-MCNC: 100 MG/DL
APPEARANCE UR: ABNORMAL
BACTERIA #/AREA URNS HPF: ABNORMAL /HPF
BILIRUB UR QL STRIP: NEGATIVE
COLOR UR AUTO: YELLOW
GLUCOSE UR STRIP-MCNC: NEGATIVE MG/DL
HGB UR QL STRIP: ABNORMAL
KETONES UR STRIP-MCNC: NEGATIVE MG/DL
LEUKOCYTE ESTERASE UR QL STRIP: ABNORMAL
NITRATE UR QL: POSITIVE
PH UR STRIP: 8.5 [PH] (ref 5–7)
RBC #/AREA URNS AUTO: ABNORMAL /HPF
SP GR UR STRIP: 1.02 (ref 1–1.03)
SQUAMOUS #/AREA URNS AUTO: ABNORMAL /LPF
UROBILINOGEN UR STRIP-ACNC: 0.2 E.U./DL
WBC #/AREA URNS AUTO: ABNORMAL /HPF

## 2023-07-22 PROCEDURE — 87086 URINE CULTURE/COLONY COUNT: CPT

## 2023-07-22 PROCEDURE — 81001 URINALYSIS AUTO W/SCOPE: CPT

## 2023-07-24 LAB — BACTERIA UR CULT: NORMAL

## 2023-07-26 ENCOUNTER — LAB (OUTPATIENT)
Dept: LAB | Facility: CLINIC | Age: 63
End: 2023-07-26
Payer: COMMERCIAL

## 2023-07-26 DIAGNOSIS — N39.0 URINARY TRACT INFECTION ASSOCIATED WITH CATHETERIZATION OF URINARY TRACT, UNSPECIFIED INDWELLING URINARY CATHETER TYPE, SUBSEQUENT ENCOUNTER: ICD-10-CM

## 2023-07-26 DIAGNOSIS — T83.511D URINARY TRACT INFECTION ASSOCIATED WITH CATHETERIZATION OF URINARY TRACT, UNSPECIFIED INDWELLING URINARY CATHETER TYPE, SUBSEQUENT ENCOUNTER: ICD-10-CM

## 2023-07-26 LAB
ALBUMIN UR-MCNC: 30 MG/DL
APPEARANCE UR: ABNORMAL
BACTERIA #/AREA URNS HPF: ABNORMAL /HPF
BILIRUB UR QL STRIP: NEGATIVE
COLOR UR AUTO: YELLOW
GLUCOSE UR STRIP-MCNC: NEGATIVE MG/DL
HGB UR QL STRIP: ABNORMAL
KETONES UR STRIP-MCNC: NEGATIVE MG/DL
LEUKOCYTE ESTERASE UR QL STRIP: ABNORMAL
NITRATE UR QL: POSITIVE
PH UR STRIP: 6 [PH] (ref 5–7)
RBC #/AREA URNS AUTO: ABNORMAL /HPF
SP GR UR STRIP: 1.02 (ref 1–1.03)
SQUAMOUS #/AREA URNS AUTO: ABNORMAL /LPF
UROBILINOGEN UR STRIP-ACNC: 0.2 E.U./DL
WBC #/AREA URNS AUTO: ABNORMAL /HPF
WBC CLUMPS #/AREA URNS HPF: PRESENT /HPF

## 2023-07-26 PROCEDURE — 81001 URINALYSIS AUTO W/SCOPE: CPT

## 2023-07-26 PROCEDURE — 87086 URINE CULTURE/COLONY COUNT: CPT

## 2023-07-26 NOTE — PROGRESS NOTES
[facility]  General Infectious Disease Clinic Note: Follow Up     Patient:  Ilene Gaviria, Date of birth 1960   Medical record number [id number]  Date of Visit:  07/28/2023   Consult requested by Dr. Kacy Anderson for evaluation of recurrent UTIs with MDR organisms.      Assessment       Recurrent urinary tract infections, predominant organism: ESBL Proteus mirabilis  - Currently on methenamine + Vitamin C  S/p urethral sling procedure (2018), removal 11/1/22  Urinary retention requiring intermittent self-catheterization  Rectal cancer s/p chemo/radiation and resection w/ end colostomy (2018)  Post-menopausal  DMII, last HbA1c 7.0%  Hepatic steatosis    Ms. Gaviria presents for follow up of recurrent UTIs. She does seem to have more frequent episodes over the summer months which I suspect are due to decreased hydration and increased activity. Though she is able to void more on her own now, I worry that catheterizing twice per day will leave her with an incompletely emptied bladder for significant portions of the day. We discussed increasing the frequency of catheterization to ensure that her bladder is empty without stagnant urine for more prolonged periods of time, which she will try. She is otherwise on a good regimen of aggressive hydration and methenamine + vitamin C. She is considering trying some OTC supplements, and will continue to alert us with any new or persistent UTI symptoms.      Recommendations     Increase CIC - would target ~4 times/day to ensure complete bladder emptying  Continue increased hydration, >2L/day  Continue methenamine + vitamin C  Standing order for UA + culture placed. Encouraged pt to provide straight cath sample.     RTC: 6 months, or PRN    I spent a total of 73 minutes on the day of the visit.   Time spent by me doing chart review, history and exam, documentation and further activities per the note    Eunice Larry MD  Pager  [phone]  Infectious Diseases      History of the Infectious Disease lllness:   CC: Recurrent UTIs    HPI:  Ilene Gaviria is a pleasant 62 year old female with a past medical history of colorectal cancer s/p resection with colostomy, s/p urethral sling, now with urinary retention requiring intermittent CIC, recurrent UTIs, HTN, HLD, and DMII (last HbA1c 7.0%) who presents to ID clinic for ongoing management of recurrent UTIs.     Please see initial consult note dated 10/13/22 and subsequent progress notes for full details. Briefly, Ms. Gaviria was diagnosed with colorectal cancer in 2018 and underwent colonic resection with end colostomy formation. Shortly following this, she underwent a urethral sling placement for urinary leakage. For the first few years following this, she describes 3-4 UTIs in a year. They were acutely symptomatic with dysuria and increased urinary frequency, and rapidly resolved with antibiotic therapy. In 2022, however, she began to develop UTIs with severe urinary retention. She had frequent ED visits while awaiting Urology referral, and it was noted that she was retaining urine. She saw one Urologist who recommended a chronic indwelling miguel catheter, but this was too cumbersome for her daily life, and she ultimately saw another Urologist (Dr. Anderson). She has had multiple urine cultures, many of them had mixed wong, but the predominant organism that we have cultured has been an ESBL Proteus mirabilis. She hae been treated with IM gentamicin twice for this prior to referral to ID. She also had bladder biopsies in July 2022 that were unrevealing for malignancy or radiation changes. I have recommended continuing with methenamine + vitamin C, increased hydration, and increased frequency of CIC. She underwent removal of her urethral sling with Urology (11/1/22). Since that time, she has had multiple episodes of urinary discomfort and purulent material on her catheter, some of which have  improved with increased hydration alone (epidsodes seem to correlate with times she is poorly hydrated). She has been treated for persistent symptoms with IV gentamicin on 12/29/22, 2/8/23, 6/29/23.     She was most recently seen by Urology on 6/14/23. At that time she noted she has been able to void on her own more often since the sling removal procedure, and has been catheterizing 2-4 times/day. She continues to use methenamine + vitamin C and works on staying hydrated. Vaginal estrogen was restarted at that visit. Earlier this week (7/22) she dropped off a urine sample at the lab because she felt like she might be developing a UTI. She notes that she was having issues with her parastomal hernia at the time (has difficulty passing certain foods), and sometimes it is difficult to determine if the pain is stoma related vs urinary tract related. The urinalysis had squamous epithelial cells, and mixed wong grew. We discussed that as her symptoms were improving, we would continue to monitor this week, and she should drop off an additional urine sample if her symptoms persisted or worsened. On 7/26, she dropped off an additional urine sample after having a night of increased frequency of urination (small volumes every hour). This improved the next day, and she is currently not having symptoms. She denies any blood in the urine, back pain, fevers, nausea, or vomiting. She notes that generally she seems to have more issues with UTIs in the summer when she is more active and has more issues keeping hydrated. Of note, she has been able to spontaneously void more often, and has been reducing the amount of her intermittent catheterization, now down to twice/day (morning and night. She is continuing to take methenamine + vitamin C.      Relevant Microbiology:   7/26/23 Urine culture: >100,000 CFU mixed urogenital wong    7/22/23 Urine culture: >100,000 CFU mixed urogenital wong    6/26/23 Urine culture: >100,000 CFU Proteus  mirabilis, ESBL   Proteus mirabilis ESBL     JAN     Ampicillin >=32 ug/mL Resistant     Cefazolin >=64 ug/mL Resistant 1     Cefepime  Resistant     Cefoxitin <=4 ug/mL Susceptible     Ceftazidime  Resistant     Ceftriaxone  Resistant     Ciprofloxacin >=4 ug/mL Resistant     Gentamicin <=1 ug/mL Susceptible     Levofloxacin >=8 ug/mL Resistant     Meropenem <=0.25 ug/mL Susceptible 2     Nitrofurantoin 64 ug/mL Resistant 3     Piperacillin/Tazobactam <=4 ug/mL Susceptible     Tobramycin <=1 ug/mL Susceptible     Trimethoprim/Sulfamethoxazole >16/304 ug/mL Resistant      5/23/23 Urine culture: <10,000 CFU urogenital wong    2/7/23 Urine culture: >100,000 CFU Proteus mirabilis  Proteus mirabilis       JAN     Ampicillin >=32 ug/mL Resistant     Ampicillin/ Sulbactam <=2 ug/mL Susceptible     Cefazolin <=4 ug/mL Susceptible 1     Cefepime <=1 ug/mL Susceptible     Cefoxitin <=4 ug/mL Susceptible     Ceftazidime <=1 ug/mL Susceptible     Ceftriaxone <=1 ug/mL Susceptible     Ciprofloxacin >=4 ug/mL Resistant     Gentamicin <=1 ug/mL Susceptible     Levofloxacin >=8 ug/mL Resistant     Nitrofurantoin 256 ug/mL Resistant 2     Piperacillin/Tazobactam <=4 ug/mL Susceptible     Tobramycin <=1 ug/mL Susceptible     Trimethoprim/Sulfamethoxazole >16/304 ug/mL Resistant      12/27/22 Urine culture >100,000 CFU Proteus mirabilis, ESBL (X2 strains)  Susceptibility                Proteus mirabilis (1) Proteus mirabilis (2)       JAN JAN       Ampicillin >=32 ug/mL Resistant >=32 ug/mL Resistant       Ampicillin/ Sulbactam <=2 ug/mL Susceptible <=2 ug/mL Susceptible       Cefazolin <=4 ug/mL Susceptible 1 <=4 ug/mL Susceptible 1       Cefepime <=1 ug/mL Susceptible <=1 ug/mL Susceptible       Cefoxitin <=4 ug/mL Susceptible <=4 ug/mL Susceptible       Ceftazidime <=1 ug/mL Susceptible <=1 ug/mL Susceptible       Ceftriaxone <=1 ug/mL Susceptible <=1 ug/mL Susceptible       Ciprofloxacin >=4 ug/mL Resistant >=4 ug/mL Resistant        Gentamicin <=1 ug/mL Susceptible <=1 ug/mL Susceptible       Levofloxacin >=8 ug/mL Resistant >=8 ug/mL Resistant       Nitrofurantoin >=512 ug/mL Resistant 2 64 ug/mL Resistant 2       Piperacillin/Tazobactam <=4 ug/mL Susceptible <=4 ug/mL Susceptible       Tobramycin <=1 ug/mL Susceptible <=1 ug/mL Susceptible       Trimethoprim/Sulfamethoxazole >16/304 ug/mL Resistant >16/304 ug/mL Resistant        11/7/22 Urine culture: >100,000 CFU Proteus mirabilis, ESBL               Similiar AST, fosfomycin added - resistant.   9/18/22 Urine culture: >100,000 CFU Proteus mirabilis, ESBL  Susceptibility                   Proteus mirabilis ESBL       JAN       Ampicillin >=32 ug/mL Resistant       Cefazolin >=64 ug/mL Resistant 1       Cefepime   Resistant       Cefoxitin <=4 ug/mL Susceptible       Ceftazidime   Resistant       Ceftriaxone   Resistant       Ciprofloxacin >=4 ug/mL Resistant       Gentamicin <=1 ug/mL Susceptible       Levofloxacin >=8 ug/mL Resistant       Meropenem <=0.25 ug/mL Susceptible 2       Nitrofurantoin   Resistant 3       Piperacillin/Tazobactam <=4 ug/mL Susceptible       Tobramycin <=1 ug/mL Susceptible       Trimethoprim/Sulfamethoxazole >16/304 ug/mL Resistant           8/30/22 Urine culture: >100,000 CFU Mixed urogenital wong  8/5/22 Urine culture: >100,000 CFU Proteus mirabilis, ESBL                Same susceptibility profile as above  7/1/22 Urine culture: No growth  6/20/22 Urine culture: >100,000 CFU Mixed urogenital wong  6/3/22 Urine culture: 10,000-50,000 CFU Mixed urogenital wong     Recent Antimicrobials:   6/29/23 IV gentamicin     Review of Systems: The remainder of a complete ROS was negative, except as noted in HPI.     Past Medical History:   Diagnosis Date    Cancer (H)     rectal cancer       Past Surgical History:   Procedure Laterality Date    CYSTOSCOPY, BIOPSY BLADDER, COMBINED N/A 7/26/2022    Procedure: CYSTOSCOPY, WITH BLADDER BIOPSY AND FULGURATION (Look in the  bladder and sample red  tissue then burn the area involved);  Surgeon: Kacy Anderson MD;  [place] [facility] [place]    DAVINCI COLECTOMY N/A 8/7/2018    Procedure: DAVINCI XI COLECTOMY;  DAVINCI ABDOMINAL PERINEAL RESECTION WITH PERMANENT COLOSTOMY ( PARI ) LEFT GLUTEAL FLAP TO RECTAL DEFECT ( JERE ) ;  Surgeon: Jonny Finney MD;  Location:  OR    GENITOURINARY SURGERY      bladder sling 12/18    GRAFT FLAP GLUTEUS TO PERINEUM N/A 8/7/2018    Procedure: GRAFT FLAP GLUTEUS TO PERINEUM;;  Surgeon: Conchita Ramos MD;  Location:  OR    GYN SURGERY      c section X3, tubal     IR CYSTOGRAM  8/24/2022    REMOVE MESH VAGINA N/A 11/1/2022    Procedure: EXCISION, sling, VAGINA (cut one side of the sling in the vagina), CYSTOSCOPY;  Surgeon: Kacy Anderson MD;  [place] [facility] [place]    SIGMOIDOSCOPY FLEXIBLE N/A 7/9/2018    Procedure: SIGMOIDOSCOPY FLEXIBLE;  FLEXIBILE SIGMOIDOSCOPY;  Surgeon: Jonny Finney MD;  Location:  GI       Patient Active Problem List   Diagnosis    BMI 30.0-30.9,adult    Rectal cancer (H)    Colostomy in place (H)    Lesion of bladder    Somatic dysfunction of pelvic region    Urinary retention    Urinary tract infection associated with catheterization of urinary tract (H)    Diabetes mellitus type 1 (H)    Diabetes mellitus, type 2 (H)       No outpatient medications have been marked as taking for the 7/28/23 encounter (Appointment) with Eunice Larry MD.       Allergies   Allergen Reactions    No Known Allergies     Tomato Rash              Physical Exam:   BP (!) 139/93 (BP Location: Left arm, Patient Position: Sitting, Cuff Size: Adult Large)   Pulse 83   Wt 94.4 kg (208 lb 1.6 oz)   SpO2 97%   BMI 31.65 kg/m    Wt Readings from Last 4 Encounters:   06/29/23 96.8 kg (213 lb 6.4 oz)   05/31/23 96.1 kg (211 lb 12.8 oz)   01/26/23 93.8 kg (206 lb 14.4 oz)   12/29/22 95.4 kg (210 lb 4.8 oz)     Exam:   GENERAL: well-developed, well-nourished, alert, oriented, in no  acute distress.  HEAD: Head is normocephalic, atraumatic   EYES: Eyes have anicteric sclerae.    LUNGS: Normal WOB on RA.   SKIN: No acute rashes.   NEUROLOGIC: Grossly nonfocal.         Laboratory Data:     Metabolic Studies    Recent Labs   Lab Test 05/31/23  1139 11/01/22  1247 10/28/22  0837 07/26/22  1055 07/01/22  0350 06/26/22  1617 12/17/19  0940 08/08/18  0732     --  138  --  139 135   < > 141   POTASSIUM 4.2  --  4.7  --  5.0 4.2   < > 4.1   CHLORIDE 103  --  105  --  106 103   < > 108   CO2 24  --  26  --  25 28   < > 26   ANIONGAP 14  --  7  --  8 4   < > 7   BUN 15.0  --  15  --  31* 15   < > 15   CR 0.66  --  0.60  --  1.41* 0.79   < > 0.60   GFRESTIMATED >90  --  >90  --  42* 85   < > >90   *   < > 166*   < > 204* 155*   < > 103*   ARTHUR 9.6  --  10.0  --  9.2 9.3   < > 8.2*   PHOS  --   --   --   --   --   --   --  3.2   MAG  --   --   --   --   --   --   --  1.8   LACT  --   --   --   --   --  1.7  --   --     < > = values in this interval not displayed.     Hepatic Studies    Recent Labs   Lab Test 05/31/23  1139 10/28/22  0837 07/01/22  0350   BILITOTAL 0.4 0.5 0.8   ALKPHOS 70 67 94   PROTTOTAL 7.0 7.3 6.8   ALBUMIN 4.3 4.1 3.4   AST 68* 28 19   ALT 77* 56* 49     Hematology Studies     Recent Labs   Lab Test 05/31/23  1139 10/28/22  0837 07/01/22  0350 06/26/22  1617 07/26/18  0919 04/24/17  1659   WBC 5.0 6.0 9.0 6.3   < > 5.6   ANEU  --   --   --   --   --  3.1   ALYM  --   --   --   --   --  2.1   BETZY  --   --   --   --   --  0.3   AEOS  --   --   --   --   --  0.1   HGB 14.1 14.9 12.9 14.4   < > 14.2   HCT 41.4 43.7 38.8 43.9   < > 41.6   * 146* 202 195   < > 163    < > = values in this interval not displayed.     Urine Studies     Recent Labs   Lab Test 07/26/23  0833 07/22/23  1059 06/26/23  1204 05/23/23  1524 02/07/23  1245   URINEPH 6.0 8.5* 6.0 5.5 6.5   NITRITE Positive* Positive* Positive* Negative Positive*   LEUKEST Moderate* Large* Large* Small* Large*   WBCU  25-50* * >100* >100* >182*

## 2023-07-27 LAB — BACTERIA UR CULT: NORMAL

## 2023-07-28 ENCOUNTER — OFFICE VISIT (OUTPATIENT)
Dept: INFECTIOUS DISEASES | Facility: CLINIC | Age: 63
End: 2023-07-28
Attending: INTERNAL MEDICINE
Payer: COMMERCIAL

## 2023-07-28 VITALS
SYSTOLIC BLOOD PRESSURE: 139 MMHG | WEIGHT: 208.1 LBS | HEART RATE: 83 BPM | BODY MASS INDEX: 31.65 KG/M2 | DIASTOLIC BLOOD PRESSURE: 93 MMHG | OXYGEN SATURATION: 97 %

## 2023-07-28 DIAGNOSIS — T83.511D URINARY TRACT INFECTION ASSOCIATED WITH CATHETERIZATION OF URINARY TRACT, UNSPECIFIED INDWELLING URINARY CATHETER TYPE, SUBSEQUENT ENCOUNTER: Primary | ICD-10-CM

## 2023-07-28 DIAGNOSIS — N39.0 URINARY TRACT INFECTION ASSOCIATED WITH CATHETERIZATION OF URINARY TRACT, UNSPECIFIED INDWELLING URINARY CATHETER TYPE, SUBSEQUENT ENCOUNTER: Primary | ICD-10-CM

## 2023-07-28 PROCEDURE — G0463 HOSPITAL OUTPT CLINIC VISIT: HCPCS | Performed by: INTERNAL MEDICINE

## 2023-07-28 PROCEDURE — 99215 OFFICE O/P EST HI 40 MIN: CPT | Performed by: INTERNAL MEDICINE

## 2023-07-28 PROCEDURE — 99417 PROLNG OP E/M EACH 15 MIN: CPT | Performed by: INTERNAL MEDICINE

## 2023-07-28 ASSESSMENT — PAIN SCALES - GENERAL: PAINLEVEL: NO PAIN (0)

## 2023-07-28 NOTE — NURSING NOTE
Chief Complaint   Patient presents with    RECHECK     Follow up per Dr. Larry.     BP (!) 139/93 (BP Location: Left arm, Patient Position: Sitting, Cuff Size: Adult Large)   Pulse 83   Wt 94.4 kg (208 lb 1.6 oz)   SpO2 97%   BMI 31.65 kg/m    Henrietta Norton [facility]

## 2023-07-28 NOTE — LETTER
7/28/2023       RE: Ilene Gaviria  [address] [facility] [address]     Dear Colleague,    Thank you for referring your patient, Ilene Gaviria, to the [facility] INFECTIOUS DISEASE CLINIC [place] at [facility]. Please see a copy of my visit note below.    [facility]  General Infectious Disease Clinic Note: Follow Up     Patient:  Ilene Gaviria, Date of birth 1960   Medical record number [id number]  Date of Visit:  07/28/2023   Consult requested by Dr. Kacy Anderson for evaluation of recurrent UTIs with MDR organisms.      Assessment       Recurrent urinary tract infections, predominant organism: ESBL Proteus mirabilis  - Currently on methenamine + Vitamin C  S/p urethral sling procedure (2018), removal 11/1/22  Urinary retention requiring intermittent self-catheterization  Rectal cancer s/p chemo/radiation and resection w/ end colostomy (2018)  Post-menopausal  DMII, last HbA1c 7.0%  Hepatic steatosis    Ms. Gaviria presents for follow up of recurrent UTIs. She does seem to have more frequent episodes over the summer months which I suspect are due to decreased hydration and increased activity. Though she is able to void more on her own now, I worry that catheterizing twice per day will leave her with an incompletely emptied bladder for significant portions of the day. We discussed increasing the frequency of catheterization to ensure that her bladder is empty without stagnant urine for more prolonged periods of time, which she will try. She is otherwise on a good regimen of aggressive hydration and methenamine + vitamin C. She is considering trying some OTC supplements, and will continue to alert us with any new or persistent UTI symptoms.      Recommendations     Increase CIC - would target ~4 times/day to ensure complete bladder emptying  Continue increased hydration, >2L/day  Continue methenamine + vitamin  C  Standing order for UA + culture placed. Encouraged pt to provide straight cath sample.     RTC: 6 months, or PRN    I spent a total of 73 minutes on the day of the visit.   Time spent by me doing chart review, history and exam, documentation and further activities per the note    Eunice Larry MD  Pager [phone]  Infectious Diseases      History of the Infectious Disease lllness:   CC: Recurrent UTIs    HPI:  Ilene Gaviria is a pleasant 62 year old female with a past medical history of colorectal cancer s/p resection with colostomy, s/p urethral sling, now with urinary retention requiring intermittent CIC, recurrent UTIs, HTN, HLD, and DMII (last HbA1c 7.0%) who presents to ID clinic for ongoing management of recurrent UTIs.     Please see initial consult note dated 10/13/22 and subsequent progress notes for full details. Briefly, Ms. Gaviria was diagnosed with colorectal cancer in 2018 and underwent colonic resection with end colostomy formation. Shortly following this, she underwent a urethral sling placement for urinary leakage. For the first few years following this, she describes 3-4 UTIs in a year. They were acutely symptomatic with dysuria and increased urinary frequency, and rapidly resolved with antibiotic therapy. In 2022, however, she began to develop UTIs with severe urinary retention. She had frequent ED visits while awaiting Urology referral, and it was noted that she was retaining urine. She saw one Urologist who recommended a chronic indwelling miguel catheter, but this was too cumbersome for her daily life, and she ultimately saw another Urologist (Dr. Anderson). She has had multiple urine cultures, many of them had mixed wong, but the predominant organism that we have cultured has been an ESBL Proteus mirabilis. She hae been treated with IM gentamicin twice for this prior to referral to ID. She also had bladder biopsies in July 2022 that were unrevealing for malignancy or radiation  changes. I have recommended continuing with methenamine + vitamin C, increased hydration, and increased frequency of CIC. She underwent removal of her urethral sling with Urology (11/1/22). Since that time, she has had multiple episodes of urinary discomfort and purulent material on her catheter, some of which have improved with increased hydration alone (epidsodes seem to correlate with times she is poorly hydrated). She has been treated for persistent symptoms with IV gentamicin on 12/29/22, 2/8/23, 6/29/23.     She was most recently seen by Urology on 6/14/23. At that time she noted she has been able to void on her own more often since the sling removal procedure, and has been catheterizing 2-4 times/day. She continues to use methenamine + vitamin C and works on staying hydrated. Vaginal estrogen was restarted at that visit. Earlier this week (7/22) she dropped off a urine sample at the lab because she felt like she might be developing a UTI. She notes that she was having issues with her parastomal hernia at the time (has difficulty passing certain foods), and sometimes it is difficult to determine if the pain is stoma related vs urinary tract related. The urinalysis had squamous epithelial cells, and mixed wong grew. We discussed that as her symptoms were improving, we would continue to monitor this week, and she should drop off an additional urine sample if her symptoms persisted or worsened. On 7/26, she dropped off an additional urine sample after having a night of increased frequency of urination (small volumes every hour). This improved the next day, and she is currently not having symptoms. She denies any blood in the urine, back pain, fevers, nausea, or vomiting. She notes that generally she seems to have more issues with UTIs in the summer when she is more active and has more issues keeping hydrated. Of note, she has been able to spontaneously void more often, and has been reducing the amount of her  intermittent catheterization, now down to twice/day (morning and night. She is continuing to take methenamine + vitamin C.      Relevant Microbiology:   7/26/23 Urine culture: >100,000 CFU mixed urogenital wong    7/22/23 Urine culture: >100,000 CFU mixed urogenital owng    6/26/23 Urine culture: >100,000 CFU Proteus mirabilis, ESBL   Proteus mirabilis ESBL     JAN     Ampicillin >=32 ug/mL Resistant     Cefazolin >=64 ug/mL Resistant 1     Cefepime  Resistant     Cefoxitin <=4 ug/mL Susceptible     Ceftazidime  Resistant     Ceftriaxone  Resistant     Ciprofloxacin >=4 ug/mL Resistant     Gentamicin <=1 ug/mL Susceptible     Levofloxacin >=8 ug/mL Resistant     Meropenem <=0.25 ug/mL Susceptible 2     Nitrofurantoin 64 ug/mL Resistant 3     Piperacillin/Tazobactam <=4 ug/mL Susceptible     Tobramycin <=1 ug/mL Susceptible     Trimethoprim/Sulfamethoxazole >16/304 ug/mL Resistant      5/23/23 Urine culture: <10,000 CFU urogenital wong    2/7/23 Urine culture: >100,000 CFU Proteus mirabilis  Proteus mirabilis       JAN     Ampicillin >=32 ug/mL Resistant     Ampicillin/ Sulbactam <=2 ug/mL Susceptible     Cefazolin <=4 ug/mL Susceptible 1     Cefepime <=1 ug/mL Susceptible     Cefoxitin <=4 ug/mL Susceptible     Ceftazidime <=1 ug/mL Susceptible     Ceftriaxone <=1 ug/mL Susceptible     Ciprofloxacin >=4 ug/mL Resistant     Gentamicin <=1 ug/mL Susceptible     Levofloxacin >=8 ug/mL Resistant     Nitrofurantoin 256 ug/mL Resistant 2     Piperacillin/Tazobactam <=4 ug/mL Susceptible     Tobramycin <=1 ug/mL Susceptible     Trimethoprim/Sulfamethoxazole >16/304 ug/mL Resistant      12/27/22 Urine culture >100,000 CFU Proteus mirabilis, ESBL (X2 strains)  Susceptibility                Proteus mirabilis (1) Proteus mirabilis (2)       JAN JAN       Ampicillin >=32 ug/mL Resistant >=32 ug/mL Resistant       Ampicillin/ Sulbactam <=2 ug/mL Susceptible <=2 ug/mL Susceptible       Cefazolin <=4 ug/mL Susceptible 1 <=4  ug/mL Susceptible 1       Cefepime <=1 ug/mL Susceptible <=1 ug/mL Susceptible       Cefoxitin <=4 ug/mL Susceptible <=4 ug/mL Susceptible       Ceftazidime <=1 ug/mL Susceptible <=1 ug/mL Susceptible       Ceftriaxone <=1 ug/mL Susceptible <=1 ug/mL Susceptible       Ciprofloxacin >=4 ug/mL Resistant >=4 ug/mL Resistant       Gentamicin <=1 ug/mL Susceptible <=1 ug/mL Susceptible       Levofloxacin >=8 ug/mL Resistant >=8 ug/mL Resistant       Nitrofurantoin >=512 ug/mL Resistant 2 64 ug/mL Resistant 2       Piperacillin/Tazobactam <=4 ug/mL Susceptible <=4 ug/mL Susceptible       Tobramycin <=1 ug/mL Susceptible <=1 ug/mL Susceptible       Trimethoprim/Sulfamethoxazole >16/304 ug/mL Resistant >16/304 ug/mL Resistant        11/7/22 Urine culture: >100,000 CFU Proteus mirabilis, ESBL               Similiar AST, fosfomycin added - resistant.   9/18/22 Urine culture: >100,000 CFU Proteus mirabilis, ESBL  Susceptibility                   Proteus mirabilis ESBL       JAN       Ampicillin >=32 ug/mL Resistant       Cefazolin >=64 ug/mL Resistant 1       Cefepime   Resistant       Cefoxitin <=4 ug/mL Susceptible       Ceftazidime   Resistant       Ceftriaxone   Resistant       Ciprofloxacin >=4 ug/mL Resistant       Gentamicin <=1 ug/mL Susceptible       Levofloxacin >=8 ug/mL Resistant       Meropenem <=0.25 ug/mL Susceptible 2       Nitrofurantoin   Resistant 3       Piperacillin/Tazobactam <=4 ug/mL Susceptible       Tobramycin <=1 ug/mL Susceptible       Trimethoprim/Sulfamethoxazole >16/304 ug/mL Resistant           8/30/22 Urine culture: >100,000 CFU Mixed urogenital wong  8/5/22 Urine culture: >100,000 CFU Proteus mirabilis, ESBL                Same susceptibility profile as above  7/1/22 Urine culture: No growth  6/20/22 Urine culture: >100,000 CFU Mixed urogenital wong  6/3/22 Urine culture: 10,000-50,000 CFU Mixed urogenital wong     Recent Antimicrobials:   6/29/23 IV gentamicin     Review of Systems: The  remainder of a complete ROS was negative, except as noted in HPI.     Past Medical History:   Diagnosis Date    Cancer (H)     rectal cancer       Past Surgical History:   Procedure Laterality Date    CYSTOSCOPY, BIOPSY BLADDER, COMBINED N/A 7/26/2022    Procedure: CYSTOSCOPY, WITH BLADDER BIOPSY AND FULGURATION (Look in the bladder and sample red  tissue then burn the area involved);  Surgeon: Kacy Anderson MD;  Location: [facility] OR    DAVINCI COLECTOMY N/A 8/7/2018    Procedure: DAVINCI XI COLECTOMY;  DAVINCI ABDOMINAL PERINEAL RESECTION WITH PERMANENT COLOSTOMY ( PARI ) LEFT GLUTEAL FLAP TO RECTAL DEFECT ( JERE ) ;  Surgeon: Jonny Finney MD;  Location:  OR    GENITOURINARY SURGERY      bladder sling 12/18    GRAFT FLAP GLUTEUS TO PERINEUM N/A 8/7/2018    Procedure: GRAFT FLAP GLUTEUS TO PERINEUM;;  Surgeon: Conchita Ramos MD;  Location:  OR    GYN SURGERY      c section X3, tubal     IR CYSTOGRAM  8/24/2022    REMOVE MESH VAGINA N/A 11/1/2022    Procedure: EXCISION, sling, VAGINA (cut one side of the sling in the vagina), CYSTOSCOPY;  Surgeon: Kacy Anderson MD;  Location: [facility] OR    SIGMOIDOSCOPY FLEXIBLE N/A 7/9/2018    Procedure: SIGMOIDOSCOPY FLEXIBLE;  FLEXIBILE SIGMOIDOSCOPY;  Surgeon: Jonny Finney MD;  Location:  GI       Patient Active Problem List   Diagnosis    BMI 30.0-30.9,adult    Rectal cancer (H)    Colostomy in place (H)    Lesion of bladder    Somatic dysfunction of pelvic region    Urinary retention    Urinary tract infection associated with catheterization of urinary tract (H)    Diabetes mellitus type 1 (H)    Diabetes mellitus, type 2 (H)       No outpatient medications have been marked as taking for the 7/28/23 encounter (Appointment) with Eunice Larry MD.       Allergies   Allergen Reactions    No Known Allergies     Tomato Rash              Physical Exam:   BP (!) 139/93 (BP Location: Left arm, Patient Position: Sitting, Cuff Size: Adult Large)    Pulse 83   Wt 94.4 kg (208 lb 1.6 oz)   SpO2 97%   BMI 31.65 kg/m    Wt Readings from Last 4 Encounters:   06/29/23 96.8 kg (213 lb 6.4 oz)   05/31/23 96.1 kg (211 lb 12.8 oz)   01/26/23 93.8 kg (206 lb 14.4 oz)   12/29/22 95.4 kg (210 lb 4.8 oz)     Exam:   GENERAL: well-developed, well-nourished, alert, oriented, in no acute distress.  HEAD: Head is normocephalic, atraumatic   EYES: Eyes have anicteric sclerae.    LUNGS: Normal WOB on RA.   SKIN: No acute rashes.   NEUROLOGIC: Grossly nonfocal.         Laboratory Data:     Metabolic Studies    Recent Labs   Lab Test 05/31/23  1139 11/01/22  1247 10/28/22  0837 07/26/22  1055 07/01/22  0350 06/26/22  1617 12/17/19  0940 08/08/18  0732     --  138  --  139 135   < > 141   POTASSIUM 4.2  --  4.7  --  5.0 4.2   < > 4.1   CHLORIDE 103  --  105  --  106 103   < > 108   CO2 24  --  26  --  25 28   < > 26   ANIONGAP 14  --  7  --  8 4   < > 7   BUN 15.0  --  15  --  31* 15   < > 15   CR 0.66  --  0.60  --  1.41* 0.79   < > 0.60   GFRESTIMATED >90  --  >90  --  42* 85   < > >90   *   < > 166*   < > 204* 155*   < > 103*   ARTHUR 9.6  --  10.0  --  9.2 9.3   < > 8.2*   PHOS  --   --   --   --   --   --   --  3.2   MAG  --   --   --   --   --   --   --  1.8   LACT  --   --   --   --   --  1.7  --   --     < > = values in this interval not displayed.     Hepatic Studies    Recent Labs   Lab Test 05/31/23  1139 10/28/22  0837 07/01/22  0350   BILITOTAL 0.4 0.5 0.8   ALKPHOS 70 67 94   PROTTOTAL 7.0 7.3 6.8   ALBUMIN 4.3 4.1 3.4   AST 68* 28 19   ALT 77* 56* 49     Hematology Studies     Recent Labs   Lab Test 05/31/23  1139 10/28/22  0837 07/01/22  0350 06/26/22  1617 07/26/18  0919 04/24/17  1659   WBC 5.0 6.0 9.0 6.3   < > 5.6   ANEU  --   --   --   --   --  3.1   ALYM  --   --   --   --   --  2.1   BETZY  --   --   --   --   --  0.3   AEOS  --   --   --   --   --  0.1   HGB 14.1 14.9 12.9 14.4   < > 14.2   HCT 41.4 43.7 38.8 43.9   < > 41.6   * 146* 202  195   < > 163    < > = values in this interval not displayed.     Urine Studies     Recent Labs   Lab Test 07/26/23  0833 07/22/23  1059 06/26/23  1204 05/23/23  1524 02/07/23  1245   URINEPH 6.0 8.5* 6.0 5.5 6.5   NITRITE Positive* Positive* Positive* Negative Positive*   LEUKEST Moderate* Large* Large* Small* Large*   WBCU 25-50* * >100* >100* >182*         Again, thank you for allowing me to participate in the care of your patient.      Sincerely,    Eunice Larry MD

## 2023-08-01 ENCOUNTER — TELEPHONE (OUTPATIENT)
Dept: INFECTIOUS DISEASES | Facility: CLINIC | Age: 63
End: 2023-08-01
Payer: COMMERCIAL

## 2023-08-01 NOTE — TELEPHONE ENCOUNTER
[facility] Call Center    Phone Message    May a detailed message be left on voicemail: yes     Reason for Call: Medication Question or concern regarding medication   Prescription Clarification  Name of Medication: Antibiotic   Prescribing Provider: Dr Larry   Pharmacy: [id number] - [place], [place] - [address] AT [facility]  P: [phone]  F: [phone]   What on the order needs clarification? Patient has UTI and would like antibiotic .  Per patient, please cll her prior to sending to pharmacy.      Action Taken: Other: id    Travel Screening: Not Applicable

## 2023-08-02 NOTE — TELEPHONE ENCOUNTER
Called patient back to let her know she will need to submit a UA prior to abx. Patient has standing orders. No answer, left voicemail with this info.

## 2023-08-22 ENCOUNTER — LAB (OUTPATIENT)
Dept: LAB | Facility: CLINIC | Age: 63
End: 2023-08-22
Payer: COMMERCIAL

## 2023-08-22 DIAGNOSIS — N39.0 URINARY TRACT INFECTION ASSOCIATED WITH CATHETERIZATION OF URINARY TRACT, UNSPECIFIED INDWELLING URINARY CATHETER TYPE, SUBSEQUENT ENCOUNTER: ICD-10-CM

## 2023-08-22 DIAGNOSIS — T83.511D URINARY TRACT INFECTION ASSOCIATED WITH CATHETERIZATION OF URINARY TRACT, UNSPECIFIED INDWELLING URINARY CATHETER TYPE, SUBSEQUENT ENCOUNTER: ICD-10-CM

## 2023-08-22 LAB
ALBUMIN UR-MCNC: >=300 MG/DL
APPEARANCE UR: ABNORMAL
BACTERIA #/AREA URNS HPF: ABNORMAL /HPF
BILIRUB UR QL STRIP: NEGATIVE
COLOR UR AUTO: YELLOW
GLUCOSE UR STRIP-MCNC: NEGATIVE MG/DL
HGB UR QL STRIP: ABNORMAL
KETONES UR STRIP-MCNC: NEGATIVE MG/DL
LEUKOCYTE ESTERASE UR QL STRIP: ABNORMAL
NITRATE UR QL: POSITIVE
PH UR STRIP: 8.5 [PH] (ref 5–7)
RBC #/AREA URNS AUTO: ABNORMAL /HPF
SP GR UR STRIP: 1.02 (ref 1–1.03)
SQUAMOUS #/AREA URNS AUTO: ABNORMAL /LPF
TRI-PHOS CRY #/AREA URNS HPF: ABNORMAL /HPF
UROBILINOGEN UR STRIP-ACNC: 0.2 E.U./DL
WBC #/AREA URNS AUTO: >100 /HPF

## 2023-08-22 PROCEDURE — 87186 SC STD MICRODIL/AGAR DIL: CPT

## 2023-08-22 PROCEDURE — 81001 URINALYSIS AUTO W/SCOPE: CPT

## 2023-08-22 PROCEDURE — 87086 URINE CULTURE/COLONY COUNT: CPT

## 2023-08-22 PROCEDURE — 87088 URINE BACTERIA CULTURE: CPT

## 2023-08-24 ENCOUNTER — MYC MEDICAL ADVICE (OUTPATIENT)
Dept: FAMILY MEDICINE | Facility: CLINIC | Age: 63
End: 2023-08-24
Payer: COMMERCIAL

## 2023-08-24 ENCOUNTER — TELEPHONE (OUTPATIENT)
Dept: INFECTIOUS DISEASES | Facility: CLINIC | Age: 63
End: 2023-08-24
Payer: COMMERCIAL

## 2023-08-24 DIAGNOSIS — N39.0 URINARY TRACT INFECTION ASSOCIATED WITH CATHETERIZATION OF URINARY TRACT, UNSPECIFIED INDWELLING URINARY CATHETER TYPE, SUBSEQUENT ENCOUNTER: Primary | ICD-10-CM

## 2023-08-24 DIAGNOSIS — T83.511D URINARY TRACT INFECTION ASSOCIATED WITH CATHETERIZATION OF URINARY TRACT, UNSPECIFIED INDWELLING URINARY CATHETER TYPE, SUBSEQUENT ENCOUNTER: Primary | ICD-10-CM

## 2023-08-24 RX ORDER — CEFUROXIME AXETIL 250 MG/1
250 TABLET ORAL 2 TIMES DAILY
Qty: 10 TABLET | Refills: 0 | Status: CANCELLED | OUTPATIENT
Start: 2023-08-24

## 2023-08-24 NOTE — TELEPHONE ENCOUNTER
Patient Quality Outreach    Patient is due for the following:   Diabetes -  A1C, Eye Exam, and Foot Exam  Cervical Cancer Screening - PAP Needed      Topic Date Due    COVID-19 Vaccine (1) Never done    Pneumococcal Vaccine (1 - PCV) Never done    Diptheria Tetanus Pertussis (DTAP/TDAP/TD) Vaccine (1 - Tdap) Never done    Zoster (Shingles) Vaccine (1 of 2) Never done       Next Steps:   Schedule a office visit for diabetes f/u , physical appt    Type of outreach:    Sent letter.      Questions for provider review:    None           Alfonzo Salazar MA

## 2023-08-24 NOTE — LETTER
John Gaviria,        We care about your health and have reviewed your health plan. We have reviewed your medical conditions, medication list, and lab results and am making recommendations based on this review, to better manage your health.    You are in particular need of attention regarding:  -Diabetes  -Cervical Cancer Screening  -Wellness (Physical) Visit     I am recommending that you:  -schedule a FOLLOWUP OFFICE APPOINTMENT with me.     -schedule a WELLNESS (Physical) APPOINTMENT with me.   I will check fasting labs the same day - nothing to eat except water and meds for 8-10 hours prior.  -schedule a PAP SMEAR EXAM which is due.  Please disregard this reminder if you have had this exam elsewhere within the last year.  It would be helpful for us to have a copy of your recent pap smear report in our file so that we can best coordinate your care.            Attached below is you current health maintenance. If you have had any of your health maintenance done elsewhere, please respond back to this message with when and where you had it done.         Health Maintenance Due   Topic Date Due    DIABETIC FOOT EXAM  Never done    ADVANCE CARE PLANNING  Never done    EYE EXAM  Never done    COVID-19 Vaccine (1) Never done    Pneumococcal Vaccine: Pediatrics (0 to 5 Years) and At-Risk Patients (6 to 64 Years) (1 - PCV) Never done    DTAP/TDAP/TD IMMUNIZATION (1 - Tdap) Never done    ZOSTER IMMUNIZATION (1 of 2) Never done    PAP  12/01/2018    YEARLY PREVENTIVE VISIT  02/08/2023    A1C  08/31/2023    ANNUAL REVIEW OF HM ORDERS  09/16/2023              If you have completed your pap exam elsewhere please respond with   When:  Where:      *If you have had a Hysterectomy please let us know so we can update your Medical Chart.        Thanks,        Your team at [facility]

## 2023-08-24 NOTE — TELEPHONE ENCOUNTER
----- Message from Eunice Larry MD sent at 8/24/2023  8:10 AM CDT -----  Regarding: Pt will hopefully call clinic today  Hey all,    This pt has frequent UTIs and dropped off a urine culture but I don't know if she's symptomatic or not (sometimes she improves with increasing hydration and self-cathing and I don't want to over treat her). I called her this am and she didn't answer so I asked her to call the clinic to let us know how she's feeling.     If she does have symptoms that aren't improving, I'm going to give her PO cefuroxime 250 mg BID x5 days (her most recent Proteus is more susceptible than usual!). I'd just like to know what pharmacy.

## 2023-08-25 LAB — BACTERIA UR CULT: ABNORMAL

## 2023-08-25 RX ORDER — CEFUROXIME AXETIL 250 MG/1
250 TABLET ORAL 2 TIMES DAILY
Qty: 10 TABLET | Refills: 0 | Status: SHIPPED | OUTPATIENT
Start: 2023-08-25 | End: 2023-08-30

## 2023-08-25 RX ORDER — PHENAZOPYRIDINE HYDROCHLORIDE 200 MG/1
200 TABLET, FILM COATED ORAL 3 TIMES DAILY PRN
Qty: 15 TABLET | Refills: 0 | Status: SHIPPED | OUTPATIENT
Start: 2023-08-25

## 2023-08-25 NOTE — TELEPHONE ENCOUNTER
Patient is having burning, urgency and is [place] [employer].     Called patient and relayed message from provider. Patient verbalized understanding, all questions/concerns answered.

## 2023-09-01 NOTE — TELEPHONE ENCOUNTER
Patient Quality Outreach    Patient is due for the following:   Cervical Cancer Screening - PAP Needed    Next Steps:   Schedule a office visit for Pap exam    Type of outreach:    Phone, spoke to patient/parent. Provided that she completed pap in the last 3 years at [facility].  Attempted to call [facility] twice and did not receive an answer    Questions for provider review:    None           Alec Kevin

## 2023-09-05 ENCOUNTER — LAB (OUTPATIENT)
Dept: LAB | Facility: CLINIC | Age: 63
End: 2023-09-05
Payer: COMMERCIAL

## 2023-09-05 ENCOUNTER — TELEPHONE (OUTPATIENT)
Dept: INFECTIOUS DISEASES | Facility: CLINIC | Age: 63
End: 2023-09-05

## 2023-09-05 DIAGNOSIS — N39.0 URINARY TRACT INFECTION ASSOCIATED WITH CATHETERIZATION OF URINARY TRACT, UNSPECIFIED INDWELLING URINARY CATHETER TYPE, SUBSEQUENT ENCOUNTER: ICD-10-CM

## 2023-09-05 DIAGNOSIS — T83.511D URINARY TRACT INFECTION ASSOCIATED WITH CATHETERIZATION OF URINARY TRACT, UNSPECIFIED INDWELLING URINARY CATHETER TYPE, SUBSEQUENT ENCOUNTER: ICD-10-CM

## 2023-09-05 DIAGNOSIS — T83.511A URINARY TRACT INFECTION ASSOCIATED WITH CATHETERIZATION OF URINARY TRACT (H): Primary | ICD-10-CM

## 2023-09-05 DIAGNOSIS — N39.0 URINARY TRACT INFECTION ASSOCIATED WITH CATHETERIZATION OF URINARY TRACT (H): Primary | ICD-10-CM

## 2023-09-05 LAB
ALBUMIN UR-MCNC: 100 MG/DL
APPEARANCE UR: ABNORMAL
BACTERIA #/AREA URNS HPF: ABNORMAL /HPF
BILIRUB UR QL STRIP: NEGATIVE
COLOR UR AUTO: YELLOW
GLUCOSE UR STRIP-MCNC: NEGATIVE MG/DL
HGB UR QL STRIP: ABNORMAL
KETONES UR STRIP-MCNC: NEGATIVE MG/DL
LEUKOCYTE ESTERASE UR QL STRIP: ABNORMAL
NITRATE UR QL: POSITIVE
PH UR STRIP: 6.5 [PH] (ref 5–7)
RBC #/AREA URNS AUTO: ABNORMAL /HPF
SP GR UR STRIP: >=1.03 (ref 1–1.03)
SQUAMOUS #/AREA URNS AUTO: ABNORMAL /LPF
UROBILINOGEN UR STRIP-ACNC: 0.2 E.U./DL
WBC #/AREA URNS AUTO: >100 /HPF

## 2023-09-05 PROCEDURE — 87088 URINE BACTERIA CULTURE: CPT

## 2023-09-05 PROCEDURE — 87186 SC STD MICRODIL/AGAR DIL: CPT

## 2023-09-05 PROCEDURE — 87086 URINE CULTURE/COLONY COUNT: CPT

## 2023-09-05 PROCEDURE — 81001 URINALYSIS AUTO W/SCOPE: CPT

## 2023-09-05 RX ORDER — MEPERIDINE HYDROCHLORIDE 25 MG/ML
25 INJECTION INTRAMUSCULAR; INTRAVENOUS; SUBCUTANEOUS EVERY 30 MIN PRN
Status: CANCELLED | OUTPATIENT
Start: 2023-09-06

## 2023-09-05 RX ORDER — ALBUTEROL SULFATE 0.83 MG/ML
2.5 SOLUTION RESPIRATORY (INHALATION)
Status: CANCELLED | OUTPATIENT
Start: 2023-09-06

## 2023-09-05 RX ORDER — METHYLPREDNISOLONE SODIUM SUCCINATE 125 MG/2ML
125 INJECTION, POWDER, LYOPHILIZED, FOR SOLUTION INTRAMUSCULAR; INTRAVENOUS
Status: CANCELLED
Start: 2023-09-06

## 2023-09-05 RX ORDER — HEPARIN SODIUM (PORCINE) LOCK FLUSH IV SOLN 100 UNIT/ML 100 UNIT/ML
5 SOLUTION INTRAVENOUS
Status: CANCELLED | OUTPATIENT
Start: 2023-09-06

## 2023-09-05 RX ORDER — ALBUTEROL SULFATE 90 UG/1
1-2 AEROSOL, METERED RESPIRATORY (INHALATION)
Status: CANCELLED
Start: 2023-09-06

## 2023-09-05 RX ORDER — HEPARIN SODIUM,PORCINE 10 UNIT/ML
5-20 VIAL (ML) INTRAVENOUS DAILY PRN
Status: CANCELLED | OUTPATIENT
Start: 2023-09-06

## 2023-09-05 RX ORDER — EPINEPHRINE 1 MG/ML
0.3 INJECTION, SOLUTION, CONCENTRATE INTRAVENOUS EVERY 5 MIN PRN
Status: CANCELLED | OUTPATIENT
Start: 2023-09-06

## 2023-09-05 RX ORDER — DIPHENHYDRAMINE HYDROCHLORIDE 50 MG/ML
50 INJECTION INTRAMUSCULAR; INTRAVENOUS
Status: CANCELLED
Start: 2023-09-06

## 2023-09-05 NOTE — TELEPHONE ENCOUNTER
Recent Results (from the past 24 hour(s))   UA with Microscopic reflex to Culture    Collection Time: 09/05/23 11:19 AM    Specimen: Urine, Midstream   Result Value Ref Range    Color Urine Yellow Colorless, Straw, Light Yellow, Yellow    Appearance Urine Cloudy (A) Clear    Glucose Urine Negative Negative mg/dL    Bilirubin Urine Negative Negative    Ketones Urine Negative Negative mg/dL    Specific Gravity Urine >=1.030 1.003 - 1.035    Blood Urine Large (A) Negative    pH Urine 6.5 5.0 - 7.0    Protein Albumin Urine 100 (A) Negative mg/dL    Urobilinogen Urine 0.2 0.2, 1.0 E.U./dL    Nitrite Urine Positive (A) Negative    Leukocyte Esterase Urine Small (A) Negative   Urine Microscopic Exam    Collection Time: 09/05/23 11:19 AM   Result Value Ref Range    Bacteria Urine Many (A) None Seen /HPF    RBC Urine 10-25 (A) 0-2 /HPF /HPF    WBC Urine >100 (A) 0-5 /HPF /HPF    Squamous Epithelials Urine Few (A) None Seen /LPF

## 2023-09-05 NOTE — TELEPHONE ENCOUNTER
Called patient and she will call tomorrow to get Infusion.       Reese Guevara RN  Infectious Disease on 9/5/2023 at 3:28 PM'

## 2023-09-05 NOTE — TELEPHONE ENCOUNTER
"Patient calling due to UTI symptoms. She is having bloody brown mucus in her urine, odor and urgency/freq.     Patient will drop off urine sample for UA/UC. Per Dr. Larry likely will have patient do Gentamicin IV this week. Will wait for UA results.       Reese Guevara RN  Infectious Disease on 9/5/2023 at 10:31 AM  Outpatient Parenteral Antimicrobial Therapy/ID Follow up     Infectious Diseases Indication: UTI     Antibiotic Information  Name of Antibiotic Dose of Antibiotic1 Anticipated duration   Gentamicin 5 mg/kg once                   1.Dose of antibiotic will need to be renally adjusted if creatinine clearance changes     Method of antibiotic delivery:Peripheral IV.Will the line be used for another indication besides antimicrobials? No At the end of therapy should the line used for antimicrobials be removed or de-accessed? Yes. Selecting \"yes\" will function as written order to remove PICC line or de-access the indwelling line at the end of therapy.     Weekly labs required: none     Imaging for ID follow up: ID Imaging: No.      ID provider route note: CORY RN Care Coordinator [facility] ID Clinic Information:  Phone: [phone]  Fax: [phone] (Attention ID Clinic Nurses)  "

## 2023-09-06 ENCOUNTER — INFUSION THERAPY VISIT (OUTPATIENT)
Dept: INFUSION THERAPY | Facility: CLINIC | Age: 63
End: 2023-09-06
Attending: INTERNAL MEDICINE
Payer: COMMERCIAL

## 2023-09-06 VITALS
WEIGHT: 208.1 LBS | OXYGEN SATURATION: 97 % | TEMPERATURE: 98.1 F | RESPIRATION RATE: 16 BRPM | SYSTOLIC BLOOD PRESSURE: 153 MMHG | HEART RATE: 88 BPM | BODY MASS INDEX: 31.54 KG/M2 | HEIGHT: 68 IN | DIASTOLIC BLOOD PRESSURE: 97 MMHG

## 2023-09-06 DIAGNOSIS — T83.511A URINARY TRACT INFECTION ASSOCIATED WITH CATHETERIZATION OF URINARY TRACT (H): Primary | ICD-10-CM

## 2023-09-06 DIAGNOSIS — N39.0 URINARY TRACT INFECTION ASSOCIATED WITH CATHETERIZATION OF URINARY TRACT (H): Primary | ICD-10-CM

## 2023-09-06 PROCEDURE — 258N000003 HC RX IP 258 OP 636: Performed by: INTERNAL MEDICINE

## 2023-09-06 PROCEDURE — 96365 THER/PROPH/DIAG IV INF INIT: CPT

## 2023-09-06 PROCEDURE — 250N000011 HC RX IP 250 OP 636: Performed by: INTERNAL MEDICINE

## 2023-09-06 RX ORDER — MEPERIDINE HYDROCHLORIDE 25 MG/ML
25 INJECTION INTRAMUSCULAR; INTRAVENOUS; SUBCUTANEOUS EVERY 30 MIN PRN
Status: CANCELLED | OUTPATIENT
Start: 2023-09-06

## 2023-09-06 RX ORDER — HEPARIN SODIUM (PORCINE) LOCK FLUSH IV SOLN 100 UNIT/ML 100 UNIT/ML
5 SOLUTION INTRAVENOUS
Status: CANCELLED | OUTPATIENT
Start: 2023-09-06

## 2023-09-06 RX ORDER — DIPHENHYDRAMINE HYDROCHLORIDE 50 MG/ML
50 INJECTION INTRAMUSCULAR; INTRAVENOUS
Status: CANCELLED
Start: 2023-09-06

## 2023-09-06 RX ORDER — ALBUTEROL SULFATE 90 UG/1
1-2 AEROSOL, METERED RESPIRATORY (INHALATION)
Status: CANCELLED
Start: 2023-09-06

## 2023-09-06 RX ORDER — ALBUTEROL SULFATE 0.83 MG/ML
2.5 SOLUTION RESPIRATORY (INHALATION)
Status: CANCELLED | OUTPATIENT
Start: 2023-09-06

## 2023-09-06 RX ORDER — METHYLPREDNISOLONE SODIUM SUCCINATE 125 MG/2ML
125 INJECTION, POWDER, LYOPHILIZED, FOR SOLUTION INTRAMUSCULAR; INTRAVENOUS
Status: CANCELLED
Start: 2023-09-06

## 2023-09-06 RX ORDER — EPINEPHRINE 1 MG/ML
0.3 INJECTION, SOLUTION INTRAMUSCULAR; SUBCUTANEOUS EVERY 5 MIN PRN
Status: CANCELLED | OUTPATIENT
Start: 2023-09-06

## 2023-09-06 RX ORDER — HEPARIN SODIUM,PORCINE 10 UNIT/ML
5-20 VIAL (ML) INTRAVENOUS DAILY PRN
Status: CANCELLED | OUTPATIENT
Start: 2023-09-06

## 2023-09-06 RX ADMIN — GENTAMICIN SULFATE 380 MG: 40 INJECTION, SOLUTION INTRAMUSCULAR; INTRAVENOUS at 14:49

## 2023-09-06 ASSESSMENT — PAIN SCALES - GENERAL: PAINLEVEL: MODERATE PAIN (5)

## 2023-09-06 NOTE — PATIENT INSTRUCTIONS
Dear Ilene Gaviria    Thank you for choosing [facility] ([facility]) for your Antibiotic infusion.      We look forward to seeing you at your next appointment here at [facility] ([facility]).  Please don t hesitate to call us at [phone] to reschedule any of your appointments or to speak with one of the [facility] registered nurses.  It was a pleasure taking care of you today.    Sincerely,    [facility]  [facility]  [address] [place] [address]  [address]  Phone:  [phone]

## 2023-09-06 NOTE — PROGRESS NOTES
"Infusion Nursing Note:  Ilene Gaviria presents today for   Chief Complaint   Patient presents with    Infusion     gentamicin (GARAMYCIN)       Patient seen by provider today: No   present during visit today: Not Applicable.    Note:   -Orders from Eunice Larry MD completed. Frequency: once.  -380mg Gentacmicin infused over 60min.    Intravenous Access:  Peripheral IV placed in Rt upper forearm by RN.    Treatment Conditions:  Not Applicable.    Post Infusion Assessment:  Patient tolerated infusion without incident.  Blood return noted pre and post infusion.  Site patent and intact, free from redness, edema or discomfort.  No evidence of extravasations.  Access discontinued per protocol.     Discharge Plan:   Discharge instructions reviewed with: Patient.  Patient and/or family verbalized understanding of discharge instructions and all questions answered.  AVS to patient via [employer].      Patient will follow up with provider; therapy complete.     Patient discharged in stable condition accompanied by: self.  Departure Mode: Ambulatory.      Damaris Perez RN    Blood pressure (!) 153/97, pulse 88, temperature 98.1  F (36.7  C), resp. rate 16, height 1.727 m (5' 8\"), weight 94.4 kg (208 lb 1.6 oz), SpO2 97 %, not currently breastfeeding.      Administrations This Visit       gentamicin (GARAMYCIN) 380 mg in sodium chloride 0.9 % 64.5 mL intermittent infusion       Admin Date  09/06/2023 Action  $New Bag Dose  380 mg Rate  64.5 mL/hr Route  Intravenous Administered By  Damaris Perez RN                    "

## 2023-09-08 LAB — BACTERIA UR CULT: ABNORMAL

## 2023-10-20 ENCOUNTER — TELEPHONE (OUTPATIENT)
Dept: FAMILY MEDICINE | Facility: CLINIC | Age: 63
End: 2023-10-20
Payer: COMMERCIAL

## 2023-11-03 DIAGNOSIS — N39.0 RECURRENT UTI: ICD-10-CM

## 2023-11-06 RX ORDER — METHENAMINE HIPPURATE 1000 MG/1
1 TABLET ORAL 2 TIMES DAILY
Qty: 60 TABLET | Refills: 7 | Status: SHIPPED | OUTPATIENT
Start: 2023-11-06

## 2023-11-13 ENCOUNTER — LAB (OUTPATIENT)
Dept: LAB | Facility: CLINIC | Age: 63
End: 2023-11-13
Payer: COMMERCIAL

## 2023-11-13 DIAGNOSIS — N39.0 URINARY TRACT INFECTION ASSOCIATED WITH CATHETERIZATION OF URINARY TRACT, UNSPECIFIED INDWELLING URINARY CATHETER TYPE, SUBSEQUENT ENCOUNTER: ICD-10-CM

## 2023-11-13 DIAGNOSIS — T83.511D URINARY TRACT INFECTION ASSOCIATED WITH CATHETERIZATION OF URINARY TRACT, UNSPECIFIED INDWELLING URINARY CATHETER TYPE, SUBSEQUENT ENCOUNTER: ICD-10-CM

## 2023-11-13 LAB
ALBUMIN UR-MCNC: NEGATIVE MG/DL
APPEARANCE UR: CLEAR
BACTERIA #/AREA URNS HPF: ABNORMAL /HPF
BILIRUB UR QL STRIP: NEGATIVE
COLOR UR AUTO: YELLOW
GLUCOSE UR STRIP-MCNC: NEGATIVE MG/DL
HGB UR QL STRIP: NEGATIVE
KETONES UR STRIP-MCNC: NEGATIVE MG/DL
LEUKOCYTE ESTERASE UR QL STRIP: ABNORMAL
NITRATE UR QL: NEGATIVE
PH UR STRIP: 5.5 [PH] (ref 5–7)
RBC #/AREA URNS AUTO: ABNORMAL /HPF
SP GR UR STRIP: 1.02 (ref 1–1.03)
SQUAMOUS #/AREA URNS AUTO: ABNORMAL /LPF
UROBILINOGEN UR STRIP-ACNC: 0.2 E.U./DL
WBC #/AREA URNS AUTO: ABNORMAL /HPF

## 2023-11-13 PROCEDURE — 87086 URINE CULTURE/COLONY COUNT: CPT

## 2023-11-13 PROCEDURE — 81001 URINALYSIS AUTO W/SCOPE: CPT

## 2023-11-14 LAB — BACTERIA UR CULT: NORMAL

## 2023-11-16 ENCOUNTER — TELEPHONE (OUTPATIENT)
Dept: INFECTIOUS DISEASES | Facility: CLINIC | Age: 63
End: 2023-11-16
Payer: COMMERCIAL

## 2023-11-16 NOTE — TELEPHONE ENCOUNTER
Informed patient that there is a generic of Bystolic but not xarelto. Asked if there was an alternative to xarelto. He and his wife are choosing new medicare D plans. Informed that alternative for xarelto would likely be warfarin which entails at least monthly blood draws. Patient thanked writer for information.   [facility]    Phone Message    May a detailed message be left on voicemail: yes     Reason for Call: Other: Patient is requesting a call back with lab results from 11/13/23 and to discuss.      Action Taken: Other: id    Travel Screening: Not Applicable

## 2023-11-16 NOTE — TELEPHONE ENCOUNTER
Called patient back to discuss. Let her know that her UC did not indicate treatment. She states her only symptom right now is increased frequency, but she denies the typical symptoms she gets with UTI's. She will call back if her symptoms worsen, but had no further questions at this time and was agreeable to plan.

## 2023-11-27 ENCOUNTER — ANCILLARY PROCEDURE (OUTPATIENT)
Dept: CT IMAGING | Facility: CLINIC | Age: 63
End: 2023-11-27
Attending: INTERNAL MEDICINE
Payer: COMMERCIAL

## 2023-11-27 DIAGNOSIS — C20 MALIGNANT NEOPLASM OF RECTUM (H): ICD-10-CM

## 2023-11-27 LAB
CREAT BLD-MCNC: 0.5 MG/DL (ref 0.5–1)
EGFRCR SERPLBLD CKD-EPI 2021: >60 ML/MIN/1.73M2

## 2023-11-27 PROCEDURE — 250N000009 HC RX 250: Performed by: INTERNAL MEDICINE

## 2023-11-27 PROCEDURE — 250N000011 HC RX IP 250 OP 636: Performed by: INTERNAL MEDICINE

## 2023-11-27 PROCEDURE — 74177 CT ABD & PELVIS W/CONTRAST: CPT

## 2023-11-27 PROCEDURE — 82565 ASSAY OF CREATININE: CPT

## 2023-11-27 RX ORDER — IOPAMIDOL 755 MG/ML
100 INJECTION, SOLUTION INTRAVASCULAR ONCE
Status: COMPLETED | OUTPATIENT
Start: 2023-11-27 | End: 2023-11-27

## 2023-11-27 RX ADMIN — SODIUM CHLORIDE 40 ML: 9 INJECTION, SOLUTION INTRAVENOUS at 09:25

## 2023-11-27 RX ADMIN — IOPAMIDOL 100 ML: 755 INJECTION, SOLUTION INTRAVENOUS at 09:25

## 2023-12-01 ENCOUNTER — TRANSFERRED RECORDS (OUTPATIENT)
Dept: HEALTH INFORMATION MANAGEMENT | Facility: CLINIC | Age: 63
End: 2023-12-01
Payer: COMMERCIAL

## 2023-12-04 ENCOUNTER — OFFICE VISIT (OUTPATIENT)
Dept: OPTOMETRY | Facility: CLINIC | Age: 63
End: 2023-12-04
Attending: PHYSICIAN ASSISTANT
Payer: COMMERCIAL

## 2023-12-04 DIAGNOSIS — H52.4 PRESBYOPIA: ICD-10-CM

## 2023-12-04 DIAGNOSIS — H52.13 MYOPIA OF BOTH EYES: ICD-10-CM

## 2023-12-04 DIAGNOSIS — H52.223 REGULAR ASTIGMATISM OF BOTH EYES: ICD-10-CM

## 2023-12-04 DIAGNOSIS — E11.40 TYPE 2 DIABETES MELLITUS WITH DIABETIC NEUROPATHY, WITHOUT LONG-TERM CURRENT USE OF INSULIN (H): ICD-10-CM

## 2023-12-04 DIAGNOSIS — E11.40 TYPE 2 DIABETES MELLITUS WITH DIABETIC NEUROPATHY, WITHOUT LONG-TERM CURRENT USE OF INSULIN (H): Primary | ICD-10-CM

## 2023-12-04 PROCEDURE — 92004 COMPRE OPH EXAM NEW PT 1/>: CPT | Performed by: OPTOMETRIST

## 2023-12-04 PROCEDURE — 92015 DETERMINE REFRACTIVE STATE: CPT | Performed by: OPTOMETRIST

## 2023-12-04 RX ORDER — LOSARTAN POTASSIUM 25 MG/1
25 TABLET ORAL DAILY
Qty: 90 TABLET | Refills: 1 | Status: SHIPPED | OUTPATIENT
Start: 2023-12-04 | End: 2023-12-27

## 2023-12-04 ASSESSMENT — REFRACTION_WEARINGRX
OS_CYLINDER: +1.00
OS_AXIS: 014
OD_CYLINDER: +0.50
OD_SPHERE: -1.75
SPECS_TYPE: SVL
OD_AXIS: 165
OS_SPHERE: -2.00

## 2023-12-04 ASSESSMENT — CONF VISUAL FIELD
METHOD: COUNTING FINGERS
OS_INFERIOR_TEMPORAL_RESTRICTION: 0
OS_INFERIOR_NASAL_RESTRICTION: 0
OD_NORMAL: 1
OD_INFERIOR_TEMPORAL_RESTRICTION: 0
OS_NORMAL: 1
OD_SUPERIOR_TEMPORAL_RESTRICTION: 0
OS_SUPERIOR_TEMPORAL_RESTRICTION: 0
OS_SUPERIOR_NASAL_RESTRICTION: 0
OD_INFERIOR_NASAL_RESTRICTION: 0
OD_SUPERIOR_NASAL_RESTRICTION: 0

## 2023-12-04 ASSESSMENT — REFRACTION_MANIFEST
OD_AXIS: 179
OD_SPHERE: -1.75
OS_AXIS: 015
OS_CYLINDER: +0.75
OD_CYLINDER: +0.50
OD_ADD: +2.50
OS_AXIS: 017
OS_SPHERE: -2.25
OS_ADD: +2.50
OS_SPHERE: -2.00
OD_CYLINDER: +0.50
OD_AXIS: 165
OD_SPHERE: -1.75
OS_CYLINDER: +0.75

## 2023-12-04 ASSESSMENT — KERATOMETRY
OD_K2POWER_DIOPTERS: 44.50
OD_AXISANGLE_DEGREES: 031
OD_AXISANGLE2_DEGREES: 121
OS_K1POWER_DIOPTERS: 43.75
OD_K1POWER_DIOPTERS: 44.00
OS_AXISANGLE_DEGREES: 071
OS_K2POWER_DIOPTERS: 44.00
OS_AXISANGLE2_DEGREES: 161

## 2023-12-04 ASSESSMENT — VISUAL ACUITY
OS_SC: 20/100
METHOD: SNELLEN - LINEAR
OD_SC: 20/80
OS_CC: 20/25
OD_SC: J1
OS_SC: J1
OD_CC: 20/20

## 2023-12-04 ASSESSMENT — EXTERNAL EXAM - RIGHT EYE: OD_EXAM: NORMAL

## 2023-12-04 ASSESSMENT — CUP TO DISC RATIO
OS_RATIO: 0.2
OD_RATIO: 0.2

## 2023-12-04 ASSESSMENT — TONOMETRY
OS_IOP_MMHG: 16
OD_IOP_MMHG: 16
IOP_METHOD: APPLANATION

## 2023-12-04 ASSESSMENT — SLIT LAMP EXAM - LIDS
COMMENTS: NORMAL
COMMENTS: NORMAL

## 2023-12-04 ASSESSMENT — EXTERNAL EXAM - LEFT EYE: OS_EXAM: NORMAL

## 2023-12-04 NOTE — PATIENT INSTRUCTIONS
Patient Education   Diabetes weakens the blood vessels all over the body, including the eyes. Damage to the blood vessels in the eyes can cause swelling or bleeding into part of the eye (called the retina). This is called diabetic retinopathy (AFSHAN-tin-AH-pu-thee). If not treated, this disease can cause vision loss or blindness.   Symptoms may include blurred or distorted vision, but many people have no symptoms. It's important to see your eye doctor regularly to check for problems.   Early treatment and good control can help protect your vision. Here are the things you can do to help prevent vision loss:      1. Keep your blood sugar levels under tight control.      2. Bring high blood pressure under control.      3. No smoking.      4. Have yearly dilated eye exams.       Myopia is a result of long eyes. It is commonly referred to as near-sightedness. Seeing clearly in the distance is the main challenge.    Astigmatism results from curvature differential in the cornea and crystalline lens which can cause a distorted image, as light rays are prevented from meeting at a common focus.    Presbyopia is the diagnosis. Presbyopia is an age-related condition where the eye's crystalline lens doesn't change shape as easily as it once did.    Eyeglass prescription given.    Recommend annual eye exams.    Candace Wagner O.D.  [address] [address] [phone]

## 2023-12-04 NOTE — TELEPHONE ENCOUNTER
Rx not noted in last clinic visit notes.   Pended to provider.   Caity Singh RN on 12/4/2023 at 1:14 PM

## 2023-12-04 NOTE — PROGRESS NOTES
Chief Complaint   Patient presents with    Diabetic Eye Exam        Chief Complaint(s) and History of Present Illness(es)       Diabetic Eye Exam                    Lab Results   Component Value Date    A1C 7.0 05/31/2023    A1C 7.0 10/28/2022            Last Eye Exam: 3-4 yrs ago  Dilated Previously: No, side effects of dilation explained today    What are you currently using to see?  Glasses - wears for driving mostly    Patient is diabetic type 2 - blood sugars well controlled. A1C under 6    Distance Vision Acuity: Satisfied with vision    Near Vision Acuity: Satisfied with vision while reading  unaided    Eye Comfort: good  Do you use eye drops? : No  Occupation or Hobbies: sells insurance    [employer]        Medical, surgical and family histories reviewed and updated 12/4/2023.       OBJECTIVE: See Ophthalmology exam    ASSESSMENT:    ICD-10-CM    1. Type 2 diabetes mellitus with diabetic neuropathy, without long-term current use of insulin (H)  E11.40 Adult Eye  Referral          PLAN:    Ilene Gaviria aware  eye exam results will be sent to Peter Altamirano.  There are no Patient Instructions on file for this visit.

## 2023-12-15 DIAGNOSIS — E11.40 TYPE 2 DIABETES MELLITUS WITH DIABETIC NEUROPATHY, WITHOUT LONG-TERM CURRENT USE OF INSULIN (H): ICD-10-CM

## 2023-12-18 RX ORDER — DULAGLUTIDE 3 MG/.5ML
3 INJECTION, SOLUTION SUBCUTANEOUS WEEKLY
Qty: 2 ML | Refills: 0 | Status: SHIPPED | OUTPATIENT
Start: 2023-12-18 | End: 2024-07-11

## 2023-12-18 NOTE — TELEPHONE ENCOUNTER
Dulaglutide (TRULICITY) 3 MG/0.5ML SOPN   2 mL 0 6/15/2023     6/15/2023  [facility] [place]    Chrsital Monteiro MD  Endocrinology, Diabetes, and Metabolism    Routed because: gap  in med rf. A1C

## 2023-12-26 NOTE — PROGRESS NOTES
Outcome for 12/26/23 10:00 AM: Patient is not checking blood sugars  Appointment reminder phone call made to patient.

## 2023-12-27 ENCOUNTER — OFFICE VISIT (OUTPATIENT)
Dept: ENDOCRINOLOGY | Facility: CLINIC | Age: 63
End: 2023-12-27
Payer: COMMERCIAL

## 2023-12-27 ENCOUNTER — LAB (OUTPATIENT)
Dept: LAB | Facility: CLINIC | Age: 63
End: 2023-12-27
Payer: COMMERCIAL

## 2023-12-27 VITALS
OXYGEN SATURATION: 96 % | WEIGHT: 210 LBS | SYSTOLIC BLOOD PRESSURE: 149 MMHG | DIASTOLIC BLOOD PRESSURE: 98 MMHG | HEART RATE: 64 BPM | BODY MASS INDEX: 31.93 KG/M2

## 2023-12-27 DIAGNOSIS — N39.0 URINARY TRACT INFECTION ASSOCIATED WITH CATHETERIZATION OF URINARY TRACT, UNSPECIFIED INDWELLING URINARY CATHETER TYPE, SUBSEQUENT ENCOUNTER: ICD-10-CM

## 2023-12-27 DIAGNOSIS — E11.40 TYPE 2 DIABETES MELLITUS WITH DIABETIC NEUROPATHY, WITHOUT LONG-TERM CURRENT USE OF INSULIN (H): ICD-10-CM

## 2023-12-27 DIAGNOSIS — T83.511D URINARY TRACT INFECTION ASSOCIATED WITH CATHETERIZATION OF URINARY TRACT, UNSPECIFIED INDWELLING URINARY CATHETER TYPE, SUBSEQUENT ENCOUNTER: ICD-10-CM

## 2023-12-27 LAB
ALBUMIN SERPL BCG-MCNC: 4.4 G/DL (ref 3.5–5.2)
ALBUMIN UR-MCNC: NEGATIVE MG/DL
ALP SERPL-CCNC: 76 U/L (ref 40–150)
ALT SERPL W P-5'-P-CCNC: 87 U/L (ref 0–50)
APPEARANCE UR: ABNORMAL
AST SERPL W P-5'-P-CCNC: 51 U/L (ref 0–45)
BILIRUB DIRECT SERPL-MCNC: <0.2 MG/DL (ref 0–0.3)
BILIRUB SERPL-MCNC: 0.4 MG/DL
BILIRUB UR QL STRIP: NEGATIVE
CHOLEST SERPL-MCNC: 127 MG/DL
COLOR UR AUTO: ABNORMAL
CREAT UR-MCNC: 85.7 MG/DL
FASTING STATUS PATIENT QL REPORTED: NO
GLUCOSE UR STRIP-MCNC: NEGATIVE MG/DL
HBA1C MFR BLD: 6.8 %
HDLC SERPL-MCNC: 45 MG/DL
HGB UR QL STRIP: NEGATIVE
HYALINE CASTS: 2 /LPF
KETONES UR STRIP-MCNC: NEGATIVE MG/DL
LDLC SERPL CALC-MCNC: 34 MG/DL
LEUKOCYTE ESTERASE UR QL STRIP: ABNORMAL
MICROALBUMIN UR-MCNC: 12.4 MG/L
MICROALBUMIN/CREAT UR: 14.47 MG/G CR (ref 0–25)
MUCOUS THREADS #/AREA URNS LPF: PRESENT /LPF
NITRATE UR QL: POSITIVE
NONHDLC SERPL-MCNC: 82 MG/DL
PH UR STRIP: 5.5 [PH] (ref 5–7)
PROT SERPL-MCNC: 7.1 G/DL (ref 6.4–8.3)
RBC URINE: 3 /HPF
SP GR UR STRIP: 1.02 (ref 1–1.03)
SQUAMOUS EPITHELIAL: 8 /HPF
TRANSITIONAL EPI: 2 /HPF
TRIGL SERPL-MCNC: 240 MG/DL
TSH SERPL DL<=0.005 MIU/L-ACNC: 3.24 UIU/ML (ref 0.3–4.2)
UROBILINOGEN UR STRIP-MCNC: NORMAL MG/DL
WBC URINE: 22 /HPF

## 2023-12-27 PROCEDURE — 83036 HEMOGLOBIN GLYCOSYLATED A1C: CPT | Performed by: PATHOLOGY

## 2023-12-27 PROCEDURE — 82570 ASSAY OF URINE CREATININE: CPT | Performed by: STUDENT IN AN ORGANIZED HEALTH CARE EDUCATION/TRAINING PROGRAM

## 2023-12-27 PROCEDURE — 87086 URINE CULTURE/COLONY COUNT: CPT | Performed by: INTERNAL MEDICINE

## 2023-12-27 PROCEDURE — 99000 SPECIMEN HANDLING OFFICE-LAB: CPT | Performed by: PATHOLOGY

## 2023-12-27 PROCEDURE — 36415 COLL VENOUS BLD VENIPUNCTURE: CPT | Performed by: PATHOLOGY

## 2023-12-27 PROCEDURE — 80076 HEPATIC FUNCTION PANEL: CPT | Performed by: PATHOLOGY

## 2023-12-27 PROCEDURE — 84443 ASSAY THYROID STIM HORMONE: CPT | Performed by: PATHOLOGY

## 2023-12-27 PROCEDURE — 80061 LIPID PANEL: CPT | Performed by: PATHOLOGY

## 2023-12-27 PROCEDURE — 81001 URINALYSIS AUTO W/SCOPE: CPT | Performed by: PATHOLOGY

## 2023-12-27 PROCEDURE — 99215 OFFICE O/P EST HI 40 MIN: CPT | Performed by: STUDENT IN AN ORGANIZED HEALTH CARE EDUCATION/TRAINING PROGRAM

## 2023-12-27 RX ORDER — LOSARTAN POTASSIUM 50 MG/1
50 TABLET ORAL DAILY
Qty: 90 TABLET | Refills: 1 | Status: SHIPPED | OUTPATIENT
Start: 2023-12-27

## 2023-12-27 ASSESSMENT — PAIN SCALES - GENERAL: PAINLEVEL: NO PAIN (0)

## 2023-12-27 NOTE — PROGRESS NOTES
Endocrinology Clinic Visit       NAME:  Ilene Gaviria  PCP:  Trevor Rizo  MRN:  [id number]  Reason for Consult: DM2  Requesting Provider:  Referred Self    Chief Complaint     Chief Complaint   Patient presents with    Diabetes       History of Present Illness     Ilene Gaviria is a 61 year old female who is seen in clinic for DM2 and weight management. Last seen 6/2024.    Her past medical history is significant for rectal cancer status post surgery and chemotherapy currently in remission.  Never been on steroids before.  The patient was diagnosed with type 2 diabetes 1 year ago . A1C was checked no records, she said it was in the range of 8.  She was started on metformin, she is tolerating it very well.      Visit of 3/2023 she wanted to start wt loss meds and we discussed ozempic. Her weight has always been always between 180-210 since having her kids. It is been stable but not able to loose weight despite dieting. She has problem with portion control. We started Trulicity.    Last visit 6/2023 Trulicity increased to 3 mg. She stopped it 3 weeks ago, she wanted to try off it. She reported it worked well for her , decreased her appetite and helped with portion control. She had no side effects. She restarted a week ago, back on 3 mg and had nausea.   She had an ankle surgery , tendon transfer, was non weight bearing for 6 month. She is doing PT.   No change in weight.       Wt Readings from Last 4 Encounters:   12/27/23 95.3 kg (210 lb)   09/06/23 94.4 kg (208 lb 1.6 oz)   07/28/23 94.4 kg (208 lb 1.6 oz)   06/29/23 96.8 kg (213 lb 6.4 oz)     A1c today 6.8.    Diabetes Care/Complications:   Eyes: had an eye exam 2 weeks, no DR.   Kidneys: positive urine microalb 12/2022, normal Cr on 11/2023. On cozaar 25 mg daily.  Nerves: tingling and numbness bottom at first but now at the ankle. Foot exam completed 6/2022  Smoking: no  Blood Pressure: BP high today and on previous visits.   Lipids:  on simvastatin 20 mg daily. 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 She lives with her .    Problem List     Patient Active Problem List   Diagnosis    BMI 30.0-30.9,adult    Rectal cancer (H)    Colostomy in place (H)    Lesion of bladder    Somatic dysfunction of pelvic region    Urinary retention    Urinary tract infection associated with catheterization of urinary tract     Diabetes mellitus type 1 (H)    Diabetes mellitus, type 2 (H)        Medications     Current Outpatient Medications   Medication    Dulaglutide (TRULICITY) 3 MG/0.5ML SOPN    estradiol (ESTRACE) 0.1 MG/GM vaginal cream    losartan (COZAAR) 25 MG tablet    metFORMIN (GLUCOPHAGE) 500 MG tablet    methenamine hippurate (HIPREX) 1 g tablet    phenazopyridine (PYRIDIUM) 200 MG tablet    simvastatin (ZOCOR) 20 MG tablet     No current facility-administered medications for this visit.        Allergies     Allergies   Allergen Reactions    No Known Allergies     Tomato Rash       Medical / Surgical History     Past Medical History:   Diagnosis Date    Cancer (H)     rectal cancer     Past Surgical History:   Procedure Laterality Date    CYSTOSCOPY, BIOPSY BLADDER, COMBINED N/A 7/26/2022    Procedure: CYSTOSCOPY, WITH BLADDER BIOPSY AND FULGURATION (Look in the bladder and sample red  tissue then burn the area involved);  Surgeon: Kacy Anderson MD;  Location: [facility] [place]    DAVINCI COLECTOMY N/A 8/7/2018    Procedure: DAVINCI XI COLECTOMY;  DAVINCI ABDOMINAL PERINEAL RESECTION WITH PERMANENT COLOSTOMY ( PARI ) LEFT GLUTEAL FLAP TO RECTAL DEFECT ( JERE ) ;  Surgeon: Jonny Finney MD;  Location:  OR    GENITOURINARY SURGERY      bladder sling 12/18    GRAFT FLAP GLUTEUS  TO PERINEUM N/A 8/7/2018    Procedure: GRAFT FLAP GLUTEUS TO PERINEUM;;  Surgeon: Conchita Ramos MD;  Location:  OR    GYN SURGERY      c section X3, tubal     IR CYSTOGRAM  8/24/2022    REMOVE MESH VAGINA N/A 11/1/2022    Procedure: EXCISION, sling, VAGINA (cut one side of the sling in the vagina), CYSTOSCOPY;  Surgeon: Kacy Anderson MD;  Location: [facility] OR    SIGMOIDOSCOPY FLEXIBLE N/A 7/9/2018    Procedure: SIGMOIDOSCOPY FLEXIBLE;  FLEXIBILE SIGMOIDOSCOPY;  Surgeon: Jonny Finney MD;  Location:  GI       Social History     Social History     Socioeconomic History    Marital status:      Spouse name: Not on file    Number of children: Not on file    Years of education: Not on file    Highest education level: Not on file   Occupational History    Not on file   Tobacco Use    Smoking status: Never    Smokeless tobacco: Never   Vaping Use    Vaping Use: Never used   Substance and Sexual Activity    Alcohol use: Yes     Comment: rare    Drug use: No    Sexual activity: Not Currently     Partners: Male   Other Topics Concern    Parent/sibling w/ CABG, MI or angioplasty before 65F 55M? Not Asked   Social History Narrative    Not on file     Social Determinants of Health     Financial Resource Strain: Not on file   Food Insecurity: Not on file   Transportation Needs: Not on file   Physical Activity: Not on file   Stress: Not on file   Social Connections: Not on file   Interpersonal Safety: Not on file   Housing Stability: Not on file       Family History   Noncontributory    ROS     12 ROS completed, pertinent positive and negative in HPI    Physical Exam   BP (!) 149/98   Pulse 64   Wt 95.3 kg (210 lb)   SpO2 96%   BMI 31.93 kg/m     /GENERAL: Healthy, alert and no distress  EYES: Eyes grossly normal to inspection.  No discharge or erythema, or obvious scleral/conjunctival abnormalities.  RESP: No audible wheeze, cough, or visible cyanosis.  No visible retractions or increased work of  "breathing.    SKIN: Visible skin clear. No significant rash, abnormal pigmentation or lesions.  NEURO: Cranial nerves grossly intact.  Mentation and speech appropriate for age.  PSYCH: Mentation appears normal, affect normal/bright, judgement and insight intact, normal speech and appearance well-groomed.      Labs/Imaging     Pertinent Labs were reviewed and updated in EPIC and discussed briefly.  Radiology Results were  reviewed and updated in EPIC and discussed briefly.    Summary of recent findings:   No results found for: A1C    No results found for: \"TSH\", \"T4\"    Creatinine   Date Value Ref Range Status   05/31/2023 0.66 0.51 - 0.95 mg/dL Final   12/17/2019 0.60 0.52 - 1.04 mg/dL Final     Creatinine POCT   Date Value Ref Range Status   11/27/2023 0.5 0.5 - 1.0 mg/dL Final       Recent Labs   Lab Test 12/17/19  0940   CHOL 206*   HDL 39*   LDL 97   TRIG 348*       No results found for: \"[id number]\", \"[id number]\", \"[id number]\"    I personally reviewed the patient's outside records from [facility] EMR and faxed records. Summary of pertinent findings in \[facility] [place] [facility]\"".    Impression / Plan     1. Diabetes Mellitus: Type 2  2. Obesity class II    Good glycemic control. She was taking metformin 1 g daily instead of 2 g unsure why, she would like to increase her dose. We discussed titrating trulicity slowly up now that she has been off it for 3 weeks; for now she would like to try 3 mg again if nausea persist she will let me know.            2. Diabetes Complications: refer to hpi. Due for urine alb.      3. Blood Pressure Management: Blood pressure is above goal. Increase losartan to 100 mg daily.     4.Lipid Management: Per the new ACC/RALPH/NHLBI guidelines, statins are recommended for individuals with diabetes aged 40-75 with LDL  without ASCVD, and for any individual with ASCVD. Currently the patient is on a statin.      5. Smoking Status: Patient Pt is smoke free..       6. Elevated liver enzymes: very likely NAFLD but " work up was never completed. Discussed with patient and she will review with PCP. Could consider, screening for viral hepatitis, autoimmune and fibroscan. We discussed that wt is the mainstay of treatment. Glp1a that might help with NAFLD are semiglutide, tirezapetide and liraglutide. If no wt on trulicity , will try if insurance would cover semuglutide.     Review of the result(s) of each unique test - A1c and diabetes related labs.          Test and/or medications prescribed today:  No orders of the defined types were placed in this encounter.        Follow up: 6 month    40 minutes spent on the date of the encounter doing chart review, history and exam, documentation and further activities as noted above.       Christal Monteiro MD  Endocrinology, Diabetes and Metabolism  [facility]

## 2023-12-27 NOTE — LETTER
12/27/2023       RE: Ilene Gaviria  [address] [address]     Dear Colleague,    Thank you for referring your patient, Ilene Gaviria, to the [facility]. Please see a copy of my visit note below.    Outcome for 12/26/23 10:00 AM: Patient is not checking blood sugars  Appointment reminder phone call made to patient.         Endocrinology Clinic Visit       NAME:  Ilene Gaviria  PCP:  Trevor Rizo  MRN:  [id number]  Reason for Consult: DM2  Requesting Provider:  Referred Self    Chief Complaint     Chief Complaint   Patient presents with    Diabetes       History of Present Illness     Ilene Gaviria is a 61 year old female who is seen in clinic for DM2 and weight management. Last seen 6/2024.    Her past medical history is significant for rectal cancer status post surgery and chemotherapy currently in remission.  Never been on steroids before.  The patient was diagnosed with type 2 diabetes 1 year ago . A1C was checked no records, she said it was in the range of 8.  She was started on metformin, she is tolerating it very well.      Visit of 3/2023 she wanted to start wt loss meds and we discussed ozempic. Her weight has always been always between 180-210 since having her kids. It is been stable but not able to loose weight despite dieting. She has problem with portion control. We started Trulicity.    Last visit 6/2023 Trulicity increased to 3 mg. She stopped it 3 weeks ago, she wanted to try off it. She reported it worked well for her , decreased her appetite and helped with portion control. She had no side effects. She restarted a week ago, back on 3 mg and had nausea.   She had an ankle surgery , tendon transfer, was non weight bearing for 6 month. She is doing PT.   No change in weight.       Wt Readings from Last 4 Encounters:   12/27/23 95.3 kg (210 lb)    09/06/23 94.4 kg (208 lb 1.6 oz)   07/28/23 94.4 kg (208 lb 1.6 oz)   06/29/23 96.8 kg (213 lb 6.4 oz)     A1c today 6.8.    Diabetes Care/Complications:   Eyes: had an eye exam 2 weeks, no DR.   Kidneys: positive urine microalb 12/2022, normal Cr on 11/2023. On cozaar 25 mg daily.  Nerves: tingling and numbness bottom at first but now at the ankle. Foot exam completed 6/2022  Smoking: no  Blood Pressure: BP high today and on previous visits.   Lipids: on simvastatin 20 mg daily. 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 She lives with her .    Problem List     Patient Active Problem List   Diagnosis    BMI 30.0-30.9,adult    Rectal cancer (H)    Colostomy in place (H)    Lesion of bladder    Somatic dysfunction of pelvic region    Urinary retention    Urinary tract infection associated with catheterization of urinary tract     Diabetes mellitus type 1 (H)    Diabetes mellitus, type 2 (H)        Medications     Current Outpatient Medications   Medication    Dulaglutide (TRULICITY) 3 MG/0.5ML SOPN    estradiol (ESTRACE) 0.1 MG/GM vaginal cream    losartan (COZAAR) 25 MG tablet    metFORMIN (GLUCOPHAGE) 500 MG tablet    methenamine hippurate (HIPREX) 1 g tablet    phenazopyridine (PYRIDIUM) 200 MG tablet    simvastatin (ZOCOR) 20 MG tablet     No current facility-administered medications for this visit.        Allergies     Allergies   Allergen Reactions    No Known Allergies     Tomato Rash       Medical / Surgical History     Past Medical History:   Diagnosis Date    Cancer (H)     rectal cancer     Past Surgical History:   Procedure Laterality Date    CYSTOSCOPY, BIOPSY BLADDER, COMBINED N/A 7/26/2022    Procedure: CYSTOSCOPY, WITH BLADDER  BIOPSY AND FULGURATION (Look in the bladder and sample red  tissue then burn the area involved);  Surgeon: Kacy Anderson MD;  [place] [facility] [place]    DAVINCI COLECTOMY N/A 8/7/2018    Procedure: DAVINCI XI COLECTOMY;  DAVINCI ABDOMINAL PERINEAL RESECTION WITH PERMANENT COLOSTOMY ( PARI ) LEFT GLUTEAL FLAP TO RECTAL DEFECT ( JERE ) ;  Surgeon: Jonny Finney MD;  Location:  OR    GENITOURINARY SURGERY      bladder sling 12/18    GRAFT FLAP GLUTEUS TO PERINEUM N/A 8/7/2018    Procedure: GRAFT FLAP GLUTEUS TO PERINEUM;;  Surgeon: Conchita Ramos MD;  Location:  OR    GYN SURGERY      c section X3, tubal     IR CYSTOGRAM  8/24/2022    REMOVE MESH VAGINA N/A 11/1/2022    Procedure: EXCISION, sling, VAGINA (cut one side of the sling in the vagina), CYSTOSCOPY;  Surgeon: Kacy Anderson MD;  [place] [facility] [place]    SIGMOIDOSCOPY FLEXIBLE N/A 7/9/2018    Procedure: SIGMOIDOSCOPY FLEXIBLE;  FLEXIBILE SIGMOIDOSCOPY;  Surgeon: Jonny Finney MD;  Location:  GI       Social History     Social History     Socioeconomic History    Marital status:      Spouse name: Not on file    Number of children: Not on file    Years of education: Not on file    Highest education level: Not on file   Occupational History    Not on file   Tobacco Use    Smoking status: Never    Smokeless tobacco: Never   Vaping Use    Vaping Use: Never used   Substance and Sexual Activity    Alcohol use: Yes     Comment: rare    Drug use: No    Sexual activity: Not Currently     Partners: Male   Other Topics Concern    Parent/sibling w/ CABG, MI or angioplasty before 65F 55M? Not Asked   Social History Narrative    Not on file     Social Determinants of Health     Financial Resource Strain: Not on file   Food Insecurity: Not on file   Transportation Needs: Not on file   Physical Activity: Not on file   Stress: Not on file   Social Connections: Not on file   Interpersonal Safety: Not on file   Housing Stability: Not on file  "      Family History   Noncontributory    ROS     12 ROS completed, pertinent positive and negative in HPI    Physical Exam   BP (!) 149/98   Pulse 64   Wt 95.3 kg (210 lb)   SpO2 96%   BMI 31.93 kg/m     /GENERAL: Healthy, alert and no distress  EYES: Eyes grossly normal to inspection.  No discharge or erythema, or obvious scleral/conjunctival abnormalities.  RESP: No audible wheeze, cough, or visible cyanosis.  No visible retractions or increased work of breathing.    SKIN: Visible skin clear. No significant rash, abnormal pigmentation or lesions.  NEURO: Cranial nerves grossly intact.  Mentation and speech appropriate for age.  PSYCH: Mentation appears normal, affect normal/bright, judgement and insight intact, normal speech and appearance well-groomed.      Labs/Imaging     Pertinent Labs were reviewed and updated in EPIC and discussed briefly.  Radiology Results were  reviewed and updated in EPIC and discussed briefly.    Summary of recent findings:   No results found for: A1C    No results found for: \"TSH\", \"T4\"    Creatinine   Date Value Ref Range Status   05/31/2023 0.66 0.51 - 0.95 mg/dL Final   12/17/2019 0.60 0.52 - 1.04 mg/dL Final     Creatinine POCT   Date Value Ref Range Status   11/27/2023 0.5 0.5 - 1.0 mg/dL Final       Recent Labs   Lab Test 12/17/19  0940   CHOL 206*   HDL 39*   LDL 97   TRIG 348*       No results found for: \"[id number]\", \"[id number]\", \"[id number]\"    I personally reviewed the patient's outside records from [facility] EMR and faxed records. Summary of pertinent findings in [facility].    Impression / Plan     1. Diabetes Mellitus: Type 2  2. Obesity class II    Good glycemic control. She was taking metformin 1 g daily instead of 2 g unsure why, she would like to increase her dose. We discussed titrating trulicity slowly up now that she has been off it for 3 weeks; for now she would like to try 3 mg again if nausea persist she will let me know.            2. Diabetes Complications: refer " to hpi. Due for urine alb.      3. Blood Pressure Management: Blood pressure is improving. Currently is  on pharmacotherapy for this.      4.Lipid Management: Per the new ACC/RALPH/NHLBI guidelines, statins are recommended for individuals with diabetes aged 40-75 with LDL  without ASCVD, and for any individual with ASCVD. Currently the patient is on a statin.      5. Smoking Status: Patient Pt is smoke free..       6. Elevated liver enzymes: very likely NAFLD but work up was never completed. Discussed with patient and she will review with PCP. Could consider, screening for viral hepatitis, autoimmune and fibroscan. We discussed that wt is the mainstay of treatment. Glp1a that might help with NAFLD are semiglutide, tirezapetide and liraglutide. If no wt on trulicity , will try if insurance would cover semuglutide.     Review of the result(s) of each unique test - A1c and diabetes related labs.          Test and/or medications prescribed today:  No orders of the defined types were placed in this encounter.        Follow up: 6 month    40 minutes spent on the date of the encounter doing chart review, history and exam, documentation and further activities as noted above.       Christal Monteiro MD  Endocrinology, Diabetes and Metabolism  [facility]

## 2023-12-28 LAB — BACTERIA UR CULT: NORMAL

## 2024-04-12 ENCOUNTER — MYC MEDICAL ADVICE (OUTPATIENT)
Dept: FAMILY MEDICINE | Facility: CLINIC | Age: 64
End: 2024-04-12
Payer: COMMERCIAL

## 2024-04-12 NOTE — LETTER
John Gaviria,        We care about your health and have reviewed your health plan. We have reviewed your medical conditions, medication list, and lab results and am making recommendations based on this review to better manage your health.        You are in particular need of attention regarding:  -Depression  -Diabetes  -Wellness (Physical) Visit           I am recommending that you:  -schedule a LAB ONLY APPOINTMENT to recheck your: A1c tests within the next 1-4 weeks.  -schedule a WELLNESS (Physical) APPOINTMENT with me.   I will check fasting labs the same day - nothing to eat except water and meds for 8-10 hours prior.  -schedule a PAP SMEAR EXAM which is due.  Please disregard this reminder if you have had this exam elsewhere within the last year.  It would be helpful for us to have a copy of your recent pap smear report in our file so that we can best coordinate your care.        Attached below is you current health maintenance. If you have had any of your health maintenance done elsewhere, please call us back at our offices [phone] with your respond with when and where you had it done.       If you have completed your pap exam elsewhere please respond with   When:  Where:      *If you have had a Hysterectomy please let us know so we can update your Medical Chart.    Health Maintenance Due   Topic Date Due    DIABETIC FOOT EXAM  Never done    ADVANCE CARE PLANNING  Never done    Pneumococcal Vaccine: Pediatrics (0 to 5 Years) and At-Risk Patients (6 to 64 Years) (1 of 2 - PCV) Never done    DTAP/TDAP/TD IMMUNIZATION (1 - Tdap) Never done    ZOSTER IMMUNIZATION (1 of 2) Never done    PAP  12/01/2018    RSV VACCINE (Pregnancy & 60+) (1 - 1-dose 60+ series) Never done    YEARLY PREVENTIVE VISIT  02/08/2023    INFLUENZA VACCINE (1) Never done    COVID-19 Vaccine (1 - 2023-24 season) Never done    ANNUAL REVIEW OF HM ORDERS  09/16/2023    PHQ-2 (once per calendar year)  01/01/2024    A1C   03/27/2024                  Thanks,        Your team at [facility]

## 2024-04-12 NOTE — TELEPHONE ENCOUNTER
Patient Quality Outreach    Patient is due for the following:   Diabetes -  A1C, Eye Exam, and Foot Exam  Cervical Cancer Screening - PAP Needed  Depression  -  Depression follow-up visitPHQ2  Physical Preventive Adult Physical      Topic Date Due    Pneumococcal Vaccine (1 of 2 - PCV) Never done    Diptheria Tetanus Pertussis (DTAP/TDAP/TD) Vaccine (1 - Tdap) Never done    Zoster (Shingles) Vaccine (1 of 2) Never done    Flu Vaccine (1) Never done    COVID-19 Vaccine (1 - 2023-24 season) Never done       Next Steps:   Schedule a Adult Preventative  Patient was assigned appropriate questionnaire to complete    Type of outreach:    Sent letter.      Questions for provider review:    None           Alfonzo Salazar, CMA

## 2024-04-29 ENCOUNTER — DOCUMENTATION ONLY (OUTPATIENT)
Dept: INFECTIOUS DISEASES | Facility: CLINIC | Age: 64
End: 2024-04-29
Payer: COMMERCIAL

## 2024-07-09 NOTE — PROGRESS NOTES
Virtual Visit Details      Outcome for 07/09/24 1:55 PM: Patient is not checking blood sugars  Maria Elena Stout MA    Patient is showing 4/5 MNCM met. BP out range   Maria Elena Stout MA

## 2024-07-11 ENCOUNTER — VIRTUAL VISIT (OUTPATIENT)
Dept: ENDOCRINOLOGY | Facility: CLINIC | Age: 64
End: 2024-07-11
Payer: COMMERCIAL

## 2024-07-11 DIAGNOSIS — E11.40 TYPE 2 DIABETES MELLITUS WITH DIABETIC NEUROPATHY, WITHOUT LONG-TERM CURRENT USE OF INSULIN (H): ICD-10-CM

## 2024-07-11 PROCEDURE — 99442 PR PHYSICIAN TELEPHONE EVALUATION 11-20 MIN: CPT | Mod: 93 | Performed by: STUDENT IN AN ORGANIZED HEALTH CARE EDUCATION/TRAINING PROGRAM

## 2024-07-11 RX ORDER — TIRZEPATIDE 2.5 MG/.5ML
2.5 INJECTION, SOLUTION SUBCUTANEOUS
Qty: 2 ML | Refills: 0 | Status: SHIPPED | OUTPATIENT
Start: 2024-07-11

## 2024-07-11 RX ORDER — SIMVASTATIN 20 MG
20 TABLET ORAL
Qty: 90 TABLET | Refills: 3 | Status: SHIPPED | OUTPATIENT
Start: 2024-07-11

## 2024-07-11 NOTE — PROGRESS NOTES
Endocrinology Visit       Type of service:  Telephone Visit   Phone call duration: 15 minutes       Distant Location (provider location):  Off-site    NAME:  Ilene Gaviria  PCP:  Trevor Rizo  MRN:  [id number]  Reason for Consult: DM2  Requesting Provider:  Referred Self    Chief Complaint     Chief Complaint   Patient presents with    RECHECK     6 MO        History of Present Illness     Ilene Gaviria is a 61 year old female who is seen in clinic for DM2 and weight management. Last seen 12/2023.    Her past medical history is significant for rectal cancer status post surgery and chemotherapy currently in remission.  Never been on steroids before.  The patient was diagnosed with type 2 diabetes in 2022. A1C was checked no records, she said it was in the range of 8.  She was started on metformin, she is tolerating it very well.      Visit of 3/2023 she wanted to start wt loss meds and we discussed ozempic. Her weight has always been always between 180-210 since having her kids. It is been stable but not able to loose weight despite dieting. She has problem with portion control. We started Trulicity.    visit 6/2023 Trulicity increased to 3 mg. She reported it worked well for her , decreased her appetite and helped with portion control. She took it on and off and as having nausea.  She stopped taking trucility few month ago, not sure why.   She had an ankle surgery last year, tendon transfer, was non weight bearing for 6 month. She is doing PT. This much better now.  Reported no change in weight.        Wt Readings from Last 4 Encounters:   12/27/23 95.3 kg (210 lb)   09/06/23 94.4 kg (208 lb 1.6 oz)   07/28/23 94.4 kg (208 lb 1.6 oz)   06/29/23 96.8 kg (213 lb 6.4 oz)     A1c last checked on 12/2023 was 6.8.    Diabetes Care/Complications:   Eyes: had an eye exam 2 weeks, no DR.   Kidneys: positive urine microalb 12/2022, normal Cr on 11/2023. On cozaar 25 mg daily. Last urine alb  negative 12/2023  Nerves: tingling and numbness bottom at first but now at the ankle. Foot exam completed 6/2022  Smoking: no  Blood Pressure: BP high  on previous visits.   Lipids: on simvastatin 20 mg daily. 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 She lives with her .    Problem List     Patient Active Problem List   Diagnosis    BMI 30.0-30.9,adult    Rectal cancer (H)    Colostomy in place (H)    Lesion of bladder    Somatic dysfunction of pelvic region    Urinary retention    Urinary tract infection associated with catheterization of urinary tract  (H24)    Diabetes mellitus type 1 (H)    Diabetes mellitus, type 2 (H)        Medications     Current Outpatient Medications   Medication Sig Dispense Refill    losartan (COZAAR) 50 MG tablet Take 1 tablet (50 mg) by mouth daily 90 tablet 1    metFORMIN (GLUCOPHAGE) 1000 MG tablet Take 1 tablet (1,000 mg) by mouth 2 times daily (with meals) 90 tablet 3    simvastatin (ZOCOR) 20 MG tablet Take 1 tablet (20 mg) by mouth daily (with dinner) 90 tablet 3    tirzepatide (MOUNJARO) 2.5 MG/0.5ML pen Inject 2.5 mg subcutaneously every 7 days 2 mL 0    estradiol (ESTRACE) 0.1 MG/GM vaginal cream Place 2 g vaginally three times a week (Patient taking differently: Place vaginally three times a week) 42.5 g 3    methenamine hippurate (HIPREX) 1 g tablet Take 1 tablet (1 g) by mouth 2 times daily 60 tablet 7    phenazopyridine (PYRIDIUM) 200 MG tablet Take 1 tablet (200 mg) by mouth 3 times daily as needed for irritation 15 tablet 0     No current facility-administered medications for this visit.        Allergies     Allergies   Allergen Reactions    No Known Allergies     Tomato Rash       Medical / Surgical History     Past  Medical History:   Diagnosis Date    Cancer (H)     rectal cancer     Past Surgical History:   Procedure Laterality Date    CYSTOSCOPY, BIOPSY BLADDER, COMBINED N/A 7/26/2022    Procedure: CYSTOSCOPY, WITH BLADDER BIOPSY AND FULGURATION (Look in the bladder and sample red  tissue then burn the area involved);  Surgeon: Kacy Anderson MD;  Location: [facility] OR    DAVINCI COLECTOMY N/A 8/7/2018    Procedure: DAVINCI XI COLECTOMY;  DAVINCI ABDOMINAL PERINEAL RESECTION WITH PERMANENT COLOSTOMY ( PARI ) LEFT GLUTEAL FLAP TO RECTAL DEFECT ( JERE ) ;  Surgeon: Jonny Finney MD;  Location:  OR    GENITOURINARY SURGERY      bladder sling 12/18    GRAFT FLAP GLUTEUS TO PERINEUM N/A 8/7/2018    Procedure: GRAFT FLAP GLUTEUS TO PERINEUM;;  Surgeon: Conchita Ramos MD;  Location:  OR    GYN SURGERY      c section X3, tubal     IR CYSTOGRAM  8/24/2022    REMOVE MESH VAGINA N/A 11/1/2022    Procedure: EXCISION, sling, VAGINA (cut one side of the sling in the vagina), CYSTOSCOPY;  Surgeon: Kacy Anderson MD;  Location: [facility] OR    SIGMOIDOSCOPY FLEXIBLE N/A 7/9/2018    Procedure: SIGMOIDOSCOPY FLEXIBLE;  FLEXIBILE SIGMOIDOSCOPY;  Surgeon: Jonny Finney MD;  Location:  GI       Social History     Social History     Socioeconomic History    Marital status:      Spouse name: Not on file    Number of children: Not on file    Years of education: Not on file    Highest education level: Not on file   Occupational History    Not on file   Tobacco Use    Smoking status: Never    Smokeless tobacco: Never   Vaping Use    Vaping status: Never Used   Substance and Sexual Activity    Alcohol use: Yes     Comment: rare    Drug use: No    Sexual activity: Not Currently     Partners: Male   Other Topics Concern    Parent/sibling w/ CABG, MI or angioplasty before 65F 55M? Not Asked   Social History Narrative    Not on file     Social Determinants of Health     Financial Resource Strain: Not on file   Food  "Insecurity: Not on file   Transportation Needs: Not on file   Physical Activity: Not on file   Stress: Not on file   Social Connections: Not on file   Interpersonal Safety: Not on file   Housing Stability: Not on file       Family History   Noncontributory    ROS     12 ROS completed, pertinent positive and negative in HPI    Physical Exam   There were no vitals taken for this visit.   /GENERAL: Healthy, alert and no distress  EYES: Eyes grossly normal to inspection.  No discharge or erythema, or obvious scleral/conjunctival abnormalities.  RESP: No audible wheeze, cough, or visible cyanosis.  No visible retractions or increased work of breathing.    SKIN: Visible skin clear. No significant rash, abnormal pigmentation or lesions.  NEURO: Cranial nerves grossly intact.  Mentation and speech appropriate for age.  PSYCH: Mentation appears normal, affect normal/bright, judgement and insight intact, normal speech and appearance well-groomed.      Labs/Imaging     Pertinent Labs were reviewed and updated in EPIC and discussed briefly.  Radiology Results were  reviewed and updated in EPIC and discussed briefly.    Summary of recent findings:   No results found for: A1C    TSH   Date Value Ref Range Status   12/27/2023 3.24 0.30 - 4.20 uIU/mL Final       Creatinine   Date Value Ref Range Status   05/31/2023 0.66 0.51 - 0.95 mg/dL Final   12/17/2019 0.60 0.52 - 1.04 mg/dL Final     Creatinine POCT   Date Value Ref Range Status   11/27/2023 0.5 0.5 - 1.0 mg/dL Final       Recent Labs   Lab Test 12/17/19  0940   CHOL 206*   HDL 39*   LDL 97   TRIG 348*       No results found for: \"[id number]\", \"[id number]\", \"[id number]\"    I personally reviewed the patient's outside records from [facility] EMR and faxed records. Summary of pertinent findings in [facility].    Impression / Plan     1. Diabetes Mellitus: Type 2  2. Obesity class II  No A1c since 12/2023 and no SMBG, I am unable to assess her DM control today. She stopped trulicity several " month ago. We will check A1c. We discussed changing to Mounjaro for better wt loss effect. Unsure if covered by insurance. We discussed the sglt2i use for DM control if A1c comes back elevated and other glp1a are not covered.            2. Diabetes Complications: refer to hpi.       3. Blood Pressure Management: Blood pressure was above goal last visit.  We increased losartan to 100 mg daily.     4.Lipid Management: Per the new ACC/RALPH/NHLBI guidelines, statins are recommended for individuals with diabetes aged 40-75 with LDL  without ASCVD, and for any individual with ASCVD. Currently the patient is on a statin.      5. Smoking Status: Patient Pt is smoke free..       6. Elevated liver enzymes: very likely NAFLD but work up was never completed. Discussed with patient and she will review with PCP. Could consider, screening for viral hepatitis, autoimmune and fibroscan. We discussed that wt is the mainstay of treatment for NAFLD. Glp1a that might help with NAFLD are semiglutide, tirezapetide and liraglutide.     Review of the result(s) of each unique test - A1c and diabetes related labs.          Test and/or medications prescribed today:  Orders Placed This Encounter   Procedures    Hemoglobin A1c         Follow up: 3 month    30 minutes spent on the date of the encounter doing chart review, history and exam, documentation and further activities as noted above.      The longitudinal plan of care for the diagnosis(es)/condition(s) as documented were addressed during this visit. Due to the added complexity in care, I will continue to support Ilene in the subsequent management and with ongoing continuity of care.       Christal Monteiro MD  Endocrinology, Diabetes and Metabolism  [facility]

## 2024-07-11 NOTE — LETTER
7/11/2024       RE: Ilene Gaviria  [address]  [place] [facility] [address]     Dear Colleague,    Thank you for referring your patient, Ilene Gaviria, to the [facility] [place] at [facility]. Please see a copy of my visit note below.    Virtual Visit Details      Outcome for 07/09/24 1:55 PM: Patient is not checking blood sugars  Maria Elena Stout MA    Patient is showing 4/5 MNCM met. BP out range   Maria Elena Stout MA      Endocrinology Visit       Type of service:  Telephone Visit   Phone call duration: 15 minutes       Distant Location (provider location):  Off-site    NAME:  Ilene Gaviria  PCP:  Trevor Rizo  MRN:  [id number]  Reason for Consult: DM2  Requesting Provider:  Referred Self    Chief Complaint     Chief Complaint   Patient presents with    RECHECK     6 MO        History of Present Illness     Ilene Gaviria is a 61 year old female who is seen in clinic for DM2 and weight management. Last seen 12/2023.    Her past medical history is significant for rectal cancer status post surgery and chemotherapy currently in remission.  Never been on steroids before.  The patient was diagnosed with type 2 diabetes in 2022. A1C was checked no records, she said it was in the range of 8.  She was started on metformin, she is tolerating it very well.      Visit of 3/2023 she wanted to start wt loss meds and we discussed ozempic. Her weight has always been always between 180-210 since having her kids. It is been stable but not able to loose weight despite dieting. She has problem with portion control. We started Trulicity.    visit 6/2023 Trulicity increased to 3 mg. She reported it worked well for her , decreased her appetite and helped with portion control. She took it on and off and as having nausea.  She stopped taking trucility few month ago, not sure why.   She had an ankle surgery last year, tendon  transfer, was non weight bearing for 6 month. She is doing PT. This much better now.  Reported no change in weight.        Wt Readings from Last 4 Encounters:   12/27/23 95.3 kg (210 lb)   09/06/23 94.4 kg (208 lb 1.6 oz)   07/28/23 94.4 kg (208 lb 1.6 oz)   06/29/23 96.8 kg (213 lb 6.4 oz)     A1c last checked on 12/2023 was 6.8.    Diabetes Care/Complications:   Eyes: had an eye exam 2 weeks, no DR.   Kidneys: positive urine microalb 12/2022, normal Cr on 11/2023. On cozaar 25 mg daily. Last urine alb negative 12/2023  Nerves: tingling and numbness bottom at first but now at the ankle. Foot exam completed 6/2022  Smoking: no  Blood Pressure: BP high  on previous visits.   Lipids: on simvastatin 20 mg daily. 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 She lives with her .    Problem List     Patient Active Problem List   Diagnosis    BMI 30.0-30.9,adult    Rectal cancer (H)    Colostomy in place (H)    Lesion of bladder    Somatic dysfunction of pelvic region    Urinary retention    Urinary tract infection associated with catheterization of urinary tract  (H24)    Diabetes mellitus type 1 (H)    Diabetes mellitus, type 2 (H)        Medications     Current Outpatient Medications   Medication Sig Dispense Refill    losartan (COZAAR) 50 MG tablet Take 1 tablet (50 mg) by mouth daily 90 tablet 1    metFORMIN (GLUCOPHAGE) 1000 MG tablet Take 1 tablet (1,000 mg) by mouth 2 times daily (with meals) 90 tablet 3    simvastatin (ZOCOR) 20 MG tablet Take 1 tablet (20 mg) by mouth daily (with dinner) 90 tablet 3    tirzepatide (MOUNJARO) 2.5 MG/0.5ML pen Inject 2.5 mg subcutaneously every 7 days 2 mL 0    estradiol (ESTRACE) 0.1 MG/GM vaginal cream Place 2 g vaginally three  times a week (Patient taking differently: Place vaginally three times a week) 42.5 g 3    methenamine hippurate (HIPREX) 1 g tablet Take 1 tablet (1 g) by mouth 2 times daily 60 tablet 7    phenazopyridine (PYRIDIUM) 200 MG tablet Take 1 tablet (200 mg) by mouth 3 times daily as needed for irritation 15 tablet 0     No current facility-administered medications for this visit.        Allergies     Allergies   Allergen Reactions    No Known Allergies     Tomato Rash       Medical / Surgical History     Past Medical History:   Diagnosis Date    Cancer (H)     rectal cancer     Past Surgical History:   Procedure Laterality Date    CYSTOSCOPY, BIOPSY BLADDER, COMBINED N/A 7/26/2022    Procedure: CYSTOSCOPY, WITH BLADDER BIOPSY AND FULGURATION (Look in the bladder and sample red  tissue then burn the area involved);  Surgeon: Kacy Anderson MD;  Location: [facility] OR    DAVINCI COLECTOMY N/A 8/7/2018    Procedure: DAVINCI XI COLECTOMY;  DAVINCI ABDOMINAL PERINEAL RESECTION WITH PERMANENT COLOSTOMY ( PARI ) LEFT GLUTEAL FLAP TO RECTAL DEFECT ( JERE ) ;  Surgeon: Jonny Finney MD;  Location:  OR    GENITOURINARY SURGERY      bladder sling 12/18    GRAFT FLAP GLUTEUS TO PERINEUM N/A 8/7/2018    Procedure: GRAFT FLAP GLUTEUS TO PERINEUM;;  Surgeon: Conchita Ramos MD;  Location:  OR    GYN SURGERY      c section X3, tubal     IR CYSTOGRAM  8/24/2022    REMOVE MESH VAGINA N/A 11/1/2022    Procedure: EXCISION, sling, VAGINA (cut one side of the sling in the vagina), CYSTOSCOPY;  Surgeon: Kacy Anderson MD;  Location: [facility] OR    SIGMOIDOSCOPY FLEXIBLE N/A 7/9/2018    Procedure: SIGMOIDOSCOPY FLEXIBLE;  FLEXIBILE SIGMOIDOSCOPY;  Surgeon: Jonny Finney MD;  Location:  GI       Social History     Social History     Socioeconomic History    Marital status:      Spouse name: Not on file    Number of children: Not on file    Years of education: Not on file    Highest education level: Not on file    Occupational History    Not on file   Tobacco Use    Smoking status: Never    Smokeless tobacco: Never   Vaping Use    Vaping status: Never Used   Substance and Sexual Activity    Alcohol use: Yes     Comment: rare    Drug use: No    Sexual activity: Not Currently     Partners: Male   Other Topics Concern    Parent/sibling w/ CABG, MI or angioplasty before 65F 55M? Not Asked   Social History Narrative    Not on file     Social Determinants of Health     Financial Resource Strain: Not on file   Food Insecurity: Not on file   Transportation Needs: Not on file   Physical Activity: Not on file   Stress: Not on file   Social Connections: Not on file   Interpersonal Safety: Not on file   Housing Stability: Not on file       Family History   Noncontributory    ROS     12 ROS completed, pertinent positive and negative in HPI    Physical Exam   There were no vitals taken for this visit.   /GENERAL: Healthy, alert and no distress  EYES: Eyes grossly normal to inspection.  No discharge or erythema, or obvious scleral/conjunctival abnormalities.  RESP: No audible wheeze, cough, or visible cyanosis.  No visible retractions or increased work of breathing.    SKIN: Visible skin clear. No significant rash, abnormal pigmentation or lesions.  NEURO: Cranial nerves grossly intact.  Mentation and speech appropriate for age.  PSYCH: Mentation appears normal, affect normal/bright, judgement and insight intact, normal speech and appearance well-groomed.      Labs/Imaging     Pertinent Labs were reviewed and updated in EPIC and discussed briefly.  Radiology Results were  reviewed and updated in EPIC and discussed briefly.    Summary of recent findings:   No results found for: A1C    TSH   Date Value Ref Range Status   12/27/2023 3.24 0.30 - 4.20 uIU/mL Final       Creatinine   Date Value Ref Range Status   05/31/2023 0.66 0.51 - 0.95 mg/dL Final   12/17/2019 0.60 0.52 - 1.04 mg/dL Final     Creatinine POCT   Date Value Ref Range Status  "  11/27/2023 0.5 0.5 - 1.0 mg/dL Final       Recent Labs   Lab Test 12/17/19  0940   CHOL 206*   HDL 39*   LDL 97   TRIG 348*       No results found for: \"[id number]\", \"[id number]\", \"[id number]\"    I personally reviewed the patient's outside records from [employer] EMR and faxed records. Summary of pertinent findings in HPI.    Impression / Plan     1. Diabetes Mellitus: Type 2  2. Obesity class II  No A1c since 12/2023 and no SMBG, I am unable to assess her DM control today. She stopped trulicity several month ago. We will check A1c. We discussed changing to Mounjaro for better wt loss effect. Unsure if covered by insurance. We discussed the sglt2i use for DM control if A1c comes back elevated and other glp1a are not covered.            2. Diabetes Complications: refer to hpi.       3. Blood Pressure Management: Blood pressure was above goal last visit.  We increased losartan to 100 mg daily.     4.Lipid Management: Per the new ACC/RALPH/NHLBI guidelines, statins are recommended for individuals with diabetes aged 40-75 with LDL  without ASCVD, and for any individual with ASCVD. Currently the patient is on a statin.      5. Smoking Status: Patient Pt is smoke free..       6. Elevated liver enzymes: very likely NAFLD but work up was never completed. Discussed with patient and she will review with PCP. Could consider, screening for viral hepatitis, autoimmune and fibroscan. We discussed that wt is the mainstay of treatment for NAFLD. Glp1a that might help with NAFLD are semiglutide, tirezapetide and liraglutide.     Review of the result(s) of each unique test - A1c and diabetes related labs.          Test and/or medications prescribed today:  Orders Placed This Encounter   Procedures    Hemoglobin A1c         Follow up: 3 month    30 minutes spent on the date of the encounter doing chart review, history and exam, documentation and further activities as noted above.      The longitudinal plan of care for the " diagnosis(es)/condition(s) as documented were addressed during this visit. Due to the added complexity in care, I will continue to support Ilene in the subsequent management and with ongoing continuity of care.       Christal Monteiro MD  Endocrinology, Diabetes and Metabolism  [facility]

## 2024-07-11 NOTE — NURSING NOTE
Current patient location:  pts office    Is the patient currently in the state of [place]? YES    Visit mode:TELEPHONE    If the visit is dropped, the patient can be reconnected by: TELEPHONE VISIT: Phone number:   Telephone Information:   Mobile [phone]       Will anyone else be joining the visit? NO  (If patient encounters technical issues they should call [phone] :[phone])    How would you like to obtain your AVS? Mail a copy    Are changes needed to the allergy or medication list? Yes   PT is not taking trulicity, estradiol, hiprex, and pyridium     Are refills needed on medications prescribed by this physician? YES- pt wants to talk to provider about Trulicity    Reason for visit: RECHECK (6 MO )    Elizabeth GREEN

## 2024-07-26 ENCOUNTER — INFUSION THERAPY VISIT (OUTPATIENT)
Dept: INFUSION THERAPY | Facility: CLINIC | Age: 64
End: 2024-07-26
Attending: INTERNAL MEDICINE
Payer: COMMERCIAL

## 2024-07-26 ENCOUNTER — TELEPHONE (OUTPATIENT)
Dept: INFECTIOUS DISEASES | Facility: CLINIC | Age: 64
End: 2024-07-26

## 2024-07-26 VITALS
TEMPERATURE: 97.7 F | WEIGHT: 209.3 LBS | OXYGEN SATURATION: 98 % | HEIGHT: 68 IN | SYSTOLIC BLOOD PRESSURE: 118 MMHG | DIASTOLIC BLOOD PRESSURE: 82 MMHG | RESPIRATION RATE: 18 BRPM | HEART RATE: 76 BPM | BODY MASS INDEX: 31.72 KG/M2

## 2024-07-26 DIAGNOSIS — N39.0 URINARY TRACT INFECTION ASSOCIATED WITH CATHETERIZATION OF URINARY TRACT (H): Primary | ICD-10-CM

## 2024-07-26 DIAGNOSIS — T83.511A URINARY TRACT INFECTION ASSOCIATED WITH CATHETERIZATION OF URINARY TRACT (H): Primary | ICD-10-CM

## 2024-07-26 DIAGNOSIS — N39.0 URINARY TRACT INFECTION ASSOCIATED WITH CATHETERIZATION OF URINARY TRACT, UNSPECIFIED INDWELLING URINARY CATHETER TYPE, INITIAL ENCOUNTER (H): Primary | ICD-10-CM

## 2024-07-26 DIAGNOSIS — T83.511A URINARY TRACT INFECTION ASSOCIATED WITH CATHETERIZATION OF URINARY TRACT, UNSPECIFIED INDWELLING URINARY CATHETER TYPE, INITIAL ENCOUNTER (H): Primary | ICD-10-CM

## 2024-07-26 PROCEDURE — 258N000003 HC RX IP 258 OP 636: Performed by: INTERNAL MEDICINE

## 2024-07-26 PROCEDURE — 96365 THER/PROPH/DIAG IV INF INIT: CPT

## 2024-07-26 PROCEDURE — 250N000011 HC RX IP 250 OP 636: Performed by: INTERNAL MEDICINE

## 2024-07-26 RX ORDER — EPINEPHRINE 1 MG/ML
0.3 INJECTION, SOLUTION, CONCENTRATE INTRAVENOUS EVERY 5 MIN PRN
Status: CANCELLED | OUTPATIENT
Start: 2024-07-26

## 2024-07-26 RX ORDER — HEPARIN SODIUM (PORCINE) LOCK FLUSH IV SOLN 100 UNIT/ML 100 UNIT/ML
5 SOLUTION INTRAVENOUS
OUTPATIENT
Start: 2024-07-26

## 2024-07-26 RX ORDER — METHYLPREDNISOLONE SODIUM SUCCINATE 125 MG/2ML
125 INJECTION, POWDER, LYOPHILIZED, FOR SOLUTION INTRAMUSCULAR; INTRAVENOUS
Status: CANCELLED
Start: 2024-07-26

## 2024-07-26 RX ORDER — ALBUTEROL SULFATE 90 UG/1
1-2 AEROSOL, METERED RESPIRATORY (INHALATION)
Start: 2024-07-26

## 2024-07-26 RX ORDER — HEPARIN SODIUM (PORCINE) LOCK FLUSH IV SOLN 100 UNIT/ML 100 UNIT/ML
5 SOLUTION INTRAVENOUS
Status: CANCELLED | OUTPATIENT
Start: 2024-07-26

## 2024-07-26 RX ORDER — HEPARIN SODIUM,PORCINE 10 UNIT/ML
5-20 VIAL (ML) INTRAVENOUS DAILY PRN
Status: CANCELLED | OUTPATIENT
Start: 2024-07-26

## 2024-07-26 RX ORDER — DIPHENHYDRAMINE HYDROCHLORIDE 50 MG/ML
50 INJECTION INTRAMUSCULAR; INTRAVENOUS
Start: 2024-07-26

## 2024-07-26 RX ORDER — DIPHENHYDRAMINE HYDROCHLORIDE 50 MG/ML
50 INJECTION INTRAMUSCULAR; INTRAVENOUS
Status: CANCELLED
Start: 2024-07-26

## 2024-07-26 RX ORDER — MEPERIDINE HYDROCHLORIDE 25 MG/ML
25 INJECTION INTRAMUSCULAR; INTRAVENOUS; SUBCUTANEOUS EVERY 30 MIN PRN
Status: CANCELLED | OUTPATIENT
Start: 2024-07-26

## 2024-07-26 RX ORDER — MEPERIDINE HYDROCHLORIDE 25 MG/ML
25 INJECTION INTRAMUSCULAR; INTRAVENOUS; SUBCUTANEOUS EVERY 30 MIN PRN
OUTPATIENT
Start: 2024-07-26

## 2024-07-26 RX ORDER — HEPARIN SODIUM,PORCINE 10 UNIT/ML
5-20 VIAL (ML) INTRAVENOUS DAILY PRN
OUTPATIENT
Start: 2024-07-26

## 2024-07-26 RX ORDER — ALBUTEROL SULFATE 0.83 MG/ML
2.5 SOLUTION RESPIRATORY (INHALATION)
Status: CANCELLED | OUTPATIENT
Start: 2024-07-26

## 2024-07-26 RX ORDER — ALBUTEROL SULFATE 0.83 MG/ML
2.5 SOLUTION RESPIRATORY (INHALATION)
OUTPATIENT
Start: 2024-07-26

## 2024-07-26 RX ORDER — GENTAMICIN 40 MG/ML
5 INJECTION, SOLUTION INTRAMUSCULAR; INTRAVENOUS ONCE
Status: CANCELLED
Start: 2024-07-26 | End: 2024-07-26

## 2024-07-26 RX ORDER — ALBUTEROL SULFATE 90 UG/1
1-2 AEROSOL, METERED RESPIRATORY (INHALATION)
Status: CANCELLED
Start: 2024-07-26

## 2024-07-26 RX ORDER — EPINEPHRINE 1 MG/ML
0.3 INJECTION, SOLUTION INTRAMUSCULAR; SUBCUTANEOUS EVERY 5 MIN PRN
OUTPATIENT
Start: 2024-07-26

## 2024-07-26 RX ORDER — METHYLPREDNISOLONE SODIUM SUCCINATE 125 MG/2ML
125 INJECTION, POWDER, LYOPHILIZED, FOR SOLUTION INTRAMUSCULAR; INTRAVENOUS
Start: 2024-07-26

## 2024-07-26 RX ADMIN — GENTAMICIN SULFATE 380 MG: 40 INJECTION, SOLUTION INTRAMUSCULAR; INTRAVENOUS at 15:28

## 2024-07-26 NOTE — PATIENT INSTRUCTIONS
Dear Ilene Gaviria    Thank you for choosing [facility] ([facility]) for your infusion.  The following information is a summary of our appointment as well as important reminders.      We look forward in seeing you on your next appointment here at [facility]  Please don t hesitate to call us at [phone] to reschedule any of your appointments or to speak with one of the [facility] registered nurses.  It was a pleasure taking care of you today.    Sincerely,    [facility]  [address] [place] [address]  [id number]  Phone:  [phone]

## 2024-07-26 NOTE — PROGRESS NOTES
"Infusion Nursing Note:  Ilene Gaviria presents today for gentamicin.    Patient seen by provider today: No   present during visit today: Not Applicable.    Note:   Infusion over 60 minutes.    Intravenous Access:  Peripheral IV placed.    Treatment Conditions:  Not Applicable.    Report given to late shift nurse at 1515 to resume cares.    Administrations This Visit       gentamicin (GARAMYCIN) 380 mg in sodium chloride 0.9 % 64.5 mL intermittent infusion       Admin Date  07/26/2024 Action  $New Bag Dose  380 mg Rate  64.5 mL/hr Route  Intravenous Documented By  Zena Tolbert, RN                  /74 (BP Location: Left arm)   Pulse 80   Temp 97.7  F (36.5  C) (Oral)   Resp 18   Ht 1.727 m (5' 8\")   Wt 94.9 kg (209 lb 4.8 oz)   SpO2 98%   BMI 31.82 kg/m      Zena Tolbert, YOGESH  "

## 2024-07-26 NOTE — TELEPHONE ENCOUNTER
[facility]    Phone Message    May a detailed message be left on voicemail: yes     Reason for Call: Order(s): Other:   Reason for requested: Pt is requesting Dr. Larry place lab orders. She states she is having a UTI flare up and her previous standing lab orders have /  Date needed: ASAP  Provider name: Kendy    Action Taken: Other: ID    Travel Screening: Not Applicable     Date of Service:

## 2024-07-26 NOTE — TELEPHONE ENCOUNTER
Per VO Dr. Larry Gentamicin 5mg/kg one time. Patient aware and will call to schedule. Therapy plan entered.       Reese Guevara RN  Infectious Disease on 7/26/2024 at 1:09 PM

## 2024-07-26 NOTE — TELEPHONE ENCOUNTER
Eunice Larry MD  You4 minutes ago (11:30 AM)     NARDA Leigh,    I'll put orders in, but we might just need to start a treatment plan given it's Friday.    Eunice       Called patient and left

## 2024-07-29 ENCOUNTER — TELEPHONE (OUTPATIENT)
Dept: INFECTIOUS DISEASES | Facility: CLINIC | Age: 64
End: 2024-07-29
Payer: COMMERCIAL

## 2024-07-29 DIAGNOSIS — A49.9 INFECTION DUE TO GRAM POSITIVE BACTERIA: ICD-10-CM

## 2024-07-29 DIAGNOSIS — T83.511A URINARY TRACT INFECTION ASSOCIATED WITH CATHETERIZATION OF URINARY TRACT (H): Primary | ICD-10-CM

## 2024-07-29 DIAGNOSIS — N39.0 URINARY TRACT INFECTION ASSOCIATED WITH CATHETERIZATION OF URINARY TRACT (H): Primary | ICD-10-CM

## 2024-07-29 RX ORDER — CEFADROXIL 500 MG/1
500 CAPSULE ORAL 2 TIMES DAILY
Qty: 10 CAPSULE | Refills: 0 | Status: SHIPPED | OUTPATIENT
Start: 2024-07-29

## 2024-07-29 NOTE — TELEPHONE ENCOUNTER
Called patient to relay providers message. No answer left message to call RN.       Reese Guevara RN  Infectious Disease on 7/29/2024 at 9:37 AM

## 2024-07-29 NOTE — TELEPHONE ENCOUNTER
Patient states she is feeling a lot better. Patient is leaving town tomorrow. Patient had stopped the Hiprex and will resume after finishing the antibiotics.     She will also go in today to get blood culture.       Reese Guevara RN  Infectious Disease on 7/29/2024 at 11:00 AM

## 2024-07-29 NOTE — TELEPHONE ENCOUNTER
----- Message from Eunice Larry sent at 7/29/2024  8:53 AM CDT -----  Regarding: Follow up UTI  Hello all,    We gave this pt empiric gentamicin on Friday for UTI (she has history of resistant organisms), but her culture is now growing 2 gram positive organisms. Can someone call to see how she is doing? We can switch her to PO cefadroxil 500 mg BID X5 days, but I'd also like to get blood cultures.     Thanks,  Eunice

## 2024-07-30 ENCOUNTER — HOSPITAL ENCOUNTER (EMERGENCY)
Facility: CLINIC | Age: 64
End: 2024-07-30
Payer: COMMERCIAL

## 2024-07-30 ENCOUNTER — NURSE TRIAGE (OUTPATIENT)
Dept: INFECTIOUS DISEASES | Facility: CLINIC | Age: 64
End: 2024-07-30
Payer: COMMERCIAL

## 2024-07-30 NOTE — TELEPHONE ENCOUNTER
Eunice Larry MD Pedina, Mickey RN  Caller: Unspecified (Today,  7:04 AM)    I can't tell if she is having worse UTI symptoms or is having an antibiotic reaction. Can you discuss with her? If she's having quite a few watery stools we should also consider C diff.    I'm inpatient this week and can't see her in clinic. Are there any other general ID providers available?    Eunice

## 2024-07-30 NOTE — TELEPHONE ENCOUNTER
"Reason for Disposition   Caller has NON-URGENT question and triager unable to answer question    Additional Information   Negative: SEVERE difficulty breathing (e.g., struggling for each breath, speaks in single words)   Negative: Sounds like a life-threatening emergency to the triager   Negative: Sinus infection and taking an antibiotic   Negative: Wound infection and taking an antibiotic   Negative: MODERATE difficulty breathing (e.g., speaks in phrases, SOB even at rest, pulse 100-120)   Negative: Fever > 103 F  (39.4 C)   Negative: Patient sounds very sick or weak to the triager   Negative: Taking antibiotic and new-onset of fever   Negative: Taking antibiotic > 48 hours (2 days) and fever still present (SAME)   Negative: Taking antibiotic > 72 hours (3 days) and symptoms (other than fever) not improved    Answer Assessment - Initial Assessment Questions  1. INFECTION: \"What infection is the antibiotic being given for?\"      UTI   2. ANTIBIOTIC: \"What antibiotic are you taking\" \"How many times per day?\"      Cefadroxil BID   3. DURATION: \"When was the antibiotic started?\"     Yesterday   4. MAIN CONCERN OR SYMPTOM:  \"What is your main concern right now?\"      Last night fever, nausea, freq urination every hour over night.   5. BETTER-SAME-WORSE: \"Are you getting better, staying the same, or getting worse compared to when you first started the antibiotics?\" If getting worse, ask: \"In what way?\"       This morning feeling a little better   6. FEVER: \"Do you have a fever?\" If Yes, ask: \"What is your temperature, how was it measured, and when did it start?\"      Unsure by touch very warm sweating and clamy with chills   7. SYMPTOMS: \"Are there any other symptoms you're concerned about?\" If Yes, ask: \"When did it start?\" Watery diarrhea, x2      Over night   8. FOLLOW-UP APPOINTMENT: \"Do you have a follow-up appointment with your doctor?\"      None    Protocols used: Infection on Antibiotic Follow-up Call-A-[place]    "

## 2024-07-30 NOTE — TELEPHONE ENCOUNTER
"S-(situation): Patient called RN due to symptoms she had overnight. She went to ED but the wait was over 5 hours so she went home and called RN leaving a VM to call her to discuss symptoms.   Ilene states she \"had a terrible night.\" She got up about every hour to urinate, at midnight she had the first of 2 very watery bought's of diarrhea. She does not have a thermometer although felt very warm, was clammy and had the chills. She is feeling very nauseous also. She started the cefadroxil and has had 3 doses. This morning she is feeling a little better. She is unsure as to what to do at this point.     B-(background): Patient currently being treated for UTI had dose of IV Gentamicin on 7/26 and started Cefedroxil on 7/29.   Culture results   50,000-100,000 CFU/mL Staphylococcus aureus Abnormal       50,000-100,000 CFU/mL Aerococcus urinae Abnormal      A-(assessment): Patient is having s/e from tx of UTI or symptoms of UTI are getting worse. RN unable to determine over the phone.     R-(recommendations): Will send to provider to advise.       Reese Guevara RN  Infectious Disease on 7/30/2024 at 7:16 AM      "

## 2024-07-30 NOTE — TELEPHONE ENCOUNTER
Patient thinks she is having side effects from the medication due to the symptoms coming and going in waves. The first two doses were too close together.     Patient is going to continue to take the medication to see if the side effects go away. If they do not get better over the next two days then she will call back to see about a different medication. She has not had any further watery diarrhea. She also will bring in a sample for C-diff if that continues. Order is in per Dr. Larry's request.     Will send to provider as a FYI with patients plan.      Reese Guevara RN  Infectious Disease on 7/30/2024 at 8:26 AM

## 2024-10-23 NOTE — ED TRIAGE NOTES
Recurrent dysuria, frequency, flank pain for the last 48 hrs. Last time she was on abx for UTI was 2 weeks ago.     Triage Assessment     Row Name 06/03/22 0002       Respiratory WDL    Respiratory WDL WDL       Skin Circulation/Temperature WDL    Skin Circulation/Temperature WDL WDL       Cardiac WDL    Cardiac WDL X  hypertensive       Peripheral/Neurovascular WDL    Peripheral Neurovascular WDL WDL       Cognitive/Neuro/Behavioral WDL    Cognitive/Neuro/Behavioral WDL WDL               Activity as tolerated Follow Instructions Provided by your Surgical Team

## 2024-10-25 ENCOUNTER — TELEPHONE (OUTPATIENT)
Dept: INFECTIOUS DISEASES | Facility: CLINIC | Age: 64
End: 2024-10-25
Payer: COMMERCIAL

## 2024-10-25 NOTE — TELEPHONE ENCOUNTER
[facility] 10/25 to sched a next avail follow up with Dr. Larry via in-person or video. Make sure she's in [place] for video since she spends time in [place] as well. Then place on a waiting list.

## 2024-10-26 ENCOUNTER — LAB (OUTPATIENT)
Dept: LAB | Facility: CLINIC | Age: 64
End: 2024-10-26
Payer: COMMERCIAL

## 2024-10-26 DIAGNOSIS — T83.511A URINARY TRACT INFECTION ASSOCIATED WITH CATHETERIZATION OF URINARY TRACT, UNSPECIFIED INDWELLING URINARY CATHETER TYPE, INITIAL ENCOUNTER (H): ICD-10-CM

## 2024-10-26 DIAGNOSIS — N39.0 URINARY TRACT INFECTION ASSOCIATED WITH CATHETERIZATION OF URINARY TRACT, UNSPECIFIED INDWELLING URINARY CATHETER TYPE, INITIAL ENCOUNTER (H): ICD-10-CM

## 2024-10-26 LAB
ALBUMIN UR-MCNC: 30 MG/DL
APPEARANCE UR: ABNORMAL
BACTERIA #/AREA URNS HPF: ABNORMAL /HPF
BILIRUB UR QL STRIP: NEGATIVE
COLOR UR AUTO: YELLOW
GLUCOSE UR STRIP-MCNC: NEGATIVE MG/DL
HGB UR QL STRIP: ABNORMAL
KETONES UR STRIP-MCNC: NEGATIVE MG/DL
LEUKOCYTE ESTERASE UR QL STRIP: ABNORMAL
NITRATE UR QL: NEGATIVE
PH UR STRIP: 5.5 [PH] (ref 5–7)
RBC #/AREA URNS AUTO: ABNORMAL /HPF
SP GR UR STRIP: >=1.03 (ref 1–1.03)
SQUAMOUS #/AREA URNS AUTO: ABNORMAL /LPF
UROBILINOGEN UR STRIP-ACNC: 0.2 E.U./DL
WBC #/AREA URNS AUTO: ABNORMAL /HPF

## 2024-10-26 PROCEDURE — 81001 URINALYSIS AUTO W/SCOPE: CPT

## 2024-10-26 PROCEDURE — 87086 URINE CULTURE/COLONY COUNT: CPT

## 2024-10-27 LAB — BACTERIA UR CULT: NORMAL

## 2024-10-28 ENCOUNTER — TELEPHONE (OUTPATIENT)
Dept: ENDOCRINOLOGY | Facility: CLINIC | Age: 64
End: 2024-10-28
Payer: COMMERCIAL

## 2024-10-28 ENCOUNTER — TELEPHONE (OUTPATIENT)
Dept: INFECTIOUS DISEASES | Facility: CLINIC | Age: 64
End: 2024-10-28
Payer: COMMERCIAL

## 2024-10-28 NOTE — TELEPHONE ENCOUNTER
"Eunice Larry MD  [facility] Infectious Disease Adult Csc Rn  Hello all,    Can we touch base with her and see how she's feeling? Urine culture has mixed wong so if her symptoms are improved I'd rather not treat.    Eunice        Called patient per provider request. Patient states she \"feels like it comes and goes; comes on strong but then fades away\" regarding her urinary symptoms. Was in [place] recently and had some blood in her underpants, but as of today says \"not really\" when asked if she has urinary symptoms. She states with her stoma it can be hard to tell; thinks urine is a little cloudy and a little smelly, but no pain when urinating. Urinating somewhat more frequently than usual but also cites other bladder issues. Avoiding self-cathing as much for UTI prevention.     Explained to patient that her culture shows mixed wong and explained that it must have been contaminated. Patient fine with not being treated for now. Will monitor symptoms and reach out if anything worsens; will be in [place] until Saturday.     "

## 2024-10-28 NOTE — PROGRESS NOTES
Outcome for 10/28/24 9:48 AM: Patient is not checking blood sugars  Maria Elena Stout MA    Patient is showing 5/5 MNCM met.   Maria Elena Stout MA

## 2024-10-30 ENCOUNTER — VIRTUAL VISIT (OUTPATIENT)
Dept: ENDOCRINOLOGY | Facility: CLINIC | Age: 64
End: 2024-10-30
Payer: COMMERCIAL

## 2024-10-30 ENCOUNTER — TELEPHONE (OUTPATIENT)
Dept: ENDOCRINOLOGY | Facility: CLINIC | Age: 64
End: 2024-10-30

## 2024-10-30 DIAGNOSIS — E11.40 TYPE 2 DIABETES MELLITUS WITH DIABETIC NEUROPATHY, WITHOUT LONG-TERM CURRENT USE OF INSULIN (H): Primary | ICD-10-CM

## 2024-10-30 PROCEDURE — G2211 COMPLEX E/M VISIT ADD ON: HCPCS | Mod: 95 | Performed by: STUDENT IN AN ORGANIZED HEALTH CARE EDUCATION/TRAINING PROGRAM

## 2024-10-30 PROCEDURE — 99214 OFFICE O/P EST MOD 30 MIN: CPT | Mod: 95 | Performed by: STUDENT IN AN ORGANIZED HEALTH CARE EDUCATION/TRAINING PROGRAM

## 2024-10-30 NOTE — TELEPHONE ENCOUNTER
Left Voicemail (1st Attempt) for the patient to call back and schedule the following:    Appointment type: return  Provider: dr. terry  Return date: 1/30/2025  Specialty phone number: [phone]   Additonal Notes: : Return in about 3 months (around 1/30/2025).      [facility]   Orthopedics, Podiatry, Sports Medicine, Ent ,Eye , Audiology, Adult Endocrine & Diabetes, Nutrition & Medication Therapy Management Specialties   [facility]

## 2024-10-30 NOTE — LETTER
10/30/2024      Ilene Gaviria  [address] [address]      Dear Colleague,    Thank you for referring your patient, Ilene Gaviria, to the [facility]. Please see a copy of my visit note below.    Outcome for 10/28/24 9:48 AM: Patient is not checking blood sugars  Maria Elena Stout MA    Patient is showing 5/5 MNCM met.   Maria Elena Stout MA     Endocrinology Visit     Virtual visit:      Joined the call at 10/30/2024, 8:31:37 am.  Left the call at 10/30/2024, 8:47:40 am.      Distant Location (provider location):  Off-site  Patient location work    [employer]    NAME:  Ilene Gaviria  PCP:  Trevor Rizo  MRN:  [id number]  Reason for Consult: DM2  Requesting Provider:  Referred Self    Chief Complaint     Chief Complaint   Patient presents with     RECHECK       History of Present Illness     Ilene Gaviria is a 61 year old female who is seen in clinic for DM2 and weight management. Last seen 7/2024.    Her past medical history is significant for rectal cancer status post surgery and chemotherapy currently in remission.  Never been on steroids before.  The patient was diagnosed with type 2 diabetes in 2022. A1C was checked no records, she said it was in the range of 8.  She was started on metformin, she is tolerating it very well.      Visit of 3/2023 she wanted to start wt loss meds and we discussed ozempic. Her weight has always been always between 180-210 since having her kids. It is been stable but not able to loose weight despite dieting. She has problem with portion control. We started Trulicity.    visit 6/2023 Trulicity increased to 3 mg. She reported it worked well for her , decreased her appetite and helped with portion control. She took it on and off and was having nausea.  She stopped taking trucility spring 2024.   She had an ankle surgery last year, tendon transfer, was non weight bearing for 6 month. Still limiting her  activity     7/2024 her A1c went up. We added Mounjaro, she took it for 1 month. It has an expensive copay . She stopped it  She is travelling back and forth to [place] living there. She is more active, exercising almost every day. Reported she lost 10 lbs on her own.    Wt Readings from Last 4 Encounters:   07/26/24 94.9 kg (209 lb 4.8 oz)   07/26/24 95.3 kg (210 lb)   12/27/23 95.3 kg (210 lb)   09/06/23 94.4 kg (208 lb 1.6 oz)     Lab Results   Component Value Date    A1C 7.6 (H) 07/29/2024    A1C 6.8 (H) 12/27/2023    A1C 7.0 (H) 05/31/2023    A1C 7.0 (H) 10/28/2022    HEMOGLOBINA1 6.1 06/01/2022         Diabetes Care/Complications:   Eyes: had an eye exam summer 2024, no DR.   Kidneys: positive urine microalb 12/2022, normal Cr on 11/2023. On cozaar 25 mg daily. Last urine alb negative 12/2023  Nerves: tingling and numbness bottom at first but now at the ankle. Foot exam completed 6/2022  Smoking: no  Blood Pressure: BP high  on previous visits.   Lipids: on simvastatin 20 mg daily. 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 She lives with her .    Problem List     Patient Active Problem List   Diagnosis     BMI 30.0-30.9,adult     Rectal cancer (H)     Colostomy in place (H)     Lesion of bladder     Somatic dysfunction of pelvic region     Urinary retention     Urinary tract infection associated with catheterization of urinary tract (H)     Diabetes mellitus type 1 (H)     Diabetes mellitus, type 2 (H)        Medications     Current Outpatient Medications   Medication Sig Dispense Refill     empagliflozin (JARDIANCE) 10 MG TABS tablet Take 1 tablet (10 mg) by mouth daily. 90 tablet 1     cefadroxil (DURICEF) 500 MG capsule Take 1 capsule (500 mg) by mouth 2  times daily 10 capsule 0     estradiol (ESTRACE) 0.1 MG/GM vaginal cream Place 2 g vaginally three times a week (Patient not taking: Reported on 7/26/2024) 42.5 g 3     losartan (COZAAR) 50 MG tablet Take 1 tablet (50 mg) by mouth daily 90 tablet 1     metFORMIN (GLUCOPHAGE) 1000 MG tablet Take 1 tablet (1,000 mg) by mouth 2 times daily (with meals) 90 tablet 3     methenamine hippurate (HIPREX) 1 g tablet Take 1 tablet (1 g) by mouth 2 times daily (Patient not taking: Reported on 7/26/2024) 60 tablet 7     phenazopyridine (PYRIDIUM) 200 MG tablet Take 1 tablet (200 mg) by mouth 3 times daily as needed for irritation (Patient not taking: Reported on 7/26/2024) 15 tablet 0     simvastatin (ZOCOR) 20 MG tablet Take 1 tablet (20 mg) by mouth daily (with dinner) 90 tablet 3     No current facility-administered medications for this visit.        Allergies     Allergies   Allergen Reactions     No Known Allergies      Tomato Rash       Medical / Surgical History     Past Medical History:   Diagnosis Date     Cancer (H)     rectal cancer     Past Surgical History:   Procedure Laterality Date     CYSTOSCOPY, BIOPSY BLADDER, COMBINED N/A 7/26/2022    Procedure: CYSTOSCOPY, WITH BLADDER BIOPSY AND FULGURATION (Look in the bladder and sample red  tissue then burn the area involved);  Surgeon: Kacy Anderson MD;  Location: [facility] OR     DAVINCI COLECTOMY N/A 8/7/2018    Procedure: DAVINCI XI COLECTOMY;  DAVINCI ABDOMINAL PERINEAL RESECTION WITH PERMANENT COLOSTOMY ( PARI ) LEFT GLUTEAL FLAP TO RECTAL DEFECT ( JERE ) ;  Surgeon: Jonny Finney MD;  Location:  OR     GENITOURINARY SURGERY      bladder sling 12/18     GRAFT FLAP GLUTEUS TO PERINEUM N/A 8/7/2018    Procedure: GRAFT FLAP GLUTEUS TO PERINEUM;;  Surgeon: Conchita Ramos MD;  Location:  OR     GYN SURGERY      c section X3, tubal      IR CYSTOGRAM  8/24/2022     REMOVE MESH VAGINA N/A 11/1/2022    Procedure: EXCISION, sling, VAGINA (cut one side of  the sling in the vagina), CYSTOSCOPY;  Surgeon: Kacy Anderson MD;  Location: [facility] OR     SIGMOIDOSCOPY FLEXIBLE N/A 7/9/2018    Procedure: SIGMOIDOSCOPY FLEXIBLE;  FLEXIBILE SIGMOIDOSCOPY;  Surgeon: Jonny Finney MD;  Location:  GI       Social History     Social History     Socioeconomic History     Marital status:      Spouse name: Not on file     Number of children: Not on file     Years of education: Not on file     Highest education level: Not on file   Occupational History     Not on file   Tobacco Use     Smoking status: Never     Smokeless tobacco: Never   Vaping Use     Vaping status: Never Used   Substance and Sexual Activity     Alcohol use: Yes     Comment: rare     Drug use: No     Sexual activity: Not Currently     Partners: Male   Other Topics Concern     Parent/sibling w/ CABG, MI or angioplasty before 65F 55M? Not Asked   Social History Narrative     Not on file     Social Drivers of Health     Financial Resource Strain: Not on file   Food Insecurity: Not on file   Transportation Needs: Not on file   Physical Activity: Not on file   Stress: Not on file   Social Connections: Not on file   Interpersonal Safety: Not on file   Housing Stability: Not on file       Family History   Noncontributory    ROS     12 ROS completed, pertinent positive and negative in HPI    Physical Exam   There were no vitals taken for this visit.   /GENERAL: Healthy, alert and no distress  EYES: Eyes grossly normal to inspection.  No discharge or erythema, or obvious scleral/conjunctival abnormalities.  RESP: No audible wheeze, cough, or visible cyanosis.  No visible retractions or increased work of breathing.    SKIN: Visible skin clear. No significant rash, abnormal pigmentation or lesions.  NEURO: Cranial nerves grossly intact.  Mentation and speech appropriate for age.  PSYCH: Mentation appears normal, affect normal/bright, judgement and insight intact, normal speech and appearance  "well-groomed.      Labs/Imaging     Pertinent Labs were reviewed and updated in EPIC and discussed briefly.  Radiology Results were  reviewed and updated in EPIC and discussed briefly.    Summary of recent findings:   No results found for: A1C    TSH   Date Value Ref Range Status   12/27/2023 3.24 0.30 - 4.20 uIU/mL Final       Creatinine   Date Value Ref Range Status   05/31/2023 0.66 0.51 - 0.95 mg/dL Final   12/17/2019 0.60 0.52 - 1.04 mg/dL Final     Creatinine POCT   Date Value Ref Range Status   11/27/2023 0.5 0.5 - 1.0 mg/dL Final       Recent Labs   Lab Test 12/17/19  0940   CHOL 206*   HDL 39*   LDL 97   TRIG 348*       No results found for: \"[id number]\", \"[id number]\", \"[id number]\"    I personally reviewed the patient's outside records from [facility] EMR and faxed records. Summary of pertinent findings in [facility].    Impression / Plan     1. Diabetes Mellitus: Type 2  2. Obesity class II  A1c 7/2024 since after stopping trulicity. Mounjaro has high copay. She has been changing her lifestyle. It looks like glp1a not covered by her insurance but we need to double check. We will check A1c if less than 7, no changes ( continue metformin only and lifestyle modification). If above 7,  we discussed the sglt2i use , reviewed its safety profile and side effects. She has hx of recurrent UTI, which she reported has been less of a problem since stopping self cath. We briefly discussed CLARKE if needed.           2. Diabetes Complications: refer to hpi.  Due for labs in 3 m     3. Blood Pressure Management: Blood pressure controlled on losartan to 100 mg daily.     4.Lipid Management: Per the new ACC/RALPH/NHLBI guidelines, statins are recommended for individuals with diabetes aged 40-75 with LDL  without ASCVD, and for any individual with ASCVD. Currently the patient is on a statin.      5. Smoking Status: Patient Pt is smoke free..       6. Elevated liver enzymes: very likely NAFLD but work up was never completed. Discussed " with patient and she will review with PCP. Could consider, screening for viral hepatitis, autoimmune and fibroscan. We discussed that wt is the mainstay of treatment for NAFLD. Glp1a that might help with NAFLD are semiglutide, tirezapetide and liraglutide.     Review of the result(s) of each unique test - A1c and diabetes related labs.          Test and/or medications prescribed today:  Orders Placed This Encounter   Procedures     Hemoglobin A1c     Creatinine     Lipid panel reflex to direct LDL Fasting     Hepatic panel     Albumin Random Urine Quantitative with Creat Ratio         Follow up: 3 month       The longitudinal plan of care for the diagnosis(es)/condition(s) as documented were addressed during this visit. Due to the added complexity in care, I will continue to support Ilene in the subsequent management and with ongoing continuity of care.       Christal Monteiro MD  Endocrinology, Diabetes and Metabolism  [facility]      Again, thank you for allowing me to participate in the care of your patient.        Sincerely,        Christal Monteiro MD

## 2024-10-30 NOTE — PROGRESS NOTES
Endocrinology Visit     Virtual visit:      Joined the call at 10/30/2024, 8:31:37 am.  Left the call at 10/30/2024, 8:47:40 am.      Distant Location (provider location):  Off-site  Patient location work    [facility]    NAME:  Ilene Gaviria  PCP:  Trevor Rizo  MRN:  [id number]  Reason for Consult: DM2  Requesting Provider:  Referred Self    Chief Complaint     Chief Complaint   Patient presents with    RECHECK       History of Present Illness     Ilene Gaviria is a 61 year old female who is seen in clinic for DM2 and weight management. Last seen 7/2024.    Her past medical history is significant for rectal cancer status post surgery and chemotherapy currently in remission.  Never been on steroids before.  The patient was diagnosed with type 2 diabetes in 2022. A1C was checked no records, she said it was in the range of 8.  She was started on metformin, she is tolerating it very well.      Visit of 3/2023 she wanted to start wt loss meds and we discussed ozempic. Her weight has always been always between 180-210 since having her kids. It is been stable but not able to loose weight despite dieting. She has problem with portion control. We started Trulicity.    visit 6/2023 Trulicity increased to 3 mg. She reported it worked well for her , decreased her appetite and helped with portion control. She took it on and off and was having nausea.  She stopped taking trucility spring 2024.   She had an ankle surgery last year, tendon transfer, was non weight bearing for 6 month. Still limiting her activity     7/2024 her A1c went up. We added Mounjaro, she took it for 1 month. It has an expensive copay . She stopped it  She is travelling back and forth to [place] living there. She is more active, exercising almost every day. Reported she lost 10 lbs on her own.    Wt Readings from Last 4 Encounters:   07/26/24 94.9 kg (209 lb 4.8 oz)   07/26/24 95.3 kg (210 lb)   12/27/23 95.3 kg (210 lb)    09/06/23 94.4 kg (208 lb 1.6 oz)     Lab Results   Component Value Date    A1C 7.6 (H) 07/29/2024    A1C 6.8 (H) 12/27/2023    A1C 7.0 (H) 05/31/2023    A1C 7.0 (H) 10/28/2022    HEMOGLOBINA1 6.1 06/01/2022         Diabetes Care/Complications:   Eyes: had an eye exam summer 2024, no DR.   Kidneys: positive urine microalb 12/2022, normal Cr on 11/2023. On cozaar 25 mg daily. Last urine alb negative 12/2023  Nerves: tingling and numbness bottom at first but now at the ankle. Foot exam completed 6/2022  Smoking: no  Blood Pressure: BP high  on previous visits.   Lipids: on simvastatin 20 mg daily. 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 She lives with her .    Problem List     Patient Active Problem List   Diagnosis    BMI 30.0-30.9,adult    Rectal cancer (H)    Colostomy in place (H)    Lesion of bladder    Somatic dysfunction of pelvic region    Urinary retention    Urinary tract infection associated with catheterization of urinary tract (H)    Diabetes mellitus type 1 (H)    Diabetes mellitus, type 2 (H)        Medications     Current Outpatient Medications   Medication Sig Dispense Refill    empagliflozin (JARDIANCE) 10 MG TABS tablet Take 1 tablet (10 mg) by mouth daily. 90 tablet 1    cefadroxil (DURICEF) 500 MG capsule Take 1 capsule (500 mg) by mouth 2 times daily 10 capsule 0    estradiol (ESTRACE) 0.1 MG/GM vaginal cream Place 2 g vaginally three times a week (Patient not taking: Reported on 7/26/2024) 42.5 g 3    losartan (COZAAR) 50 MG tablet Take 1 tablet (50 mg) by mouth daily 90 tablet 1    metFORMIN (GLUCOPHAGE) 1000 MG tablet Take 1 tablet (1,000 mg) by mouth 2 times daily (with meals) 90 tablet 3    methenamine hippurate (HIPREX) 1 g tablet Take  1 tablet (1 g) by mouth 2 times daily (Patient not taking: Reported on 7/26/2024) 60 tablet 7    phenazopyridine (PYRIDIUM) 200 MG tablet Take 1 tablet (200 mg) by mouth 3 times daily as needed for irritation (Patient not taking: Reported on 7/26/2024) 15 tablet 0    simvastatin (ZOCOR) 20 MG tablet Take 1 tablet (20 mg) by mouth daily (with dinner) 90 tablet 3     No current facility-administered medications for this visit.        Allergies     Allergies   Allergen Reactions    No Known Allergies     Tomato Rash       Medical / Surgical History     Past Medical History:   Diagnosis Date    Cancer (H)     rectal cancer     Past Surgical History:   Procedure Laterality Date    CYSTOSCOPY, BIOPSY BLADDER, COMBINED N/A 7/26/2022    Procedure: CYSTOSCOPY, WITH BLADDER BIOPSY AND FULGURATION (Look in the bladder and sample red  tissue then burn the area involved);  Surgeon: Kacy Anderson MD;  Location: [facility] OR    DAVINCI COLECTOMY N/A 8/7/2018    Procedure: DAVINCI XI COLECTOMY;  DAVINCI ABDOMINAL PERINEAL RESECTION WITH PERMANENT COLOSTOMY ( PARI ) LEFT GLUTEAL FLAP TO RECTAL DEFECT ( JERE ) ;  Surgeon: Jonny Finney MD;  Location:  OR    GENITOURINARY SURGERY      bladder sling 12/18    GRAFT FLAP GLUTEUS TO PERINEUM N/A 8/7/2018    Procedure: GRAFT FLAP GLUTEUS TO PERINEUM;;  Surgeon: Conchita Ramos MD;  Location:  OR    GYN SURGERY      c section X3, tubal     IR CYSTOGRAM  8/24/2022    REMOVE MESH VAGINA N/A 11/1/2022    Procedure: EXCISION, sling, VAGINA (cut one side of the sling in the vagina), CYSTOSCOPY;  Surgeon: Kacy Anderson MD;  Location: [facility] OR    SIGMOIDOSCOPY FLEXIBLE N/A 7/9/2018    Procedure: SIGMOIDOSCOPY FLEXIBLE;  FLEXIBILE SIGMOIDOSCOPY;  Surgeon: Jonny Finney MD;  Location:  GI       Social History     Social History     Socioeconomic History    Marital status:      Spouse name: Not on file    Number of children: Not on file    Years of education: Not on  file    Highest education level: Not on file   Occupational History    Not on file   Tobacco Use    Smoking status: Never    Smokeless tobacco: Never   Vaping Use    Vaping status: Never Used   Substance and Sexual Activity    Alcohol use: Yes     Comment: rare    Drug use: No    Sexual activity: Not Currently     Partners: Male   Other Topics Concern    Parent/sibling w/ CABG, MI or angioplasty before 65F 55M? Not Asked   Social History Narrative    Not on file     Social Drivers of Health     Financial Resource Strain: Not on file   Food Insecurity: Not on file   Transportation Needs: Not on file   Physical Activity: Not on file   Stress: Not on file   Social Connections: Not on file   Interpersonal Safety: Not on file   Housing Stability: Not on file       Family History   Noncontributory    ROS     12 ROS completed, pertinent positive and negative in HPI    Physical Exam   There were no vitals taken for this visit.   /GENERAL: Healthy, alert and no distress  EYES: Eyes grossly normal to inspection.  No discharge or erythema, or obvious scleral/conjunctival abnormalities.  RESP: No audible wheeze, cough, or visible cyanosis.  No visible retractions or increased work of breathing.    SKIN: Visible skin clear. No significant rash, abnormal pigmentation or lesions.  NEURO: Cranial nerves grossly intact.  Mentation and speech appropriate for age.  PSYCH: Mentation appears normal, affect normal/bright, judgement and insight intact, normal speech and appearance well-groomed.      Labs/Imaging     Pertinent Labs were reviewed and updated in EPIC and discussed briefly.  Radiology Results were  reviewed and updated in EPIC and discussed briefly.    Summary of recent findings:   No results found for: A1C    TSH   Date Value Ref Range Status   12/27/2023 3.24 0.30 - 4.20 uIU/mL Final       Creatinine   Date Value Ref Range Status   05/31/2023 0.66 0.51 - 0.95 mg/dL Final   12/17/2019 0.60 0.52 - 1.04 mg/dL Final  "    Creatinine POCT   Date Value Ref Range Status   11/27/2023 0.5 0.5 - 1.0 mg/dL Final       Recent Labs   Lab Test 12/17/19  0940   CHOL 206*   HDL 39*   LDL 97   TRIG 348*       No results found for: \"[id number]\", \"[id number]\", \"[id number]\"    I personally reviewed the patient's outside records from [employer] EMR and faxed records. Summary of pertinent findings in HPI.    Impression / Plan     1. Diabetes Mellitus: Type 2  2. Obesity class II  A1c 7/2024 since after stopping trulicity. Mounjaro has high copay. She has been changing her lifestyle. It looks like glp1a not covered by her insurance but we need to double check. We will check A1c if less than 7, no changes ( continue metformin only and lifestyle modification). If above 7,  we discussed the sglt2i use , reviewed its safety profile and side effects. She has hx of recurrent UTI, which she reported has been less of a problem since stopping self cath. We briefly discussed CLARKE if needed.           2. Diabetes Complications: refer to hpi.  Due for labs in 3 m     3. Blood Pressure Management: Blood pressure controlled on losartan to 100 mg daily.     4.Lipid Management: Per the new ACC/RALPH/NHLBI guidelines, statins are recommended for individuals with diabetes aged 40-75 with LDL  without ASCVD, and for any individual with ASCVD. Currently the patient is on a statin.      5. Smoking Status: Patient Pt is smoke free..       6. Elevated liver enzymes: very likely NAFLD but work up was never completed. Discussed with patient and she will review with PCP. Could consider, screening for viral hepatitis, autoimmune and fibroscan. We discussed that wt is the mainstay of treatment for NAFLD. Glp1a that might help with NAFLD are semiglutide, tirezapetide and liraglutide.     Review of the result(s) of each unique test - A1c and diabetes related labs.          Test and/or medications prescribed today:  Orders Placed This Encounter   Procedures    Hemoglobin A1c    " Creatinine    Lipid panel reflex to direct LDL Fasting    Hepatic panel    Albumin Random Urine Quantitative with Creat Ratio         Follow up: 3 month       The longitudinal plan of care for the diagnosis(es)/condition(s) as documented were addressed during this visit. Due to the added complexity in care, I will continue to support Ilene in the subsequent management and with ongoing continuity of care.       Christal Monteiro MD  Endocrinology, Diabetes and Metabolism  [facility]

## 2024-10-30 NOTE — NURSING NOTE
Current patient location: [place]    Is the patient currently in the state of [place]? YES    Visit mode:VIDEO    If the visit is dropped, the patient can be reconnected by: VIDEO VISIT: Text to cell phone:   Telephone Information:   Mobile [phone]       Will anyone else be joining the visit? NO  (If patient encounters technical issues they should call [phone])    Are changes needed to the allergy or medication list? Ilene reported no changes to e-check in information for visit. VF did not review e-check in information again with Ilene due to this.       Are refills needed on medications prescribed by this physician? Discuss with provider    Rooming Documentation:  Patient declined to complete questionnaire(s)    Reason for visit: RECHECK    Laureen GREEN

## 2024-11-05 NOTE — TELEPHONE ENCOUNTER
Left Voicemail (2nd Attempt) for the patient to call back and schedule the following:    Appointment type: Return Endo  Provider: Dr. Monteiro  Return date: 1/30/2025  Specialty phone number: [phone]  Additional appointment(s) needed: yes  Additonal Notes: lab    2nd attempt to schedule a return visit with Dr. Monteiro, with labs prior.    Also sent an email.    Brittany ZENDEJAS/Jose Procedure    [facility]   Neurology, NeuroSurgery, NeuroPsychology, Pain Management and Cardiology Specialties  Medical/Surgical Adult Specialties

## 2024-11-13 ENCOUNTER — LAB (OUTPATIENT)
Dept: LAB | Facility: CLINIC | Age: 64
End: 2024-11-13
Payer: COMMERCIAL

## 2024-11-13 DIAGNOSIS — N39.0 URINARY TRACT INFECTION ASSOCIATED WITH CATHETERIZATION OF URINARY TRACT, UNSPECIFIED INDWELLING URINARY CATHETER TYPE, INITIAL ENCOUNTER (H): ICD-10-CM

## 2024-11-13 DIAGNOSIS — T83.511A URINARY TRACT INFECTION ASSOCIATED WITH CATHETERIZATION OF URINARY TRACT, UNSPECIFIED INDWELLING URINARY CATHETER TYPE, INITIAL ENCOUNTER (H): ICD-10-CM

## 2024-11-13 LAB
ALBUMIN UR-MCNC: 50 MG/DL
APPEARANCE UR: ABNORMAL
BACTERIA #/AREA URNS HPF: ABNORMAL /HPF
BILIRUB UR QL STRIP: NEGATIVE
COLOR UR AUTO: YELLOW
GLUCOSE UR STRIP-MCNC: NEGATIVE MG/DL
HGB UR QL STRIP: NEGATIVE
KETONES UR STRIP-MCNC: NEGATIVE MG/DL
LEUKOCYTE ESTERASE UR QL STRIP: ABNORMAL
MUCOUS THREADS #/AREA URNS LPF: PRESENT /LPF
NITRATE UR QL: NEGATIVE
PH UR STRIP: 6 [PH] (ref 5–7)
RBC URINE: 8 /HPF
SP GR UR STRIP: 1.02 (ref 1–1.03)
SQUAMOUS EPITHELIAL: 8 /HPF
TRANSITIONAL EPI: 1 /HPF
UROBILINOGEN UR STRIP-MCNC: NORMAL MG/DL
WBC URINE: 60 /HPF

## 2024-11-13 PROCEDURE — 81001 URINALYSIS AUTO W/SCOPE: CPT

## 2024-11-13 PROCEDURE — 87086 URINE CULTURE/COLONY COUNT: CPT

## 2024-11-14 LAB — BACTERIA UR CULT: NORMAL

## 2024-11-27 ENCOUNTER — PATIENT OUTREACH (OUTPATIENT)
Dept: CARE COORDINATION | Facility: CLINIC | Age: 64
End: 2024-11-27
Payer: COMMERCIAL

## 2024-12-22 DIAGNOSIS — E11.40 TYPE 2 DIABETES MELLITUS WITH DIABETIC NEUROPATHY, WITHOUT LONG-TERM CURRENT USE OF INSULIN (H): ICD-10-CM

## 2024-12-25 ENCOUNTER — PATIENT OUTREACH (OUTPATIENT)
Dept: CARE COORDINATION | Facility: CLINIC | Age: 64
End: 2024-12-25
Payer: COMMERCIAL

## 2024-12-27 NOTE — TELEPHONE ENCOUNTER
Losartan sent to [facility]. Patient has now left [place] please advise.

## 2024-12-29 NOTE — TELEPHONE ENCOUNTER
losartan (COZAAR) 50 MG tablet 90 tablet 1 12/27/2023     Last Office Visit: 10/30/24  Future Office visit:   NONE      Angiotensin-II Receptors [id number]/27/2024 03:39 PM   Protocol Details Has GFR on file in past 12 months and most recent value is normal    Normal serum potassium on file in past 12 months        Component      Latest Ref Rng 11/27/2023  9:25 AM   Creatinine POCT      0.5 - 1.0 mg/dL 0.5    GFR, ESTIMATED POCT      >60 mL/min/1.73m2 >60      Component      Latest Ref Rng 5/31/2023  11:39 AM   Sodium      136 - 145 mmol/L 141    Potassium      3.4 - 5.3 mmol/L 4.2        Routing refill request to provider for review/approval because:  OVERDUE LABS    Miroslava Arcos RN  [facility]/Red Flag Triage & Med Refill Team

## 2024-12-30 RX ORDER — LOSARTAN POTASSIUM 50 MG/1
50 TABLET ORAL DAILY
Qty: 90 TABLET | Refills: 1 | Status: SHIPPED | OUTPATIENT
Start: 2024-12-30

## 2025-01-02 DIAGNOSIS — N39.0 RECURRENT UTI: Primary | ICD-10-CM

## 2025-01-02 RX ORDER — METHENAMINE HIPPURATE 1000 MG/1
1 TABLET ORAL 2 TIMES DAILY
Qty: 60 TABLET | Refills: 3 | Status: SHIPPED | OUTPATIENT
Start: 2025-01-02

## 2025-06-25 ENCOUNTER — VIRTUAL VISIT (OUTPATIENT)
Dept: UROLOGY | Facility: CLINIC | Age: 65
End: 2025-06-25
Payer: COMMERCIAL

## 2025-06-25 DIAGNOSIS — N39.0 RECURRENT UTI: Primary | ICD-10-CM

## 2025-06-25 DIAGNOSIS — N95.8 GENITOURINARY SYNDROME OF MENOPAUSE: ICD-10-CM

## 2025-06-25 PROCEDURE — 1126F AMNT PAIN NOTED NONE PRSNT: CPT | Mod: 95 | Performed by: UROLOGY

## 2025-06-25 PROCEDURE — 98005 SYNCH AUDIO-VIDEO EST LOW 20: CPT | Performed by: UROLOGY

## 2025-06-25 NOTE — LETTER
6/25/2025       RE: Ilene Gaviria  [address] [address]     Dear Colleague,    Thank you for referring your patient, Ilene Gaviria, to the [facility]OLOG[facility] [place] [facility]. Please see a copy of my visit note below.    Virtual Visit Details    Type of service:  Video Visit     Originating Location (pt. Location): Home    Distant Location (provider location):  Off-site  Platform used for Video Visit: [employer]    Reason for Visit:  F/u on urinary tract infections and sling excision on 11/1/22.    Clinical Data:  Ms. Gaviria is a 64 year old female with frequency, urgency and History of colorectal cancer s/p resection and colostomy placement with retention    History of sling shortly after and then had trouble with UTI like symptoms.    Since she has been voiding more on her own since the excision of the sling on 11/1/22 but she only performs CIC once before bed and doesn't know the volume on her residual but she reports it is about half her bladder volume.      She is using the methenamine and Vit C and things that it helps quite a bit.  And tried to drink more water.  She uses it once per day.  She only had one infection in Feb of 2025.    She underwent a cystoscopy and biopsy on 7/26/22  Final Diagnosis   A. Bladder,right lateral wall, biopsy:  - Denuded urothelium, granulation tissue  - Negative for malignancy     B.  Bladder, posterior wall, biopsy:  - Bladder mucosa with acute and chronic inflammation and reactive changes  - Negative for malignancy             Review of Labs:  The following labs were reviewed by me and discussed with the patient:     Color Urine (no units)   Date Value   11/13/2024 Yellow     Appearance Urine (no units)   Date Value   11/13/2024 Slightly Cloudy (A)     Glucose Urine (mg/dL)   Date Value   11/13/2024 Negative     Bilirubin Urine (no units)   Date Value   11/13/2024 Negative     Ketones  Urine (mg/dL)   Date Value   11/13/2024 Negative     Specific Gravity Urine (no units)   Date Value   11/13/2024 1.025     pH Urine (no units)   Date Value   11/13/2024 6.0     Protein Albumin Urine (mg/dL)   Date Value   11/13/2024 50 (A)     Urobilinogen Urine (E.U./dL)   Date Value   10/26/2024 0.2     Nitrite Urine (no units)   Date Value   11/13/2024 Negative     Leukocyte Esterase Urine (no units)   Date Value   11/13/2024 Moderate (A)       UDS from 8/24/22:    -Maximum cystometric capacity 360 mL with normal filling sensations.  -Good bladder compliance without detrusor overactivity or stress incontinence.  -Maximum detrusor contraction during voiding reaches 38 cm H2O.  -With catheters in place, she voids 10 mL with Qmax 0.8 ml/s and increased EMG activity. BOOI is 31.1 which is equivocal for bladder outlet obstruction.  -With catheters removed, she voids an additional 32 mL with Qmax 2.3 ml/s and incomplete bladder emptying with final catheterized postvoid residual of 320 mL.   -Fluoroscopy reveals a moderately trabeculated bladder wall without diverticuli or VUR. The bladder neck was closed during filling; voiding images unavailable as patient transferred to [facility] due to being unable to void on Sonesta chair.    Assessment & Plan  63 y/o F with much improved frequency, urgency and urinary retention with incomplete bladder emptying and recurrent uti's.  She continues to self cath.  She feels like she may have an infection but is improving with more fluids  -continue to stay well hydrated.  -continue methenameine and vit C for prophylaxis for recurrent uti once at night.  -resume estrace for atrophic vaginitis for GSM  -continue CIC once at night  -f/u in 1 year year to reassess with PA, virtually       Thank you for allowing me to participate in the care of  Ms. Ilene Gaviria and I will keep you updated on her progress.    Kacy Anderson MD      Again, thank you for allowing me to participate in  the care of your patient.      Sincerely,    Kacy Anderson MD

## 2025-06-25 NOTE — PATIENT INSTRUCTIONS
-continue to stay well hydrated.  -continue methenameine and vit C for prophylaxis for recurrent uti once at night.  -resume estrace for atrophic vaginitis for GSM  -continue CIC once at night  -f/u in 1 year year to reassess with PA, virtually

## 2025-06-25 NOTE — PROGRESS NOTES
Reason for Visit:  F/u on urinary tract infections and sling excision on 11/1/22.    Clinical Data:  Ms. Gaviria is a 64 year old female with frequency, urgency and History of colorectal cancer s/p resection and colostomy placement with retention    History of sling shortly after and then had trouble with UTI like symptoms.    Since she has been voiding more on her own since the excision of the sling on 11/1/22 but she only performs CIC once before bed and doesn't know the volume on her residual but she reports it is about half her bladder volume.      She is using the methenamine and Vit C and things that it helps quite a bit.  And tried to drink more water.  She uses it once per day.  She only had one infection in Feb of 2025.    She underwent a cystoscopy and biopsy on 7/26/22  Final Diagnosis   A. Bladder,right lateral wall, biopsy:  - Denuded urothelium, granulation tissue  - Negative for malignancy     B.  Bladder, posterior wall, biopsy:  - Bladder mucosa with acute and chronic inflammation and reactive changes  - Negative for malignancy             Review of Labs:  The following labs were reviewed by me and discussed with the patient:     Color Urine (no units)   Date Value   11/13/2024 Yellow     Appearance Urine (no units)   Date Value   11/13/2024 Slightly Cloudy (A)     Glucose Urine (mg/dL)   Date Value   11/13/2024 Negative     Bilirubin Urine (no units)   Date Value   11/13/2024 Negative     Ketones Urine (mg/dL)   Date Value   11/13/2024 Negative     Specific Gravity Urine (no units)   Date Value   11/13/2024 1.025     pH Urine (no units)   Date Value   11/13/2024 6.0     Protein Albumin Urine (mg/dL)   Date Value   11/13/2024 50 (A)     Urobilinogen Urine (E.U./dL)   Date Value   10/26/2024 0.2     Nitrite Urine (no units)   Date Value   11/13/2024 Negative     Leukocyte Esterase Urine (no units)   Date Value   11/13/2024 Moderate (A)       UDS from 8/24/22:    -Maximum cystometric capacity 360 mL  with normal filling sensations.  -Good bladder compliance without detrusor overactivity or stress incontinence.  -Maximum detrusor contraction during voiding reaches 38 cm H2O.  -With catheters in place, she voids 10 mL with Qmax 0.8 ml/s and increased EMG activity. BOOI is 31.1 which is equivocal for bladder outlet obstruction.  -With catheters removed, she voids an additional 32 mL with Qmax 2.3 ml/s and incomplete bladder emptying with final catheterized postvoid residual of 320 mL.   -Fluoroscopy reveals a moderately trabeculated bladder wall without diverticuli or VUR. The bladder neck was closed during filling; voiding images unavailable as patient transferred to [facility] due to being unable to void on Sonesta chair.    Assessment & Plan   63 y/o F with much improved frequency, urgency and urinary retention with incomplete bladder emptying and recurrent uti's.  She continues to self cath.  She feels like she may have an infection but is improving with more fluids  -continue to stay well hydrated.  -continue methenameine and vit C for prophylaxis for recurrent uti once at night.  -resume estrace for atrophic vaginitis for GSM  -continue CIC once at night  -f/u in 1 year year to reassess with PA, virtually       Thank you for allowing me to participate in the care of  Ms. Ilene Gaviria and I will keep you updated on her progress.    Kacy Anderson MD

## 2025-06-25 NOTE — NURSING NOTE
Current patient location: office     Is the patient currently in the state of [place]? YES    Visit mode: CONVERT TO TELEPHONE    If the visit is dropped, the patient can be reconnected by:pt requests a phone call- audio is not working on video,     Will anyone else be joining the visit? NO  (If patient encounters technical issues they should call [phone]    Are changes needed to the allergy or medication list? Yes pt has medications that she is no longer taking     Are refills needed on medications prescribed by this physician? NO    Rooming Documentation:  Not applicable    Reason for visit: KATHY GREEN

## 2025-06-25 NOTE — PROGRESS NOTES
Virtual Visit Details    Type of service:  Video Visit     Originating Location (pt. Location): Home    Distant Location (provider location):  Off-site  Platform used for Video Visit: [employer]

## 2025-07-21 ENCOUNTER — HOSPITAL ENCOUNTER (EMERGENCY)
Facility: CLINIC | Age: 65
Discharge: LEFT BEFORE DISCHARGE | End: 2025-07-21
Admitting: EMERGENCY MEDICINE
Payer: COMMERCIAL

## 2025-07-21 VITALS
WEIGHT: 200 LBS | TEMPERATURE: 97.7 F | OXYGEN SATURATION: 97 % | SYSTOLIC BLOOD PRESSURE: 163 MMHG | BODY MASS INDEX: 30.31 KG/M2 | RESPIRATION RATE: 16 BRPM | HEIGHT: 68 IN | DIASTOLIC BLOOD PRESSURE: 102 MMHG | HEART RATE: 73 BPM

## 2025-07-21 LAB
ALBUMIN SERPL BCG-MCNC: 4.2 G/DL (ref 3.5–5.2)
ALP SERPL-CCNC: 91 U/L (ref 40–150)
ALT SERPL W P-5'-P-CCNC: 68 U/L (ref 0–50)
ANION GAP SERPL CALCULATED.3IONS-SCNC: 11 MMOL/L (ref 7–15)
AST SERPL W P-5'-P-CCNC: 41 U/L (ref 0–45)
ATRIAL RATE - MUSE: 75 BPM
BASOPHILS # BLD AUTO: 0 10E3/UL (ref 0–0.2)
BASOPHILS NFR BLD AUTO: 1 %
BILIRUB SERPL-MCNC: 0.3 MG/DL
BUN SERPL-MCNC: 18.3 MG/DL (ref 8–23)
CALCIUM SERPL-MCNC: 9.4 MG/DL (ref 8.8–10.4)
CHLORIDE SERPL-SCNC: 101 MMOL/L (ref 98–107)
CREAT SERPL-MCNC: 0.54 MG/DL (ref 0.51–0.95)
DIASTOLIC BLOOD PRESSURE - MUSE: NORMAL MMHG
EGFRCR SERPLBLD CKD-EPI 2021: >90 ML/MIN/1.73M2
EOSINOPHIL # BLD AUTO: 0.1 10E3/UL (ref 0–0.7)
EOSINOPHIL NFR BLD AUTO: 2 %
ERYTHROCYTE [DISTWIDTH] IN BLOOD BY AUTOMATED COUNT: 13.1 % (ref 10–15)
GLUCOSE SERPL-MCNC: 281 MG/DL (ref 70–99)
HCO3 SERPL-SCNC: 26 MMOL/L (ref 22–29)
HCT VFR BLD AUTO: 40.2 % (ref 35–47)
HGB BLD-MCNC: 14.2 G/DL (ref 11.7–15.7)
HOLD SPECIMEN: NORMAL
HOLD SPECIMEN: NORMAL
IMM GRANULOCYTES # BLD: 0 10E3/UL
IMM GRANULOCYTES NFR BLD: 1 %
INTERPRETATION ECG - MUSE: NORMAL
LYMPHOCYTES # BLD AUTO: 2.4 10E3/UL (ref 0.8–5.3)
LYMPHOCYTES NFR BLD AUTO: 36 %
MAGNESIUM SERPL-MCNC: 1.7 MG/DL (ref 1.7–2.3)
MCH RBC QN AUTO: 32.3 PG (ref 26.5–33)
MCHC RBC AUTO-ENTMCNC: 35.3 G/DL (ref 31.5–36.5)
MCV RBC AUTO: 92 FL (ref 78–100)
MONOCYTES # BLD AUTO: 0.4 10E3/UL (ref 0–1.3)
MONOCYTES NFR BLD AUTO: 5 %
NEUTROPHILS # BLD AUTO: 3.6 10E3/UL (ref 1.6–8.3)
NEUTROPHILS NFR BLD AUTO: 55 %
NRBC # BLD AUTO: 0 10E3/UL
NRBC BLD AUTO-RTO: 0 /100
P AXIS - MUSE: 48 DEGREES
PLATELET # BLD AUTO: 152 10E3/UL (ref 150–450)
POTASSIUM SERPL-SCNC: 4.3 MMOL/L (ref 3.4–5.3)
PR INTERVAL - MUSE: 152 MS
PROT SERPL-MCNC: 6.8 G/DL (ref 6.4–8.3)
QRS DURATION - MUSE: 82 MS
QT - MUSE: 388 MS
QTC - MUSE: 433 MS
R AXIS - MUSE: 24 DEGREES
RBC # BLD AUTO: 4.39 10E6/UL (ref 3.8–5.2)
SODIUM SERPL-SCNC: 138 MMOL/L (ref 135–145)
SYSTOLIC BLOOD PRESSURE - MUSE: NORMAL MMHG
T AXIS - MUSE: 21 DEGREES
TROPONIN T SERPL HS-MCNC: 13 NG/L
VENTRICULAR RATE- MUSE: 75 BPM
WBC # BLD AUTO: 6.6 10E3/UL (ref 4–11)

## 2025-07-21 PROCEDURE — 99281 EMR DPT VST MAYX REQ PHY/QHP: CPT

## 2025-07-21 PROCEDURE — 36415 COLL VENOUS BLD VENIPUNCTURE: CPT | Performed by: EMERGENCY MEDICINE

## 2025-07-21 PROCEDURE — 84484 ASSAY OF TROPONIN QUANT: CPT | Performed by: EMERGENCY MEDICINE

## 2025-07-21 PROCEDURE — 84155 ASSAY OF PROTEIN SERUM: CPT | Performed by: EMERGENCY MEDICINE

## 2025-07-21 PROCEDURE — 85025 COMPLETE CBC W/AUTO DIFF WBC: CPT | Performed by: EMERGENCY MEDICINE

## 2025-07-21 PROCEDURE — 83735 ASSAY OF MAGNESIUM: CPT | Performed by: EMERGENCY MEDICINE

## 2025-07-21 ASSESSMENT — COLUMBIA-SUICIDE SEVERITY RATING SCALE - C-SSRS
6. HAVE YOU EVER DONE ANYTHING, STARTED TO DO ANYTHING, OR PREPARED TO DO ANYTHING TO END YOUR LIFE?: NO
2. HAVE YOU ACTUALLY HAD ANY THOUGHTS OF KILLING YOURSELF IN THE PAST MONTH?: NO
1. IN THE PAST MONTH, HAVE YOU WISHED YOU WERE DEAD OR WISHED YOU COULD GO TO SLEEP AND NOT WAKE UP?: NO

## 2025-07-21 ASSESSMENT — ACTIVITIES OF DAILY LIVING (ADL)
ADLS_ACUITY_SCORE: 42

## 2025-07-21 NOTE — ED TRIAGE NOTES
Pt comes in with left axilla left chest pain started a day ago. Dyspnea with movement. Diarrhea for 2 days, has a colostomy bag.      Triage Assessment (Adult)       Row Name 07/21/25 1526          Triage Assessment    Airway WDL WDL        Respiratory WDL    Respiratory WDL X;expansion/retractions;rhythm/pattern     Rhythm/Pattern, Respiratory shortness of breath        Skin Circulation/Temperature WDL    Skin Circulation/Temperature WDL WDL        Cardiac WDL    Cardiac WDL X;chest pain        Chest Pain Assessment    Chest Pain Location arm, left     Character aching        Peripheral/Neurovascular WDL    Peripheral Neurovascular WDL WDL        Cognitive/Neuro/Behavioral WDL    Cognitive/Neuro/Behavioral WDL WDL

## 2025-07-26 ENCOUNTER — HEALTH MAINTENANCE LETTER (OUTPATIENT)
Age: 65
End: 2025-07-26

## (undated) DEVICE — DAVINCI ENDOWRIST STAPLER 45 SHEATH 410370

## (undated) DEVICE — SUCTION MANIFOLD NEPTUNE 2 SYS 1 PORT 702-025-000

## (undated) DEVICE — GLOVE PROTEXIS W/NEU-THERA 7.5  2D73TE75

## (undated) DEVICE — NDL COUNTER 40CT  31142311

## (undated) DEVICE — DEVICE SUTURE GRASPER TROCAR CLOSURE 14GA PMITCSG

## (undated) DEVICE — ESU GROUND PAD UNIVERSAL W/O CORD

## (undated) DEVICE — ESU GROUND PAD ADULT W/CORD E7507

## (undated) DEVICE — LINEN TOWEL PACK X5 5464

## (undated) DEVICE — PEN MARKING SKIN

## (undated) DEVICE — SOL WATER IRRIG 3000ML BAG 2B7117

## (undated) DEVICE — CATH FOLEY 18FR 5ML SILICONE LUBRI-SIL 175818

## (undated) DEVICE — SU ETHILON 2-0 FS 18" 664H

## (undated) DEVICE — SU VICRYL 0 UR-6 27" J603H

## (undated) DEVICE — BLADE KNIFE SURG 15 371115

## (undated) DEVICE — SOL NACL 0.9% IRRIG 500ML BOTTLE 2F7123

## (undated) DEVICE — GLOVE PROTEXIS BLUE W/NEU-THERA 7.5  2D73EB75

## (undated) DEVICE — DRAIN JACKSON PRATT RESERVOIR 100ML SU130-1305

## (undated) DEVICE — SU PDS II 2-0 CT-2 27"  Z333H

## (undated) DEVICE — DAVINCI XI DRAPE COLUMN 470341

## (undated) DEVICE — DRAIN JACKSON PRATT 15FR ROUND SU130-1323

## (undated) DEVICE — SUCTION TIP YANKAUER W/O VENT K86

## (undated) DEVICE — SU VICRYL 3-0 SH 27" J316H

## (undated) DEVICE — DAVINCI HOT SHEARS TIP COVER  400180

## (undated) DEVICE — GLOVE PROTEXIS MICRO 7.0  2D73PM70

## (undated) DEVICE — SU ETHILON 3-0 FS-1 18" 669H

## (undated) DEVICE — PAD CHUX UNDERPAD 30X30"

## (undated) DEVICE — SYR BULB IRRIG 50ML LATEX FREE 0035280

## (undated) DEVICE — PACK DAVINCI UROLOGY SBA15UDFSG

## (undated) DEVICE — PANTIES MESH LG/XLG 2PK 706M2

## (undated) DEVICE — ESU PENCIL W/HOLSTER E2350H

## (undated) DEVICE — PACK CYSTO CUSTOM ASC

## (undated) DEVICE — PACK SET-UP STD 9102

## (undated) DEVICE — LIGHT HANDLE X2

## (undated) DEVICE — CLIP APPLIER 11" MED LIGACLIP MCM20

## (undated) DEVICE — RETR RING LONE STAR 28.3X18.3CM W/CATH CLIPSX2 3308G

## (undated) DEVICE — RETR ELASTIC STAYS LONE STAR SHARP 5MM 8/PACK 3311-8G

## (undated) DEVICE — SOL WATER IRRIG 1000ML BOTTLE 2F7114

## (undated) DEVICE — DAVINCI XI GRASPER FENESTRATED TIP UP 8MM 470347

## (undated) DEVICE — DRAIN JACKSON PRATT 15FR ROUND SIL LF JP-2229

## (undated) DEVICE — SU ETHILON 4-0 PS-2 18" 1667G

## (undated) DEVICE — ESU HOLDER LAP INST DISP PURPLE LONG 330MM H-PRO-330

## (undated) DEVICE — SPONGE RAY-TEC 4X8" 7318

## (undated) DEVICE — DRAIN JACKSON PRATT CHANNEL 19FR ROUND HUBLESS SIL JP-2230

## (undated) DEVICE — DRAPE UNDER BUTTOCK 8483

## (undated) DEVICE — KIT PATIENT POSITIONING PIGAZZI LATEX FREE 40580

## (undated) DEVICE — GLOVE PROTEXIS BLUE W/NEU-THERA 6.5  2D73EB65

## (undated) DEVICE — DRSG ABDOMINAL 07 1/2X8" 7197D

## (undated) DEVICE — DAVINCI XI SEAL UNIVERSAL 5-8MM 470361

## (undated) DEVICE — SUCTION IRR STRYKERFLOW II W/TIP 250-070-520

## (undated) DEVICE — PREP SKIN SCRUB TRAY 4461A

## (undated) DEVICE — TUBING SUCTION MEDI-VAC 1/4"X20' N620A

## (undated) DEVICE — DAVINCI XI MONOPOLAR SCISSORS HOT SHEARS 8MM 470179

## (undated) DEVICE — NDL COUNTER 20CT 31142493

## (undated) DEVICE — SPONGE LAP 4X18" X8415

## (undated) DEVICE — SUCTION CANISTER MEDIVAC LINER 3000ML W/LID 65651-530

## (undated) DEVICE — DRSG XEROFORM 5X9" 8884431605

## (undated) DEVICE — DRAPE BREAST/CHEST 29420

## (undated) DEVICE — SU PROLENE 2-0 SHDA 48" 8533H

## (undated) DEVICE — SOL WATER IRRIG 500ML BOTTLE 2F7113

## (undated) DEVICE — DAVINCI XI REDUCER 8-12MM 470381

## (undated) DEVICE — DRAPE MAYO STAND 23X54 8337

## (undated) DEVICE — COVER CAMERA IN-LIGHT DISP LT-C02

## (undated) DEVICE — SPONGE LAP 18X18" X8435

## (undated) DEVICE — DAVINCI XI DRAPE ARM 470015

## (undated) DEVICE — GOWN IMPERVIOUS SPECIALTY XL/XLONG 39049

## (undated) DEVICE — DAVINCI XI ENDOWRIST STAPLER RELOAD 45MM GREEN 48445G

## (undated) DEVICE — LIGHT HANDLE X1 31140133

## (undated) DEVICE — SU VICRYL 0 TIE 12X18" J906G

## (undated) DEVICE — BLADE KNIFE SURG 10 371110

## (undated) DEVICE — DAVINCI SEAL CANNULA AND STPL 12MM 470380

## (undated) DEVICE — SU VICRYL 3-0 SH CR 8X18" J774

## (undated) DEVICE — SU PDS II 0 CTX 60" Z990G

## (undated) DEVICE — STPL SKIN 35W ROTATING HEAD PRW35

## (undated) DEVICE — TUBING CONMED AIRSEAL SMOKE EVAC INSUFFLATION ASM-EVAC

## (undated) DEVICE — NDL INSUFFLATION 13GA 120MM C2201

## (undated) DEVICE — SU MONOCRYL 3-0 PS-2 27" Y427H

## (undated) DEVICE — RETR RING LONE STAR 16.6X16.2CM 3715

## (undated) DEVICE — SU VICRYL 1 CT-1 27" UND J261H

## (undated) DEVICE — TAPE CLOTH ADHESIVE 3" LATEX FREE

## (undated) DEVICE — SU MONOCRYL 4-0 PS-2 18" UND Y496G

## (undated) DEVICE — DRAPE MINOR PROCEDURE LAP 29496

## (undated) DEVICE — GOWN LG DISP 9515

## (undated) DEVICE — PAD PERI W/WINGS 1580A

## (undated) DEVICE — DRSG TELFA 3X8" 1238

## (undated) DEVICE — VESSEL SEALER EXTEND

## (undated) DEVICE — ENDO TROCAR CONMED AIRSEAL BLADELESS 08X120MM IAS8-120LP

## (undated) DEVICE — DAVINCI XI NDL DRIVER LARGE 470006

## (undated) DEVICE — DAVINCI XI OBTURATOR BLADELESS 8MM 470359

## (undated) RX ORDER — ONDANSETRON 2 MG/ML
INJECTION INTRAMUSCULAR; INTRAVENOUS
Status: DISPENSED
Start: 2022-07-26

## (undated) RX ORDER — KETOROLAC TROMETHAMINE 30 MG/ML
INJECTION, SOLUTION INTRAMUSCULAR; INTRAVENOUS
Status: DISPENSED
Start: 2018-08-07

## (undated) RX ORDER — CIPROFLOXACIN 2 MG/ML
INJECTION, SOLUTION INTRAVENOUS
Status: DISPENSED
Start: 2018-08-07

## (undated) RX ORDER — LIDOCAINE HYDROCHLORIDE 20 MG/ML
INJECTION, SOLUTION EPIDURAL; INFILTRATION; INTRACAUDAL; PERINEURAL
Status: DISPENSED
Start: 2018-08-07

## (undated) RX ORDER — INDOCYANINE GREEN AND WATER 25 MG
KIT INJECTION
Status: DISPENSED
Start: 2018-08-07

## (undated) RX ORDER — CEFAZOLIN SODIUM 2 G/100ML
INJECTION, SOLUTION INTRAVENOUS
Status: DISPENSED
Start: 2018-08-07

## (undated) RX ORDER — ONDANSETRON 2 MG/ML
INJECTION INTRAMUSCULAR; INTRAVENOUS
Status: DISPENSED
Start: 2022-11-01

## (undated) RX ORDER — GENTAMICIN 40 MG/ML
INJECTION, SOLUTION INTRAMUSCULAR; INTRAVENOUS
Status: DISPENSED
Start: 2022-09-21

## (undated) RX ORDER — ONDANSETRON 2 MG/ML
INJECTION INTRAMUSCULAR; INTRAVENOUS
Status: DISPENSED
Start: 2018-08-07

## (undated) RX ORDER — DEXAMETHASONE SODIUM PHOSPHATE 4 MG/ML
INJECTION, SOLUTION INTRA-ARTICULAR; INTRALESIONAL; INTRAMUSCULAR; INTRAVENOUS; SOFT TISSUE
Status: DISPENSED
Start: 2018-08-07

## (undated) RX ORDER — GENTAMICIN 40 MG/ML
INJECTION, SOLUTION INTRAMUSCULAR; INTRAVENOUS
Status: DISPENSED
Start: 2022-09-23

## (undated) RX ORDER — PROPOFOL 10 MG/ML
INJECTION, EMULSION INTRAVENOUS
Status: DISPENSED
Start: 2022-07-26

## (undated) RX ORDER — DEXAMETHASONE SODIUM PHOSPHATE 4 MG/ML
INJECTION, SOLUTION INTRA-ARTICULAR; INTRALESIONAL; INTRAMUSCULAR; INTRAVENOUS; SOFT TISSUE
Status: DISPENSED
Start: 2022-07-26

## (undated) RX ORDER — FENTANYL CITRATE 50 UG/ML
INJECTION, SOLUTION INTRAMUSCULAR; INTRAVENOUS
Status: DISPENSED
Start: 2018-08-07

## (undated) RX ORDER — GENTAMICIN 40 MG/ML
INJECTION, SOLUTION INTRAMUSCULAR; INTRAVENOUS
Status: DISPENSED
Start: 2022-09-22

## (undated) RX ORDER — CELECOXIB 200 MG/1
CAPSULE ORAL
Status: DISPENSED
Start: 2018-08-07

## (undated) RX ORDER — OXYCODONE HCL 10 MG/1
TABLET, FILM COATED, EXTENDED RELEASE ORAL
Status: DISPENSED
Start: 2018-08-07

## (undated) RX ORDER — DEXAMETHASONE SODIUM PHOSPHATE 4 MG/ML
INJECTION, SOLUTION INTRA-ARTICULAR; INTRALESIONAL; INTRAMUSCULAR; INTRAVENOUS; SOFT TISSUE
Status: DISPENSED
Start: 2022-11-01

## (undated) RX ORDER — HEPARIN SODIUM 5000 [USP'U]/.5ML
INJECTION, SOLUTION INTRAVENOUS; SUBCUTANEOUS
Status: DISPENSED
Start: 2018-08-07

## (undated) RX ORDER — HYDROMORPHONE HYDROCHLORIDE 1 MG/ML
INJECTION, SOLUTION INTRAMUSCULAR; INTRAVENOUS; SUBCUTANEOUS
Status: DISPENSED
Start: 2018-08-07

## (undated) RX ORDER — GENTAMICIN 40 MG/ML
INJECTION, SOLUTION INTRAMUSCULAR; INTRAVENOUS
Status: DISPENSED
Start: 2022-08-23

## (undated) RX ORDER — ACETAMINOPHEN 325 MG/1
TABLET ORAL
Status: DISPENSED
Start: 2018-08-07

## (undated) RX ORDER — VECURONIUM BROMIDE 1 MG/ML
INJECTION, POWDER, LYOPHILIZED, FOR SOLUTION INTRAVENOUS
Status: DISPENSED
Start: 2018-08-07

## (undated) RX ORDER — GENTAMICIN 40 MG/ML
INJECTION, SOLUTION INTRAMUSCULAR; INTRAVENOUS
Status: DISPENSED
Start: 2022-10-06

## (undated) RX ORDER — GENTAMICIN 40 MG/ML
INJECTION, SOLUTION INTRAMUSCULAR; INTRAVENOUS
Status: DISPENSED
Start: 2022-08-24

## (undated) RX ORDER — FENTANYL CITRATE 50 UG/ML
INJECTION, SOLUTION INTRAMUSCULAR; INTRAVENOUS
Status: DISPENSED
Start: 2022-07-26

## (undated) RX ORDER — GENTAMICIN 40 MG/ML
INJECTION, SOLUTION INTRAMUSCULAR; INTRAVENOUS
Status: DISPENSED
Start: 2022-08-22

## (undated) RX ORDER — GABAPENTIN 300 MG/1
CAPSULE ORAL
Status: DISPENSED
Start: 2018-08-07

## (undated) RX ORDER — FENTANYL CITRATE 50 UG/ML
INJECTION, SOLUTION INTRAMUSCULAR; INTRAVENOUS
Status: DISPENSED
Start: 2022-11-01

## (undated) RX ORDER — BUPIVACAINE HYDROCHLORIDE 5 MG/ML
INJECTION, SOLUTION EPIDURAL; INTRACAUDAL
Status: DISPENSED
Start: 2018-08-07

## (undated) RX ORDER — FENTANYL CITRATE 50 UG/ML
INJECTION, SOLUTION INTRAMUSCULAR; INTRAVENOUS
Status: DISPENSED
Start: 2018-07-09

## (undated) RX ORDER — CEFAZOLIN SODIUM 1 G/3ML
INJECTION, POWDER, FOR SOLUTION INTRAMUSCULAR; INTRAVENOUS
Status: DISPENSED
Start: 2022-07-26

## (undated) RX ORDER — GENTAMICIN 40 MG/ML
INJECTION, SOLUTION INTRAMUSCULAR; INTRAVENOUS
Status: DISPENSED
Start: 2022-10-07

## (undated) RX ORDER — CEFAZOLIN SODIUM 2 G/50ML
SOLUTION INTRAVENOUS
Status: DISPENSED
Start: 2022-11-01

## (undated) RX ORDER — CEFAZOLIN SODIUM 1 G/3ML
INJECTION, POWDER, FOR SOLUTION INTRAMUSCULAR; INTRAVENOUS
Status: DISPENSED
Start: 2018-08-07

## (undated) RX ORDER — FENTANYL CITRATE 50 UG/ML
INJECTION, SOLUTION INTRAMUSCULAR; INTRAVENOUS
Status: DISPENSED
Start: 2019-07-15

## (undated) RX ORDER — BUPIVACAINE HYDROCHLORIDE 2.5 MG/ML
INJECTION, SOLUTION EPIDURAL; INFILTRATION; INTRACAUDAL
Status: DISPENSED
Start: 2023-02-24

## (undated) RX ORDER — GLYCOPYRROLATE 0.2 MG/ML
INJECTION, SOLUTION INTRAMUSCULAR; INTRAVENOUS
Status: DISPENSED
Start: 2018-08-07

## (undated) RX ORDER — NEOSTIGMINE METHYLSULFATE 1 MG/ML
VIAL (ML) INJECTION
Status: DISPENSED
Start: 2018-08-07

## (undated) RX ORDER — ACETAMINOPHEN 325 MG/1
TABLET ORAL
Status: DISPENSED
Start: 2022-11-01

## (undated) RX ORDER — LIDOCAINE HYDROCHLORIDE 20 MG/ML
INJECTION, SOLUTION EPIDURAL; INFILTRATION; INTRACAUDAL; PERINEURAL
Status: DISPENSED
Start: 2022-07-26

## (undated) RX ORDER — TRIAMCINOLONE ACETONIDE 40 MG/ML
INJECTION, SUSPENSION INTRA-ARTICULAR; INTRAMUSCULAR
Status: DISPENSED
Start: 2023-02-24

## (undated) RX ORDER — LIDOCAINE HYDROCHLORIDE 10 MG/ML
INJECTION, SOLUTION EPIDURAL; INFILTRATION; INTRACAUDAL; PERINEURAL
Status: DISPENSED
Start: 2023-02-24

## (undated) RX ORDER — GENTAMICIN 40 MG/ML
INJECTION, SOLUTION INTRAMUSCULAR; INTRAVENOUS
Status: DISPENSED
Start: 2022-10-05